# Patient Record
Sex: FEMALE | Race: BLACK OR AFRICAN AMERICAN | Employment: OTHER | URBAN - METROPOLITAN AREA
[De-identification: names, ages, dates, MRNs, and addresses within clinical notes are randomized per-mention and may not be internally consistent; named-entity substitution may affect disease eponyms.]

---

## 2017-01-23 ENCOUNTER — HOSPITAL ENCOUNTER (OUTPATIENT)
Dept: MAMMOGRAPHY | Age: 77
Discharge: HOME | End: 2017-01-23
Attending: INTERNAL MEDICINE
Payer: MEDICARE

## 2017-01-23 DIAGNOSIS — Z12.31 VISIT FOR SCREENING MAMMOGRAM: ICD-10-CM

## 2017-01-23 PROCEDURE — 77067 SCR MAMMO BI INCL CAD: CPT

## 2017-02-22 RX ORDER — LORAZEPAM 0.5 MG/1
0.5 TABLET ORAL
Qty: 30 TAB | Refills: 0 | Status: SHIPPED | OUTPATIENT
Start: 2017-02-22 | End: 2018-01-02 | Stop reason: SDUPTHER

## 2017-02-24 NOTE — TELEPHONE ENCOUNTER
The following prescription was called into pt's local pharmacy:    LORazepam (ATIVAN) 0.5 mg tablet [[id number]]   Order Details   Dose: 0.5 mg Route: Oral Frequency: BEDTIME PRN   Dispense Quantity:  30 Tab Refills:  0 Fills Remaining:  --           Sig: Take 1 Tab by mouth nightly as needed.  Max Daily Amount: 0.5 mg.          Written Date:  02/22/17 Expiration Date:  --     Start Date:  02/22/17 End Date:  --            Ordering Provider:  -- TERRY #:  -- NPI:  --    Authorizing Provider:  Prisca Daugherty MD TERRY #:  [id number] NPI:  [id number]       Original Order:  LORazepam (ATIVAN) 0.5 mg tablet [[id number]]      Pharmacy:  [facility] [address] [place] [address] TERRY #:  [id number]     Pharmacy Comments:  --          Quantity Remaining:  -- Quantity Filled:  --

## 2017-02-27 ENCOUNTER — TELEPHONE (OUTPATIENT)
Dept: INTERNAL MEDICINE CLINIC | Age: 77
End: 2017-02-27

## 2017-02-27 NOTE — TELEPHONE ENCOUNTER
Patient states she needs a call back to discuss her blood pressure reading this morning of 135/102. Please call.  Thank you

## 2017-02-27 NOTE — TELEPHONE ENCOUNTER
Called, spoke to pt. Two pt identifiers confirmed. Pt states having BP at 135/102. Pt states having some stress d/t loss of  last week. Pt states just taking BP and results were 131/81. Pt states taking amlodipine and metoprolol as prescribed. Pt advised to keep checking periodically and call if persistently elevated. Pt informed NOV 3/17/17. Pt verbalized understanding of information discussed w/ no further questions at this time.

## 2017-02-28 RX ORDER — METOPROLOL TARTRATE 50 MG/1
50 TABLET ORAL 2 TIMES DAILY
Qty: 180 TAB | Refills: 2 | Status: SHIPPED | OUTPATIENT
Start: 2017-02-28 | End: 2017-05-23 | Stop reason: SDUPTHER

## 2017-03-13 ENCOUNTER — LAB ONLY (OUTPATIENT)
Dept: INTERNAL MEDICINE CLINIC | Age: 77
End: 2017-03-13

## 2017-03-13 ENCOUNTER — HOSPITAL ENCOUNTER (OUTPATIENT)
Dept: LAB | Age: 77
Discharge: HOME | End: 2017-03-13
Payer: MEDICARE

## 2017-03-13 DIAGNOSIS — I25.10 CORONARY ARTERY DISEASE INVOLVING NATIVE CORONARY ARTERY OF NATIVE HEART WITHOUT ANGINA PECTORIS: ICD-10-CM

## 2017-03-13 DIAGNOSIS — E11.21 TYPE II DIABETES MELLITUS WITH NEPHROPATHY (HCC): ICD-10-CM

## 2017-03-13 DIAGNOSIS — E03.1 CONGENITAL HYPOTHYROIDISM WITHOUT GOITER: ICD-10-CM

## 2017-03-13 DIAGNOSIS — M81.0 OSTEOPOROSIS: ICD-10-CM

## 2017-03-13 DIAGNOSIS — E78.2 MIXED HYPERLIPIDEMIA: ICD-10-CM

## 2017-03-13 DIAGNOSIS — I50.32 DIASTOLIC CHF, CHRONIC (HCC): ICD-10-CM

## 2017-03-13 PROCEDURE — 83036 HEMOGLOBIN GLYCOSYLATED A1C: CPT

## 2017-03-13 PROCEDURE — 80053 COMPREHEN METABOLIC PANEL: CPT

## 2017-03-13 PROCEDURE — 36415 COLL VENOUS BLD VENIPUNCTURE: CPT

## 2017-03-13 PROCEDURE — 84443 ASSAY THYROID STIM HORMONE: CPT

## 2017-03-14 LAB
ALBUMIN SERPL-MCNC: 4.2 G/DL (ref 3.5–4.8)
ALBUMIN/GLOB SERPL: 1.4 {RATIO} (ref 1.2–2.2)
ALP SERPL-CCNC: 82 IU/L (ref 39–117)
ALT SERPL-CCNC: 15 IU/L (ref 0–32)
AST SERPL-CCNC: 19 IU/L (ref 0–40)
BILIRUB SERPL-MCNC: 0.5 MG/DL (ref 0–1.2)
BUN SERPL-MCNC: 16 MG/DL (ref 8–27)
BUN/CREAT SERPL: 22 (ref 11–26)
CALCIUM SERPL-MCNC: 9.5 MG/DL (ref 8.7–10.3)
CHLORIDE SERPL-SCNC: 103 MMOL/L (ref 96–106)
CO2 SERPL-SCNC: 21 MMOL/L (ref 18–29)
CREAT SERPL-MCNC: 0.74 MG/DL (ref 0.57–1)
EST. AVERAGE GLUCOSE BLD GHB EST-MCNC: 163 MG/DL
GLOBULIN SER CALC-MCNC: 3.1 G/DL (ref 1.5–4.5)
GLUCOSE SERPL-MCNC: 129 MG/DL (ref 65–99)
HBA1C MFR BLD: 7.3 % (ref 4.8–5.6)
POTASSIUM SERPL-SCNC: 4.4 MMOL/L (ref 3.5–5.2)
PROT SERPL-MCNC: 7.3 G/DL (ref 6–8.5)
SODIUM SERPL-SCNC: 145 MMOL/L (ref 134–144)
TSH SERPL DL<=0.005 MIU/L-ACNC: 3.77 UIU/ML (ref 0.45–4.5)

## 2017-03-17 ENCOUNTER — OFFICE VISIT (OUTPATIENT)
Dept: INTERNAL MEDICINE CLINIC | Age: 77
End: 2017-03-17

## 2017-03-17 VITALS
DIASTOLIC BLOOD PRESSURE: 85 MMHG | SYSTOLIC BLOOD PRESSURE: 139 MMHG | WEIGHT: 167.4 LBS | OXYGEN SATURATION: 99 % | HEART RATE: 82 BPM | BODY MASS INDEX: 31.6 KG/M2 | HEIGHT: 61 IN | TEMPERATURE: 97.5 F

## 2017-03-17 DIAGNOSIS — F51.01 PRIMARY INSOMNIA: ICD-10-CM

## 2017-03-17 DIAGNOSIS — M81.0 OSTEOPOROSIS: ICD-10-CM

## 2017-03-17 DIAGNOSIS — I10 ESSENTIAL HYPERTENSION, BENIGN: ICD-10-CM

## 2017-03-17 DIAGNOSIS — D50.8 OTHER IRON DEFICIENCY ANEMIA: ICD-10-CM

## 2017-03-17 DIAGNOSIS — F32.9 REACTIVE DEPRESSION: ICD-10-CM

## 2017-03-17 DIAGNOSIS — I25.10 CORONARY ARTERY DISEASE INVOLVING NATIVE CORONARY ARTERY OF NATIVE HEART WITHOUT ANGINA PECTORIS: ICD-10-CM

## 2017-03-17 DIAGNOSIS — E78.2 MIXED HYPERLIPIDEMIA: ICD-10-CM

## 2017-03-17 DIAGNOSIS — E03.1 CONGENITAL HYPOTHYROIDISM WITHOUT GOITER: ICD-10-CM

## 2017-03-17 DIAGNOSIS — E11.21 TYPE II DIABETES MELLITUS WITH NEPHROPATHY (HCC): Primary | ICD-10-CM

## 2017-03-17 DIAGNOSIS — I50.32 DIASTOLIC CHF, CHRONIC (HCC): ICD-10-CM

## 2017-03-17 DIAGNOSIS — I73.9 PVD (PERIPHERAL VASCULAR DISEASE) (HCC): ICD-10-CM

## 2017-03-17 NOTE — PROGRESS NOTES
HISTORY OF PRESENT ILLNESS  Contreras Rey is a 69 y.o. female. HPI   Last here 12/14/16.  Pt is here to f/u on chronic conditions    BP today is 143/72, will repeat this  She does note of increased stress with her 's death last month  BP at home running around 129/92  Continues metoprolol 50mg BID and norvasc 2.5mg daily  Recall had angioedema with lisinopril and benicar-HCT in the past  Pt called in 2/17 and stated her BP had been high a few times from her 's passing    Pt brought in her BS log for the last three months- reviewed with her   BS at home running around 77, 80s, 90s, 105, 107, 122, lowest 77 in February  In January- BS running 90s-118, in December low of 68 but not lows since this  Evening BS checked infrequently runs 120-150s, a few in the 200 range   Last visit, decreased her lantus from 22 to 20 U qhs and humalog from 8 U to 4 U with dinner  Continues victoza 1.8mg daily and metformin 1000mg BID  She also takes ASA 81mg daily  Recall issues with hypoglycemia in the past      Reviewed last labs 3/17  a1c stable overall and at goal for her- 7.5 or better      Continues on prilosec daily for reflux, works well, no breakthrough    Wt is stable since last visit  Discussed diet and weight loss     Pt follows annually with Dr. Gerhard Cranker ([address] [place] [address]) for her PAD  Reviewed reports from 3/01/17: normal toe pressures, normal blood flow in extremities at rest   She saw him 2/17 and all was stable, will f/u next year       Continues on synthroid 88 mcg daily      Reviewed mammogram 1/23/17: negative     Continues on lipitor 80mg-max dose daily for cholesterol   Per pt, Mele Bonilla wanted her to add zetia to her cholesterol medications  She will address further with him at her f/u with this physician in April       Pt follows with Dr. Mele Bonilla (cardio), last saw in 8/16  She follows q6 months, has appt pending 4/03/17        Continues lexapro 5mg daily for depression and anxiety  Pt states she does not remember to take this on daily basis- addressed with her   Pt has been feeling more sad and down recently  Her  passed away last month in 2/17   She does have some family members in the area  She states he passed away on her birthday and this was upsetting for her   Pt thinks she has been coping well overall   Pt does have things to do and keeps herself busy   She is working on finalizing issues from her 's death   She does have ativan at home to use prn for sleep and has been using this every other day with improvement to her sleeping       ACP: SDM is her daughter, Diandra Huynh  Pt brought in this paperwork today-       PREVENTIVE:    Colonoscopy: 8/02/13, Dr. Bushra Buitrago, repeat 5 years  Pap: 12/14, declines further   Mammogram: 1/23/17 negative  Dexa: 12/07/15, stopped fosamax, improved   Tdap: 1/04/2016  Pneumovax: 10/10/2010  Lhygnke77: never had?   Zostavax: 10/10/2016  Flu shot: 10/10/2016  Foot exam: 8/29/16 normal  Microalbumin: 12/15, 3/17 ordered   A1c: 8/11 6.2, 11/11 6.6, 1/24/12 6.3 , 5/12 7.2, 9/12 6.5, 12/12 6.9, 3/13 7.1, 6/13 7.0, 9/13 7.2, 2/14 7.7, 5/14 7.4, 7/14 7.8, 10/14 8.0 , 12/14 8.1, 4/15 7.7, 7/15 8.3, 9/15 7.3, 12/15 7.2 , 3/16 9.1, 8/16 7.6, 12/16 7.2, 3/17 7.3  Eye exam: Dr. Balderas Level, 8/16  Lipids: 8/16             Patient Active Problem List    Diagnosis Date Noted    Encounter for long-term (current) use of insulin ([facility] 04/04/2016    Type II diabetes mellitus with nephropathy ([facility] 01/04/2016    Lower GI bleeding 10/12/2015    Diverticulosis of intestine with bleeding 10/11/2015    Anemia 78/82/7807    Diastolic CHF, chronic ([address] 10/11/2015    GIB (gastrointestinal bleeding) 10/08/2015    ACP (advance care planning) 09/08/2015    Osteoporosis 09/10/2013    Bunion 10/15/2012    Essential hypertension, benign 05/17/2012    Mixed hyperlipidemia 05/17/2012    Postsurgical percutaneous transluminal coronary angioplasty status 05/17/2012    Old myocardial infarction 05/17/2012    Postsurgical aortocoronary bypass status 05/17/2012    S/P CABG x 3 11/11/2011    ASHD (arteriosclerotic heart disease) 11/10/2011    Heart failure ([facility] 10/18/2011    Hyperlipidemia 10/18/2011    Influenza 01/19/2011    CAD (coronary artery disease) 11/18/2009    Hypothyroidism 11/18/2009    PVD (peripheral vascular disease) ([facility] 11/18/2009     Current Outpatient Prescriptions   Medication Sig Dispense Refill    metoprolol tartrate (LOPRESSOR) 50 mg tablet Take 1 Tab by mouth two (2) times a day. 180 Tab 2    LORazepam (ATIVAN) 0.5 mg tablet Take 1 Tab by mouth nightly as needed. Max Daily Amount: 0.5 mg. 30 Tab 0    triamcinolone acetonide (KENALOG) 0.1 % topical cream Apply  to affected area two (2) times a day. use thin layer, Monday -Friday only 45 g 0    omeprazole (PRILOSEC) 40 mg capsule TAKE 1 CAPSULE BY MOUTH DAILY 90 Cap 1    metFORMIN (GLUCOPHAGE) 1,000 mg tablet TAKE 1 TABLET BY MOUTH TWICE DAILY 180 Tab 1    levothyroxine (SYNTHROID) 88 mcg tablet TAKE 1 TABLET BY MOUTH EVERY DAY BEFORE BREAKFAST 90 Tab 1    amLODIPine (NORVASC) 2.5 mg tablet TAKE 1 TABLET BY MOUTH DAILY 90 Tab 1    escitalopram oxalate (LEXAPRO) 5 mg tablet TAKE 1 TABLET BY MOUTH DAILY 90 Tab 2    INSULIN LISPRO (HUMALOG SC) 8 Units by SubCUTAneous route daily.  insulin glargine (LANTUS SOLOSTAR) 100 unit/mL (3 mL) pen 22 Units by SubCUTAneous route nightly.  glucose blood VI test strips (PRODIGY NO CODING) strip Prodigy Auto code test strips. Use to check blood sugar four times daily. Dx: E11.21 400 Strip 3    Insulin Needles, Disposable, (BD INSULIN PEN NEEDLE UF SHORT) 31 gauge x 5/16\" ndle Use as directed daily. Dx: E11.21 100 Pen Needle 11    Liraglutide (VICTOZA 3-GEE) 0.6 mg/0.1 mL (18 mg/3 mL) sub-q pen 0.3 mL by SubCUTAneous route daily.  Daily 9 Syringe 3    EPIPEN 2-GEE 0.3 mg/0.3 mL (1:1,000) injection   0    atorvastatin (LIPITOR) 80 mg tablet TAKE 1 TABLET NIGHTLY 90 Tab 3    aspirin 81 mg chewable tablet Take 1 Tab by mouth daily. 90 Tab 3    glucose blood VI test strips (ASCENSIA AUTODISC VI, ONE TOUCH ULTRA TEST VI) strip Qid 100 Strip 11    ferrous sulfate 325 mg (65 mg iron) tablet Take 1 Tab by mouth two (2) times daily (with meals). 60 Tab 1    polyethylene glycol (MIRALAX) 17 gram packet Take 1 Packet by mouth daily. 30 Each 1    cyanocobalamin (VITAMIN B-12) 1,000 mcg tablet Take 1,000 mcg by mouth daily. Past Surgical History:   Procedure Laterality Date    CARDIAC SURG PROCEDURE UNLIST      cabg x 3    HX CORONARY ARTERY BYPASS GRAFT  11/11/11    3 vessel    HX GYN      hysterectomy    HX UROLOGICAL      kidney stone    VASCULAR SURGERY PROCEDURE UNLIST      Stents put in right leg      Lab Results  Component Value Date/Time   WBC 7.2 03/31/2016 09:28 AM   Hemoglobin (POC) 12.9 03/02/2011 12:21 PM   HGB 13.7 03/31/2016 09:28 AM   HCT 41.7 03/31/2016 09:28 AM   PLATELET 490 37/10/9946 09:28 AM   MCV 85 03/31/2016 09:28 AM       Lab Results  Component Value Date/Time   Cholesterol, total 182 08/25/2016 09:47 AM   HDL Cholesterol 59 08/25/2016 09:47 AM   LDL, calculated 102 08/25/2016 09:47 AM   Triglyceride 104 08/25/2016 09:47 AM   CHOL/HDL Ratio 3.2 09/14/2010 08:42 AM       Lab Results  Component Value Date/Time   GFR est AA 91 03/13/2017 09:39 AM   GFR est non-AA 79 03/13/2017 09:39 AM   Creatinine 0.74 03/13/2017 09:39 AM   BUN 16 03/13/2017 09:39 AM   Sodium 145 03/13/2017 09:39 AM   Potassium 4.4 03/13/2017 09:39 AM   Chloride 103 03/13/2017 09:39 AM   CO2 21 03/13/2017 09:39 AM         Review of Systems   Respiratory: Negative for shortness of breath. Cardiovascular: Negative for chest pain. Physical Exam   Constitutional: She is oriented to person, place, and time. She appears well-developed and well-nourished. No distress. HENT:   Head: Normocephalic and atraumatic.    Mouth/Throat: Oropharynx is clear and moist. No oropharyngeal exudate. Eyes: Conjunctivae and EOM are normal. Right eye exhibits no discharge. Left eye exhibits no discharge. Neck: Normal range of motion. Neck supple. Cardiovascular: Normal rate, regular rhythm, normal heart sounds and intact distal pulses. Exam reveals no gallop and no friction rub. No murmur heard. Pulmonary/Chest: Effort normal and breath sounds normal. No respiratory distress. She has no wheezes. She has no rales. She exhibits no tenderness. Musculoskeletal: Normal range of motion. She exhibits no edema, tenderness or deformity. Lymphadenopathy:     She has no cervical adenopathy. Neurological: She is alert and oriented to person, place, and time. Coordination normal.   Skin: Skin is warm and dry. No rash noted. She is not diaphoretic. No erythema. No pallor. Psychiatric: She has a normal mood and affect. Her behavior is normal.       ASSESSMENT and PLAN    ICD-10-CM ICD-9-CM    1. Type II diabetes mellitus with nephropathy (HCC)    a1c controlled for her age on lantus 20 U qhs and humalog 4 U with dinner, victoza 1.8mg daily and metformin BID, goal a1c 7.5 or better, no additional changes today E11.21 250.40 AMB POC URINE, MICROALBUMIN, SEMIQUANT (3 RESULTS)     583.81 LIPID PANEL      METABOLIC PANEL, COMPREHENSIVE      HEMOGLOBIN A1C WITH EAG      TSH 3RD GENERATION   2. Osteoporosis    Due for repeat DEXA in December, currently on vit D, previously on fosamax M81.0 733.00 LIPID PANEL      METABOLIC PANEL, COMPREHENSIVE      HEMOGLOBIN A1C WITH EAG      TSH 3RD GENERATION   3. Diastolic CHF, chronic ([facility]    Follows with cardiology, Dr. Halley Yap, has appt pending to see him coming up in April. Stable, no signs or sx of active CAD I50.32 428.32 LIPID PANEL     760.3 METABOLIC PANEL, COMPREHENSIVE      HEMOGLOBIN A1C WITH EAG      TSH 3RD GENERATION   4.  Coronary artery disease involving native coronary artery of native heart without angina pectoris    Stable, sees Haim     I25.10 414.01 LIPID PANEL      METABOLIC PANEL, COMPREHENSIVE      HEMOGLOBIN A1C WITH EAG      TSH 3RD GENERATION   5. Congenital hypothyroidism without goiter    On synthroid 88mcg daily, controlled  E03.1 243 LIPID PANEL      METABOLIC PANEL, COMPREHENSIVE      HEMOGLOBIN A1C WITH EAG      TSH 3RD GENERATION   6. Mixed hyperlipidemia    On max dose lipitor, LDL close to goal. Dr. Mauricio Simon may be adding zetia to cholesterol medication if he prefers LDL less than 70, I addressed this with her. E78.2 272.2 LIPID PANEL      METABOLIC PANEL, COMPREHENSIVE      HEMOGLOBIN A1C WITH EAG      TSH 3RD GENERATION   7. Essential hypertension, benign    Initial BP mildly elevated, repeat BP improved. Continue metoprolol BID and norvasc 2.5mg daily I10 401.1 LIPID PANEL      METABOLIC PANEL, COMPREHENSIVE      HEMOGLOBIN A1C WITH EAG      TSH 3RD GENERATION   8. Other iron deficiency anemia    Mild, monitor CBC D50.8  LIPID PANEL      METABOLIC PANEL, COMPREHENSIVE      HEMOGLOBIN A1C WITH EAG      TSH 3RD GENERATION   9. PVD (peripheral vascular disease) ([facility])    Sees Dr Dk Calabrese annually, UTD I73.9 443.9 LIPID PANEL      METABOLIC PANEL, COMPREHENSIVE      HEMOGLOBIN A1C WITH EAG      TSH 3RD GENERATION   10. Reactive depression    Currently only taking her lexapro sporadically, discussed taking this every day to improve her sx F32.9 300.4 LIPID PANEL      METABOLIC PANEL, COMPREHENSIVE      HEMOGLOBIN A1C WITH EAG      TSH 3RD GENERATION   11. Primary insomnia    Has ativan which she takes qhs and this helps. F51.01 307.42 LIPID PANEL      METABOLIC PANEL, COMPREHENSIVE      HEMOGLOBIN A1C WITH EAG      TSH 3RD GENERATION            Written by Sloan De La Paz, as dictated by Tom Browne MD.    Current diagnosis and concerns discussed with pt at length.  Understands risks and benefits or current treatment plan and medications and accepts the treatment and medication with any possible risks.   Pt asks appropriate questions which were answered.   Pt instructed to call with any concerns or problems.

## 2017-03-17 NOTE — MR AVS SNAPSHOT
Visit Information Date & Time Provider Department Dept. Phone Encounter #  
 3/17/2017 11:15 AM Brianne West, [address] [place] [address],[address] [phone] [id number] Follow-up Instructions Return in about 3 months (around 6/17/2017). Your Appointments 4/3/2017 10:45 AM  
6 MONTH with Valdemar Lopez MD  
[facility] [address] [facility]) Appt Note: .; 2-28-17 pt called to cancell appt will cb ltr to resched-lj 2-28-17; 6 mo resched 3-8-17  
 [id number] [facility]  
[phone] [id number] [facility] Upcoming Health Maintenance Date Due Pneumococcal 65+ Low/Medium Risk (2 of 2 - PPSV23) 10/10/2015 EYE EXAM RETINAL OR DILATED Q1 8/5/2016 MICROALBUMIN Q1 12/30/2016 GLAUCOMA SCREENING Q2Y 8/5/2017 LIPID PANEL Q1 8/25/2017 FOOT EXAM Q1 8/29/2017 HEMOGLOBIN A1C Q6M 9/13/2017 MEDICARE YEARLY EXAM 12/15/2017 COLONOSCOPY 8/2/2018 DTaP/Tdap/Td series (2 - Td) 1/4/2026 Allergies as of 3/17/2017  Review Complete On: 3/17/2017 By: Mariana Krishnamurthy Severity Noted Reaction Type Reactions Ace Inhibitors  11/18/2009    Swelling Arb-angiotensin Receptor Antagonist  02/01/2016    Swelling Lisinopril  11/10/2011    Swelling Plavix [Clopidogrel]  11/18/2009    Other (comments) Excessive bleeding Potassium  11/25/2011   Intolerance Nausea and Vomiting Potassium containing oral products are not well tolerated by patient Current Immunizations  Reviewed on 10/14/2015 Name Date Influenza Vaccine 10/10/2016, 10/7/2014 Influenza Vaccine (Quad) PF 9/8/2015 Influenza Vaccine Split 9/6/2012, 10/18/2011 Pneumococcal Vaccine (Unspecified Type) 10/10/2010 Tdap 1/4/2016 Zoster Vaccine, Live 10/10/2016 Not reviewed this visit You Were Diagnosed With   
  
 Codes Comments Type II diabetes mellitus with nephropathy ([facility]    -  Primary ICD-10-CM: E11.21 
ICD-9-CM: 250.40, 583.81 Osteoporosis     ICD-10-CM: M81.0 ICD-9-CM: 733.00 Diastolic CHF, chronic (HCC)     ICD-10-CM: I50.32 
ICD-9-CM: 428.32, 428.0 Coronary artery disease involving native coronary artery of native heart without angina pectoris     ICD-10-CM: I25.10 ICD-9-CM: 414.01 Congenital hypothyroidism without goiter     ICD-10-CM: E03.1 ICD-9-CM: 657 Mixed hyperlipidemia     ICD-10-CM: E78.2 ICD-9-CM: 272.2 Essential hypertension, benign     ICD-10-CM: I10 
ICD-9-CM: 401.1 Other iron deficiency anemia     ICD-10-CM: D50.8   
 PVD (peripheral vascular disease) ([facility]     ICD-10-CM: I73.9 ICD-9-CM: 443.9 Reactive depression     ICD-10-CM: F32.9 ICD-9-CM: 300.4 Primary insomnia     ICD-10-CM: F51.01 
ICD-9-CM: 307.42 Vitals BP Pulse Temp Height(growth percentile) Weight(growth percentile) SpO2  
 143/72 (BP 1 Location: Right arm, BP Patient Position: Sitting) 82 97.5 °F (36.4 °C) (Oral) 5' 1\" (1.549 m) 167 lb 6.4 oz (75.9 kg) 99% BMI OB Status Smoking Status 31.63 kg/m2 Hysterectomy Former Smoker BMI and BSA Data Body Mass Index Body Surface Area  
 31.63 kg/m 2 1.81 m 2 Preferred Pharmacy Pharmacy Name Phone [address] [facility], [facility] Marylin Gorman [phone] Your Updated Medication List  
  
   
This list is accurate as of: 3/17/17 11:40 AM.  Always use your most recent med list. amLODIPine 2.5 mg tablet Commonly known as:  Marissa Pace TAKE 1 TABLET BY MOUTH DAILY  
  
 aspirin 81 mg chewable tablet Take 1 Tab by mouth daily. atorvastatin 80 mg tablet Commonly known as:  LIPITOR  
TAKE 1 TABLET NIGHTLY  
  
 EPIPEN 2-GEE 0.3 mg/0.3 mL injection Generic drug:  EPINEPHrine  
  
 escitalopram oxalate 5 mg tablet Commonly known as:  Pete Cox TAKE 1 TABLET BY MOUTH DAILY ferrous sulfate 325 mg (65 mg iron) tablet Take 1 Tab by mouth two (2) times daily (with meals). * glucose blood VI test strips strip Commonly known as:  ASCENSIA AUTODISC VI, ONE TOUCH ULTRA TEST VI Qid * glucose blood VI test strips strip Commonly known as:  PRODIGY NO CODING Prodigy Auto code test strips. Use to check blood sugar four times daily. Dx: E11.21  
  
 HUMALOG SC  
8 Units by SubCUTAneous route daily. insulin glargine 100 unit/mL (3 mL) pen Commonly known as:  LANTUS SOLOSTAR  
22 Units by SubCUTAneous route nightly. Insulin Needles (Disposable) 31 gauge x 5/16\" Ndle Commonly known as:  BD INSULIN PEN NEEDLE UF SHORT Use as directed daily. Dx: E11.21  
  
 levothyroxine 88 mcg tablet Commonly known as:  SYNTHROID  
TAKE 1 TABLET BY MOUTH EVERY DAY BEFORE BREAKFAST Liraglutide 0.6 mg/0.1 mL (18 mg/3 mL) sub-q pen Commonly known as:  VICTOZA 3-GEE  
0.3 mL by SubCUTAneous route daily. Daily LORazepam 0.5 mg tablet Commonly known as:  ATIVAN Take 1 Tab by mouth nightly as needed. Max Daily Amount: 0.5 mg.  
  
 metFORMIN 1,000 mg tablet Commonly known as:  GLUCOPHAGE  
TAKE 1 TABLET BY MOUTH TWICE DAILY  
  
 metoprolol tartrate 50 mg tablet Commonly known as:  LOPRESSOR Take 1 Tab by mouth two (2) times a day. omeprazole 40 mg capsule Commonly known as:  PRILOSEC  
TAKE 1 CAPSULE BY MOUTH DAILY polyethylene glycol 17 gram packet Commonly known as:  Waymond Gee Take 1 Packet by mouth daily. triamcinolone acetonide 0.1 % topical cream  
Commonly known as:  KENALOG Apply  to affected area two (2) times a day. use thin layer, Monday -Friday only VITAMIN B-12 1,000 mcg tablet Generic drug:  cyanocobalamin Take 1,000 mcg by mouth daily. * Notice: This list has 2 medication(s) that are the same as other medications prescribed for you.  Read the directions carefully, and ask your doctor or other care provider to review them with you. We Performed the Following AMB POC URINE, MICROALBUMIN, SEMIQUANT (3 RESULTS) [54350 CPT(R)] Follow-up Instructions Return in about 3 months (around 6/17/2017). To-Do List   
 03/23/2017 Lab:  HEMOGLOBIN A1C WITH EAG   
  
 03/23/2017 Lab:  LIPID PANEL   
  
 03/23/2017 Lab:  METABOLIC PANEL, COMPREHENSIVE   
  
 03/23/2017 Lab:  TSH 3RD GENERATION Introducing [facility]! Jennifer Dela Cruz introduces [employer] patient portal. Now you can access parts of your medical record, email your doctor's office, and request medication refills online. 1. In your internet browser, go to [email or web address] 2. Click on the First Time User? Click Here link in the Sign In box. You will see the New Member Sign Up page. 3. Enter your [employer] Access Code exactly as it appears below. You will not need to use this code after youve completed the sign-up process. If you do not sign up before the expiration date, you must request a new code. · [employer] Access Code: [email or web address] Expires: 6/15/2017 11:40 AM 
 
4. Enter the last four digits of your Social Security Number (xxxx) and Date of Birth (mm/dd/yyyy) as indicated and click Submit. You will be taken to the next sign-up page. 5. Create a [employer]il ID. This will be your [employer] login ID and cannot be changed, so think of one that is secure and easy to remember. 6. Create a [employer] password. You can change your password at any time. 7. Enter your Password Reset Question and Answer. This can be used at a later time if you forget your password. 8. Enter your e-mail address. You will receive e-mail notification when new information is available in [address] 9. Click Sign Up. You can now view and download portions of your medical record. 10. Click the Download Summary menu link to download a portable copy of your medical information. If you have questions, please visit the Frequently Asked Questions section of the [employer] website. Remember, [employer] is NOT to be used for urgent needs. For medical emergencies, dial 911. Now available from your iPhone and Android! Please provide this summary of care documentation to your next provider. Your primary care clinician is listed as Joaquin Irby. If you have any questions after today's visit, please call [phone].

## 2017-03-17 NOTE — ACP (ADVANCE CARE PLANNING)
Patient's signed ACP document received. Copy made in order to scan in to record and original returned to patient.

## 2017-03-27 RX ORDER — PEN NEEDLE, DIABETIC 31 GX5/16"
NEEDLE, DISPOSABLE MISCELLANEOUS
Qty: 180 PEN NEEDLE | Refills: 12 | Status: SHIPPED | OUTPATIENT
Start: 2017-03-27 | End: 2017-07-10 | Stop reason: SDUPTHER

## 2017-04-12 RX ORDER — ATORVASTATIN CALCIUM 80 MG/1
TABLET, FILM COATED ORAL
Qty: 90 TAB | Refills: 3 | Status: SHIPPED | OUTPATIENT
Start: 2017-04-12 | End: 2018-04-25 | Stop reason: SDUPTHER

## 2017-04-28 ENCOUNTER — OFFICE VISIT (OUTPATIENT)
Dept: CARDIOLOGY CLINIC | Age: 77
End: 2017-04-28

## 2017-04-28 VITALS
RESPIRATION RATE: 16 BRPM | HEART RATE: 84 BPM | SYSTOLIC BLOOD PRESSURE: 126 MMHG | OXYGEN SATURATION: 97 % | WEIGHT: 165.8 LBS | HEIGHT: 61 IN | DIASTOLIC BLOOD PRESSURE: 82 MMHG | BODY MASS INDEX: 31.3 KG/M2

## 2017-04-28 DIAGNOSIS — I50.32 DIASTOLIC CHF, CHRONIC (HCC): ICD-10-CM

## 2017-04-28 DIAGNOSIS — I10 ESSENTIAL HYPERTENSION, BENIGN: ICD-10-CM

## 2017-04-28 DIAGNOSIS — E78.5 HYPERLIPIDEMIA, UNSPECIFIED HYPERLIPIDEMIA TYPE: ICD-10-CM

## 2017-04-28 DIAGNOSIS — I25.10 CORONARY ARTERY DISEASE INVOLVING NATIVE CORONARY ARTERY OF NATIVE HEART WITHOUT ANGINA PECTORIS: Primary | ICD-10-CM

## 2017-04-28 DIAGNOSIS — E03.9 HYPOTHYROIDISM, UNSPECIFIED TYPE: ICD-10-CM

## 2017-04-28 DIAGNOSIS — I25.2 OLD MYOCARDIAL INFARCTION: ICD-10-CM

## 2017-04-28 DIAGNOSIS — Z95.1 S/P CABG X 3: ICD-10-CM

## 2017-04-28 DIAGNOSIS — I73.9 PVD (PERIPHERAL VASCULAR DISEASE) (HCC): ICD-10-CM

## 2017-04-28 DIAGNOSIS — E11.21 TYPE II DIABETES MELLITUS WITH NEPHROPATHY (HCC): ICD-10-CM

## 2017-04-28 RX ORDER — EZETIMIBE 10 MG/1
10 TABLET ORAL DAILY
Qty: 90 TAB | Refills: 3 | Status: SHIPPED | OUTPATIENT
Start: 2017-04-28 | End: 2017-05-10 | Stop reason: ALTCHOICE

## 2017-04-28 NOTE — PROGRESS NOTES
Subjective/HPI:     Maryjo Lau is a 69 y.o. female is here for f/u appt. The patient denies chest pain/worsening shortness of breath, orthopnea, PND, LE edema, palpitations, syncope, presyncope or fatigue.          PCP Provider  Reilly Ramírez MD  Past Medical History:   Diagnosis Date    CAD (coronary artery disease)     cabg     Diabetes ([facility]     Diverticulosis     Endocrine disease     thyroid    GERD (gastroesophageal reflux disease)     Heart disease     Heart failure ([facility] 10/18/2011    Hypertension     Other ill-defined conditions     kidney stones    Postsurgical percutaneous transluminal coronary angioplasty status 5/17/2012    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    [facility]    Sleeping difficulty     Teeth decayed     Thyroid disease     Weakness       Past Surgical History:   Procedure Laterality Date    CARDIAC SURG PROCEDURE UNLIST      cabg x 3    HX CORONARY ARTERY BYPASS GRAFT  11/11/11    3 vessel    HX GYN      hysterectomy    HX UROLOGICAL      kidney stone    VASCULAR SURGERY PROCEDURE UNLIST      Stents put in right leg     Allergies   Allergen Reactions    Ace Inhibitors Swelling    Arb-Angiotensin Receptor Antagonist Swelling    Lisinopril Swelling    Plavix [Clopidogrel] Other (comments)     Excessive bleeding    Potassium Nausea and Vomiting     Potassium containing oral products are not well tolerated by patient      Family History   Problem Relation Age of Onset    Diabetes Mother     Heart Disease Mother     Diabetes Brother     Heart Disease Brother     Mental Retardation Brother     Hypertension Brother     Other Father     Cancer Sister     Diabetes Brother     Heart Disease Brother     Diabetes Brother     Breast Cancer Daughter       Current Outpatient Prescriptions   Medication Sig    atorvastatin (LIPITOR) 80 mg tablet TAKE 1 TABLET NIGHTLY    BD INSULIN PEN NEEDLE UF SHORT 31 gauge x 5/16\" ndle USE AS DIRECTED DAILY    metoprolol tartrate (LOPRESSOR) 50 mg tablet Take 1 Tab by mouth two (2) times a day.  LORazepam (ATIVAN) 0.5 mg tablet Take 1 Tab by mouth nightly as needed. Max Daily Amount: 0.5 mg.    triamcinolone acetonide (KENALOG) 0.1 % topical cream Apply  to affected area two (2) times a day. use thin layer, Monday -Friday only    omeprazole (PRILOSEC) 40 mg capsule TAKE 1 CAPSULE BY MOUTH DAILY    metFORMIN (GLUCOPHAGE) 1,000 mg tablet TAKE 1 TABLET BY MOUTH TWICE DAILY    levothyroxine (SYNTHROID) 88 mcg tablet TAKE 1 TABLET BY MOUTH EVERY DAY BEFORE BREAKFAST    amLODIPine (NORVASC) 2.5 mg tablet TAKE 1 TABLET BY MOUTH DAILY    escitalopram oxalate (LEXAPRO) 5 mg tablet TAKE 1 TABLET BY MOUTH DAILY    INSULIN LISPRO (HUMALOG SC) 8 Units by SubCUTAneous route daily.  insulin glargine (LANTUS SOLOSTAR) 100 unit/mL (3 mL) pen 22 Units by SubCUTAneous route nightly.  glucose blood VI test strips (PRODIGY NO CODING) strip Prodigy Auto code test strips. Use to check blood sugar four times daily. Dx: E11.21    Liraglutide (VICTOZA 3-GEE) 0.6 mg/0.1 mL (18 mg/3 mL) sub-q pen 0.3 mL by SubCUTAneous route daily. Daily    aspirin 81 mg chewable tablet Take 1 Tab by mouth daily.  glucose blood VI test strips (ASCENSIA AUTODISC VI, ONE TOUCH ULTRA TEST VI) strip Qid    cyanocobalamin (VITAMIN B-12) 1,000 mcg tablet Take 1,000 mcg by mouth daily.  EPIPEN 2-GEE 0.3 mg/0.3 mL (1:1,000) injection     ferrous sulfate 325 mg (65 mg iron) tablet Take 1 Tab by mouth two (2) times daily (with meals).  polyethylene glycol (MIRALAX) 17 gram packet Take 1 Packet by mouth daily. No current facility-administered medications for this visit.        Vitals:    04/28/17 1031 04/28/17 1040   BP: 128/80 126/82   Pulse: 84    Resp: 16    SpO2: 97%    Weight: 165 lb 12.8 oz (75.2 kg)    Height: 5' 1\" (1.549 m)      Social History     Social History    Marital status:      Spouse name: N/A    Number of children: N/A    Years of education: N/A     Occupational History    Not on file. Social History Main Topics    Smoking status: Former Smoker     Packs/day: 0.50     Years: 40.00     Quit date: 9/20/2010    Smokeless tobacco: Never Used    Alcohol use No    Drug use: No    Sexual activity: No     Other Topics Concern    Not on file     Social History Narrative       I have reviewed the nurses notes, vitals, problem list, allergy list, medical history, family, social history and medications. Review of Symptoms:    General: Pt denies excessive weight gain or loss. Pt is able to conduct ADL's  HEENT: Denies blurred vision, headaches, epistaxis and difficulty swallowing. Respiratory: Denies worsening shortness of breath, STOUT, wheezing or stridor. Cardiovascular: Denies precordial pain, palpitations, edema or PND  Gastrointestinal: Denies poor appetite, indigestion, abdominal pain or blood in stool  Urinary: Denies dysuria, pyuria  Musculoskeletal: Denies pain or swelling from muscles or joints  Neurologic: Denies tremor, paresthesias, or sensory motor disturbance  Skin: Denies rash, itching or texture change. Psych: Denies depression        Physical Exam:      General: Well developed, in no acute distress, cooperative and alert  HEENT: No carotid bruits, no JVD, trach is midline. Neck Supple, PEERL, EOM intact. Heart:  Normal S1/S2 negative S3 or S4. Regular, no murmur, gallop or rub.   Respiratory: Clear bilaterally x 4, no wheezing or rales  Abdomen:   Soft, non-tender, no masses, bowel sounds are active.   Extremities:  No edema, normal cap refill, no cyanosis, atraumatic. Neuro: A&Ox3, speech clear, gait stable. Skin: Skin color is normal. No rashes or lesions. Non diaphoretic  Vascular: 2+ pulses symmetric in all extremities    Cardiographics    ECG: Sinus  Rhythm  -First degree A-V block HR 84  Low voltage in precordial leads.    Left atrial enlargement    Results for orders placed or performed during the hospital encounter of 10/11/12   EKG, 12 LEAD, INITIAL   Result Value Ref Range    Ventricular Rate 53 BPM    Atrial Rate 53 BPM    P-R Interval 212 ms    QRS Duration 88 ms    Q-T Interval 406 ms    QTC Calculation (Bezet) 380 ms    Calculated P Axis 59 degrees    Calculated R Axis 43 degrees    Calculated T Axis 25 degrees    Diagnosis       Sinus bradycardia with 1st degree AV block  Septal infarct (cited on or before 13-JUL-2009)  When compared with ECG of 10-NOV-2011 12:13,  Questionable change in initial forces of Anteroseptal leads  QT has shortened  Confirmed by MD Fausto, Sejal Motley ([id number]) on 10/11/2012 5:25:43 PM         Cardiology Labs:  Lab Results   Component Value Date/Time    Cholesterol, total 182 08/25/2016 09:47 AM    HDL Cholesterol 59 08/25/2016 09:47 AM    LDL, calculated 102 08/25/2016 09:47 AM    Triglyceride 104 08/25/2016 09:47 AM    CHOL/HDL Ratio 3.2 09/14/2010 08:42 AM       Lab Results   Component Value Date/Time    Sodium 145 03/13/2017 09:39 AM    Potassium 4.4 03/13/2017 09:39 AM    Chloride 103 03/13/2017 09:39 AM    CO2 21 03/13/2017 09:39 AM    Anion gap 7 10/16/2015 04:34 AM    Glucose 129 03/13/2017 09:39 AM    BUN 16 03/13/2017 09:39 AM    Creatinine 0.74 03/13/2017 09:39 AM    BUN/Creatinine ratio 22 03/13/2017 09:39 AM    GFR est AA 91 03/13/2017 09:39 AM    GFR est non-AA 79 03/13/2017 09:39 AM    Calcium 9.5 03/13/2017 09:39 AM    AST (SGOT) 19 03/13/2017 09:39 AM    Alk. phosphatase 82 03/13/2017 09:39 AM    Protein, total 7.3 03/13/2017 09:39 AM    Albumin 4.2 03/13/2017 09:39 AM    Globulin 3.4 10/16/2015 04:34 AM    A-G Ratio 1.4 03/13/2017 09:39 AM    ALT (SGPT) 15 03/13/2017 09:39 AM           Assessment:     Assessment:     Doug Reyes was seen today for cholesterol problem, hypertension and shortness of breath.     Diagnoses and all orders for this visit:    Coronary artery disease involving native coronary artery of native heart without angina pectoris    Hyperlipidemia, unspecified hyperlipidemia type  -     AMB POC EKG ROUTINE W/ 12 LEADS, INTER & REP    Diastolic CHF, chronic (HCC)    Essential hypertension, benign    Old myocardial infarction    S/P CABG x 3    PVD (peripheral vascular disease) ([facility])    Hypothyroidism, unspecified type    Type II diabetes mellitus with nephropathy ([facility] [address]        ICD-10-CM ICD-9-CM    1. Coronary artery disease involving native coronary artery of native heart without angina pectoris I25.10 414.01    2. Hyperlipidemia, unspecified hyperlipidemia type E78.5 272.4 AMB POC EKG ROUTINE W/ 12 LEADS, INTER & REP   3. Diastolic CHF, chronic (HCC) I50.32 428.32      428.0    4. Essential hypertension, benign I10 401.1    5. Old myocardial infarction I25.2 412    6. S/P CABG x 3 Z95.1 V45.81    7. PVD (peripheral vascular disease) ([facility]) I73.9 443.9    8. Hypothyroidism, unspecified type E03.9 244.9    9. Type II diabetes mellitus with nephropathy ([facility]) E11.21 250.40      583.81      Orders Placed This Encounter    AMB POC EKG ROUTINE W/ 12 LEADS, INTER & REP     Order Specific Question:   Reason for Exam:     Answer:   Routine        Plan:     Patient presents today for f/u appt, denies new/worsening cardiac complaints. She remains in SR without significant changes. BP is normotensive. Noted LDL not at goal in August. I will add Zetia 10mg daily, PCP can repeat labs in 3 months. f/u in 6 months. Terence Cuba NP      [facility]    4/28/2017         Agree with note as outlined by  NP. I confirm findings in history and physical exam. No additional findings noted. Agree with plan as outlined above.      Hansa Gonzalez MD

## 2017-04-28 NOTE — MR AVS SNAPSHOT
Visit Information Date & Time Provider Department Dept. Phone Encounter #  
 4/28/2017 10:30 AM Slava Wise MD [facility] [phone] [id number] Your Appointments 6/26/2017 11:30 AM  
ROUTINE CARE with Surjit Jose MD  
[facility]) Appt Note: 3 month follow up  
 [facility] [address] [id number]  
[address] [facility] [address] [place] [address] Upcoming Health Maintenance Date Due Pneumococcal 65+ Low/Medium Risk (2 of 2 - PPSV23) 10/10/2015 EYE EXAM RETINAL OR DILATED Q1 8/5/2016 MICROALBUMIN Q1 12/30/2016 GLAUCOMA SCREENING Q2Y 8/5/2017 LIPID PANEL Q1 8/25/2017 FOOT EXAM Q1 8/29/2017 HEMOGLOBIN A1C Q6M 9/13/2017 MEDICARE YEARLY EXAM 12/15/2017 COLONOSCOPY 8/2/2018 DTaP/Tdap/Td series (2 - Td) 1/4/2026 Allergies as of 4/28/2017  Review Complete On: 4/28/2017 By: Jackie Larson NP Severity Noted Reaction Type Reactions Ace Inhibitors  11/18/2009    Swelling Arb-angiotensin Receptor Antagonist  02/01/2016    Swelling Lisinopril  11/10/2011    Swelling Plavix [Clopidogrel]  11/18/2009    Other (comments) Excessive bleeding Potassium  11/25/2011   Intolerance Nausea and Vomiting Potassium containing oral products are not well tolerated by patient Current Immunizations  Reviewed on 10/14/2015 Name Date Influenza Vaccine 10/10/2016, 10/7/2014 Influenza Vaccine (Quad) PF 9/8/2015 Influenza Vaccine Split 9/6/2012, 10/18/2011 Pneumococcal Vaccine (Unspecified Type) 10/10/2010 Tdap 1/4/2016 Zoster Vaccine, Live 10/10/2016 Not reviewed this visit You Were Diagnosed With   
  
 Codes Comments Coronary artery disease involving native coronary artery of native heart without angina pectoris    -  Primary ICD-10-CM: I25.10 ICD-9-CM: 414.01   
 Hyperlipidemia, unspecified hyperlipidemia type     ICD-10-CM: E78.5 ICD-9-CM: 272.4 Diastolic CHF, chronic (HCC)     ICD-10-CM: I50.32 
ICD-9-CM: 428.32, 428.0 Essential hypertension, benign     ICD-10-CM: I10 
ICD-9-CM: 401.1 Old myocardial infarction     ICD-10-CM: I25.2 ICD-9-CM: 143 S/P CABG x 3     ICD-10-CM: Z95.1 ICD-9-CM: V45.81 PVD (peripheral vascular disease) ([facility]     ICD-10-CM: I73.9 ICD-9-CM: 443.9 Hypothyroidism, unspecified type     ICD-10-CM: E03.9 ICD-9-CM: 511. 9 Type II diabetes mellitus with nephropathy (HCC)     ICD-10-CM: E11.21 
ICD-9-CM: 250.40, 583.81 Vitals BP Pulse Resp Height(growth percentile) Weight(growth percentile) SpO2  
 126/82 (BP 1 Location: Right arm, BP Patient Position: Sitting) 84 16 5' 1\" (1.549 m) 165 lb 12.8 oz (75.2 kg) 97% BMI OB Status Smoking Status 31.33 kg/m2 Hysterectomy Former Smoker Vitals History BMI and BSA Data Body Mass Index Body Surface Area  
 31.33 kg/m 2 1.8 m 2 Preferred Pharmacy Pharmacy Name Phone [address], [facility] [phone] Your Updated Medication List  
  
   
This list is accurate as of: 4/28/17 11:10 AM.  Always use your most recent med list. amLODIPine 2.5 mg tablet Commonly known as:  Juanjo Marifer TAKE 1 TABLET BY MOUTH DAILY  
  
 aspirin 81 mg chewable tablet Take 1 Tab by mouth daily. atorvastatin 80 mg tablet Commonly known as:  LIPITOR  
TAKE 1 TABLET NIGHTLY  
  
 BD INSULIN PEN NEEDLE UF SHORT 31 gauge x 5/16\" Ndle Generic drug:  Insulin Needles (Disposable) USE AS DIRECTED DAILY  
  
 EPIPEN 2-GEE 0.3 mg/0.3 mL injection Generic drug:  EPINEPHrine  
  
 escitalopram oxalate 5 mg tablet Commonly known as:  [facility] TAKE 1 TABLET BY MOUTH DAILY ferrous sulfate 325 mg (65 mg iron) tablet Take 1 Tab by mouth two (2) times daily (with meals). * glucose blood VI test strips strip Commonly known as:  ASCENSIA AUTODISC VI, ONE TOUCH ULTRA TEST VI Qid * glucose blood VI test strips strip Commonly known as:  PRODIGY NO CODING Prodigy Auto code test strips. Use to check blood sugar four times daily. Dx: E11.21  
  
 HUMALOG SC  
8 Units by SubCUTAneous route daily. insulin glargine 100 unit/mL (3 mL) pen Commonly known as:  LANTUS SOLOSTAR  
22 Units by SubCUTAneous route nightly. levothyroxine 88 mcg tablet Commonly known as:  SYNTHROID  
TAKE 1 TABLET BY MOUTH EVERY DAY BEFORE BREAKFAST Liraglutide 0.6 mg/0.1 mL (18 mg/3 mL) sub-q pen Commonly known as:  VICTOZA 3-GEE  
0.3 mL by SubCUTAneous route daily. Daily LORazepam 0.5 mg tablet Commonly known as:  ATIVAN Take 1 Tab by mouth nightly as needed. Max Daily Amount: 0.5 mg.  
  
 metFORMIN 1,000 mg tablet Commonly known as:  GLUCOPHAGE  
TAKE 1 TABLET BY MOUTH TWICE DAILY  
  
 metoprolol tartrate 50 mg tablet Commonly known as:  LOPRESSOR Take 1 Tab by mouth two (2) times a day. omeprazole 40 mg capsule Commonly known as:  PRILOSEC  
TAKE 1 CAPSULE BY MOUTH DAILY polyethylene glycol 17 gram packet Commonly known as:  Minus Cedric Take 1 Packet by mouth daily. triamcinolone acetonide 0.1 % topical cream  
Commonly known as:  KENALOG Apply  to affected area two (2) times a day. use thin layer, Monday -Friday only VITAMIN B-12 1,000 mcg tablet Generic drug:  cyanocobalamin Take 1,000 mcg by mouth daily. * Notice: This list has 2 medication(s) that are the same as other medications prescribed for you. Read the directions carefully, and ask your doctor or other care provider to review them with you. We Performed the Following AMB POC EKG ROUTINE W/ 12 LEADS, INTER & REP [34101 CPT(R)] Introducing [facility]!    
 [facility]unb introduces [employer] patient portal. Now you can access parts of your medical record, email your doctor's office, and request medication refills online. 1. In your internet browser, go to [email or web address] 2. Click on the First Time User? Click Here link in the Sign In box. You will see the New Member Sign Up page. 3. Enter your [employer] Access Code exactly as it appears below. You will not need to use this code after youve completed the sign-up process. If you do not sign up before the expiration date, you must request a new code. · [employer] Access Code: [email or web address] Expires: 6/15/2017 11:40 AM 
 
4. Enter the last four digits of your Social Security Number (xxxx) and Date of Birth (mm/dd/yyyy) as indicated and click Submit. You will be taken to the next sign-up page. 5. Create a [employer] ID. This will be your [employer] login ID and cannot be changed, so think of one that is secure and easy to remember. 6. Create a [employer] password. You can change your password at any time. 7. Enter your Password Reset Question and Answer. This can be used at a later time if you forget your password. 8. Enter your e-mail address. You will receive e-mail notification when new information is available in [address] 9. Click Sign Up. You can now view and download portions of your medical record. 10. Click the Download Summary menu link to download a portable copy of your medical information. If you have questions, please visit the Frequently Asked Questions section of the [employer] website. Remember, [employer] is NOT to be used for urgent needs. For medical emergencies, dial 911. Now available from your iPhone and Android! Please provide this summary of care documentation to your next provider. Your primary care clinician is listed as Fifi Silver. If you have any questions after today's visit, please call [phone].

## 2017-05-02 ENCOUNTER — TELEPHONE (OUTPATIENT)
Dept: CARDIOLOGY CLINIC | Age: 77
End: 2017-05-02

## 2017-05-10 ENCOUNTER — TELEPHONE (OUTPATIENT)
Dept: CARDIOLOGY CLINIC | Age: 77
End: 2017-05-10

## 2017-05-10 RX ORDER — COLESEVELAM 180 1/1
1875 TABLET ORAL 2 TIMES DAILY WITH MEALS
COMMUNITY
End: 2019-01-09 | Stop reason: SDUPTHER

## 2017-05-10 NOTE — TELEPHONE ENCOUNTER
Pt received 30 day supply of Zetia, cost was $90, cannot afford this. Only sent 30 tabs as letter stated it is not on her formulary. Spoke with [employer] [facility] [facility] and will not be able to get any type of assistance while in the hole, does not know how much pt would need to spend to get out of hole. Is there another med pt can take? Please advise.

## 2017-05-10 NOTE — TELEPHONE ENCOUNTER
She has been on Welchol in the past. It is not the same as Zetia, works in a different way, but if she can afford that we can change it to 625mg 3 tabs bid

## 2017-05-10 NOTE — TELEPHONE ENCOUNTER
Verified patient with two identifiers. Spoke with pt, advising her to start taking Welchol 625 mg 3 tabs bid. Pt stated she has 3 bottles of Welchol at home she has never used. Will start taking that, will call when she is low so med can be reordered.

## 2017-05-10 NOTE — TELEPHONE ENCOUNTER
Patient is calling in regards to ezetimibe (ZETIA) 10 mg tablet   She states this med is not covered by her insurance and she will no be able to afford it.       Thanks

## 2017-05-24 RX ORDER — METOPROLOL TARTRATE 50 MG/1
50 TABLET ORAL 2 TIMES DAILY
Qty: 180 TAB | Refills: 2 | Status: SHIPPED | OUTPATIENT
Start: 2017-05-24 | End: 2017-11-28 | Stop reason: SDUPTHER

## 2017-05-24 RX ORDER — LEVOTHYROXINE SODIUM 88 UG/1
TABLET ORAL
Qty: 90 TAB | Refills: 1 | Status: SHIPPED | OUTPATIENT
Start: 2017-05-24 | End: 2017-06-26 | Stop reason: DRUGHIGH

## 2017-05-26 RX ORDER — ESCITALOPRAM OXALATE 5 MG/1
5 TABLET ORAL DAILY
Qty: 90 TAB | Refills: 2 | Status: SHIPPED | OUTPATIENT
Start: 2017-05-26 | End: 2017-06-26 | Stop reason: DRUGHIGH

## 2017-05-26 NOTE — TELEPHONE ENCOUNTER
Requested Prescriptions     Pending Prescriptions Disp Refills    escitalopram oxalate (LEXAPRO) 5 mg tablet 90 Tab 2     Sig: Take 1 Tab by mouth daily.      Last OV-3.17.17  Next OV-6.26.17

## 2017-05-27 RX ORDER — PEN NEEDLE, DIABETIC 30 GX3/16"
NEEDLE, DISPOSABLE MISCELLANEOUS
Qty: 1 PACKAGE | Refills: 11 | Status: SHIPPED | OUTPATIENT
Start: 2017-05-27 | End: 2017-09-26

## 2017-06-23 LAB
ALBUMIN SERPL-MCNC: 4.2 G/DL (ref 3.5–4.8)
ALBUMIN/GLOB SERPL: 1.4 {RATIO} (ref 1.2–2.2)
ALP SERPL-CCNC: 73 IU/L (ref 39–117)
ALT SERPL-CCNC: 14 IU/L (ref 0–32)
AST SERPL-CCNC: 16 IU/L (ref 0–40)
BILIRUB SERPL-MCNC: 0.7 MG/DL (ref 0–1.2)
BUN SERPL-MCNC: 14 MG/DL (ref 8–27)
BUN/CREAT SERPL: 14 (ref 12–28)
CALCIUM SERPL-MCNC: 9.5 MG/DL (ref 8.7–10.3)
CHLORIDE SERPL-SCNC: 107 MMOL/L (ref 96–106)
CHOLEST SERPL-MCNC: 134 MG/DL (ref 100–199)
CO2 SERPL-SCNC: 24 MMOL/L (ref 18–29)
CREAT SERPL-MCNC: 0.98 MG/DL (ref 0.57–1)
EST. AVERAGE GLUCOSE BLD GHB EST-MCNC: 163 MG/DL
GLOBULIN SER CALC-MCNC: 2.9 G/DL (ref 1.5–4.5)
GLUCOSE SERPL-MCNC: 82 MG/DL (ref 65–99)
HBA1C MFR BLD: 7.3 % (ref 4.8–5.6)
HDLC SERPL-MCNC: 60 MG/DL
LDLC SERPL CALC-MCNC: 53 MG/DL (ref 0–99)
POTASSIUM SERPL-SCNC: 3.9 MMOL/L (ref 3.5–5.2)
PROT SERPL-MCNC: 7.1 G/DL (ref 6–8.5)
SODIUM SERPL-SCNC: 147 MMOL/L (ref 134–144)
TRIGL SERPL-MCNC: 103 MG/DL (ref 0–149)
TSH SERPL DL<=0.005 MIU/L-ACNC: 4.85 UIU/ML (ref 0.45–4.5)
VLDLC SERPL CALC-MCNC: 21 MG/DL (ref 5–40)

## 2017-06-26 ENCOUNTER — OFFICE VISIT (OUTPATIENT)
Dept: INTERNAL MEDICINE CLINIC | Age: 77
End: 2017-06-26

## 2017-06-26 VITALS
OXYGEN SATURATION: 95 % | TEMPERATURE: 97.4 F | HEART RATE: 73 BPM | SYSTOLIC BLOOD PRESSURE: 113 MMHG | WEIGHT: 166 LBS | DIASTOLIC BLOOD PRESSURE: 72 MMHG | RESPIRATION RATE: 16 BRPM | HEIGHT: 61 IN | BODY MASS INDEX: 31.34 KG/M2

## 2017-06-26 DIAGNOSIS — E78.2 MIXED HYPERLIPIDEMIA: ICD-10-CM

## 2017-06-26 DIAGNOSIS — F32.9 REACTIVE DEPRESSION: ICD-10-CM

## 2017-06-26 DIAGNOSIS — E03.9 HYPOTHYROIDISM, UNSPECIFIED TYPE: ICD-10-CM

## 2017-06-26 DIAGNOSIS — E11.21 TYPE II DIABETES MELLITUS WITH NEPHROPATHY (HCC): Primary | ICD-10-CM

## 2017-06-26 DIAGNOSIS — I10 ESSENTIAL HYPERTENSION, BENIGN: ICD-10-CM

## 2017-06-26 DIAGNOSIS — M81.0 AGE-RELATED OSTEOPOROSIS WITHOUT CURRENT PATHOLOGICAL FRACTURE: ICD-10-CM

## 2017-06-26 DIAGNOSIS — I25.10 CORONARY ARTERY DISEASE INVOLVING NATIVE CORONARY ARTERY OF NATIVE HEART WITHOUT ANGINA PECTORIS: ICD-10-CM

## 2017-06-26 RX ORDER — ESCITALOPRAM OXALATE 10 MG/1
10 TABLET ORAL DAILY
Qty: 90 TAB | Refills: 1 | Status: SHIPPED | OUTPATIENT
Start: 2017-06-26 | End: 2018-01-02 | Stop reason: SDUPTHER

## 2017-06-26 RX ORDER — LEVOTHYROXINE SODIUM 100 UG/1
100 TABLET ORAL
Qty: 90 TAB | Refills: 1 | Status: SHIPPED | OUTPATIENT
Start: 2017-06-26 | End: 2017-11-28 | Stop reason: SDUPTHER

## 2017-06-26 RX ORDER — AMLODIPINE BESYLATE 2.5 MG/1
TABLET ORAL
Qty: 90 TAB | Refills: 1 | Status: SHIPPED | OUTPATIENT
Start: 2017-06-26 | End: 2017-11-28 | Stop reason: SDUPTHER

## 2017-06-26 NOTE — PROGRESS NOTES
HISTORY OF PRESENT ILLNESS  Oswaldo Chirinos is a 69 y.o. female. HPI   Last here 3/17/17. Pt is here to f/u on chronic conditions    BP today is   BP per cardio was 128/80   She has not been monitoring her BP at home  Continues metoprolol 50mg BID and norvasc 2.5mg daily  Recall had angioedema with lisinopril and benicar-HCT in the past  Pt called in 2/17 and stated her BP had been high a few times from her 's passing     BS at home running around 80s, 90s, 103, 114, 107, 118 in the AM, lowest 73 in the mornings  BS before dinner- 120s, 130s, 140s, occasional 160s, lower around 119  Lowest reading of 69 in the beginning of May after skipping a meal   Otherwise not having low readings  Continues lantus 20 U qhs and humalog 4 U with dinner, victoza 1.8mg daily, metformin 1000mg BID  She also takes ASA 81mg daily  Recall issues with hypoglycemia in the past, a1c under 7.5 at goal for her       Reviewed last labs 6/17  a1c 7.3-stable, thyroid suppressed    Continues on prilosec daily for reflux, works well, no breakthrough     Wt is stable since last visit  Discussed diet and weight loss     Reviewed last notes from Dr. Ramakrishna Akhtar (cardio) 4/28/17  Patient presents today for f/u appt, denies new/worsening cardiac complaints. She remains in SR without significant changes. BP is normotensive. Noted LDL not at goal in August. I will add Zetia 10mg daily, PCP can repeat labs in 3 months. f/u in 6 months.     Continues on synthroid 88 mcg daily  She is taking this separately from all medications  Discussed increasing this to 100mcg daily      Pt follows annually with Dr. Ruben Clark (vas) for her PAD  She sees him in February      Continues on lipitor 80mg-max dose daily for cholesterol   Dr. Ramakrishna Akhtar had added zetia to her regimen in April but this was too expensive  She is no longer taking this but instead back on welchol per cardio  Per instructions in [address] [facility], she should be taking two tablets BID     Pt is not currently taking iron daily      Continues lexapro 5mg daily for depression and anxiety  She is now compliant  She has been taking this every day since last visit   She has also been taking ativan prn, about twice weekly  This helps her sleep at night when especially anxious  Pt has had decreased motivation recently  Pt has not been wanting to get out and do things  Her sister, , and close friend passed away close together  She has been struggling with this   She has not been enjoying things as much  Discussed increasing dose of lexapro to 10mg daily-she is amenable        ACP: on file, SDM is her daughter, Edison Price:    Colonoscopy: 8/02/13, Dr. Marely Mahtew, repeat 5 years  Pap: 12/14, declines further   Mammogram: 1/23/17 negative  Dexa: 12/07/15, stopped fosamax, improved   Tdap: 1/04/2016  Pneumovax: 10/10/2010  [id number]: never had?   Zostavax: 10/10/2016  Flu shot: 10/10/2016  Foot exam: 6/26/17 normal  Microalbumin: 6/17 ordered   A1c: 8/11 6.2, 11/11 6.6, 1/24/12 6.3 , 5/12 7.2, 9/12 6.5, 12/12 6.9, 3/13 7.1, 6/13 7.0, 9/13 7.2, 2/14 7.7, 5/14 7.4, 7/14 7.8, 10/14 8.0 , 12/14 8.1, 4/15 7.7, 7/15 8.3, 9/15 7.3, 12/15 7.2 , 3/16 9.1, 8/16 7.6, 12/16 7.2, 3/17 7.3, 6/17 7.3  Eye exam: [facility]. Michael Loera at [facility], 4/17, will get notes  Lipids: 8/16                  Patient Active Problem List    Diagnosis Date Noted    Encounter for long-term (current) use of insulin ([facility] 04/04/2016    Type II diabetes mellitus with nephropathy ([facility] 01/04/2016    Lower GI bleeding 10/12/2015    Diverticulosis of intestine with bleeding 10/11/2015    Anemia 52/05/5124    Diastolic CHF, chronic ([facility] 10/11/2015    GIB (gastrointestinal bleeding) 10/08/2015    ACP (advance care planning) 09/08/2015    Osteoporosis 09/10/2013    Bunion 10/15/2012    Essential hypertension, benign 05/17/2012    Mixed hyperlipidemia 05/17/2012    Postsurgical percutaneous transluminal coronary angioplasty status 05/17/2012    Old myocardial infarction 05/17/2012    Postsurgical aortocoronary bypass status 05/17/2012    S/P CABG x 3 11/11/2011    ASHD (arteriosclerotic heart disease) 11/10/2011    Heart failure ([facility] 10/18/2011    Hyperlipidemia 10/18/2011    Influenza 01/19/2011    CAD (coronary artery disease) 11/18/2009    Hypothyroidism 11/18/2009    PVD (peripheral vascular disease) ([facility] 11/18/2009     Current Outpatient Prescriptions   Medication Sig Dispense Refill    Insulin Needles, Disposable, (PEN NEEDLE) 31 gauge x 5/16\" ndle Daily 1 Package 11    escitalopram oxalate (LEXAPRO) 5 mg tablet Take 1 Tab by mouth daily. 90 Tab 2    levothyroxine (SYNTHROID) 88 mcg tablet TAKE 1 TABLET BY MOUTH EVERY DAY BEFORE BREAKFAST 90 Tab 1    metoprolol tartrate (LOPRESSOR) 50 mg tablet Take 1 Tab by mouth two (2) times a day. 180 Tab 2    colesevelam (WELCHOL) 625 mg tablet Take 1,875 mg by mouth two (2) times daily (with meals).  atorvastatin (LIPITOR) 80 mg tablet TAKE 1 TABLET NIGHTLY 90 Tab 3    BD INSULIN PEN NEEDLE UF SHORT 31 gauge x 5/16\" ndle USE AS DIRECTED DAILY 180 Pen Needle 12    LORazepam (ATIVAN) 0.5 mg tablet Take 1 Tab by mouth nightly as needed. Max Daily Amount: 0.5 mg. 30 Tab 0    triamcinolone acetonide (KENALOG) 0.1 % topical cream Apply  to affected area two (2) times a day. use thin layer, Monday -Friday only 45 g 0    omeprazole (PRILOSEC) 40 mg capsule TAKE 1 CAPSULE BY MOUTH DAILY 90 Cap 1    metFORMIN (GLUCOPHAGE) 1,000 mg tablet TAKE 1 TABLET BY MOUTH TWICE DAILY 180 Tab 1    amLODIPine (NORVASC) 2.5 mg tablet TAKE 1 TABLET BY MOUTH DAILY 90 Tab 1    INSULIN LISPRO (HUMALOG SC) 8 Units by SubCUTAneous route daily.  insulin glargine (LANTUS SOLOSTAR) 100 unit/mL (3 mL) pen 22 Units by SubCUTAneous route nightly.  glucose blood VI test strips (PRODIGY NO CODING) strip Prodigy Auto code test strips. Use to check blood sugar four times daily. Dx: E11.21 400 Strip 3    Liraglutide (VICTOZA 3-GEE) 0.6 mg/0.1 mL (18 mg/3 mL) sub-q pen 0.3 mL by SubCUTAneous route daily. Daily 9 Syringe 3    EPIPEN 2-GEE 0.3 mg/0.3 mL (1:1,000) injection   0    aspirin 81 mg chewable tablet Take 1 Tab by mouth daily. 90 Tab 3    glucose blood VI test strips (ASCENSIA AUTODISC VI, ONE TOUCH ULTRA TEST VI) strip Qid 100 Strip 11    ferrous sulfate 325 mg (65 mg iron) tablet Take 1 Tab by mouth two (2) times daily (with meals). 60 Tab 1    polyethylene glycol (MIRALAX) 17 gram packet Take 1 Packet by mouth daily. 30 Each 1    cyanocobalamin (VITAMIN B-12) 1,000 mcg tablet Take 1,000 mcg by mouth daily. Past Surgical History:   Procedure Laterality Date    CARDIAC SURG PROCEDURE UNLIST      cabg x 3    HX CORONARY ARTERY BYPASS GRAFT  11/11/11    3 vessel    HX GYN      hysterectomy    HX UROLOGICAL      kidney stone    VASCULAR SURGERY PROCEDURE UNLIST      Stents put in right leg      Lab Results  Component Value Date/Time   WBC 7.2 03/31/2016 09:28 AM   HGB 13.7 03/31/2016 09:28 AM   Hemoglobin (POC) 12.9 03/02/2011 12:21 PM   HCT 41.7 03/31/2016 09:28 AM   PLATELET 137 76/47/5184 09:28 AM   MCV 85 03/31/2016 09:28 AM     Lab Results  Component Value Date/Time   Cholesterol, total 134 06/22/2017 09:41 AM   HDL Cholesterol 60 06/22/2017 09:41 AM   LDL, calculated 53 06/22/2017 09:41 AM   Triglyceride 103 06/22/2017 09:41 AM   CHOL/HDL Ratio 3.2 09/14/2010 08:42 AM     Lab Results  Component Value Date/Time   GFR est non-AA 56 06/22/2017 09:41 AM   GFR est AA 65 06/22/2017 09:41 AM   Creatinine 0.98 06/22/2017 09:41 AM   BUN 14 06/22/2017 09:41 AM   Sodium 147 06/22/2017 09:41 AM   Potassium 3.9 06/22/2017 09:41 AM   Chloride 107 06/22/2017 09:41 AM   CO2 24 06/22/2017 09:41 AM   Magnesium 1.7 10/16/2015 04:34 AM          Review of Systems   Respiratory: Negative for shortness of breath. Cardiovascular: Negative for chest pain.    Psychiatric/Behavioral: Positive for depression. Physical Exam   Constitutional: She is oriented to person, place, and time. She appears well-developed and well-nourished. No distress. HENT:   Head: Normocephalic and atraumatic. Mouth/Throat: Oropharynx is clear and moist. No oropharyngeal exudate. Eyes: Conjunctivae and EOM are normal. Right eye exhibits no discharge. Left eye exhibits no discharge. Neck: Normal range of motion. Neck supple. Cardiovascular: Normal rate, regular rhythm, normal heart sounds and intact distal pulses. Exam reveals no gallop and no friction rub. No murmur heard. Pulmonary/Chest: Effort normal and breath sounds normal. No respiratory distress. She has no wheezes. She has no rales. She exhibits no tenderness. Musculoskeletal: Normal range of motion. She exhibits no edema, tenderness or deformity. Lymphadenopathy:     She has no cervical adenopathy. Neurological: She is alert and oriented to person, place, and time. Coordination normal.   Monofilament nl BLE, good peripheral pulses, no ulcers     Skin: Skin is warm and dry. No rash noted. She is not diaphoretic. No erythema. No pallor. Psychiatric: She has a normal mood and affect. Her behavior is normal.       ASSESSMENT and PLAN    ICD-10-CM ICD-9-CM    1. Type II diabetes mellitus with nephropathy (HCC)    Adequately controlled for age on lantus 20 U, humalog 4 U with dinner, victoza 1.8mg daily and metformin 1000mg BID, continue. E11.21 250.40 HM DIABETES FOOT EXAM     221.21 METABOLIC PANEL, COMPREHENSIVE      HEMOGLOBIN A1C WITH EAG      LIPID PANEL      TSH 3RD GENERATION      MICROALBUMIN, UR, RAND W/ MICROALBUMIN/CREA RATIO   2.  Coronary artery disease involving native coronary artery of native heart without angina pectoris    UTD with Dr. Kya Smith, h/o CABG, stable and asymptomatic N32.03 430.55 METABOLIC PANEL, COMPREHENSIVE      HEMOGLOBIN A1C WITH EAG      LIPID PANEL      TSH 3RD GENERATION      MICROALBUMIN, UR, RAND W/ MICROALBUMIN/CREA RATIO   3. Hypothyroidism, unspecified type    Increase synthroid from 88mcg to 100mcg daily, repeat TFTs prior to follow up. E39.0 784.9 METABOLIC PANEL, COMPREHENSIVE      HEMOGLOBIN A1C WITH EAG      LIPID PANEL      TSH 3RD GENERATION      MICROALBUMIN, UR, RAND W/ MICROALBUMIN/CREA RATIO   4. Essential hypertension, benign    Well controlled on metoprolol 50mg BID and norvasc 2.5mg daily. W52 328.5 METABOLIC PANEL, COMPREHENSIVE      HEMOGLOBIN A1C WITH EAG      LIPID PANEL      TSH 3RD GENERATION      MICROALBUMIN, UR, RAND W/ MICROALBUMIN/CREA RATIO   5. Mixed hyperlipidemia    Now on welchol in addition to max dose lipitor, continue, repeat lipids at follow up. zetia was too expensive. S44.3 032.7 METABOLIC PANEL, COMPREHENSIVE      HEMOGLOBIN A1C WITH EAG      LIPID PANEL      TSH 3RD GENERATION      MICROALBUMIN, UR, RAND W/ MICROALBUMIN/CREA RATIO   6. Age-related osteoporosis without current pathological fracture    Previously on fosamax, currently just on vit D, due for DEXA test in December. U24.7 118.37 METABOLIC PANEL, COMPREHENSIVE      HEMOGLOBIN A1C WITH EAG      LIPID PANEL      TSH 3RD GENERATION      MICROALBUMIN, UR, RAND W/ MICROALBUMIN/CREA RATIO   7. Reactive depression    Worsening since death of  and sister, increase lexapro 10mg daily. M38.1 086.1 METABOLIC PANEL, COMPREHENSIVE      HEMOGLOBIN A1C WITH EAG      LIPID PANEL      TSH 3RD GENERATION      MICROALBUMIN, UR, RAND W/ MICROALBUMIN/CREA RATIO          Written by Dionisio Valero, as dictated by Tanner Orona MD.    Current diagnosis and concerns discussed with pt at length. Understands risks and benefits or current treatment plan and medications and accepts the treatment and medication with any possible risks.   Pt asks appropriate questions which were answered.   Pt instructed to call with any concerns or problems. This note will not be viewable in [address]

## 2017-06-26 NOTE — MR AVS SNAPSHOT
Visit Information Date & Time Provider Department Dept. Phone Encounter #  
 6/26/2017 11:30 AM Lisy Menjivar, [address] [place] [address],[address] [phone] [id number] Follow-up Instructions Return in about 3 months (around 9/26/2017). Your Appointments 11/21/2017 11:00 AM  
6 MONTH with MD T[facility]) Appt Note: 6 month per Dr. Jaime Reid, $30.00 cc, jr 4/28/17  
 [id number] [facility]  
[phone] [id number] [facility] Upcoming Health Maintenance Date Due Pneumococcal 65+ Low/Medium Risk (2 of 2 - PPSV23) 10/10/2015 EYE EXAM RETINAL OR DILATED Q1 8/5/2016 MICROALBUMIN Q1 12/30/2016 GLAUCOMA SCREENING Q2Y 8/5/2017 INFLUENZA AGE 9 TO ADULT 8/1/2017 FOOT EXAM Q1 8/29/2017 MEDICARE YEARLY EXAM 12/15/2017 HEMOGLOBIN A1C Q6M 12/22/2017 LIPID PANEL Q1 6/22/2018 COLONOSCOPY 8/2/2018 DTaP/Tdap/Td series (2 - Td) 1/4/2026 Allergies as of 6/26/2017  Review Complete On: 6/26/2017 By: Lisy Menjivar MD  
  
 Severity Noted Reaction Type Reactions Ace Inhibitors  11/18/2009    Swelling Arb-angiotensin Receptor Antagonist  02/01/2016    Swelling Lisinopril  11/10/2011    Swelling Plavix [Clopidogrel]  11/18/2009    Other (comments) Excessive bleeding Potassium  11/25/2011   Intolerance Nausea and Vomiting Potassium containing oral products are not well tolerated by patient Current Immunizations  Reviewed on 10/14/2015 Name Date Influenza Vaccine 10/10/2016, 10/7/2014 Influenza Vaccine (Quad) PF 9/8/2015 Influenza Vaccine Split 9/6/2012, 10/18/2011 Pneumococcal Vaccine (Unspecified Type) 10/10/2010 Tdap 1/4/2016 Zoster Vaccine, Live 10/10/2016 Not reviewed this visit You Were Diagnosed With   
  
 Codes Comments Type II diabetes mellitus with nephropathy ([facility]    -  Primary ICD-10-CM: E11.21 
ICD-9-CM: 250.40, 583.81 Coronary artery disease involving native coronary artery of native heart without angina pectoris     ICD-10-CM: I25.10 ICD-9-CM: 414.01 Hypothyroidism, unspecified type     ICD-10-CM: E03.9 ICD-9-CM: 244.9 Essential hypertension, benign     ICD-10-CM: I10 
ICD-9-CM: 401.1 Mixed hyperlipidemia     ICD-10-CM: E78.2 ICD-9-CM: 272.2 Age-related osteoporosis without current pathological fracture     ICD-10-CM: M81.0 ICD-9-CM: 733.01 Reactive depression     ICD-10-CM: F32.9 ICD-9-CM: 300.4 Vitals OB Status Smoking Status Hysterectomy Former Smoker Preferred Pharmacy Pharmacy Name Phone [address], [facility] [phone] Your Updated Medication List  
  
   
This list is accurate as of: 6/26/17 11:35 AM.  Always use your most recent med list. amLODIPine 2.5 mg tablet Commonly known as:  [place] TAKE 1 TABLET BY MOUTH DAILY  
  
 aspirin 81 mg chewable tablet Take 1 Tab by mouth daily. atorvastatin 80 mg tablet Commonly known as:  LIPITOR  
TAKE 1 TABLET NIGHTLY * BD INSULIN PEN NEEDLE UF SHORT 31 gauge x 5/16\" Ndle Generic drug:  Insulin Needles (Disposable) USE AS DIRECTED DAILY * Insulin Needles (Disposable) 31 gauge x 5/16\" Ndle Commonly known as:  PEN NEEDLE Daily EPIPEN 2-GEE 0.3 mg/0.3 mL injection Generic drug:  EPINEPHrine  
  
 escitalopram oxalate 10 mg tablet Commonly known as:  Maudie Phoenix Take 1 Tab by mouth daily. glucose blood VI test strips strip Commonly known as:  ASCENSIA AUTODISC VI, ONE TOUCH ULTRA TEST VI Qid HUMALOG SC  
8 Units by SubCUTAneous route daily. insulin glargine 100 unit/mL (3 mL) Inpn Commonly known as:  LANDANDRE NICHOLS  
22 Units by SubCUTAneous route nightly. levothyroxine 100 mcg tablet Commonly known as:  SYNTHROID Take 1 Tab by mouth Daily (before breakfast). Liraglutide 0.6 mg/0.1 mL (18 mg/3 mL) sub-q pen Commonly known as:  VICTOZA 3-GEE  
0.3 mL by SubCUTAneous route daily. Daily LORazepam 0.5 mg tablet Commonly known as:  ATIVAN Take 1 Tab by mouth nightly as needed. Max Daily Amount: 0.5 mg.  
  
 metFORMIN 1,000 mg tablet Commonly known as:  GLUCOPHAGE  
TAKE 1 TABLET BY MOUTH TWICE DAILY  
  
 metoprolol tartrate 50 mg tablet Commonly known as:  LOPRESSOR Take 1 Tab by mouth two (2) times a day. omeprazole 40 mg capsule Commonly known as:  PRILOSEC  
TAKE 1 CAPSULE BY MOUTH DAILY polyethylene glycol 17 gram packet Commonly known as:  [place] Rouge Take 1 Packet by mouth daily. triamcinolone acetonide 0.1 % topical cream  
Commonly known as:  KENALOG Apply  to affected area two (2) times a day. use thin layer, Monday -Friday only VITAMIN B-12 1,000 mcg tablet Generic drug:  cyanocobalamin Take 1,000 mcg by mouth daily. WELCHOL 625 mg tablet Generic drug:  colesevelam  
Take 1,875 mg by mouth two (2) times daily (with meals). * Notice: This list has 2 medication(s) that are the same as other medications prescribed for you. Read the directions carefully, and ask your doctor or other care provider to review them with you. Prescriptions Sent to Pharmacy Refills  
 levothyroxine (SYNTHROID) 100 mcg tablet 1 Sig: Take 1 Tab by mouth Daily (before breakfast). Class: Normal  
 Pharmacy: [address], [address] Ph #: [phone] Route: Oral  
 escitalopram oxalate (LEXAPRO) 10 mg tablet 1 Sig: Take 1 Tab by mouth daily. Class: Normal  
 Pharmacy: [address], [address] Ph #: [phone]  Route: Oral  
 amLODIPine (NORVASC) 2.5 mg tablet 1  
 Sig: TAKE 1 TABLET BY MOUTH DAILY Class: Normal  
 Pharmacy: [address], [address] [place] [address] Ph #: [phone] Liraglutide (VICTOZA 3-GEE) 0.6 mg/0.1 mL (18 mg/3 mL) sub-q pen 3 Si.3 mL by SubCUTAneous route daily. Daily Class: Normal  
 Pharmacy: [address], [address] [place] [address] Ph #: [phone] Route: SubCUTAneous We Performed the Following  DIABETES FOOT EXAM [7 Custom] Follow-up Instructions Return in about 3 months (around 2017). To-Do List   
 2017 Lab:  HEMOGLOBIN A1C WITH EAG   
  
 2017 Lab:  LIPID PANEL   
  
 2017 Lab:  METABOLIC PANEL, COMPREHENSIVE   
  
 2017 Lab:  MICROALBUMIN, UR, RAND W/ MICROALBUMIN/CREA RATIO   
  
 2017 Lab:  TSH 3RD GENERATION Patient Instructions Increase lexapro to 10mg Increase synthroid to 100mcg daily Introducing [facility]! [facility] [facility] introduces [facility] patient portal. Now you can access parts of your medical record, email your doctor's office, and request medication refills online. 1. In your internet browser, go to [email or web address] 2. Click on the First Time User? Click Here link in the Sign In box. You will see the New Member Sign Up page. 3. Enter your [facility] Access Code exactly as it appears below. You will not need to use this code after youve completed the sign-up process. If you do not sign up before the expiration date, you must request a new code. · [facility] Access Code: [email or web address] Expires: 2017 11:35 AM 
 
4. Enter the last four digits of your Social Security Number (xxxx) and Date of Birth (mm/dd/yyyy) as indicated and click Submit. You will be taken to the next sign-up page. 5. Create a [facility] ID.  This will be your [facility] login ID and cannot be changed, so think of one that is secure and easy to remember. 6. Create a [facility] password. You can change your password at any time. 7. Enter your Password Reset Question and Answer. This can be used at a later time if you forget your password. 8. Enter your e-mail address. You will receive e-mail notification when new information is available in [address] 9. Click Sign Up. You can now view and download portions of your medical record. 10. Click the Download Summary menu link to download a portable copy of your medical information. If you have questions, please visit the Frequently Asked Questions section of the [facility] website. Remember, [facility] is NOT to be used for urgent needs. For medical emergencies, dial 911. Now available from your iPhone and Android! Please provide this summary of care documentation to your next provider. Your primary care clinician is listed as Ever Spatz. If you have any questions after today's visit, please call [phone].

## 2017-06-29 NOTE — TELEPHONE ENCOUNTER
Pt states she is in the gap for her insurance so the [address] would cost $488. Insurance states you can prescribe Novalin (pre-filled pen)  and that will replace this. Pt will need to know how to use this. Pt states if you have any samples that would help her through this she would certainly appreciate it.

## 2017-06-29 NOTE — TELEPHONE ENCOUNTER
Please see message & advise on medications, thanks!      Requested Prescriptions     Pending Prescriptions Disp Refills    insulin aspart (NOVOLOG) 100 unit/mL inpn

## 2017-06-30 RX ORDER — INSULIN ASPART 100 [IU]/ML
INJECTION, SOLUTION INTRAVENOUS; SUBCUTANEOUS
OUTPATIENT
Start: 2017-06-30

## 2017-06-30 NOTE — TELEPHONE ENCOUNTER
victoza and novolog are not the same at all and not exchangeble    Lets see if we can provide her with samples or see if trulicity or bydureon would be more affordable

## 2017-06-30 NOTE — TELEPHONE ENCOUNTER
Call completed to patient, two identifiers verified. Patient advised per Dr. Jaguar Paiz that Victoza and Novolog are not the same at all and not exchangeable. Patient advised our office has available samples for pick-up. Patient verbalized an understanding. Samples were bagged and placed in the sample refrigerator.

## 2017-07-03 RX ORDER — INSULIN GLARGINE 100 [IU]/ML
20 INJECTION, SOLUTION SUBCUTANEOUS
Qty: 5 PEN | Refills: 1 | Status: SHIPPED | OUTPATIENT
Start: 2017-07-03 | End: 2017-07-12 | Stop reason: ALTCHOICE

## 2017-07-07 ENCOUNTER — TELEPHONE (OUTPATIENT)
Dept: INTERNAL MEDICINE CLINIC | Age: 77
End: 2017-07-07

## 2017-07-07 NOTE — TELEPHONE ENCOUNTER
Pt states the lantis insulin that you called in is still too expensive. Zackary Hebert suggested using Relion that would require vials & syringes   Please call this into Zackary Hebert and if you have any questions ask for Flora Morris at the pharmacy.

## 2017-07-10 DIAGNOSIS — E11.21 TYPE II DIABETES MELLITUS WITH NEPHROPATHY (HCC): Primary | ICD-10-CM

## 2017-07-10 RX ORDER — PEN NEEDLE, DIABETIC 30 GX3/16"
NEEDLE, DISPOSABLE MISCELLANEOUS
Qty: 180 PEN NEEDLE | Refills: 12 | Status: SHIPPED | OUTPATIENT
Start: 2017-07-10 | End: 2017-09-26

## 2017-07-10 NOTE — TELEPHONE ENCOUNTER
Patient is calling to get the status of her original request on Friday for medication change due to expense. Please call to discuss.  Thank you

## 2017-07-10 NOTE — TELEPHONE ENCOUNTER
MD Junior Verónica Lord LPN        Caller: Unspecified (3 days ago, 11:35 AM)                     the patient is on generic lantus (basaglar) -- we can change this to vials if needed

## 2017-07-10 NOTE — TELEPHONE ENCOUNTER
Called, spoke to pt. Two pt identifiers confirmed. Pt informed per Dr. Rosetta Almanzar would not prefer insulin Relion. Pt advised to check if insurance prefers Matty Childes, or Basaglar. Pt states she will be out after tonight. Pt instructed that sample in clinic ready for tomorrow. Pt states needing Bd ultra [facility]-p[facility] to PCP. Pt advised to call back with preference of insulin. Pt verbalized understanding of information discussed w/ no further questions at this time.

## 2017-07-11 DIAGNOSIS — E11.21 TYPE II DIABETES MELLITUS WITH NEPHROPATHY (HCC): Primary | ICD-10-CM

## 2017-07-11 RX ORDER — INSULIN GLARGINE 100 [IU]/ML
INJECTION, SOLUTION SUBCUTANEOUS
Qty: 1 PEN | Refills: 0 | Status: SHIPPED | COMMUNITY
Start: 2017-07-11 | End: 2017-07-12 | Stop reason: ALTCHOICE

## 2017-07-11 RX ORDER — INSULIN GLARGINE 100 [IU]/ML
INJECTION, SOLUTION SUBCUTANEOUS
Qty: 2 PEN | Refills: 0 | Status: SHIPPED | COMMUNITY
Start: 2017-07-11 | End: 2017-07-12 | Stop reason: ALTCHOICE

## 2017-07-12 ENCOUNTER — TELEPHONE (OUTPATIENT)
Dept: INTERNAL MEDICINE CLINIC | Age: 77
End: 2017-07-12

## 2017-07-12 NOTE — TELEPHONE ENCOUNTER
Called, spoke to pt. Two pt identifiers confirmed. Pt informed that novolin 70/30 sent to pharmacy. Pt advised to finish basaglar and humalog insulin then can start novolin. Pt informed once starting novolin, d/c lantus and humalog. Pt verbalized understanding of information discussed w/ no further questions at this time.

## 2017-07-24 DIAGNOSIS — E11.21 TYPE II DIABETES MELLITUS WITH NEPHROPATHY (HCC): Primary | ICD-10-CM

## 2017-07-24 NOTE — TELEPHONE ENCOUNTER
Pt also needs test strips called Prodigy (no Coding)  same amount as previous script, she just has to use this brand now due to insurance.       90 day mail order

## 2017-07-25 RX ORDER — METFORMIN HYDROCHLORIDE 1000 MG/1
TABLET ORAL
Qty: 180 TAB | Refills: 1 | Status: SHIPPED | OUTPATIENT
Start: 2017-07-25 | End: 2018-01-15 | Stop reason: SDUPTHER

## 2017-08-10 DIAGNOSIS — E78.2 MIXED HYPERLIPIDEMIA: ICD-10-CM

## 2017-08-10 RX ORDER — OMEPRAZOLE 40 MG/1
CAPSULE, DELAYED RELEASE ORAL
Qty: 90 CAP | Refills: 1 | Status: SHIPPED | OUTPATIENT
Start: 2017-08-10 | End: 2018-01-02 | Stop reason: SDUPTHER

## 2017-08-10 NOTE — TELEPHONE ENCOUNTER
Pt called concerning her Medications Rx Omeprazole, Humalog (only one pin left) Victoza (Half of pin left) Basaglar she needs this medication as well and she would like this sent to the [address].  Pt best contact number [phone]      Copied/pasted from [facility]

## 2017-08-11 ENCOUNTER — HOSPITAL ENCOUNTER (EMERGENCY)
Age: 77
Discharge: HOME | End: 2017-08-11
Attending: EMERGENCY MEDICINE
Payer: MEDICARE

## 2017-08-11 ENCOUNTER — APPOINTMENT (OUTPATIENT)
Dept: CT IMAGING | Age: 77
End: 2017-08-11
Attending: PHYSICIAN ASSISTANT
Payer: MEDICARE

## 2017-08-11 VITALS
BODY MASS INDEX: 30.43 KG/M2 | DIASTOLIC BLOOD PRESSURE: 95 MMHG | WEIGHT: 161.16 LBS | SYSTOLIC BLOOD PRESSURE: 178 MMHG | OXYGEN SATURATION: 97 % | HEART RATE: 81 BPM | TEMPERATURE: 97.8 F | HEIGHT: 61 IN | RESPIRATION RATE: 16 BRPM

## 2017-08-11 DIAGNOSIS — S09.90XA MINOR HEAD INJURY, INITIAL ENCOUNTER: Primary | ICD-10-CM

## 2017-08-11 PROCEDURE — 99282 EMERGENCY DEPT VISIT SF MDM: CPT

## 2017-08-11 PROCEDURE — 70450 CT HEAD/BRAIN W/O DYE: CPT

## 2017-08-11 RX ORDER — HYDROCODONE BITARTRATE AND ACETAMINOPHEN 5; 325 MG/1; MG/1
1 TABLET ORAL
Qty: 10 TAB | Refills: 0 | Status: SHIPPED | OUTPATIENT
Start: 2017-08-11 | End: 2017-09-26

## 2017-08-11 NOTE — DISCHARGE INSTRUCTIONS
Head Injury: Care Instructions  Your Care Instructions  Most injuries to the head are minor. Bumps, cuts, and scrapes on the head and face usually heal well and can be treated the same as injuries to other parts of the body. Although it's rare, once in a while a more serious problem shows up after you are home. So it's good to be on the lookout for symptoms for a day or two. Follow-up care is a key part of your treatment and safety. Be sure to make and go to all appointments, and call your doctor if you are having problems. It's also a good idea to know your test results and keep a list of the medicines you take. How can you care for yourself at home? · Follow your doctor's instructions. He or she will tell you if you need someone to watch you closely for the next 24 hours or longer. · Take it easy for the next few days or more if you are not feeling well. · Ask your doctor when it's okay for you to go back to activities like driving a car, riding a bike, or operating machinery. When should you call for help? Call 911 anytime you think you may need emergency care. For example, call if:  · You have a seizure. · You passed out (lost consciousness). · You are confused or can't stay awake. Call your doctor now or seek immediate medical care if:  · You have new or worse vomiting. · You feel less alert. · You have new weakness or numbness in any part of your body. Watch closely for changes in your health, and be sure to contact your doctor if:  · You do not get better as expected. · You have new symptoms, such as headaches, trouble concentrating, or changes in mood. Where can you learn more? Go to http://ginny-claudio.info/. Enter V684 in the search box to learn more about \"Head Injury: Care Instructions. \"  Current as of: October 14, 2016  Content Version: 11.3  © 1203-2012 [employer].  Care instructions adapted under license by [employer] (which disclaims liability or warranty for this information). If you have questions about a medical condition or this instruction, always ask your healthcare professional. [place] [address] any warranty or liability for your use of this information.

## 2017-08-11 NOTE — ED PROVIDER NOTES
HPI Comments:   Jorje Stewart is a 69 y.o. female with a hx of HTN, DM, CHF, CABG x3, and cardiac stents presenting to the ED C/O HA, hematoma to the left periorbital region, dizziness, and abrasion to the right knee s/p fall that occurred last night. Pt states she fell out of her bed last night but denies any LOC and is not on any blood thinners. She applied ice to the hematoma with some swelling improvement but it has not completely resolved. Patient denies nausea or any other symptoms or complaints. PCP: Charlotte Crockett MD    There are no other complaints, changes or physical findings at this time. Written by SVETLANA Connor, as dictated by Lyle Nascimento       The history is provided by the patient. No  was used.         Past Medical History:   Diagnosis Date    CAD (coronary artery disease)     cabg     Diabetes ([facility] [address]     Diverticulosis     Endocrine disease     thyroid    GERD (gastroesophageal reflux disease)     Heart disease     Heart failure ([facility] [address] 10/18/2011    Hypertension     Other ill-defined conditions     kidney stones    Postsurgical percutaneous transluminal coronary angioplasty status 5/17/2012    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    [facility]    Sleeping difficulty     Teeth decayed     Thyroid disease     Weakness        Past Surgical History:   Procedure Laterality Date    CARDIAC SURG PROCEDURE UNLIST      cabg x 3    HX CORONARY ARTERY BYPASS GRAFT  11/11/11    3 vessel    HX GYN      hysterectomy    HX UROLOGICAL      kidney stone    VASCULAR SURGERY PROCEDURE UNLIST      Stents put in right leg         Family History:   Problem Relation Age of Onset    Diabetes Mother     Heart Disease Mother     Diabetes Brother     Heart Disease Brother     Mental Retardation Brother     Hypertension Brother     Other Father     Cancer Sister     Diabetes Brother     Heart Disease Brother     Diabetes Brother     Breast Cancer Daughter Social History     Social History    Marital status:      Spouse name: N/A    Number of children: N/A    Years of education: N/A     Occupational History    Not on file. Social History Main Topics    Smoking status: Former Smoker     Packs/day: 0.50     Years: 40.00     Quit date: 9/20/2010    Smokeless tobacco: Never Used    Alcohol use No    Drug use: No    Sexual activity: No     Other Topics Concern    Not on file     Social History Narrative         ALLERGIES: Ace inhibitors; Arb-angiotensin receptor antagonist; Lisinopril; Plavix [clopidogrel]; and Potassium    Review of Systems   Constitutional: Negative for fatigue and fever. HENT: Negative for ear pain and sore throat. +Hematoma to left periorbital region   Eyes: Negative for pain, redness and visual disturbance. Respiratory: Negative for cough and shortness of breath. Cardiovascular: Negative for chest pain and palpitations. Gastrointestinal: Negative for abdominal pain, nausea and vomiting. Genitourinary: Negative for dysuria, frequency and urgency. Musculoskeletal: Negative for back pain, gait problem, neck pain and neck stiffness. Skin: Positive for wound (abrasion to right knee). Negative for rash. Neurological: Positive for dizziness and headaches. Negative for weakness, light-headedness and numbness. Vitals:    08/11/17 0800   BP: (!) 178/95   Pulse: 81   Resp: 16   Temp: 97.8 °F (36.6 °C)   SpO2: 97%   Weight: 73.1 kg (161 lb 2.5 oz)   Height: 5' 1\" (1.549 m)            Physical Exam   Constitutional: She is oriented to person, place, and time. She appears well-developed and well-nourished. No distress. HENT:   Head: Normocephalic. Right Ear: External ear normal.   Left Ear: External ear normal.   Nose: Nose normal.   Mouth/Throat: Oropharynx is clear and moist. No oropharyngeal exudate.    Large cephalohematoma on the left   Eyes: Conjunctivae and EOM are normal. Pupils are equal, round, and reactive to light. Right eye exhibits no discharge. Left eye exhibits no discharge. No scleral icterus. Neck: Normal range of motion. Neck supple. No JVD present. No tracheal deviation present. Cardiovascular: Normal rate, regular rhythm, normal heart sounds and intact distal pulses. Exam reveals no gallop and no friction rub. No murmur heard. Pulmonary/Chest: Effort normal and breath sounds normal. No respiratory distress. She has no wheezes. She has no rales. She exhibits no tenderness. Abdominal: Soft. Bowel sounds are normal. She exhibits no distension and no mass. There is no tenderness. There is no rebound and no guarding. Musculoskeletal: Normal range of motion. She exhibits no edema or tenderness. Lymphadenopathy:     She has no cervical adenopathy. Neurological: She is alert and oriented to person, place, and time. She has normal reflexes. No cranial nerve deficit. She exhibits normal muscle tone. Coordination normal.   Neurologically intact   Skin: Skin is warm and dry. She is not diaphoretic. Psychiatric: She has a normal mood and affect. Her behavior is normal. Judgment and thought content normal.   Nursing note and vitals reviewed. MDM  Number of Diagnoses or Management Options  Diagnosis management comments: DDx: intracerebral bleed, minor head trauma, abrasion       Amount and/or Complexity of Data Reviewed  Tests in the radiology section of CPT®: ordered and reviewed  Review and summarize past medical records: yes    Patient Progress  Patient progress: stable    Procedures    IMAGING RESULTS:  CT Results  (Last 48 hours)               08/11/17 0824  CT HEAD WO CONT Final result    Impression:  IMPRESSION:    1. There is no acute intracranial abnormality. 2. Left frontal extracalvarial soft tissue hematoma. Narrative:  EXAM:  CT HEAD WO CONT       INDICATION: Left frontal headache after injury getting out of bed today.        COMPARISON: CT head 9/13/2012       TECHNIQUE: Axial noncontrast head CT from foramen magnum to vertex. Coronal and   sagittal reformatted images were obtained. CT dose reduction was achieved   through use of a standardized protocol tailored for this examination and   automatic exposure control for dose modulation. FINDINGS:  There is diffuse age-related parenchymal volume loss. The ventricles   and sulci are age-appropriate without hydrocephalus. There is no mass effect or   midline shift. There is no intracranial hemorrhage or extra-axial fluid   collection. There is no significant white matter disease. The gray-white matter   differentiation is maintained. The basal cisterns are patent. The osseous structures are intact. There is a left frontal extracalvarial soft   tissue hematoma. The visualized paranasal sinuses and mastoid air cells are   clear. IMPRESSION:  1. Minor head injury, initial encounter    2. Cephalohematoma        PLAN:  1. Discharge Medication List as of 8/11/2017  8:48 AM      START taking these medications    Details   HYDROcodone-acetaminophen (NORCO) 5-325 mg per tablet Take 1 Tab by mouth every six (6) hours as needed for Pain. Max Daily Amount: 4 Tabs., Print, Disp-10 Tab, R-0         CONTINUE these medications which have NOT CHANGED    Details   ! ! Liraglutide (VICTOZA 3-GEE) 0.6 mg/0.1 mL (18 mg/3 mL) sub-q pen 0.3 mL by SubCUTAneous route daily. Daily, Normal, Disp-9 Syringe, R-3      omeprazole (PRILOSEC) 40 mg capsule TAKE 1 CAPSULE BY MOUTH DAILY, Normal, Disp-90 Cap, R-1      insulin NPH/insulin regular (NOVOLIN 70/30, HUMULIN 70/30) 100 unit/mL (70-30) injection 10 units sq bid, Normal, Disp-10 mL, R-1      lancets (FREESTYLE LANCETS) 28 gauge misc Check blood sugar 4 times daily. , Normal, Disp-200 Lancet, R-1      Blood-Glucose Meter (FREESTYLE LITE METER) monitoring kit Check blood sugar 4 times daily. , Normal, Disp-1 Kit, R-0      !! glucose blood VI test strips (FREESTYLE LITE STRIPS) strip Check blood sugar 4 times daily. , Normal, Disp-200 Strip, R-1      metFORMIN (GLUCOPHAGE) 1,000 mg tablet TAKE 1 TABLET BY MOUTH TWICE DAILY, Normal, Disp-180 Tab, R-1      !! glucose blood VI test strips (PRODIGY NO CODING) strip Check blood sugar 4 times daily. , Normal, Disp-200 Strip, R-3      !! Insulin Needles, Disposable, (BD INSULIN PEN NEEDLE UF SHORT) 31 gauge x 5/16\" ndle USE AS DIRECTED DAILY, Normal, Disp-180 Pen Needle, R-12      !! Liraglutide (VICTOZA) 0.6 mg/0.1 mL (18 mg/3 mL) sub-q pen Inject 1.8mg subq daily. , Sample, Disp-3 Each, R-0      levothyroxine (SYNTHROID) 100 mcg tablet Take 1 Tab by mouth Daily (before breakfast). , Normal, Disp-90 Tab, R-1      escitalopram oxalate (LEXAPRO) 10 mg tablet Take 1 Tab by mouth daily. , Normal, Disp-90 Tab, R-1      amLODIPine (NORVASC) 2.5 mg tablet TAKE 1 TABLET BY MOUTH DAILY, Normal, Disp-90 Tab, R-1      !! Insulin Needles, Disposable, (PEN NEEDLE) 31 gauge x 5/16\" ndle Daily, Normal, Disp-1 Package, R-11      metoprolol tartrate (LOPRESSOR) 50 mg tablet Take 1 Tab by mouth two (2) times a day., Normal, Disp-180 Tab, R-2      colesevelam (WELCHOL) 625 mg tablet Take 1,875 mg by mouth two (2) times daily (with meals). , Historical Med      atorvastatin (LIPITOR) 80 mg tablet TAKE 1 TABLET NIGHTLY, Normal, Disp-90 Tab, R-3      LORazepam (ATIVAN) 0.5 mg tablet Take 1 Tab by mouth nightly as needed. Max Daily Amount: 0.5 mg., Print, Disp-30 Tab, R-0      INSULIN LISPRO (HUMALOG SC) 8 Units by SubCUTAneous route daily. , Historical Med      EPIPEN 2-GEE 0.3 mg/0.3 mL (1:1,000) injection Historical Med, R-0, KARAN      aspirin 81 mg chewable tablet Take 1 Tab by mouth daily. , Normal, Disp-90 Tab, R-3      !! glucose blood VI test strips (ASCENSIA AUTODISC VI, ONE TOUCH ULTRA TEST VI) strip Qid, Normal, Disp-100 Strip, R-11      polyethylene glycol (MIRALAX) 17 gram packet Take 1 Packet by mouth daily. , Print, Disp-30 Each, R-1 cyanocobalamin (VITAMIN B-12) 1,000 mcg tablet Take 1,000 mcg by mouth daily. , Historical Med       !! - Potential duplicate medications found. Please discuss with provider. 2.   Follow-up Information     Follow up With Details Comments Contact Info    Lee Ann Melendez MD In 2 days As needed [address] [facility]  [phone]          Return to ED if worse     Discharge Note:  8:51 AM  The patient is ready for discharge. The patient's signs, symptoms, diagnosis, and discharge instruction have been discussed and the patient has conveyed their understanding. The patient is to follow up as recommended or return to the ER should their symptoms worsen. Plan has been discussed and the patient is in agreement. Written by Merrick Hester, ED Scribe, as dictated by Joselito Mckenna. Attestation: This note is prepared by Merrick Hester, acting as Scribe for Joselito Mckenna. KAHLIL Paz: The scribe's documentation has been prepared under my direction and personally reviewed by me in its entirety. I confirm that the note above accurately reflects all work, treatment, procedures, and medical decision making performed by me.

## 2017-08-11 NOTE — ED NOTES
Physician Assistant reviewed discharge instructions and prescriptions with pt. Pt verbalized need for follow up care. Pt denied any questions or concerns. No acute distress at this time.

## 2017-08-11 NOTE — ED NOTES
Wheelchair to private vehicle.  Reminded pt to use an ice bag pack today at least 3-4 times at 15 mins at a time today

## 2017-08-15 ENCOUNTER — TELEPHONE (OUTPATIENT)
Dept: INTERNAL MEDICINE CLINIC | Age: 77
End: 2017-08-15

## 2017-08-15 ENCOUNTER — OFFICE VISIT (OUTPATIENT)
Dept: INTERNAL MEDICINE CLINIC | Age: 77
End: 2017-08-15

## 2017-08-15 VITALS
OXYGEN SATURATION: 97 % | HEART RATE: 90 BPM | TEMPERATURE: 97.3 F | DIASTOLIC BLOOD PRESSURE: 80 MMHG | HEIGHT: 61 IN | SYSTOLIC BLOOD PRESSURE: 137 MMHG | BODY MASS INDEX: 30.4 KG/M2 | WEIGHT: 161 LBS | RESPIRATION RATE: 16 BRPM

## 2017-08-15 DIAGNOSIS — E11.21 TYPE II DIABETES MELLITUS WITH NEPHROPATHY (HCC): ICD-10-CM

## 2017-08-15 DIAGNOSIS — H11.32 CONJUNCTIVAL HEMORRHAGE OF LEFT EYE: Primary | ICD-10-CM

## 2017-08-15 DIAGNOSIS — T14.8XXA BRUISE: ICD-10-CM

## 2017-08-15 DIAGNOSIS — I10 ESSENTIAL HYPERTENSION, BENIGN: ICD-10-CM

## 2017-08-15 DIAGNOSIS — R26.89 POOR BALANCE: ICD-10-CM

## 2017-08-15 NOTE — PROGRESS NOTES
HISTORY OF PRESENT ILLNESS  Lynnette Velez is a 69 y.o. female. HPI   Last here 6/26/17. Pt is here for ED f/u     Pt was in ED 8/11/17 following a fall   Pt fell out of her bed while sleeping x Thursday, she woke up on the floor   She fell face down onto her hardwood floor and hit her head on the nightstand   She applied ice to her face at that time but was not able to fall back asleep  Her grandson was in the house at the time-lives with her, took her to ED in the morning   Reviewed notes per ED: fell out of bed, given hydrocodone for pain, neurologically intact  Reviewed CT head: L frontal soft tissue hematoma, no fracture or hemorrhage  She has significant bruising under her BL eyes today in clinic   She was given a prescription for pain but has not had any pain with this   The first several days her L eye was completely closed but this has been improving   Pt has mild blurry vision since her fall d/t the swelling  Denies any trouble eating with this   Denies lightheadedness or dizziness   Pt notes her balance has been a bit off since this fall however   Pt has never fallen out of her bed before-this was the first time  She does not have any rails to her bed, advised getting a guardrail to prevent recurrence  Advised moving her nightstand away from the side of her bed and putting pillows down next to her bed  Discussed completing PT for balance and strength- she is amenable, ordered      Labs for follow up ordered and in the computer     Provided samples of her victoza today  She is nearly out of her current supply  She is in the doughnut hole currently   Recall last visit, I had to stop lantus and humalog, started 70/30 mix- she has not started this yet. . She still has some humalog left over. She has basaglar at home. basaglar is not covered- will provide samples of lantus today. After completing basaglar and humalog will start 70/30 mix, ordered.  Discussed she can take this with the victoza but not the additional insulin. Provided instructions for her today. Advised closely monitoring her BS at home with the change in insulin regimen.      PREVENTIVE:    Colonoscopy: 8/02/13, Dr. Sydney Irby, repeat 5 years  Pap: 12/14, declines further   Mammogram: 1/23/17 negative  DEXA: 12/07/15, stopped fosamax, improved   Tdap: 1/04/2016  Pneumovax: 10/10/2010  [id number]: never had?   Zostavax: 10/10/2016  Flu shot: 10/10/2016  Foot exam: 6/26/17 normal  Microalbumin: 6/17 ordered- still due   A1c:9/13 7.2, 2/14 7.7, 5/14 7.4, 7/14 7.8, 10/14 8.0 , 12/14 8.1, 4/15 7.7, 7/15 8.3, 9/15 7.3, 12/15 7.2 , 3/16 9.1, 8/16 7.6, 12/16 7.2, 3/17 7.3, 6/17 7.3  Eye exam: Dr. Paddy Horne at [facility], 4/17, will get notes  Lipids: 6/17 LDL 53         Patient Active Problem List    Diagnosis Date Noted    Encounter for long-term (current) use of insulin ([address] 04/04/2016    Type II diabetes mellitus with nephropathy ([address] 01/04/2016    Lower GI bleeding 10/12/2015    Diverticulosis of intestine with bleeding 10/11/2015    Anemia 64/83/8264    Diastolic CHF, chronic ([address] 10/11/2015    GIB (gastrointestinal bleeding) 10/08/2015    ACP (advance care planning) 09/08/2015    Osteoporosis 09/10/2013    Bunion 10/15/2012    Essential hypertension, benign 05/17/2012    Mixed hyperlipidemia 05/17/2012    Postsurgical percutaneous transluminal coronary angioplasty status 05/17/2012    Old myocardial infarction 05/17/2012    Postsurgical aortocoronary bypass status 05/17/2012    S/P CABG x 3 11/11/2011    ASHD (arteriosclerotic heart disease) 11/10/2011    Heart failure ([address] 10/18/2011    Hyperlipidemia 10/18/2011    Influenza 01/19/2011    CAD (coronary artery disease) 11/18/2009    Hypothyroidism 11/18/2009    PVD (peripheral vascular disease) ([facility] 11/18/2009     Current Outpatient Prescriptions   Medication Sig Dispense Refill    Liraglutide (VICTOZA 3-GEE) 0.6 mg/0.1 mL (18 mg/3 mL) sub-q pen 0.3 mL by SubCUTAneous route daily. Daily 9 Syringe 3    omeprazole (PRILOSEC) 40 mg capsule TAKE 1 CAPSULE BY MOUTH DAILY 90 Cap 1    insulin NPH/insulin regular (NOVOLIN 70/30, HUMULIN 70/30) 100 unit/mL (70-30) injection 10 units sq bid 10 mL 1    lancets (FREESTYLE LANCETS) 28 gauge misc Check blood sugar 4 times daily. 200 Lancet 1    Blood-Glucose Meter (FREESTYLE LITE METER) monitoring kit Check blood sugar 4 times daily. 1 Kit 0    glucose blood VI test strips (FREESTYLE LITE STRIPS) strip Check blood sugar 4 times daily. 200 Strip 1    glucose blood VI test strips (PRODIGY NO CODING) strip Check blood sugar 4 times daily. 200 Strip 3    Insulin Needles, Disposable, (BD INSULIN PEN NEEDLE UF SHORT) 31 gauge x 5/16\" ndle USE AS DIRECTED DAILY 180 Pen Needle 12    Liraglutide (VICTOZA) 0.6 mg/0.1 mL (18 mg/3 mL) sub-q pen Inject 1.8mg subq daily. 3 Each 0    levothyroxine (SYNTHROID) 100 mcg tablet Take 1 Tab by mouth Daily (before breakfast). 90 Tab 1    escitalopram oxalate (LEXAPRO) 10 mg tablet Take 1 Tab by mouth daily. 90 Tab 1    amLODIPine (NORVASC) 2.5 mg tablet TAKE 1 TABLET BY MOUTH DAILY 90 Tab 1    Insulin Needles, Disposable, (PEN NEEDLE) 31 gauge x 5/16\" ndle Daily 1 Package 11    metoprolol tartrate (LOPRESSOR) 50 mg tablet Take 1 Tab by mouth two (2) times a day. 180 Tab 2    colesevelam (WELCHOL) 625 mg tablet Take 1,875 mg by mouth two (2) times daily (with meals).  atorvastatin (LIPITOR) 80 mg tablet TAKE 1 TABLET NIGHTLY 90 Tab 3    INSULIN LISPRO (HUMALOG SC) 4 Units by SubCUTAneous route daily.  aspirin 81 mg chewable tablet Take 1 Tab by mouth daily. 90 Tab 3    glucose blood VI test strips (ASCENSIA AUTODISC VI, ONE TOUCH ULTRA TEST VI) strip Qid 100 Strip 11    polyethylene glycol (MIRALAX) 17 gram packet Take 1 Packet by mouth daily. 30 Each 1    cyanocobalamin (VITAMIN B-12) 1,000 mcg tablet Take 1,000 mcg by mouth daily.       HYDROcodone-acetaminophen (NORCO) 5-325 mg per tablet Take 1 Tab by mouth every six (6) hours as needed for Pain. Max Daily Amount: 4 Tabs. 10 Tab 0    metFORMIN (GLUCOPHAGE) 1,000 mg tablet TAKE 1 TABLET BY MOUTH TWICE DAILY 180 Tab 1    LORazepam (ATIVAN) 0.5 mg tablet Take 1 Tab by mouth nightly as needed. Max Daily Amount: 0.5 mg. 30 Tab 0    EPIPEN 2-GEE 0.3 mg/0.3 mL (1:1,000) injection   0     Past Surgical History:   Procedure Laterality Date    CARDIAC SURG PROCEDURE UNLIST      cabg x 3    HX CORONARY ARTERY BYPASS GRAFT  11/11/11    3 vessel    HX GYN      hysterectomy    HX UROLOGICAL      kidney stone    VASCULAR SURGERY PROCEDURE UNLIST      Stents put in right leg      Lab Results  Component Value Date/Time   WBC 7.2 03/31/2016 09:28 AM   HGB 13.7 03/31/2016 09:28 AM   Hemoglobin (POC) 12.9 03/02/2011 12:21 PM   HCT 41.7 03/31/2016 09:28 AM   PLATELET 978 76/02/1578 09:28 AM   MCV 85 03/31/2016 09:28 AM     Lab Results  Component Value Date/Time   Cholesterol, total 134 06/22/2017 09:41 AM   HDL Cholesterol 60 06/22/2017 09:41 AM   LDL, calculated 53 06/22/2017 09:41 AM   Triglyceride 103 06/22/2017 09:41 AM   CHOL/HDL Ratio 3.2 09/14/2010 08:42 AM     Lab Results  Component Value Date/Time   GFR est non-AA 56 06/22/2017 09:41 AM   GFR est AA 65 06/22/2017 09:41 AM   Creatinine 0.98 06/22/2017 09:41 AM   BUN 14 06/22/2017 09:41 AM   Sodium 147 06/22/2017 09:41 AM   Potassium 3.9 06/22/2017 09:41 AM   Chloride 107 06/22/2017 09:41 AM   CO2 24 06/22/2017 09:41 AM   Magnesium 1.7 10/16/2015 04:34 AM          Review of Systems   Eyes: Positive for blurred vision. Respiratory: Negative for shortness of breath. Cardiovascular: Negative for chest pain. Musculoskeletal: Positive for falls. Physical Exam   Constitutional: She is oriented to person, place, and time. She appears well-developed and well-nourished. No distress. HENT:   Head: Normocephalic and atraumatic.    Eyes: EOM are normal. Pupils are equal, round, and reactive to light. Right eye exhibits no discharge. Left eye exhibits no discharge. No scleral icterus. Conjunctival hemorrhage L eye  Eye lid more swollen to R than L    Neck: Normal range of motion. Neck supple. Cardiovascular: Normal rate, regular rhythm, normal heart sounds and intact distal pulses. Exam reveals no gallop and no friction rub. No murmur heard. Pulmonary/Chest: Effort normal and breath sounds normal. No respiratory distress. She has no wheezes. She has no rales. She exhibits no tenderness. Musculoskeletal: Normal range of motion. She exhibits no edema, tenderness or deformity. Lymphadenopathy:     She has no cervical adenopathy. Neurological: She is alert and oriented to person, place, and time. Coordination normal.   Skin: Skin is warm and dry. No rash noted. She is not diaphoretic. No erythema. No pallor. Healing abrasion R knee with bruising around this  Mild bruising R neck  Periorbital bruising BL eyelids with eccymosis  2 inch hematoma L scalp  L periauricular bruising   Psychiatric: She has a normal mood and affect. Her behavior is normal.       ASSESSMENT and PLAN    ICD-10-CM ICD-9-CM    1. Conjunctival hemorrhage of left eye    Discussed benign etiology, will continue to resolve over time. Pt has normal ROM of her eye, vision intact. H11.32 372.72 REFERRAL TO PHYSICAL THERAPY      CBC W/O DIFF   2. Type II diabetes mellitus with nephropathy (HCC)    Pt is in [facility] and can no longer afford her victoza, lantus, or humalog, I have ordered novolog 70/30 mix for her to start at 10 U BID. She has not yet started this. Discussed when she starts to stop the lantus and the humalog, will have her monitor BS closely. She will continue victoza until she runs out and will provide her samples as much as possible. She has f/u with me in September and will adjust insulin further at that time. E11.21 250.40 REFERRAL TO PHYSICAL THERAPY     583.81 CBC W/O DIFF   3.  Bruise    Pt had head CT, no fractures found. Had mechanical fall from her bed which we discussed in great detail. Discussed precautions to take to prevent recurrent falls. Only anticoagulant was ASA. Discussed PT for her poor balance to reduce falls in the future. T14.8 924.9 REFERRAL TO PHYSICAL THERAPY      CBC W/O DIFF   4. Poor balance    Refer PT R26.89 781.99 REFERRAL TO PHYSICAL THERAPY      CBC W/O DIFF   5. Essential hypertension, benign    Well controlled, not orthostatic, continue current regimen    I10 401.1             Written by Sherren Cabal, as dictated by Elizabeth Amor MD.    Current diagnosis and concerns discussed with pt at length. Understands risks and benefits or current treatment plan and medications and accepts the treatment and medication with any possible risks.   Pt asks appropriate questions which were answered.   Pt instructed to call with any concerns or problems. This note will not be viewable in [address]

## 2017-08-15 NOTE — MR AVS SNAPSHOT
Visit Information Date & Time Provider Department Dept. Phone Encounter #  
 8/15/2017 12:00 PM Bruna Badillo, [address] [phone] [id number] Follow-up Instructions Return for as scheduled. Your Appointments 9/26/2017 11:30 AM  
ROUTINE CARE with Bruna Badillo MD  
[facility] [address] [facility]) Appt Note: 3 mon f/u  
 [facility], [place] [address] [facility]  
[phone]  
  
   
 [facility],  [facility]  [address] [facility]  
  
    
 11/21/2017 11:00 AM  
6 MONTH with [address], [address] [facility] [address]) Appt Note: 6 month per Dr. Gerardo Rm, $30.00 cc, jr 4/28/17  
 [address] [facility]  
[phone] [address] [facility] Upcoming Health Maintenance Date Due Pneumococcal 65+ Low/Medium Risk (2 of 2 - PPSV23) 10/10/2015 EYE EXAM RETINAL OR DILATED Q1 8/5/2016 MICROALBUMIN Q1 12/30/2016 INFLUENZA AGE 9 TO ADULT 8/1/2017 GLAUCOMA SCREENING Q2Y 8/5/2017 MEDICARE YEARLY EXAM 12/15/2017 HEMOGLOBIN A1C Q6M 12/22/2017 LIPID PANEL Q1 6/22/2018 FOOT EXAM Q1 6/26/2018 COLONOSCOPY 8/2/2018 DTaP/Tdap/Td series (2 - Td) 1/4/2026 Allergies as of 8/15/2017  Review Complete On: 8/15/2017 By: Bruna Badillo MD  
  
 Severity Noted Reaction Type Reactions Ace Inhibitors  11/18/2009    Swelling Arb-angiotensin Receptor Antagonist  02/01/2016    Swelling Lisinopril  11/10/2011    Swelling Plavix [Clopidogrel]  11/18/2009    Other (comments) Excessive bleeding Potassium  11/25/2011   Intolerance Nausea and Vomiting Potassium containing oral products are not well tolerated by patient Current Immunizations  Reviewed on 10/14/2015 Name Date Influenza Vaccine 10/10/2016, 10/7/2014 Influenza Vaccine (Quad) PF 9/8/2015 Influenza Vaccine Split 9/6/2012, 10/18/2011 Tdap 1/4/2016 ZZZ-RETIRED (DO NOT USE) Pneumococcal Vaccine (Unspecified Type) 10/10/2010 Zoster Vaccine, Live 10/10/2016 Not reviewed this visit You Were Diagnosed With   
  
 Codes Comments Conjunctival hemorrhage of left eye    -  Primary ICD-10-CM: H11.32 
ICD-9-CM: 372.72 Type II diabetes mellitus with nephropathy (HCC)     ICD-10-CM: E11.21 
ICD-9-CM: 250.40, 583.81 Bruise     ICD-10-CM: T14.8 ICD-9-CM: 924.9 Poor balance     ICD-10-CM: R26.89 
ICD-9-CM: 781.99 Essential hypertension, benign     ICD-10-CM: I10 
ICD-9-CM: 401.1 Vitals BP Pulse Temp Resp Height(growth percentile) Weight(growth percentile) 137/80 (BP 1 Location: Left arm, BP Patient Position: Sitting) 90 97.3 °F (36.3 °C) (Oral) 16 5' 1\" (1.549 m) 161 lb (73 kg) SpO2 BMI OB Status Smoking Status 97% 30.42 kg/m2 Hysterectomy Former Smoker Vitals History BMI and BSA Data Body Mass Index Body Surface Area  
 30.42 kg/m 2 1.77 m 2 Preferred Pharmacy Pharmacy Name Phone [facility] [address] [place] [address] [phone] Your Updated Medication List  
  
   
This list is accurate as of: 8/15/17 12:22 PM.  Always use your most recent med list. amLODIPine 2.5 mg tablet Commonly known as:  Aakash Shield TAKE 1 TABLET BY MOUTH DAILY  
  
 aspirin 81 mg chewable tablet Take 1 Tab by mouth daily. atorvastatin 80 mg tablet Commonly known as:  LIPITOR  
TAKE 1 TABLET NIGHTLY Blood-Glucose Meter monitoring kit Commonly known as:  FREESTYLE LITE METER Check blood sugar 4 times daily. EPIPEN 2-GEE 0.3 mg/0.3 mL injection Generic drug:  EPINEPHrine  
  
 escitalopram oxalate 10 mg tablet Commonly known as:  Philip Herring Take 1 Tab by mouth daily. * glucose blood VI test strips strip Commonly known as:  ASCENSIA AUTODISC VI, ONE TOUCH ULTRA TEST VI Qid * glucose blood VI test strips strip Commonly known as:  PRODIGY NO CODING Check blood sugar 4 times daily. * glucose blood VI test strips strip Commonly known as:  FREESTYLE LITE STRIPS Check blood sugar 4 times daily. HUMALOG SC  
4 Units by SubCUTAneous route daily. HYDROcodone-acetaminophen 5-325 mg per tablet Commonly known as:  Billye Raleigh Take 1 Tab by mouth every six (6) hours as needed for Pain. Max Daily Amount: 4 Tabs. * Insulin Needles (Disposable) 31 gauge x 5/16\" Ndle Commonly known as:  PEN NEEDLE Daily * Insulin Needles (Disposable) 31 gauge x 5/16\" Ndle Commonly known as:  BD INSULIN PEN NEEDLE UF SHORT  
USE AS DIRECTED DAILY  
  
 insulin NPH/insulin regular 100 unit/mL (70-30) injection Commonly known as:  NOVOLIN 70/30, HUMULIN 70/30  
10 units sq bid  
  
 lancets 28 gauge Misc Commonly known as:  FREESTYLE LANCETS Check blood sugar 4 times daily. levothyroxine 100 mcg tablet Commonly known as:  SYNTHROID Take 1 Tab by mouth Daily (before breakfast). * Liraglutide 0.6 mg/0.1 mL (18 mg/3 mL) sub-q pen Commonly known as:  Chip Dopp Inject 1.8mg subq daily. * Liraglutide 0.6 mg/0.1 mL (18 mg/3 mL) sub-q pen Commonly known as:  VICTOZA 3-GEE  
0.3 mL by SubCUTAneous route daily. Daily LORazepam 0.5 mg tablet Commonly known as:  ATIVAN Take 1 Tab by mouth nightly as needed. Max Daily Amount: 0.5 mg.  
  
 metFORMIN 1,000 mg tablet Commonly known as:  GLUCOPHAGE  
TAKE 1 TABLET BY MOUTH TWICE DAILY  
  
 metoprolol tartrate 50 mg tablet Commonly known as:  LOPRESSOR Take 1 Tab by mouth two (2) times a day. omeprazole 40 mg capsule Commonly known as:  PRILOSEC  
TAKE 1 CAPSULE BY MOUTH DAILY polyethylene glycol 17 gram packet Commonly known as:  Karli Hussar Take 1 Packet by mouth daily. VITAMIN B-12 1,000 mcg tablet Generic drug:  cyanocobalamin Take 1,000 mcg by mouth daily. WELCHOL 625 mg tablet Generic drug:  colesevelam  
Take 1,875 mg by mouth two (2) times daily (with meals). * Notice: This list has 7 medication(s) that are the same as other medications prescribed for you. Read the directions carefully, and ask your doctor or other care provider to review them with you. We Performed the Following REFERRAL TO PHYSICAL THERAPY [MOS21 Custom] Comments:  
 Please evaluate patient for balance Please schedule and authorize patient for services Follow-up Instructions Return for as scheduled. To-Do List   
 08/15/2017 Lab:  CBC W/O DIFF Referral Information Referral ID Referred By Referred To  
  
 [id number] BRENNA, Nhi Angel [facility] [address] [place], [address] [facility] Phone: [phone] Fax: [phone] Visits Status Start Date End Date 1 New Request 8/15/17 8/15/18 If your referral has a status of pending review or denied, additional information will be sent to support the outcome of this decision. Patient Instructions 70/30 insulin mix 10 units 2 times per day will replace lantus and humalog Introducing [facility]! [facility] introduces [employer] patient portal. Now you can access parts of your medical record, email your doctor's office, and request medication refills online. 1. In your internet browser, go to [email or web address] 2. Click on the First Time User? Click Here link in the Sign In box. You will see the New Member Sign Up page. 3. Enter your [employer]ajan Access Code exactly as it appears below. You will not need to use this code after youve completed the sign-up process. If you do not sign up before the expiration date, you must request a new code. · [employer] Access Code: [email or web address] Expires: 9/24/2017 11:35 AM 
 
4.  Enter the last four digits of your Social Security Number (xxxx) and Date of Birth (mm/dd/yyyy) as indicated and click Submit. You will be taken to the next sign-up page. 5. Create a [employer]res ID. This will be your [employer] login ID and cannot be changed, so think of one that is secure and easy to remember. 6. Create a [employer] password. You can change your password at any time. 7. Enter your Password Reset Question and Answer. This can be used at a later time if you forget your password. 8. Enter your e-mail address. You will receive e-mail notification when new information is available in [address] 9. Click Sign Up. You can now view and download portions of your medical record. 10. Click the Download Summary menu link to download a portable copy of your medical information. If you have questions, please visit the Frequently Asked Questions section of the [employer] website. Remember, [employer] is NOT to be used for urgent needs. For medical emergencies, dial 911. Now available from your iPhone and Android! Please provide this summary of care documentation to your next provider. Your primary care clinician is listed as Nasreen Barraza. If you have any questions after today's visit, please call [phone].

## 2017-08-15 NOTE — TELEPHONE ENCOUNTER
Patient states she needs a call back to be advised if there are any samples of [id number] [facility] that she can get from Dr. Trudi Morales. Patient states she also needs to discuss those medications if not available what else can be prescribed. Please call to advise. Patient does have appt that was scheduled while on phone.  Thank you

## 2017-08-15 NOTE — TELEPHONE ENCOUNTER
Called and spoke to pt. Two pt identifiers confirmed. Reported to pt that [id number] [facility] samples where available. Pt stated she would be by sometime this week to . No further questions at time of call.

## 2017-09-05 ENCOUNTER — HOSPITAL ENCOUNTER (OUTPATIENT)
Dept: PHYSICAL THERAPY | Age: 77
Discharge: HOME | End: 2017-09-05
Payer: MEDICARE

## 2017-09-05 PROCEDURE — 97110 THERAPEUTIC EXERCISES: CPT

## 2017-09-05 PROCEDURE — G8979 MOBILITY GOAL STATUS: HCPCS

## 2017-09-05 PROCEDURE — 97162 PT EVAL MOD COMPLEX 30 MIN: CPT

## 2017-09-05 PROCEDURE — G8978 MOBILITY CURRENT STATUS: HCPCS

## 2017-09-05 NOTE — PROGRESS NOTES
PT INITIAL EVALUATION NOTE - [facility] 2-15    Patient Name: Lyle Miranda  Date:2017  : 1940  [x]  Patient  Verified  Payor: [employer] / Plan: [address] [place] [address] HMO / Product Type: Managed Care Medicare /    In time: 9:40 AM  Out time: 10:45 AM  Total Treatment Time (min): 65 minutes  Total Timed Codes (min): 15 minutes  1:1 Treatment Time ( only): 65 minutes   Visit #: 1     Treatment Area: r26.89    SUBJECTIVE  Pain Level (0-10 scale): 0/10  Any medication changes, allergies to medications, adverse drug reactions, diagnosis change, or new procedure performed?: [] No    [x] Yes (see summary sheet for update)  Subjective: The Pt reports that she has noticed that she has been stumbling and losing her balance more frequently over the last month. She did fall out of her bed and hit her head on the nightstand and went to the ED- CT scan was negative. She states that the fall had nothing to do with her balance, but her balance has been progressively worsening. She denies any other major falls, but she has had increasingly number of trips and stumbles. She states that she has noticed a gradual progressing in her LE weakness, and reports occasional instability and buckling after standing/walking >30 minutes. She has noticed difficulty ambulating on uneven surfaces. She used to walk her daughter's dog, but does not feel comfortable walking the dog because of her balance. She wants to get back to \"not feeling like I'm going to fall\". She lives in a 1-story home with 3 stairs to enter (handrails on both sides) and denies any difficulty going up and down those stairs; but states she has difficulty climbing a flight of stairs (she takes 1 step at a time). She is independent with her ADLs, but reports a decrease in activity due to fear of falling. She states that at the grocery store she has to use a cart because she is afraid she will fall.  PMH: CHF, HTN, triple bypass surgery , PVD, and diabetes. OBJECTIVE/EXAMINATION  Posture: In standing, lean to right  Other Observations:  N/A  Gait and Functional Mobility:  Mild Trendelenburg gait bilaterally; decreased heel strike and foot clearance bilaterally, decreased knee flexion during swing bilaterally    Lumbar ROM:   Flexion:  Moderate   Extension: Moderate   Side Bending: Right: Mild   Left: Mild   Rotation: Right: Moderate    Left: Moderate    Balance Assessment: severe deficits in balance and neuromuscular control in single limb stance bilaterally (right- 1s; left- 3s)    Squat Assessment:   Double Leg NT   Single Leg NT    Neurological: Sensations: Intact to light touch sensation L1-S1 bilaterally    Flexibility: Moderate-severe restrictions in hamstrings, hip internal and external rotators, quadriceps, iliopsoas, and gastrocnemius/soleus complex bilaterally     Right Knee:  AROM WFL   Left  Knee:  AROM WFL      LOWER QUARTER   MUSCLE STRENGTH  KEY       R  L  0 - No Contraction  Hip flex  4-  4-  1 - Trace          er    4-  4-  2 - Poor          ir   4  4  3 - Fair           abd  3+  3+  4 - Good          ext  NT  NT  5 - Normal   Knee flex  4  4               Ext  4-  4-      Ankle DF  4  4                PF  4  4    Joint Mobility Assessment: Not assessed  Palpation: Not assessed    Special Tests:Varus: NT     SLR: Neg B    Valgus: NT     Slump: NT    Shivam's: NT    Madan: Positive B    Anterior Drawer: NT    Obers: NT    Posterior Drawer: NT    Clonus: Neg B    Lachman's: NT    TU.11 s    30s sit to stand: 8 (no hands)    MSTSIB: trial 1- 30s, trial 2- 30s, trial 5- 10s, trial 6- 3s       15 min Therapeutic Exercise:  [x] See flow sheet :   Rationale: increase ROM, increase strength, improve coordination, improve balance and increase proprioception to improve the patients ability to ambulate and perform ADLs with less difficulty or risk of falling          With   [x] TE   [] TA   [] neuro   [x] other: Patient Education: [x] Review HEP    [] Progressed/Changed HEP based on:   [] positioning   [] body mechanics   [] transfers   [] heat/ice application    [x] other: Pt educated on diagnosis and prognosis with therapy     Other Objective/Functional Measures: FOTO will be assessed at next visit    Pain Level (0-10 scale) post treatment: 0/10    ASSESSMENT/Changes in Function:     [x]  See Plan of Mamie Noyola, PT 9/5/2017  9:41 AM

## 2017-09-05 NOTE — PROGRESS NOTES
[address] [address] (MOB IV), [address] [place]  Phone: [phone] Fax: [phone]     Plan of Care/Statement of Necessity for Physical Therapy Services  2-15    Patient name: Toño Fu  : 1940  Provider#: [id number]  Referral source: Soniya Cerrato MD      Medical/Treatment Diagnosis: r26.89     Prior Hospitalization: see medical history     Comorbidities:  CHF, HTN, triple bypass surgery , PVD, and diabetes. Prior Level of Function: The patient completed 20 minutes of exercise seldom or never  Medications: Verified on Patient Summary List  Start of Care: 17      Onset Date: 2017   The Plan of Care and following information is based on the information from the initial evaluation. Assessment/ key information: The Pt is a pleasant and motivated 69year old female who presents to therapy with complaints of gradually worsening balance and lower extremity strength. The Pt currently displays decreased lower extremity strength and stability, decreased core strength and stability, decreased balance and neuromuscular control, moderate/severe restrictions in her hip musculature flexibility, gait impairmentsand decreased activity tolerance and endurance. The patient would benefit from skilled physical therapy to help improve the above listed impairments to allow the patient to safely return to their prior level of function with less overall pain or risk of further injury. The patient has a good prognosis with skilled physical therapy. Evaluation Complexity History HIGH Complexity :3+ comorbidities / personal factors will impact the outcome/ POC ; Examination HIGH Complexity : 4+ Standardized tests and measures addressing body structure, function, activity limitation and / or participation in recreation  ;Presentation MEDIUM Complexity : Evolving with changing characteristics  ; Clinical Decision Making MEDIUM Complexity : FOTO score of 26-74  Overall Complexity Rating: MEDIUM    Problem List: decrease ROM, decrease strength, impaired gait/ balance, decrease ADL/ functional abilitiies, decrease activity tolerance, decrease flexibility/ joint mobility and decrease transfer abilities   Treatment Plan may include any combination of the following: Therapeutic exercise, Therapeutic activities, Neuromuscular re-education, Physical agent/modality, Gait/balance training, Manual therapy, Patient education, Self Care training, Functional mobility training, Home safety training and Stair training  Patient / Family readiness to learn indicated by: asking questions and interest  Persons(s) to be included in education: patient (P)  Barriers to Learning/Limitations: None  Patient Goal (s): to get my steps and balance corrected so I won't fall  Patient Self Reported Health Status: fair  Rehabilitation Potential: good    Short Term Goals: To be accomplished in 6 treatments:  1. The Pt will be independent and compliant with her HEP. Long Term Goals: To be accomplished in 12 treatments:  1. The Pt will score the MCII on her FOTO survey demonstrating improved overall function. 2. The Pt will be able to stand/walk >/=45 minutes without feelings of weakness or instability to allow the Pt to be able to perform ADLs with less difficulty. 3. The Pt will be able to perform >/= 11 sit to stands in 30s demonstrating improved LE strength. 4. The Pt will score >/= 85/120 on her mSTSIB demonstrating improved balance and neuromuscular control. Frequency / Duration: Patient to be seen 1-2 times per week for 12 treatments. Patient/ Caregiver education and instruction: self care, activity modification and exercises    []  Plan of care has been reviewed with PTA    G-Codes (GP)  Mobility   Current  CL= 60-79%   Goal  CK= 40-59%  The severity rating is based on clinical judgment.     Certification Period: 9/5/17-12/5/17    Estrella Clarke, PT 9/5/2017 2:47 PM    ________________________________________________________________________    I certify that the above Therapy Services are being furnished while the patient is under my care. I agree with the treatment plan and certify that this therapy is necessary.     [de-identified] Signature:____________________  Date:____________Time: _________

## 2017-09-11 ENCOUNTER — HOSPITAL ENCOUNTER (OUTPATIENT)
Dept: PHYSICAL THERAPY | Age: 77
Discharge: HOME | End: 2017-09-11
Payer: MEDICARE

## 2017-09-11 PROCEDURE — 97530 THERAPEUTIC ACTIVITIES: CPT

## 2017-09-11 PROCEDURE — 97110 THERAPEUTIC EXERCISES: CPT

## 2017-09-11 NOTE — PROGRESS NOTES
PT DAILY TREATMENT NOTE - [facility] 2-15    Patient Name: Fabiola Sims  Date:2017  : 1940  [x]  Patient  Verified  Payor: Laura Denice / Plan: [address] [place] [address] HMO / Product Type: Managed Care Medicare /    In time:1230  Out time:130  Total Treatment Time (min): 60  Total Timed Codes (min): 45  1:1 Treatment Time ( [address] only): 45   Visit #: 2     Treatment Area: Other abnormalities of gait and mobility [R26.89]    SUBJECTIVE  Pain Level (0-10 scale): 0  Any medication changes, allergies to medications, adverse drug reactions, diagnosis change, or new procedure performed?: [x] No    [] Yes (see summary sheet for update)  Subjective functional status/changes:   [] No changes reported  States compliant with HEP. No change in overall s/s. OBJECTIVE    30 min Therapeutic Exercise:  [x] See flow sheet :   Rationale: increase strength, improve coordination, improve balance and increase proprioception to improve the patients ability to function at a higher level    15 min Therapeutic Activity:  [x]  See flow sheet :   Rationale: improve coordination, improve balance and increase proprioception  to improve the patients ability to function at a higher level           With   [] TE   [] TA   [] neuro   [] other: Patient Education: [x] Review HEP    [] Progressed/Changed HEP based on:   [] positioning   [] body mechanics   [] transfers   [] heat/ice application    [] other:      Other Objective/Functional Measures:      Pain Level (0-10 scale) post treatment: 0    ASSESSMENT/Changes in Function:   Needs frequent breaks, gets fatigued easily  Patient will continue to benefit from skilled PT services to address ROM deficits, analyze and address soft tissue restrictions, analyze and modify body mechanics/ergonomics and assess and modify postural abnormalities to attain remaining goals.      [x]  See Plan of Care  []  See progress note/recertification  []  See Discharge Summary         Progress towards goals / Updated goals:      PLAN  []  Upgrade activities as tolerated     [x]  Continue plan of care  []  Update interventions per flow sheet       []  Discharge due to:_  []  Other:_      Barbara Palomares, PT 9/11/2017  3:04 PM

## 2017-09-14 ENCOUNTER — HOSPITAL ENCOUNTER (OUTPATIENT)
Dept: PHYSICAL THERAPY | Age: 77
Discharge: HOME | End: 2017-09-14
Payer: MEDICARE

## 2017-09-14 PROCEDURE — 97110 THERAPEUTIC EXERCISES: CPT

## 2017-09-14 PROCEDURE — 97530 THERAPEUTIC ACTIVITIES: CPT

## 2017-09-14 NOTE — PROGRESS NOTES
PT DAILY TREATMENT NOTE - [facility] 2-15    Patient Name: Carolin New  Date:2017  : 1940  [x]  Patient  Verified  Payor: Jenny Bermudez / Plan: [address] [place], [place] [address] HMO / Product Type: Managed Care Medicare /    In time:1230  Out time:130  Total Treatment Time (min): 60  Total Timed Codes (min): 45  1:1 Treatment Time ( [address] only): 39   Visit #: 3     Treatment Area: r26.    SUBJECTIVE  Pain Level (0-10 scale): 0  Any medication changes, allergies to medications, adverse drug reactions, diagnosis change, or new procedure performed?: [x] No    [] Yes (see summary sheet for update)  Subjective functional status/changes:   [] No changes reported  Tolerated last session without pain or residual effects. No pain at onset today. OBJECTIVE    25 min Therapeutic Exercise:  [x] See flow sheet :   Rationale: increase strength, improve coordination, improve balance and increase proprioception to improve the patients ability to function at a higher level    25 min Therapeutic Activity:  [x]  See flow sheet :   Rationale: improve coordination, improve balance and increase proprioception  to improve the patients ability to function at a higher level           With   [] TE   [] TA   [] neuro   [] other: Patient Education: [x] Review HEP    [] Progressed/Changed HEP based on:   [] positioning   [] body mechanics   [] transfers   [] heat/ice application    [] other:      Other Objective/Functional Measures:      Pain Level (0-10 scale) post treatment: 0    ASSESSMENT/Changes in Function:   Needs frequent breaks, gets fatigued easily  Patient will continue to benefit from skilled PT services to address ROM deficits, analyze and address soft tissue restrictions, analyze and modify body mechanics/ergonomics and assess and modify postural abnormalities to attain remaining goals.      [x]  See Plan of Care  []  See progress note/recertification  []  See Discharge Summary         Progress towards goals / Updated goals:      PLAN  []  Upgrade activities as tolerated     [x]  Continue plan of care  []  Update interventions per flow sheet       []  Discharge due to:_  []  Other:_      Cathy Menjivar PT 9/14/2017  3:04 PM

## 2017-09-20 ENCOUNTER — HOSPITAL ENCOUNTER (OUTPATIENT)
Dept: PHYSICAL THERAPY | Age: 77
Discharge: HOME | End: 2017-09-20
Payer: MEDICARE

## 2017-09-20 PROCEDURE — 97530 THERAPEUTIC ACTIVITIES: CPT

## 2017-09-20 PROCEDURE — 97110 THERAPEUTIC EXERCISES: CPT

## 2017-09-20 NOTE — PROGRESS NOTES
PT DAILY TREATMENT NOTE - [facility] 2-15    Patient Name: Ino Ballard  Date:2017  : 1940  [x]  Patient  Verified  Payor: Carlo Ramires / Plan: [address] [place] [address] HMO / Product Type: Managed Care Medicare /    In time:10:30a  Out time:11:30a  Total Treatment Time (min): 60  Total Timed Codes (min): 60  1:1 Treatment Time ([address] only): 60   Visit #: 4     Treatment Area: r2689    SUBJECTIVE  Pain Level (0-10 scale): 0  Any medication changes, allergies to medications, adverse drug reactions, diagnosis change, or new procedure performed?: [x] No    [] Yes (see summary sheet for update)  Subjective functional status/changes:   [] No changes reported  Patient reports she is getting a little better, but still shuffles. OBJECTIVE    35 min Therapeutic Exercise:  [x] See flow sheet :   Rationale: increase strength, improve coordination, improve balance and increase proprioception to improve the patients ability to function at a higher level    25 min Therapeutic Activity:  [x]  See flow sheet :   Rationale: improve coordination, improve balance and increase proprioception  to improve the patients ability to function at a higher level           With   [] TE   [] TA   [] neuro   [] other: Patient Education: [x] Review HEP    [] Progressed/Changed HEP based on:   [] positioning   [] body mechanics   [] transfers   [] heat/ice application    [] other:      Other Objective/Functional Measures: Tandem stance: no UE, 1 LOB bilaterally. Pain Level (0-10 scale) post treatment: 0    ASSESSMENT/Changes in Function:   Needs frequent breaks, gets fatigued easily  Patient will continue to benefit from skilled PT services to address ROM deficits, analyze and address soft tissue restrictions, analyze and modify body mechanics/ergonomics and assess and modify postural abnormalities to attain remaining goals.      []  See Plan of Care  []  See progress note/recertification  []  See Discharge Summary Progress towards goals / Updated goals: Patient demonstrates improved tolerance for therapeutic exercises, however continues to require rest breaks due to increased fatigue. Short Term Goals: To be accomplished in 6 treatments:  1. The Pt will be independent and compliant with her HEP. Long Term Goals: To be accomplished in 12 treatments:  1. The Pt will score the MCII on her FOTO survey demonstrating improved overall function. 2. The Pt will be able to stand/walk >/=45 minutes without feelings of weakness or instability to allow the Pt to be able to perform ADLs with less difficulty. 3. The Pt will be able to perform >/= 11 sit to stands in 30s demonstrating improved LE strength.   4. The Pt will score >/= 85/120 on her mSTSIB demonstrating improved balance and neuromuscular control.     PLAN  []  Upgrade activities as tolerated     [x]  Continue plan of care  []  Update interventions per flow sheet       []  Discharge due to:_  []  Other:_      Chelsea Fried 9/20/2017  3:04 PM

## 2017-09-22 ENCOUNTER — LAB ONLY (OUTPATIENT)
Dept: INTERNAL MEDICINE CLINIC | Age: 77
End: 2017-09-22

## 2017-09-22 DIAGNOSIS — F32.9 REACTIVE DEPRESSION: ICD-10-CM

## 2017-09-22 DIAGNOSIS — E11.21 TYPE II DIABETES MELLITUS WITH NEPHROPATHY (HCC): ICD-10-CM

## 2017-09-22 DIAGNOSIS — H11.32 CONJUNCTIVAL HEMORRHAGE OF LEFT EYE: ICD-10-CM

## 2017-09-22 DIAGNOSIS — E03.9 HYPOTHYROIDISM, UNSPECIFIED TYPE: ICD-10-CM

## 2017-09-22 DIAGNOSIS — R26.89 POOR BALANCE: ICD-10-CM

## 2017-09-22 DIAGNOSIS — T14.8XXA BRUISE: ICD-10-CM

## 2017-09-22 DIAGNOSIS — I25.10 CORONARY ARTERY DISEASE INVOLVING NATIVE CORONARY ARTERY OF NATIVE HEART WITHOUT ANGINA PECTORIS: ICD-10-CM

## 2017-09-22 DIAGNOSIS — I10 ESSENTIAL HYPERTENSION, BENIGN: ICD-10-CM

## 2017-09-22 DIAGNOSIS — E78.2 MIXED HYPERLIPIDEMIA: ICD-10-CM

## 2017-09-22 DIAGNOSIS — M81.0 AGE-RELATED OSTEOPOROSIS WITHOUT CURRENT PATHOLOGICAL FRACTURE: ICD-10-CM

## 2017-09-23 LAB
ALBUMIN SERPL-MCNC: 3.9 G/DL (ref 3.5–4.8)
ALBUMIN/CREAT UR: 477.5 MG/G CREAT (ref 0–30)
ALBUMIN/GLOB SERPL: 1.3 {RATIO} (ref 1.2–2.2)
ALP SERPL-CCNC: 72 IU/L (ref 39–117)
ALT SERPL-CCNC: 10 IU/L (ref 0–32)
AST SERPL-CCNC: 14 IU/L (ref 0–40)
BILIRUB SERPL-MCNC: 0.6 MG/DL (ref 0–1.2)
BUN SERPL-MCNC: 16 MG/DL (ref 8–27)
BUN/CREAT SERPL: 17 (ref 12–28)
CALCIUM SERPL-MCNC: 9.2 MG/DL (ref 8.7–10.3)
CHLORIDE SERPL-SCNC: 104 MMOL/L (ref 96–106)
CHOLEST SERPL-MCNC: 157 MG/DL (ref 100–199)
CO2 SERPL-SCNC: 25 MMOL/L (ref 18–29)
CREAT SERPL-MCNC: 0.94 MG/DL (ref 0.57–1)
CREAT UR-MCNC: 137.3 MG/DL
ERYTHROCYTE [DISTWIDTH] IN BLOOD BY AUTOMATED COUNT: 17.3 % (ref 12.3–15.4)
EST. AVERAGE GLUCOSE BLD GHB EST-MCNC: 151 MG/DL
GLOBULIN SER CALC-MCNC: 2.9 G/DL (ref 1.5–4.5)
GLUCOSE SERPL-MCNC: 138 MG/DL (ref 65–99)
HBA1C MFR BLD: 6.9 % (ref 4.8–5.6)
HCT VFR BLD AUTO: 38.9 % (ref 34–46.6)
HDLC SERPL-MCNC: 56 MG/DL
HGB BLD-MCNC: 12.3 G/DL (ref 11.1–15.9)
LDLC SERPL CALC-MCNC: 78 MG/DL (ref 0–99)
MCH RBC QN AUTO: 26.7 PG (ref 26.6–33)
MCHC RBC AUTO-ENTMCNC: 31.6 G/DL (ref 31.5–35.7)
MCV RBC AUTO: 85 FL (ref 79–97)
MICROALBUMIN UR-MCNC: 655.6 UG/ML
PLATELET # BLD AUTO: 186 X10E3/UL (ref 150–379)
POTASSIUM SERPL-SCNC: 3.6 MMOL/L (ref 3.5–5.2)
PROT SERPL-MCNC: 6.8 G/DL (ref 6–8.5)
RBC # BLD AUTO: 4.6 X10E6/UL (ref 3.77–5.28)
SODIUM SERPL-SCNC: 142 MMOL/L (ref 134–144)
TRIGL SERPL-MCNC: 116 MG/DL (ref 0–149)
TSH SERPL DL<=0.005 MIU/L-ACNC: 1.22 UIU/ML (ref 0.45–4.5)
VLDLC SERPL CALC-MCNC: 23 MG/DL (ref 5–40)
WBC # BLD AUTO: 7.2 X10E3/UL (ref 3.4–10.8)

## 2017-09-26 ENCOUNTER — OFFICE VISIT (OUTPATIENT)
Dept: INTERNAL MEDICINE CLINIC | Age: 77
End: 2017-09-26

## 2017-09-26 VITALS
OXYGEN SATURATION: 96 % | HEIGHT: 61 IN | HEART RATE: 60 BPM | TEMPERATURE: 97.4 F | SYSTOLIC BLOOD PRESSURE: 132 MMHG | RESPIRATION RATE: 16 BRPM | DIASTOLIC BLOOD PRESSURE: 77 MMHG

## 2017-09-26 DIAGNOSIS — I10 ESSENTIAL HYPERTENSION, BENIGN: ICD-10-CM

## 2017-09-26 DIAGNOSIS — E11.21 TYPE II DIABETES MELLITUS WITH NEPHROPATHY (HCC): Primary | ICD-10-CM

## 2017-09-26 DIAGNOSIS — F32.9 REACTIVE DEPRESSION: ICD-10-CM

## 2017-09-26 DIAGNOSIS — R26.89 POOR BALANCE: ICD-10-CM

## 2017-09-26 DIAGNOSIS — R09.81 HEAD CONGESTION: ICD-10-CM

## 2017-09-26 DIAGNOSIS — M81.0 AGE-RELATED OSTEOPOROSIS WITHOUT CURRENT PATHOLOGICAL FRACTURE: ICD-10-CM

## 2017-09-26 DIAGNOSIS — Z23 ENCOUNTER FOR IMMUNIZATION: ICD-10-CM

## 2017-09-26 DIAGNOSIS — E78.2 MIXED HYPERLIPIDEMIA: ICD-10-CM

## 2017-09-26 DIAGNOSIS — E03.9 HYPOTHYROIDISM, UNSPECIFIED TYPE: ICD-10-CM

## 2017-09-26 DIAGNOSIS — I25.10 CORONARY ARTERY DISEASE INVOLVING NATIVE CORONARY ARTERY OF NATIVE HEART WITHOUT ANGINA PECTORIS: ICD-10-CM

## 2017-09-26 NOTE — PROGRESS NOTES
VORB per Dr. Christy Vargas, flu vaccine given after obtaining the consent form. 0.5ml injected in the R deltoid muscle intramuscularly. Prev 13 injected in the L deltoid muscle, 0.5ml. Administered by Nikia Reyes LPN. VIS given.

## 2017-09-26 NOTE — PROGRESS NOTES
HISTORY OF PRESENT ILLNESS  Fabiola Rob is a 69 y.o. female. HPI   Last here 8/15/17. Pt is here for routine care.     BP today is 132/77 today  She has not been monitoring her BP at home  Continues metoprolol 50mg BID and norvasc 2.5mg daily  Recall had angioedema with lisinopril and benicar-HCTZ in the past      BS at home running around 114-120, 109, 137, 131 in the AM fasting  BS at home running around 68, 90s, 145, 86 in the PM  Last visit, I started her on 10U 70/30 humulin mix daily - reports compliance - tolerating well  Lantus/humalog stopped d/t cost  Last visit, I provided pt with victoza samples as she was in the donut hole  She has since run out of victoza samples  Will stop victoza from now on  Continues on metformin 1000mg BID  She also takes ASA 81mg daily  Recall pt was on lantus 20 U qhs and humalog 4 U with dinner in the past  Recall issues with hypoglycemia in the past, a1c under 7.5 at goal for her     Last visit, pt c/o fall and ED visit  Pt denies any recent falls  Pt states that she still has a bump to her R forehead but bruising resolving, pain resolved  Had poor balance and I sent her to PT   Pt completes PT twice weekly      Reviewed last labs 9/17  Kidney nl, liver nl  Ordered labs to complete prior to next visit     Continues on prilosec daily for reflux, works well, no breakthrough  Story[employer] is stable since last visit  Discussed diet and weight loss      Pt follows with Dr. Ayleen Arciniega (cardio)   Last visit was 4/28/17  Next visit is scheduled for 11/21/17     Continues on synthroid 100 mcg daily  She takes this medication separately from all medications      Pt follows with Dr. Eric Kim (vasc) for her PAD annually in February   Will schedule an appt for 2/2018      Continues on lipitor 80mg (max dose) and welchol 625mg BID for cholesterol      Pt is not currently taking iron daily       Continues lexapro 5mg daily for depression and anxiety  Pt states that she feels tired on this med and a little sedated in the am  Advised her to take this med qhs  Pt no longer uses her ativan    Pt c/o minimally productive cough with mucus x last week - unchanged, no sore throat, no ear pain  Pt denies having a fever    Advised using zyrtec and flonase OTC      ACP: on file, SDM is her daughter, Faye Oliveros.     PREVENTIVE:    Colonoscopy: 8/02/13, Dr. Shawn Reeder, repeat 5 years  Pap: 12/14, declines further   Mammogram: 1/23/17 negative  DEXA: 12/07/15, stopped fosamax, improved   Tdap: 1/04/2016  Pneumovax: 10/10/2010  [id number]: 9/26/17  Zostavax: 10/10/2016  Flu shot: 9/26/17  Foot exam: 6/26/17 normal  Microalbumin: 9/17, some protein in urine   A1c:, 4/15 7.7, 7/15 8.3, 9/15 7.3, 12/15 7.2 , 3/16 9.1, 8/16 7.6, 12/16 7.2, 3/17 7.3, 6/17 7.3, 9/17 6.9  [facility] exam: [facility] Julia [facility], 9/17, floaters, will get notes for review  Lipids: 9/17 LDL 78    Patient Active Problem List    Diagnosis Date Noted    Encounter for long-term (current) use of insulin ([facility] 04/04/2016    Type II diabetes mellitus with nephropathy ([facility] 01/04/2016    Lower GI bleeding 10/12/2015    Diverticulosis of intestine with bleeding 10/11/2015    Anemia 75/47/3447    Diastolic CHF, chronic ([address] 10/11/2015    GIB (gastrointestinal bleeding) 10/08/2015    ACP (advance care planning) 09/08/2015    Osteoporosis 09/10/2013    Bunion 10/15/2012    Essential hypertension, benign 05/17/2012    Mixed hyperlipidemia 05/17/2012    Postsurgical percutaneous transluminal coronary angioplasty status 05/17/2012    Old myocardial infarction 05/17/2012    Postsurgical aortocoronary bypass status 05/17/2012    S/P CABG x 3 11/11/2011    ASHD (arteriosclerotic heart disease) 11/10/2011    Heart failure ([facility] 10/18/2011    Hyperlipidemia 10/18/2011    Influenza 01/19/2011    CAD (coronary artery disease) 11/18/2009    Hypothyroidism 11/18/2009    PVD (peripheral vascular disease) ([facility] 11/18/2009     Current Outpatient Prescriptions   Medication Sig Dispense Refill    HYDROcodone-acetaminophen (NORCO) 5-325 mg per tablet Take 1 Tab by mouth every six (6) hours as needed for Pain. Max Daily Amount: 4 Tabs. 10 Tab 0    Liraglutide (VICTOZA 3-GEE) 0.6 mg/0.1 mL (18 mg/3 mL) sub-q pen 0.3 mL by SubCUTAneous route daily. Daily 9 Syringe 3    omeprazole (PRILOSEC) 40 mg capsule TAKE 1 CAPSULE BY MOUTH DAILY 90 Cap 1    insulin NPH/insulin regular (NOVOLIN 70/30, HUMULIN 70/30) 100 unit/mL (70-30) injection 10 units sq bid 10 mL 1    lancets (FREESTYLE LANCETS) 28 gauge misc Check blood sugar 4 times daily. 200 Lancet 1    Blood-Glucose Meter (FREESTYLE LITE METER) monitoring kit Check blood sugar 4 times daily. 1 Kit 0    glucose blood VI test strips (FREESTYLE LITE STRIPS) strip Check blood sugar 4 times daily. 200 Strip 1    metFORMIN (GLUCOPHAGE) 1,000 mg tablet TAKE 1 TABLET BY MOUTH TWICE DAILY 180 Tab 1    glucose blood VI test strips (PRODIGY NO CODING) strip Check blood sugar 4 times daily. 200 Strip 3    Insulin Needles, Disposable, (BD INSULIN PEN NEEDLE UF SHORT) 31 gauge x 5/16\" ndle USE AS DIRECTED DAILY 180 Pen Needle 12    Liraglutide (VICTOZA) 0.6 mg/0.1 mL (18 mg/3 mL) sub-q pen Inject 1.8mg subq daily. 3 Each 0    levothyroxine (SYNTHROID) 100 mcg tablet Take 1 Tab by mouth Daily (before breakfast). 90 Tab 1    escitalopram oxalate (LEXAPRO) 10 mg tablet Take 1 Tab by mouth daily. 90 Tab 1    amLODIPine (NORVASC) 2.5 mg tablet TAKE 1 TABLET BY MOUTH DAILY 90 Tab 1    Insulin Needles, Disposable, (PEN NEEDLE) 31 gauge x 5/16\" ndle Daily 1 Package 11    metoprolol tartrate (LOPRESSOR) 50 mg tablet Take 1 Tab by mouth two (2) times a day. 180 Tab 2    colesevelam (WELCHOL) 625 mg tablet Take 1,875 mg by mouth two (2) times daily (with meals).  atorvastatin (LIPITOR) 80 mg tablet TAKE 1 TABLET NIGHTLY 90 Tab 3    LORazepam (ATIVAN) 0.5 mg tablet Take 1 Tab by mouth nightly as needed.  Max Daily Amount: 0.5 mg. 30 Tab 0    INSULIN LISPRO (HUMALOG SC) 4 Units by SubCUTAneous route daily.  EPIPEN 2-GEE 0.3 mg/0.3 mL (1:1,000) injection   0    aspirin 81 mg chewable tablet Take 1 Tab by mouth daily. 90 Tab 3    glucose blood VI test strips (ASCENSIA AUTODISC VI, ONE TOUCH ULTRA TEST VI) strip Qid 100 Strip 11    polyethylene glycol (MIRALAX) 17 gram packet Take 1 Packet by mouth daily. 30 Each 1    cyanocobalamin (VITAMIN B-12) 1,000 mcg tablet Take 1,000 mcg by mouth daily. Past Surgical History:   Procedure Laterality Date    CARDIAC SURG PROCEDURE UNLIST      cabg x 3    HX CORONARY ARTERY BYPASS GRAFT  11/11/11    3 vessel    HX GYN      hysterectomy    HX UROLOGICAL      kidney stone    VASCULAR SURGERY PROCEDURE UNLIST      Stents put in right leg      Lab Results  Component Value Date/Time   WBC 7.2 09/22/2017 09:20 AM   HGB 12.3 09/22/2017 09:20 AM   Hemoglobin (POC) 12.9 03/02/2011 12:21 PM   HCT 38.9 09/22/2017 09:20 AM   PLATELET 479 97/84/8953 09:20 AM   MCV 85 09/22/2017 09:20 AM     Lab Results  Component Value Date/Time   Cholesterol, total 157 09/22/2017 09:20 AM   HDL Cholesterol 56 09/22/2017 09:20 AM   LDL, calculated 78 09/22/2017 09:20 AM   Triglyceride 116 09/22/2017 09:20 AM   CHOL/HDL Ratio 3.2 09/14/2010 08:42 AM     Lab Results  Component Value Date/Time   GFR est non-AA 59 09/22/2017 09:20 AM   GFR est AA 68 09/22/2017 09:20 AM   Creatinine 0.94 09/22/2017 09:20 AM   BUN 16 09/22/2017 09:20 AM   Sodium 142 09/22/2017 09:20 AM   Potassium 3.6 09/22/2017 09:20 AM   Chloride 104 09/22/2017 09:20 AM   CO2 25 09/22/2017 09:20 AM   Magnesium 1.7 10/16/2015 04:34 AM          Review of Systems   Constitutional: Negative for fever. Respiratory: Positive for cough and sputum production. Negative for shortness of breath. Cardiovascular: Negative for chest pain. Musculoskeletal: Negative for falls.        Physical Exam   Constitutional: She is oriented to person, place, and time. She appears well-developed and well-nourished. No distress. HENT:   Head: Normocephalic and atraumatic. Right Ear: External ear normal.   Left Ear: External ear normal.   Mouth/Throat: Oropharynx is clear and moist. No oropharyngeal exudate. Eyes: Conjunctivae and EOM are normal. Right eye exhibits no discharge. Left eye exhibits no discharge. Neck: Normal range of motion. Neck supple. No thyromegaly present. Cardiovascular: Normal rate, regular rhythm, normal heart sounds and intact distal pulses. Exam reveals no gallop and no friction rub. No murmur heard. Pulmonary/Chest: Effort normal and breath sounds normal. No respiratory distress. She has no wheezes. She has no rales. She exhibits no tenderness. Musculoskeletal: Normal range of motion. She exhibits no edema, tenderness or deformity. Lymphadenopathy:     She has no cervical adenopathy. Neurological: She is alert and oriented to person, place, and time. She has normal reflexes. She exhibits normal muscle tone. Coordination normal.   Skin: Skin is warm and dry. No rash noted. She is not diaphoretic. No erythema. No pallor. Psychiatric: She has a normal mood and affect. Her behavior is normal.       ASSESSMENT and PLAN    ICD-10-CM ICD-9-CM    1. Type II diabetes mellitus with nephropathy (HCC)    Now on 70/30 humulin mix, tolerating well, home readings are good, no longer on victoza, continues metformin, continue 10U BID of insulin mix H50.90 314.50 METABOLIC PANEL, BASIC     583.81 HEMOGLOBIN A1C WITH EAG   2. Coronary artery disease involving native coronary artery of native heart without angina pectoris    Will be seeing Dr. Marta Barreto later this fall P32.91 639.24 METABOLIC PANEL, BASIC      HEMOGLOBIN A1C WITH EAG   3. Hypothyroidism, unspecified type    At goal on recent labs on synthroid 100mcg daily E64.0 863.4 METABOLIC PANEL, BASIC      HEMOGLOBIN A1C WITH EAG   4.  Essential hypertension, benign    BP adequately controlled on norvasc and metoprolol, continue F89 733.7 METABOLIC PANEL, BASIC      HEMOGLOBIN A1C WITH EAG   5. Mixed hyperlipidemia    On lipitor and welchol, at goal H43.0 184.6 METABOLIC PANEL, BASIC      HEMOGLOBIN A1C WITH EAG   6. Age-related osteoporosis without current pathological fracture    DEXA will be due in 12/17, will order next visit, previously on fosamax, continue vit D for tx C52.0 000.88 METABOLIC PANEL, BASIC      HEMOGLOBIN A1C WITH EAG   7. Reactive depression    Stable on lexapro but finds it sedating, will move med to PM N11.8 854.3 METABOLIC PANEL, BASIC      HEMOGLOBIN A1C WITH EAG   8. Poor balance    Improving with PT, no recurring falls since last visit   R26.89 781.99    9. Head congestion    Likely viral in etiology or from allergies, discussed flonase and zyrtec OTC R09.81 478.19         Scribed by [address], as dictated by Dr. Talisha Burt. Current diagnosis and concerns discussed with pt at length. Pt understands risks and benefits or current treatment plan and medications, and accepts the treatment and medication with any possible risks. Pt asks appropriate questions, which were answered. Pt was instructed to call with any concerns or problems. This note will not be viewable in [address]

## 2017-09-26 NOTE — MR AVS SNAPSHOT
Visit Information Date & Time Provider Department Dept. Phone Encounter #  
 9/26/2017 11:30 AM Keith Verma, [address] [phone] [id number] Follow-up Instructions Return in about 3 months (around 12/26/2017). Your Appointments 11/21/2017 11:00 AM  
6 MONTH with MD [facility]) Appt Note: 6 month per Dr. Fina Borges, $30.00 cc, jr 4/28/17  
 [address] [facility]  
[phone] [address] [facility] Upcoming Health Maintenance Date Due Pneumococcal 65+ Low/Medium Risk (2 of 2 - PPSV23) 10/10/2015 EYE EXAM RETINAL OR DILATED Q1 8/5/2016 INFLUENZA AGE 9 TO ADULT 8/1/2017 GLAUCOMA SCREENING Q2Y 8/5/2017 MEDICARE YEARLY EXAM 12/15/2017 HEMOGLOBIN A1C Q6M 3/22/2018 FOOT EXAM Q1 6/26/2018 COLONOSCOPY 8/2/2018 MICROALBUMIN Q1 9/22/2018 LIPID PANEL Q1 9/22/2018 DTaP/Tdap/Td series (2 - Td) 1/4/2026 Allergies as of 9/26/2017  Review Complete On: 9/26/2017 By: Keith Verma MD  
  
 Severity Noted Reaction Type Reactions Ace Inhibitors  11/18/2009    Swelling Arb-angiotensin Receptor Antagonist  02/01/2016    Swelling Lisinopril  11/10/2011    Swelling Plavix [Clopidogrel]  11/18/2009    Other (comments) Excessive bleeding Potassium  11/25/2011   Intolerance Nausea and Vomiting Potassium containing oral products are not well tolerated by patient Current Immunizations  Reviewed on 9/26/2017 Name Date Influenza Vaccine 10/10/2016, 10/7/2014 Influenza Vaccine (Quad) PF 9/8/2015 Influenza Vaccine Split 9/6/2012, 10/18/2011 Tdap 1/4/2016 ZZZ-RETIRED (DO NOT USE) Pneumococcal Vaccine (Unspecified Type) 10/10/2010 Zoster Vaccine, Live 10/10/2016 Reviewed by Keith Verma MD on 9/26/2017 at 11:39 AM  
You Were Diagnosed With   
  
 Codes Comments Type II diabetes mellitus with nephropathy ([facility]    -  Primary ICD-10-CM: E11.21 
ICD-9-CM: 250.40, 583.81 Coronary artery disease involving native coronary artery of native heart without angina pectoris     ICD-10-CM: I25.10 ICD-9-CM: 414.01 Hypothyroidism, unspecified type     ICD-10-CM: E03.9 ICD-9-CM: 244.9 Essential hypertension, benign     ICD-10-CM: I10 
ICD-9-CM: 401.1 Mixed hyperlipidemia     ICD-10-CM: E78.2 ICD-9-CM: 272.2 Age-related osteoporosis without current pathological fracture     ICD-10-CM: M81.0 ICD-9-CM: 733.01 Reactive depression     ICD-10-CM: F32.9 ICD-9-CM: 300.4 Poor balance     ICD-10-CM: R26.89 
ICD-9-CM: 781.99 Head congestion     ICD-10-CM: R09.81 ICD-9-CM: 478.19 Vitals BP Pulse Temp Resp Height(growth percentile) SpO2  
 132/77 (BP 1 Location: Left arm, BP Patient Position: Sitting) 60 97.4 °F (36.3 °C) (Oral) 16 5' 1\" (1.549 m) 96% OB Status Smoking Status Hysterectomy Former Smoker Preferred Pharmacy Pharmacy Name Phone [facility] [address] [place] [address] [phone] Your Updated Medication List  
  
   
This list is accurate as of: 9/26/17 11:49 AM.  Always use your most recent med list. amLODIPine 2.5 mg tablet Commonly known as:  Orval Little TAKE 1 TABLET BY MOUTH DAILY  
  
 aspirin 81 mg chewable tablet Take 1 Tab by mouth daily. atorvastatin 80 mg tablet Commonly known as:  LIPITOR  
TAKE 1 TABLET NIGHTLY  
  
 EPIPEN 2-GEE 0.3 mg/0.3 mL injection Generic drug:  EPINEPHrine  
  
 escitalopram oxalate 10 mg tablet Commonly known as:  Edgerton Roof Take 1 Tab by mouth daily. insulin NPH/insulin regular 100 unit/mL (70-30) injection Commonly known as:  NOVOLIN 70/30, HUMULIN 70/30  
10 units sq bid  
  
 lancets 28 gauge Misc Commonly known as:  FREESTYLE LANCETS Check blood sugar 4 times daily. levothyroxine 100 mcg tablet Commonly known as:  SYNTHROID Take 1 Tab by mouth Daily (before breakfast). LORazepam 0.5 mg tablet Commonly known as:  ATIVAN Take 1 Tab by mouth nightly as needed. Max Daily Amount: 0.5 mg.  
  
 metFORMIN 1,000 mg tablet Commonly known as:  GLUCOPHAGE  
TAKE 1 TABLET BY MOUTH TWICE DAILY  
  
 metoprolol tartrate 50 mg tablet Commonly known as:  LOPRESSOR Take 1 Tab by mouth two (2) times a day. omeprazole 40 mg capsule Commonly known as:  PRILOSEC  
TAKE 1 CAPSULE BY MOUTH DAILY polyethylene glycol 17 gram packet Commonly known as:  Kathy Lobstein Take 1 Packet by mouth daily. VITAMIN B-12 1,000 mcg tablet Generic drug:  cyanocobalamin Take 1,000 mcg by mouth daily. WELCHOL 625 mg tablet Generic drug:  colesevelam  
Take 1,875 mg by mouth two (2) times daily (with meals). Follow-up Instructions Return in about 3 months (around 12/26/2017). To-Do List   
 09/27/2017 Lab:  HEMOGLOBIN A1C WITH EAG   
  
 09/27/2017 Lab:  METABOLIC PANEL, BASIC   
  
 09/27/2017 2:00 PM  
  Appointment with Kendall Chapman PT at [facility] ([phone]) Please remember to arrive at the hospital at least 30 minutes prior to your scheduled appointment time. When you come in for your appointment, please be sure to bring the therapy prescription the doctor gave you, your insurance card, and a list of the medicines you are taking. Also, please remember to wear comfortable, loose- fitting clothes. We look forward to seeing you. 09/29/2017 1:30 PM  
  Appointment with Kendall Chapman PT at [facility] ([phone]) Please remember to arrive at the hospital at least 30 minutes prior to your scheduled appointment time. When you come in for your appointment, please be sure to bring the therapy prescription the doctor gave you, your insurance card, and a list of the medicines you are taking.   Also, please remember to wear comfortable, loose- fitting clothes. We look forward to seeing you. 10/02/2017 9:30 AM  
  Appointment with Stephen Osgood, PT at [facility] ([phone]) Please remember to arrive at the hospital at least 30 minutes prior to your scheduled appointment time. When you come in for your appointment, please be sure to bring the therapy prescription the doctor gave you, your insurance card, and a list of the medicines you are taking. Also, please remember to wear comfortable, loose- fitting clothes. We look forward to seeing you. 10/04/2017 10:00 AM  
  Appointment with Stephen Osgood, PT at [facility] ([phone]) Please remember to arrive at the hospital at least 30 minutes prior to your scheduled appointment time. When you come in for your appointment, please be sure to bring the therapy prescription the doctor gave you, your insurance card, and a list of the medicines you are taking. Also, please remember to wear comfortable, loose- fitting clothes. We look forward to seeing you. Patient Instructions   
escitalapram--move to before bed not the morning Introducing [facility] SERVICES! Jorge Whyte introduces [employer] patient portal. Now you can access parts of your medical record, email your doctor's office, and request medication refills online. 1. In your internet browser, go to [email or web address] 2. Click on the First Time User? Click Here link in the Sign In box. You will see the New Member Sign Up page. 3. Enter your [employer] Access Code exactly as it appears below. You will not need to use this code after youve completed the sign-up process. If you do not sign up before the expiration date, you must request a new code. · [employer] Access Code: [email or web address] Expires: 12/24/2017 10:20 AM 
 
4. Enter the last four digits of your Social Security Number (xxxx) and Date of Birth (mm/dd/yyyy) as indicated and click Submit.  You will be taken to the next sign-up page. 5. Create a [employer] ID. This will be your [employer] login ID and cannot be changed, so think of one that is secure and easy to remember. 6. Create a [employer] password. You can change your password at any time. 7. Enter your Password Reset Question and Answer. This can be used at a later time if you forget your password. 8. Enter your e-mail address. You will receive e-mail notification when new information is available in [address] 9. Click Sign Up. You can now view and download portions of your medical record. 10. Click the Download Summary menu link to download a portable copy of your medical information. If you have questions, please visit the Frequently Asked Questions section of the [employer] website. Remember, [employer] is NOT to be used for urgent needs. For medical emergencies, dial 911. Now available from your iPhone and Android! Please provide this summary of care documentation to your next provider. Your primary care clinician is listed as Tatiana Escalera. If you have any questions after today's visit, please call [phone].

## 2017-09-27 ENCOUNTER — HOSPITAL ENCOUNTER (OUTPATIENT)
Dept: PHYSICAL THERAPY | Age: 77
Discharge: HOME | End: 2017-09-27
Payer: MEDICARE

## 2017-09-27 PROCEDURE — 97112 NEUROMUSCULAR REEDUCATION: CPT

## 2017-09-27 PROCEDURE — 97110 THERAPEUTIC EXERCISES: CPT

## 2017-09-27 NOTE — PROGRESS NOTES
PT DAILY TREATMENT NOTE - [facility] 2-15    Patient Name: Chitra Bettencourt  Date:2017  : 1940  [x]  Patient  Verified  Payor: BLUE CROSS MEDICARE / Plan: [address] [place] [address] HMO / Product Type: Managed Care Medicare /    In time: 2:00 PM  Out time: 3:03 PM  Total Treatment Time (min): 63 minutes  Total Timed Codes (min): 63 minutes  1:1 Treatment Time ([address] only): 58 minutes   Visit #: 5     Treatment Area: r26.89    SUBJECTIVE  Pain Level (0-10 scale): 0/10  Any medication changes, allergies to medications, adverse drug reactions, diagnosis change, or new procedure performed?: [x] No    [] Yes (see summary sheet for update)  Subjective functional status/changes:   [] No changes reported  The Pt reports that she is feeling a little stronger and is able to walk a little better and not stumbling as frequently. She continues to remain diligent with her HEP. OBJECTIVE    30 min Therapeutic Exercise:  [x] See flow sheet :   Rationale: increase ROM, increase strength, improve coordination, improve balance and increase proprioception to improve the patients ability to ambulate and perform ADLs with less difficulty     33 min Neuromuscular Re-education:  [x]  See flow sheet :   Rationale: increase ROM, increase strength, improve coordination, improve balance and increase proprioception  to improve the patients ability to ambulate and perform ADLs with less risk of falling          With   [x] TE   [] TA   [] neuro   [] other: Patient Education: [x] Review HEP    [] Progressed/Changed HEP based on:   [] positioning   [] body mechanics   [] transfers   [] heat/ice application    [] other:      Other Objective/Functional Measures: None noted     Pain Level (0-10 scale) post treatment: 0/10    ASSESSMENT/Changes in Function:   The Pt tolerated her therapy program and progressed very well with her balance exercises.   She was able to perform more of her exercises without any external support, but still required CGA to prevent any falls. She has difficulty walking on uneven surfaces and has to walk very slowly to prevent any loss of balance. Patient will continue to benefit from skilled PT services to modify and progress therapeutic interventions, address functional mobility deficits, address ROM deficits, address strength deficits, analyze and address soft tissue restrictions, analyze and cue movement patterns, analyze and modify body mechanics/ergonomics, assess and modify postural abnormalities and instruct in home and community integration to attain remaining goals. []  See Plan of Care  []  See progress note/recertification  []  See Discharge Summary         Progress towards goals / Updated goals:  Short Term Goals: To be accomplished in 6 treatments:  1. The Pt will be independent and compliant with her HEP- progressing  Long Term Goals: To be accomplished in 12 treatments:  1. The Pt will score the MCII on her FOTO survey demonstrating improved overall function- progressing  2. The Pt will be able to stand/walk >/=45 minutes without feelings of weakness or instability to allow the Pt to be able to perform ADLs with less difficulty- progressing  3. The Pt will be able to perform >/= 11 sit to stands in 30s demonstrating improved LE strength- progressing  4.  The Pt will score >/= 85/120 on her mSTSIB demonstrating improved balance and neuromuscular control- progressing     PLAN  [x]  Upgrade activities as tolerated     [x]  Continue plan of care  [x]  Update interventions per flow sheet       []  Discharge due to:_  []  Other:_      Geetha Lobo PT 9/27/2017  3:35 PM

## 2017-09-29 ENCOUNTER — HOSPITAL ENCOUNTER (OUTPATIENT)
Dept: PHYSICAL THERAPY | Age: 77
Discharge: HOME | End: 2017-09-29
Payer: MEDICARE

## 2017-09-29 PROCEDURE — 97110 THERAPEUTIC EXERCISES: CPT

## 2017-09-29 PROCEDURE — 97112 NEUROMUSCULAR REEDUCATION: CPT

## 2017-09-29 NOTE — PROGRESS NOTES
PT DAILY TREATMENT NOTE - [facility] 2-15    Patient Name: Dejah Ruby  Date:2017  : 1940  [x]  Patient  Verified  Payor: BL[employer] MEDICARE / Plan: [address] [place] [address] HMO / Product Type: Managed Care Medicare /    In time: 1:33 PM  Out time: 2:32 PM  Total Treatment Time (min): 59 minutes  Total Timed Codes (min): 59 minutes  1:1 Treatment Time ( only): 54 minutes   Visit #: 6     Treatment Area: r26.89    SUBJECTIVE  Pain Level (0-10 scale): 0/10  Any medication changes, allergies to medications, adverse drug reactions, diagnosis change, or new procedure performed?: [x] No    [] Yes (see summary sheet for update)  Subjective functional status/changes:   [] No changes reported  The Pt reports that she was tired after the last session, but otherwise feels okay. She continues to report diligence with her HEP. OBJECTIVE    24 min Therapeutic Exercise:  [x] See flow sheet :   Rationale: increase ROM, increase strength, improve coordination, improve balance and increase proprioception to improve the patients ability to ambulate and perform ADLs with less risk of falling or difficulty    30 min Neuromuscular Re-education:  [x]  See flow sheet :   Rationale: increase ROM, increase strength, improve coordination, improve balance and increase proprioception  to improve the patients ability to ambulate and perform ADLs with less risk of falling or difficulty    With   [] TE   [] TA   [] neuro   [] other: Patient Education: [x] Review HEP    [] Progressed/Changed HEP based on:   [] positioning   [] body mechanics   [] transfers   [] heat/ice application    [] other:     Other Objective/Functional Measures: None noted     Pain Level (0-10 scale) post treatment: 0/10    ASSESSMENT/Changes in Function:   The Pt had improved balance and neuromuscular control today and required less external support or CGA when required to prevent any loss of balance.   She is progressing well towards her goals. Patient will continue to benefit from skilled PT services to modify and progress therapeutic interventions, address functional mobility deficits, address ROM deficits, address strength deficits, analyze and address soft tissue restrictions, analyze and cue movement patterns, analyze and modify body mechanics/ergonomics, assess and modify postural abnormalities and instruct in home and community integration to attain remaining goals. []  See Plan of Care  []  See progress note/recertification  []  See Discharge Summary         Progress towards goals / Updated goals:  Short Term Goals: To be accomplished in 6 treatments:  1. The Pt will be independent and compliant with her HEP- progressing  Long Term Goals: To be accomplished in 12 treatments:  1. The Pt will score the MCII on her FOTO survey demonstrating improved overall function- progressing  2. The Pt will be able to stand/walk >/=45 minutes without feelings of weakness or instability to allow the Pt to be able to perform ADLs with less difficulty- progressing  3. The Pt will be able to perform >/= 11 sit to stands in 30s demonstrating improved LE strength- progressing  4.  The Pt will score >/= 85/120 on her mSTSIB demonstrating improved balance and neuromuscular control- progressing     PLAN  [x]  Upgrade activities as tolerated     [x]  Continue plan of care  [x]  Update interventions per flow sheet       []  Discharge due to:_  []  Other:_      Macario Bloch, PT 9/29/2017  2:11 PM

## 2017-10-02 ENCOUNTER — HOSPITAL ENCOUNTER (OUTPATIENT)
Dept: PHYSICAL THERAPY | Age: 77
Discharge: HOME | End: 2017-10-02
Payer: MEDICARE

## 2017-10-02 PROCEDURE — 97110 THERAPEUTIC EXERCISES: CPT

## 2017-10-02 PROCEDURE — 97112 NEUROMUSCULAR REEDUCATION: CPT

## 2017-10-02 NOTE — PROGRESS NOTES
PT DAILY TREATMENT NOTE - [facility] 2-15    Patient Name: Maria Dolores Aldridge  Date:10/2/2017  : 1940  [x]  Patient  Verified  Payor: Soheila Sears / Plan: [facility] HMO / Product Type: Managed Care Medicare /    In time: 9:30 AM  Out time: 10:36 AM  Total Treatment Time (min): 66 minutes  Total Timed Codes (min): 66 minutes  1:1 Treatment Time ( only): 45 minutes   Visit #: 7     Treatment Area: r26.89    SUBJECTIVE  Pain Level (0-10 scale): 0/10  Any medication changes, allergies to medications, adverse drug reactions, diagnosis change, or new procedure performed?: [x] No    [] Yes (see summary sheet for update)  Subjective functional status/changes:   [] No changes reported  The Pt reports that her balance has improved since starting therapy. She feels like she has more control and is more stable while doing ADLs. OBJECTIVE    36 min Therapeutic Exercise:  [x] See flow sheet :   Rationale: increase ROM, increase strength, improve coordination, improve balance and increase proprioception to improve the patients ability to ambulate and perform ADLs with less difficulty    30 min Neuromuscular Re-education:  [x]  See flow sheet :   Rationale: increase ROM, increase strength, improve coordination, improve balance and increase proprioception  to improve the patients ability to ambulate and perform ADLs with less risk of falling          With   [x] TE   [] TA   [] neuro   [] other: Patient Education: [x] Review HEP    [] Progressed/Changed HEP based on:   [] positioning   [] body mechanics   [] transfers   [] heat/ice application    [] other:      Other Objective/Functional Measures: None noted     Pain Level (0-10 scale) post treatment: 0/10    ASSESSMENT/Changes in Function:   The Pt progressed well with her balance and neuromuscular control exercises requiring less need for an external support and only min assist with the red mat exercises.   She is progressing well towards her goals and anticipate discharge after her next PT session. Patient will continue to benefit from skilled PT services to modify and progress therapeutic interventions, address functional mobility deficits, address ROM deficits, address strength deficits, analyze and address soft tissue restrictions, analyze and cue movement patterns, analyze and modify body mechanics/ergonomics, assess and modify postural abnormalities and instruct in home and community integration to attain remaining goals. []  See Plan of Care  []  See progress note/recertification  []  See Discharge Summary         Progress towards goals / Updated goals:  Short Term Goals: To be accomplished in 6 treatments:  1. The Pt will be independent and compliant with her HEP- progressing  Long Term Goals: To be accomplished in 12 treatments:  1. The Pt will score the MCII on her FOTO survey demonstrating improved overall function- progressing  2. The Pt will be able to stand/walk >/=45 minutes without feelings of weakness or instability to allow the Pt to be able to perform ADLs with less difficulty- progressing  3. The Pt will be able to perform >/= 11 sit to stands in 30s demonstrating improved LE strength- progressing  4.  The Pt will score >/= 85/120 on her mSTSIB demonstrating improved balance and neuromuscular control- progressing     PLAN  [x]  Upgrade activities as tolerated     [x]  Continue plan of care  [x]  Update interventions per flow sheet       []  Discharge due to:_  []  Other:_      Shola Juarez, PT 10/2/2017  9:45 AM

## 2017-10-04 ENCOUNTER — HOSPITAL ENCOUNTER (OUTPATIENT)
Dept: PHYSICAL THERAPY | Age: 77
Discharge: HOME | End: 2017-10-04
Payer: MEDICARE

## 2017-10-04 PROCEDURE — G8979 MOBILITY GOAL STATUS: HCPCS

## 2017-10-04 PROCEDURE — G8980 MOBILITY D/C STATUS: HCPCS

## 2017-10-04 PROCEDURE — 97112 NEUROMUSCULAR REEDUCATION: CPT

## 2017-10-04 PROCEDURE — 97110 THERAPEUTIC EXERCISES: CPT

## 2017-10-04 NOTE — ANCILLARY DISCHARGE INSTRUCTIONS
[facility]  [facility] ([facility]), [address] [facility], [place], operated by [facility],  [address]  Phone: [phone] Fax: [phone]    Discharge Summary 2-15    Patient name: Jeny Lai  : 1940  Provider#: [id number]  Referral source: Julissa Viveros MD      Medical/Treatment Diagnosis: r26.89     Prior Hospitalization: see medical history     Comorbidities: See Plan of Care  Prior Level of Function: See Plan of Care  Medications: Verified on Patient Summary List    Start of Care: 17      Onset Date: 2017   Visits from Start of Care: 8     Missed Visits: 0  Reporting Period : 17 to 10/4/17    Assessment/Summary of care: The Pt has been seen for 8 outpatient physical therapy sessions with complains of gradually worsening balance and lower extremity strength. The Pt met all of her therapeutic goals and progressed very well with her therapy program that focused on improving her lower extremity strength and stability, improving her core strength and stability, improving her balance and neuromuscular control, stretching her hip musculature, correcting her gait impairments, and improving her activity tolerance and endurance. The Pt reports less stumbling and tripping in her home and is now able to walk/stand 60 minutes without feelings of instability in her lower extremities. She denies any functional limitations at the time and feels more stable with less risk of falling. At this time, it is believed that the Pt has reached maximum benefit from skilled PT and will continue to progress well independently with her HEP. The Pt has been educated how to safely progress her HEP and voiced understanding. The Pt is discharged from skilled PT and will contact her referring provider with any further questions or concerns.   Thank you for this referral.    Other Objective/Functional Measures: FOTO 60/100 (45/100 at initial evaluation)  30s sit to stand: 11 (no hands)  MSTSIB: trial 1- 30s, trial 2- 30s, trial 5- 30s, trial 6-10s                      Progress towards goals / Updated goals:  Short Term Goals: To be accomplished in 6 treatments:  1. The Pt will be independent and compliant with her HEP- met  Long Term Goals: To be accomplished in 12 treatments:  1. The Pt will score the MCII on her FOTO survey demonstrating improved overall function- met FOTO 60/100 at discharge  2. The Pt will be able to stand/walk >/=45 minutes without feelings of weakness or instability to allow the Pt to be able to perform ADLs with less difficulty- met  3. The Pt will be able to perform >/= 11 sit to stands in 30s demonstrating improved LE strength- met 11  4. The Pt will score >/= 85/120 on her mSTSIB demonstrating improved balance and neuromuscular control- met mSTSIB 100/120       G-Codes (GP)  Mobility    Goal  CJ= 20-39%  D/C  CK= 40-59%    The severity rating is based on clinical judgment and the FOTO Score score. RECOMMENDATIONS:  [x]Discontinue therapy: [x]Patient has reached or is progressing toward set goals     []Patient is non-compliant or has abdicated     []Due to lack of appreciable progress towards set goals     []Other  Alfonzo Patterson, PT 10/4/2017 11:19 AM        ______________________________________________________________________  NOTE TO PHYSICIAN:  Please complete the following and fax to: [facility]: Fax: [phone]. Retain this original for your records. If you are unable to process this request in 24 hours, please contact our office.      [de-identified] Signature:____________________  Date:____________Time:_________

## 2017-11-16 ENCOUNTER — TELEPHONE (OUTPATIENT)
Dept: INTERNAL MEDICINE CLINIC | Age: 77
End: 2017-11-16

## 2017-11-16 NOTE — TELEPHONE ENCOUNTER
-Patient glucose up to 140 . is very concerned and wants a  call .  Best contact  [phone] or leave message at home number [phone]

## 2017-11-16 NOTE — TELEPHONE ENCOUNTER
Called, spoke to pt. Two pt identifiers confirmed. Pt states that blood sugars have been in the 140's over the past 2wks. Pt still currently taking Novolin 70/30 10U BID and metformin 1000mg BID. Pt states that she has eaten some sweets. Pt advised to watch diet and sugar intake. Pt advised to closely watch sugars throughout the weekend and call Monday if sugars still elevated. Pt verbalized understanding of information discussed w/ no further questions at this time.

## 2017-11-21 ENCOUNTER — OFFICE VISIT (OUTPATIENT)
Dept: CARDIOLOGY CLINIC | Age: 77
End: 2017-11-21

## 2017-11-21 VITALS
WEIGHT: 165.8 LBS | BODY MASS INDEX: 31.3 KG/M2 | HEIGHT: 61 IN | DIASTOLIC BLOOD PRESSURE: 84 MMHG | HEART RATE: 76 BPM | RESPIRATION RATE: 20 BRPM | SYSTOLIC BLOOD PRESSURE: 130 MMHG | OXYGEN SATURATION: 96 %

## 2017-11-21 DIAGNOSIS — I50.32 DIASTOLIC CHF, CHRONIC (HCC): ICD-10-CM

## 2017-11-21 DIAGNOSIS — E11.21 TYPE II DIABETES MELLITUS WITH NEPHROPATHY (HCC): ICD-10-CM

## 2017-11-21 DIAGNOSIS — E78.2 MIXED HYPERLIPIDEMIA: ICD-10-CM

## 2017-11-21 DIAGNOSIS — Z95.1 POSTSURGICAL AORTOCORONARY BYPASS STATUS: ICD-10-CM

## 2017-11-21 DIAGNOSIS — I25.10 ASHD (ARTERIOSCLEROTIC HEART DISEASE): Primary | ICD-10-CM

## 2017-11-21 NOTE — MR AVS SNAPSHOT
Visit Information Date & Time Provider Department Dept. Phone Encounter #  
 11/21/2017 11:00 AM [address], [place] [facility] [phone] [id number] Your Appointments 1/2/2018 11:00 AM  
ROUTINE CARE with Teddy Stephenson, [address] [place] [address],[address] Floor [facility]) Appt Note: 3 mon f/u  
 [address] [address]  
[address] [place] [address] [address] [place] [address] Upcoming Health Maintenance Date Due  
 EYE EXAM RETINAL OR DILATED Q1 8/5/2016 GLAUCOMA SCREENING Q2Y 8/5/2017 MEDICARE YEARLY EXAM 12/15/2017 HEMOGLOBIN A1C Q6M 3/22/2018 FOOT EXAM Q1 6/26/2018 COLONOSCOPY 8/2/2018 MICROALBUMIN Q1 9/22/2018 LIPID PANEL Q1 9/22/2018 DTaP/Tdap/Td series (2 - Td) 1/4/2026 Allergies as of 11/21/2017  Review Complete On: 11/21/2017 By: Wally Lockhart LPN Severity Noted Reaction Type Reactions Ace Inhibitors  11/18/2009    Swelling Arb-angiotensin Receptor Antagonist  02/01/2016    Swelling Lisinopril  11/10/2011    Swelling Plavix [Clopidogrel]  11/18/2009    Other (comments) Excessive bleeding Potassium  11/25/2011   Intolerance Nausea and Vomiting Potassium containing oral products are not well tolerated by patient Current Immunizations  Reviewed on 9/26/2017 Name Date Influenza High Dose Vaccine PF 9/26/2017 Influenza Vaccine 10/10/2016, 10/7/2014 Influenza Vaccine (Quad) PF 9/8/2015 Influenza Vaccine Split 9/6/2012, 10/18/2011 Pneumococcal Conjugate (PCV-13) 9/26/2017 Tdap 1/4/2016 ZZZ-RETIRED (DO NOT USE) Pneumococcal Vaccine (Unspecified Type) 10/10/2010 Zoster Vaccine, Live 10/10/2016 Not reviewed this visit You Were Diagnosed With   
  
 Codes Comments ASHD (arteriosclerotic heart disease)    -  Primary ICD-10-CM: I25.10 ICD-9-CM: 414.00   
 Diastolic CHF, chronic (HCC)     ICD-10-CM: I50.32 
ICD-9-CM: 428.32, 428.0 Mixed hyperlipidemia     ICD-10-CM: E78.2 ICD-9-CM: 272.2 Postsurgical aortocoronary bypass status     ICD-10-CM: Z95.1 ICD-9-CM: V45.81 Type II diabetes mellitus with nephropathy (HCC)     ICD-10-CM: E11.21 
ICD-9-CM: 250.40, 583.81 Vitals BP Pulse Resp Height(growth percentile) Weight(growth percentile) SpO2  
 130/84 (BP 1 Location: Right arm, BP Patient Position: Sitting) 76 20 5' 1\" (1.549 m) 165 lb 12.8 oz (75.2 kg) 96% BMI OB Status Smoking Status 31.33 kg/m2 Hysterectomy Former Smoker Vitals History BMI and BSA Data Body Mass Index Body Surface Area  
 31.33 kg/m 2 1.8 m 2 Preferred Pharmacy Pharmacy Name Phone [facility] [address] [place] [address] [phone] Your Updated Medication List  
  
   
This list is accurate as of: 11/21/17 11:46 AM.  Always use your most recent med list. amLODIPine 2.5 mg tablet Commonly known as:  Jef Chafe TAKE 1 TABLET BY MOUTH DAILY  
  
 aspirin 81 mg chewable tablet Take 1 Tab by mouth daily. atorvastatin 80 mg tablet Commonly known as:  LIPITOR  
TAKE 1 TABLET NIGHTLY  
  
 EPIPEN 2-GEE 0.3 mg/0.3 mL injection Generic drug:  EPINEPHrine  
  
 escitalopram oxalate 10 mg tablet Commonly known as:  Celesta Murders Take 1 Tab by mouth daily. insulin NPH/insulin regular 100 unit/mL (70-30) injection Commonly known as:  NOVOLIN 70/30, HUMULIN 70/30  
10 units sq bid  
  
 lancets 28 gauge Misc Commonly known as:  FREESTYLE LANCETS Check blood sugar 4 times daily. levothyroxine 100 mcg tablet Commonly known as:  SYNTHROID Take 1 Tab by mouth Daily (before breakfast). LORazepam 0.5 mg tablet Commonly known as:  ATIVAN Take 1 Tab by mouth nightly as needed. Max Daily Amount: 0.5 mg.  
  
 metFORMIN 1,000 mg tablet Commonly known as:  GLUCOPHAGE  
 TAKE 1 TABLET BY MOUTH TWICE DAILY  
  
 metoprolol tartrate 50 mg tablet Commonly known as:  LOPRESSOR Take 1 Tab by mouth two (2) times a day. omeprazole 40 mg capsule Commonly known as:  PRILOSEC  
TAKE 1 CAPSULE BY MOUTH DAILY polyethylene glycol 17 gram packet Commonly known as:  Lukas Varinder Take 1 Packet by mouth daily. VITAMIN B-12 1,000 mcg tablet Generic drug:  cyanocobalamin Take 1,000 mcg by mouth daily. WELCHOL 625 mg tablet Generic drug:  colesevelam  
Take 1,875 mg by mouth two (2) times daily (with meals). We Performed the Following AMB POC EKG ROUTINE W/ 12 LEADS, INTER & REP [64655 CPT(R)] Introducing [facility]! Flory Charlton introduces [employer] patient portal. Now you can access parts of your medical record, email your doctor's office, and request medication refills online. 1. In your internet browser, go to [email or web address]. [employer] 2. Click on the First Time User? Click Here link in the Sign In box. You will see the New Member Sign Up page. 3. Enter your [employer] Access Code exactly as it appears below. You will not need to use this code after youve completed the sign-up process. If you do not sign up before the expiration date, you must request a new code. · [employer] Access Code: [email or web address] Expires: 12/24/2017  9:20 AM 
 
4. Enter the last four digits of your Social Security Number (xxxx) and Date of Birth (mm/dd/yyyy) as indicated and click Submit. You will be taken to the next sign-up page. 5. Create a [employer] ID. This will be your [employer] login ID and cannot be changed, so think of one that is secure and easy to remember. 6. Create a [employer] password. You can change your password at any time. 7. Enter your Password Reset Question and Answer. This can be used at a later time if you forget your password. 8. Enter your e-mail address.  You will receive e-mail notification when new information is available in [employer]. 9. Click Sign Up. You can now view and download portions of your medical record. 10. Click the Download Summary menu link to download a portable copy of your medical information. If you have questions, please visit the Frequently Asked Questions section of the [employer] website. Remember, [employer] is NOT to be used for urgent needs. For medical emergencies, dial 911. Now available from your iPhone and Android! Please provide this summary of care documentation to your next provider. Your primary care clinician is listed as Heriberto Smith. If you have any questions after today's visit, please call [phone].

## 2017-11-21 NOTE — PROGRESS NOTES
NAME:  Alexa Tyler   :   1940   MRN:   [id number]   PCP:  Rey Mane MD           Subjective: The patient is a 69y.o. year old female  who returns for a routine follow-up. Since the last visit, patient reports no change in exercise tolerance, chest pain, edema, medication intolerance, palpitations, shortness of breath, PND/orthopnea wheezing, sputum, syncope, dizziness or light headedness. Doing well. Past Medical History:   Diagnosis Date    CAD (coronary artery disease)     cabg     Diabetes ([facility]     Diverticulosis     Endocrine disease     thyroid    GERD (gastroesophageal reflux disease)     Heart disease     Heart failure ([facility] 10/18/2011    Hypertension     Other ill-defined conditions(799.89)     kidney stones    Postsurgical percutaneous transluminal coronary angioplasty status 2012    S/P CABG x 3 11    [facility]    Sleeping difficulty     Teeth decayed     Thyroid disease     Weakness         ICD-10-CM ICD-9-CM    1. ASHD (arteriosclerotic heart disease) I25.10 414.00    2. Diastolic CHF, chronic (HCC) I50.32 428.32 AMB POC EKG ROUTINE W/ 12 LEADS, INTER & REP     428.0    3. Mixed hyperlipidemia E78.2 272.2    4. Postsurgical aortocoronary bypass status Z95.1 V45.81    5.  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 250.40      583.81       Social History   Substance Use Topics    Smoking status: Former Smoker     Packs/day: 0.50     Years: 40.00     Quit date: 2010    Smokeless tobacco: Never Used    Alcohol use No      Family History   Problem Relation Age of Onset    Diabetes Mother     Heart Disease Mother     Diabetes Brother     Heart Disease Brother     Mental Retardation Brother     Hypertension Brother     Other Father     Cancer Sister     Diabetes Brother     Heart Disease Brother     Diabetes Brother     Breast Cancer Daughter         Review of Systems  General: Pt denies excessive weight gain or loss. Pt is able to conduct ADL's  HEENT: Denies blurred vision, headaches, epistaxis and difficulty swallowing. Respiratory: Denies shortness of breath, STOUT, wheezing or stridor. Cardiovascular: Denies precordial pain, palpitations, edema or PND  Gastrointestinal: Denies poor appetite, indigestion, abdominal pain or blood in stool  Musculoskeletal: Denies pain or swelling from muscles or joints  Neurologic: Denies tremor, paresthesias, or sensory motor disturbance  Skin: Denies rash, itching or texture change. Objective:       Vitals:    11/21/17 1111 11/21/17 1119   BP: 130/84 130/84   Pulse: 76    Resp: 20    SpO2: 96%    Weight: 165 lb 12.8 oz (75.2 kg)    Height: 5' 1\" (1.549 m)     Body mass index is 31.33 kg/(m^2). General PE  Mental Status - Alert. General Appearance - Not in acute distress. Chest and Lung Exam   Inspection: Accessory muscles - No use of accessory muscles in breathing. Auscultation:   Breath sounds: - Normal.    Cardiovascular   Inspection: Jugular vein - Bilateral - Inspection Normal.  Palpation/Percussion:   Apical Impulse: - Normal.  Auscultation: Rhythm - Regular. Heart Sounds - S1 WNL and S2 WNL. No S3 or S4. Murmurs & Other Heart Sounds: Auscultation of the heart reveals - No Murmurs. Peripheral Vascular   Upper Extremity: Inspection - Bilateral - No Cyanotic nailbeds or Digital clubbing. Lower Extremity:   Palpation: Edema - Bilateral - No edema. Data Review:     EKG -EKG: normal EKG, normal sinus rhythm, unchanged from previous tracings. Medications reviewed  Current Outpatient Prescriptions   Medication Sig    omeprazole (PRILOSEC) 40 mg capsule TAKE 1 CAPSULE BY MOUTH DAILY    insulin NPH/insulin regular (NOVOLIN 70/30, HUMULIN 70/30) 100 unit/mL (70-30) injection 10 units sq bid    lancets (FREESTYLE LANCETS) 28 gauge misc Check blood sugar 4 times daily.     metFORMIN (GLUCOPHAGE) 1,000 mg tablet TAKE 1 TABLET BY MOUTH TWICE DAILY  levothyroxine (SYNTHROID) 100 mcg tablet Take 1 Tab by mouth Daily (before breakfast).  escitalopram oxalate (LEXAPRO) 10 mg tablet Take 1 Tab by mouth daily.  amLODIPine (NORVASC) 2.5 mg tablet TAKE 1 TABLET BY MOUTH DAILY    metoprolol tartrate (LOPRESSOR) 50 mg tablet Take 1 Tab by mouth two (2) times a day.  colesevelam (WELCHOL) 625 mg tablet Take 1,875 mg by mouth two (2) times daily (with meals).  atorvastatin (LIPITOR) 80 mg tablet TAKE 1 TABLET NIGHTLY    LORazepam (ATIVAN) 0.5 mg tablet Take 1 Tab by mouth nightly as needed. Max Daily Amount: 0.5 mg.    EPIPEN 2-GEE 0.3 mg/0.3 mL (1:1,000) injection     aspirin 81 mg chewable tablet Take 1 Tab by mouth daily.  polyethylene glycol (MIRALAX) 17 gram packet Take 1 Packet by mouth daily.  cyanocobalamin (VITAMIN B-12) 1,000 mcg tablet Take 1,000 mcg by mouth daily. No current facility-administered medications for this visit. Assessment:       ICD-10-CM ICD-9-CM    1. ASHD (arteriosclerotic heart disease) I25.10 414.00    2. Diastolic CHF, chronic (HCC) I50.32 428.32 AMB POC EKG ROUTINE W/ 12 LEADS, INTER & REP     428.0    3. Mixed hyperlipidemia E78.2 272.2    4. Postsurgical aortocoronary bypass status Z95.1 V45.81    5. 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 250.40      583.81         Plan:     Patient presents doing well and stable from cardiac standpoint. lipida at goal. Continue current care and follow up in 6 months.     Raad Melara MD

## 2017-11-21 NOTE — PROGRESS NOTES
1. Have you been to the ER, urgent care clinic since your last visit? Hospital[facility] your [facility] visit? [facility]    2. Have you seen or consulted any other health care providers outside of the [address] [place] [address] since your last visit? Include any pap smears or colon screening. NO    6 MONTH FOLLOW UP, C/O SOB WHEN WALKING.

## 2017-11-28 RX ORDER — AMLODIPINE BESYLATE 2.5 MG/1
TABLET ORAL
Qty: 90 TAB | Refills: 1 | Status: SHIPPED | OUTPATIENT
Start: 2017-11-28 | End: 2018-05-10 | Stop reason: SDUPTHER

## 2017-11-28 RX ORDER — METOPROLOL TARTRATE 50 MG/1
50 TABLET ORAL 2 TIMES DAILY
Qty: 180 TAB | Refills: 2 | Status: SHIPPED | OUTPATIENT
Start: 2017-11-28 | End: 2018-08-08 | Stop reason: SDUPTHER

## 2017-11-28 RX ORDER — AMLODIPINE BESYLATE 2.5 MG/1
TABLET ORAL
Qty: 90 TAB | Refills: 1 | Status: SHIPPED | OUTPATIENT
Start: 2017-11-28 | End: 2018-04-13

## 2017-11-28 RX ORDER — LEVOTHYROXINE SODIUM 100 UG/1
100 TABLET ORAL
Qty: 90 TAB | Refills: 1 | Status: SHIPPED | OUTPATIENT
Start: 2017-11-28 | End: 2018-04-13

## 2017-11-28 RX ORDER — LEVOTHYROXINE SODIUM 100 UG/1
TABLET ORAL
Qty: 90 TAB | Refills: 1 | Status: SHIPPED | OUTPATIENT
Start: 2017-11-28 | End: 2018-05-10 | Stop reason: SDUPTHER

## 2017-12-20 ENCOUNTER — DOCUMENTATION ONLY (OUTPATIENT)
Dept: INTERNAL MEDICINE CLINIC | Age: 77
End: 2017-12-20

## 2017-12-20 NOTE — PROGRESS NOTES
Medicare Part B Preventive Services Limitations Recommendation/Date completed if known Scheduled/ Next Due   Bone Mass Measurement  (age 72 & older, biennial) Requires diagnosis related to osteoporosis or estrogen deficiency. Biennial benefit unless patient has history of long-term glucocorticoid tx or baseline is needed because initial test was by other method Completed 12/2015      Recommended every 2 years Due 12/2017   Cardiovascular Screening Blood Tests (every 5 years)  Total cholesterol, HDL, Triglycerides Order as a panel if possible Completed 9/2017      Recommended annually Due 9/2018   Colorectal Cancer Screening  -Fecal occult blood test (annual)  -Flexible sigmoidoscopy (5y)  -Screening colonoscopy (10y)  -Barium Enema    Completed 10/2016 with Dr. Chapito Ken  Recommended in 10 years Due 10/2026   Counseling to Prevent Tobacco Use (up to 8 sessions per year)  - Counseling greater than 3 and up to 10 minutes  - Counseling greater than 10 minutes Patients must be asymptomatic of tobacco-related conditions to receive as preventive service Former smoker. Keep up the good work! Diabetes Screening Tests (at least every 3 years, Medicare covers annually or at 6-month intervals for prediabetic patients)      Fasting blood sugar (FBS) or glucose tolerance test (GTT)         Patient must be diagnosed with one of the following:  -Hypertension, Dyslipidemia, obesity, previous impaired FBS or GTT  Or any two of the following: overweight, FH of diabetes, age ? 72, history of gestational diabetes, birth of baby weighing more than 9 pounds Completed 9/2017 with A1C 6.9        Recommended every 3-6 months for diabetics Due 12/2017-3/2018   Diabetes Self-Management Training (DSMT) (no USPSTF recommendation) Requires referral by treating physician for patient with diabetes or renal disease. 10 hours of initial DSMT session of no less than 30 minutes each in a continuous 12-month period.  2 hours of follow-up DSMT in subsequent years. You have been to the diabetes treatment center in the past. You may call [phone] for a refresher course two years after your last class. Glaucoma Screening (no USPSTF recommendation) Diabetes mellitus, family history, , age 48 or over,  American, age 72 or over Completed 9/2017 with Dr. Balderas Level       Recommended annually  Due 9/2018   Human Immunodeficiency Virus (HIV) Screening (annually for increased risk patients)  HIV-1 and HIV-2 by EIA, XAVI, rapid antibody test, or oral mucosa transudate Patient must be at increased risk for HIV infection per USPSTF guidelines or pregnant. Tests covered annually for patients at increased risk. Pregnant patients may receive up to 3 test during pregnancy. N/A N/A   Medical Nutrition Therapy (MNT) (for diabetes or renal disease not recommended schedule) Requires referral by treating physician for patient with diabetes or renal disease. Can be provided in same year as diabetes self-management training (DSMT), and CMS recommends medical nutrition therapy take place after DSMT. Up to 3 hours for initial year and 2 hours in subsequent years.  N/A N/A   Prostate Cancer Screening (annually up to age 76)  - Digital rectal exam (PRISCILLA)  - Prostate specific antigen (PSA) Annually (age 48 or over), PRISCILLA not paid separately when covered E/M service is provided on same date N/A N/A   Seasonal Influenza Vaccination (annually)    Completed 9/26/17    Recommended annually Due Fall 2018   Pneumococcal Vaccination (once after 72)    Pneumococcal 23 - 10/10/2010    Prevnar 13 - 9/26/17    Both recommended once over the age of 72 Completed       Completed    Shingles Vaccine            Completed 9/2016    Recommended once over the age of 72 Completed   Hepatitis B Vaccinations (if medium/high risk) Medium/high risk factors: End-stage renal disease,  Hemophiliacs who received Factor VIII or IX concentrates, Clients of institutions for the mentally retarded, Persons who live in the same house as a HepB virus carrier, Homosexual men, Illicit injectable drug abusers. N/A N/A   Screening Mammography (biennial age 54-69)? Annually (age 36 or over) Completed 1/23/17       Recommended annually Due 1/2018   Screening Pap Tests and Pelvic Examination (up to age 79 and after 79 if unknown history or abnormal study last 10 years) Every 25 months except high risk Completed 12/2014        Recommended every 2 years Declines further    Ultrasound Screening for Abdominal Aortic Aneurysm (AAA) (once) Patient must be referred through [facility] and not have had a screening for abdominal aortic aneurysm before under Medicare. Limited to patients who meet one of the following criteria:  - Men who are 73-68 years old and have smoked more than 100 cigarettes in their lifetime.  -Anyone with a FH of AAA  -Anyone recommended for screening by USPSTF Not covered by Medicare as preventive.      You had a CTA of your abdomen 11/9/2009 that showed no aneurysm.  N/A

## 2017-12-27 ENCOUNTER — APPOINTMENT (OUTPATIENT)
Dept: INTERNAL MEDICINE CLINIC | Age: 77
End: 2017-12-27

## 2017-12-27 DIAGNOSIS — I25.10 CORONARY ARTERY DISEASE INVOLVING NATIVE CORONARY ARTERY OF NATIVE HEART WITHOUT ANGINA PECTORIS: ICD-10-CM

## 2017-12-27 DIAGNOSIS — E11.21 TYPE II DIABETES MELLITUS WITH NEPHROPATHY (HCC): ICD-10-CM

## 2017-12-27 DIAGNOSIS — E03.9 HYPOTHYROIDISM, UNSPECIFIED TYPE: ICD-10-CM

## 2017-12-27 DIAGNOSIS — I10 ESSENTIAL HYPERTENSION, BENIGN: ICD-10-CM

## 2017-12-27 DIAGNOSIS — E78.2 MIXED HYPERLIPIDEMIA: ICD-10-CM

## 2017-12-27 DIAGNOSIS — M81.0 AGE-RELATED OSTEOPOROSIS WITHOUT CURRENT PATHOLOGICAL FRACTURE: ICD-10-CM

## 2017-12-27 DIAGNOSIS — F32.9 REACTIVE DEPRESSION: ICD-10-CM

## 2017-12-28 LAB
BUN SERPL-MCNC: 17 MG/DL (ref 8–27)
BUN/CREAT SERPL: 18 (ref 12–28)
CALCIUM SERPL-MCNC: 9.6 MG/DL (ref 8.7–10.3)
CHLORIDE SERPL-SCNC: 101 MMOL/L (ref 96–106)
CO2 SERPL-SCNC: 26 MMOL/L (ref 18–29)
CREAT SERPL-MCNC: 0.92 MG/DL (ref 0.57–1)
EST. AVERAGE GLUCOSE BLD GHB EST-MCNC: 197 MG/DL
GLUCOSE SERPL-MCNC: 151 MG/DL (ref 65–99)
HBA1C MFR BLD: 8.5 % (ref 4.8–5.6)
POTASSIUM SERPL-SCNC: 4.8 MMOL/L (ref 3.5–5.2)
SODIUM SERPL-SCNC: 143 MMOL/L (ref 134–144)

## 2018-01-02 ENCOUNTER — OFFICE VISIT (OUTPATIENT)
Dept: INTERNAL MEDICINE CLINIC | Age: 78
End: 2018-01-02

## 2018-01-02 VITALS
WEIGHT: 168 LBS | RESPIRATION RATE: 16 BRPM | HEART RATE: 71 BPM | HEIGHT: 61 IN | OXYGEN SATURATION: 97 % | SYSTOLIC BLOOD PRESSURE: 140 MMHG | TEMPERATURE: 97.4 F | DIASTOLIC BLOOD PRESSURE: 84 MMHG | BODY MASS INDEX: 31.72 KG/M2

## 2018-01-02 DIAGNOSIS — E78.5 HYPERLIPIDEMIA, UNSPECIFIED HYPERLIPIDEMIA TYPE: ICD-10-CM

## 2018-01-02 DIAGNOSIS — M89.9 BONE DISEASE: ICD-10-CM

## 2018-01-02 DIAGNOSIS — F41.9 ANXIETY: ICD-10-CM

## 2018-01-02 DIAGNOSIS — I73.9 PVD (PERIPHERAL VASCULAR DISEASE) (HCC): ICD-10-CM

## 2018-01-02 DIAGNOSIS — Z12.39 SCREENING BREAST EXAMINATION: ICD-10-CM

## 2018-01-02 DIAGNOSIS — F32.9 REACTIVE DEPRESSION: ICD-10-CM

## 2018-01-02 DIAGNOSIS — Z00.00 MEDICARE ANNUAL WELLNESS VISIT, SUBSEQUENT: ICD-10-CM

## 2018-01-02 DIAGNOSIS — K21.9 GASTROESOPHAGEAL REFLUX DISEASE WITHOUT ESOPHAGITIS: ICD-10-CM

## 2018-01-02 DIAGNOSIS — E11.21 TYPE II DIABETES MELLITUS WITH NEPHROPATHY (HCC): Primary | ICD-10-CM

## 2018-01-02 DIAGNOSIS — I10 ESSENTIAL HYPERTENSION, BENIGN: ICD-10-CM

## 2018-01-02 DIAGNOSIS — R21 RASH: ICD-10-CM

## 2018-01-02 DIAGNOSIS — I25.10 CORONARY ARTERY DISEASE INVOLVING NATIVE CORONARY ARTERY OF NATIVE HEART WITHOUT ANGINA PECTORIS: ICD-10-CM

## 2018-01-02 DIAGNOSIS — E03.9 HYPOTHYROIDISM, UNSPECIFIED TYPE: ICD-10-CM

## 2018-01-02 PROBLEM — F33.9 RECURRENT DEPRESSION (HCC): Status: ACTIVE | Noted: 2018-01-02

## 2018-01-02 RX ORDER — OMEPRAZOLE 40 MG/1
CAPSULE, DELAYED RELEASE ORAL
Qty: 90 CAP | Refills: 1 | Status: SHIPPED | OUTPATIENT
Start: 2018-01-02 | End: 2018-04-13 | Stop reason: DRUGHIGH

## 2018-01-02 RX ORDER — ESCITALOPRAM OXALATE 10 MG/1
10 TABLET ORAL DAILY
Qty: 90 TAB | Refills: 1 | Status: SHIPPED | OUTPATIENT
Start: 2018-01-02 | End: 2019-01-09

## 2018-01-02 RX ORDER — LANCETS 28 GAUGE
EACH MISCELLANEOUS
Qty: 200 LANCET | Refills: 1 | Status: SHIPPED | OUTPATIENT
Start: 2018-01-02 | End: 2018-04-13

## 2018-01-02 RX ORDER — LORAZEPAM 0.5 MG/1
0.5 TABLET ORAL
Qty: 30 TAB | Refills: 0 | Status: SHIPPED | OUTPATIENT
Start: 2018-01-02 | End: 2018-07-25 | Stop reason: SDUPTHER

## 2018-01-02 NOTE — PROGRESS NOTES
This is a Subsequent Medicare Annual Wellness Exam (AWV) (Performed 12 months after IPPE or effective date of Medicare Part B enrollment)    I have reviewed the patient's medical history in detail and updated the computerized patient record. History     Past Medical History:   Diagnosis Date    CAD (coronary artery disease)     cabg     Diabetes [facility] [address]     Diverticulosis     Endocrine disease     thyroid    GERD (gastroesophageal reflux disease)     Heart disease     Heart failure ([facility] [address] 10/18/2011    Hypertension     Other ill-defined conditions(799.89)     kidney stones    Postsurgical percutaneous transluminal coronary angioplasty status 5/17/2012    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    [facility]    Sleeping difficulty     Teeth decayed     Thyroid disease     Weakness       Past Surgical History:   Procedure Laterality Date    CARDIAC SURG PROCEDURE UNLIST      cabg x 3    HX CORONARY ARTERY BYPASS GRAFT  11/11/11    3 vessel    HX GYN      hysterectomy    HX UROLOGICAL      kidney stone    VASCULAR SURGERY PROCEDURE UNLIST      Stents put in right leg     Current Outpatient Prescriptions   Medication Sig Dispense Refill    amLODIPine (NORVASC) 2.5 mg tablet TAKE 1 TABLET BY MOUTH DAILY 90 Tab 1    levothyroxine (SYNTHROID) 100 mcg tablet Take 1 Tab by mouth Daily (before breakfast). 90 Tab 1    metoprolol tartrate (LOPRESSOR) 50 mg tablet Take 1 Tab by mouth two (2) times a day. 180 Tab 2    amLODIPine (NORVASC) 2.5 mg tablet TAKE 1 TABLET DAILY 90 Tab 1    levothyroxine (SYNTHROID) 100 mcg tablet TAKE 1 TABLET DAILY BEFORE BREAKFAST 90 Tab 1    omeprazole (PRILOSEC) 40 mg capsule TAKE 1 CAPSULE BY MOUTH DAILY 90 Cap 1    insulin NPH/insulin regular (NOVOLIN 70/30, HUMULIN 70/30) 100 unit/mL (70-30) injection 10 units sq bid 10 mL 1    lancets (FREESTYLE LANCETS) 28 gauge misc Check blood sugar 4 times daily.  200 Lancet 1    metFORMIN (GLUCOPHAGE) 1,000 mg tablet TAKE 1 TABLET BY MOUTH TWICE DAILY 180 Tab 1    colesevelam (WELCHOL) 625 mg tablet Take 1,875 mg by mouth two (2) times daily (with meals).  atorvastatin (LIPITOR) 80 mg tablet TAKE 1 TABLET NIGHTLY 90 Tab 3    LORazepam (ATIVAN) 0.5 mg tablet Take 1 Tab by mouth nightly as needed. Max Daily Amount: 0.5 mg. 30 Tab 0    EPIPEN 2-GEE 0.3 mg/0.3 mL (1:1,000) injection   0    aspirin 81 mg chewable tablet Take 1 Tab by mouth daily. 90 Tab 3    cyanocobalamin (VITAMIN B-12) 1,000 mcg tablet Take 1,000 mcg by mouth daily.  escitalopram oxalate (LEXAPRO) 10 mg tablet Take 1 Tab by mouth daily. 90 Tab 1    polyethylene glycol (MIRALAX) 17 gram packet Take 1 Packet by mouth daily.  30 Each 1     Allergies   Allergen Reactions    Ace Inhibitors Swelling    Arb-Angiotensin Receptor Antagonist Swelling    Lisinopril Swelling    Plavix [Clopidogrel] Other (comments)     Excessive bleeding    Potassium Nausea and Vomiting     Potassium containing oral products are not well tolerated by patient     Family History   Problem Relation Age of Onset    Diabetes Mother     Heart Disease Mother     Diabetes Brother     Heart Disease Brother     Mental Retardation Brother     Hypertension Brother     Other Father     Cancer Sister     Diabetes Brother     Heart Disease Brother     Diabetes Brother     Breast Cancer Daughter      Social History   Substance Use Topics    Smoking status: Former Smoker     Packs/day: 0.50     Years: 40.00     Quit date: 9/20/2010    Smokeless tobacco: Never Used    Alcohol use No     Patient Active Problem List   Diagnosis Code    CAD (coronary artery disease) I25.10    Hypothyroidism E03.9    PVD (peripheral vascular disease) ([facility] [address] I73.9    Influenza J11.1    [facility] I50.9    Hyperlipidemia E78.5    ASHD (arteriosclerotic heart disease) I25.10    S/P CABG x 3 Z95.1    Essential hypertension, benign I10    Mixed hyperlipidemia E78.2    Postsurgical percutaneous transluminal coronary angioplasty status Z98.61    Old myocardial infarction I25.2    Postsurgical aortocoronary bypass status Z95.1    Bunion M21.619    Osteoporosis M81.0    ACP (advance care planning) Z71.89    GIB (gastrointestinal bleeding) K92.2    Diverticulosis of intestine with bleeding K57.91    Anemia F53.1    Diastolic CHF, chronic (HCC) I50.32    Lower GI bleeding K92.2    Type II diabetes mellitus with nephropathy ([facility] [address] E11.21    Encounter for long-term (current) use of insulin (HCC) Z79.4    Type 2 diabetes mellitus with nephropathy (HCC) E11.21       Depression Risk Factor Screening:     PHQ over the last two weeks 1/2/2018   Little interest or pleasure in doing things Not at all   Feeling down, depressed or hopeless Several days   Total Score PHQ 2 1   restart lexapro  Alcohol Risk Factor Screening: You do not drink alcohol or very rarely. Functional Ability and Level of Safety:   Hearing Loss  Hearing is good. Activities of Daily Living  The home contains: handrails and grab bars  Patient does total self care    Fall Risk  Fall Risk Assessment, last 12 mths 1/2/2018   Able to walk? Yes   Fall in past 12 months? Yes   Fall with injury? Yes   Number of falls in past 12 months 1   Fall Risk Score 2     HAD A FALL OVER THE SUMMER, NO falls since then  compelted PT   Balance is better.        Abuse Screen  Patient is not abused  Grandson lives with her , get a long well   Cognitive Screening   Evaluation of Cognitive Function:  Has your family/caregiver stated any concerns about your memory: no  Normal    Patient Care Team   Patient Care Team:  Randi Reza MD as PCP - General (Internal Medicine)  Sarah Rodriguez O.D. (Optometry)  Ap Malik MD as Physician (Cardiology)  Kulwant Talbert MD (Gastroenterology)  Felicia Alcantara MD (Obstetrics & Gynecology)  Jocelyn Cazares MD (Vascular Surgery)  Marylen Neat, RN as Ambulatory Care Navigator  Adali Chirinos RN as Ambulatory Care Navigator   Updated     Assessment/Plan   Education and counseling provided:  Are appropriate based on today's review and evaluation  End-of-Life planning (with patient's consent)  Screening Mammography  Bone mass measurement (DEXA)  Screening for glaucoma  Diabetes screening test    Diagnoses and all orders for this visit:    1. Medicare annual wellness visit, subsequent      Health Maintenance Due   Topic Date Due    EYE EXAM RETINAL OR DILATED Q1  08/05/2016    GLAUCOMA SCREENING Q2Y  08/05/2017    MEDICARE YEARLY EXAM  12/15/2017         Discussed with patient about advance medical directive. ACP on file, SDMs are her grandson Anita Lewis, daughter Laisha Roman) and brother Ibrahima Campbell) .        Colonoscopy: Dr. Georgi Redmond, 10/13/15, unsure of repeat, she will call GI  Pap: 12/14, declines further   Mammogram: 1/23/17, negative, due 1/18, ordered  DEXA: 12/07/15, stopped fosamax, improved, due, ordered     Tdap: 1/04/2016  Pneumovax: 10/10/2010  [id number]: never had? Zostavax: 10/10/2016  shingrix--consider this now  Flu shot: 10/2017    A1c:12/17 8.5  [facility] exam: [facility]. Sagrario Beck[facility] at [facility], 4/17, will get notes  Lipids: 917 LDL 78    Medication reconciliation completed by MA and reviewed by me. Medical/surgical/social/family history reviewed and updated by me. Patient provided AVS and preventative screening table. Patient verbalized understanding of all information discussed.

## 2018-01-02 NOTE — ACP (ADVANCE CARE PLANNING)
ACP planning begun today, SDM is daughter trinidad moran, grandson also on list as his brother--reviewed

## 2018-01-02 NOTE — PROGRESS NOTES
HISTORY OF PRESENT ILLNESS  Dandre Moe is a 69 y.o. female. HPI   Last here 8/15/17. Pt is here for routine care. BP is 144/84, will repeat this today   BP at home running around 120/74  Pt intermittently monitors her BP at home  Continues on metoprolol 50mg BID and norvasc 2.5mg daily  Recall had angioedema with lisinopril and benicar-HCT in the past      BS at home running around 140s. , 148 in the AM fasting and 160, 180 before dinner  Continues on 70/20 insulin mix 10U BID and metformin 1000mg BID  Lov, I provided her with victoza samples  However, she stopped taking this medication after running out of samples, as it was too expensive  Will increase insulin from 10U BID to 13U BID  If fasting BS are >120 after 5 days, will increase insulin to 15U BID  If low BS occur, will decrease insulin and call the office  Pt wonders how she must inject her abd with insulin - addressed this today  She also takes ASA 81mg daily  Recall issues with hypoglycemia in the past, a1c under 7.5 at goal for her     Wt is up 7 lbs since last visit  Discussed diet and weight loss       Reviewed last labs 12/17    Reviewed COLO 10/13/15: preparation was fair, diverticulosis was severe, one polyp   Pt is unsure of the repeat  Pt will call the GI    Pt follows with Dr. Sara Rene (cardio)  Reviewed notes 11/21/17: Patient presents doing well and stable from cardiac standpoint. lipida at goal. Continue current care and follow up in 6 months.     Pt follows with [facility]. Justen Huertas ([facility]) for her PAD  Next visit scheduled for 2/18    Lov, pt had a fall  Pt denies any recent falls  Pt has completed PT with improvement to sx  Pt has safety equipment in the bathroom    Pt lives and gets along well with her grandson    Continues on prilosec 40mg daily for reflux - works well, no breakthrough     Since lov, I increased her synthroid 100mcg daily - reports compliance  She is taking this medication separately from all medications      Continues on lipitor 80mg max dose and welchol per cardio daily for cholesterol   Recall zetia is too expensive      Reviewed depression screen 01/02/18: +1  Since lov, I increased her lexapro from 5 to 10mg daily for depression and anxiety   Pt self d/c'd this medication, as it prevented her from sleeping x 1 month  Pt still has sad/down moments  Will restart lexapro 10mg qhs  She also has ativan to use prn (about twice weekly)  This medication helps her sleep when she is anxious    Pt would like a referral for a derm for her rash - ordered       ACP on file, SDMs are her grandson Haven Jack), daughter (Aldo Stahl) and brother Vanda Coleman) .      PREVENTIVE:    Colonoscopy: Dr. Libertad Reina, 10/13/15, unsure of repeat, she will call GI  Pap: 12/14, declines further   Mammogram: 1/23/17, negative, due 1/18, ordered  DEXA: 12/07/15, stopped fosamax, improved, due, ordered   Tdap: 1/04/2016  Pneumovax: 10/10/2010  [id number]: never had?   Zostavax: 10/10/2016  shingrix---consider  Flu shot: 9/18  Foot exam: 6/26/17, normal  Microalbumin: 9/17  A1c:  4/15 7.7, 7/15 8.3, 9/15 7.3, 12/15 7.2 , 3/16 9.1, 8/16 7.6, 12/16 7.2, 3/17 7.3, 6/17 7.3, 9/17 6.9, 12/17 8.5  Eye exam: [facility] Davy [facility] at [facility], 9/17, will get notes  Lipids: 917 LDL 78    Patient Active Problem List    Diagnosis Date Noted    Encounter for long-term (current) use of insulin ([address] 04/04/2016    Type II diabetes mellitus with nephropathy ([address] 01/04/2016    Lower GI bleeding 10/12/2015    Diverticulosis of intestine with bleeding 10/11/2015    Anemia 79/91/2133    Diastolic CHF, chronic ([address] 10/11/2015    GIB (gastrointestinal bleeding) 10/08/2015    ACP (advance care planning) 09/08/2015    Osteoporosis 09/10/2013    Bunion 10/15/2012    Essential hypertension, benign 05/17/2012    Mixed hyperlipidemia 05/17/2012    Postsurgical percutaneous transluminal coronary angioplasty status 05/17/2012    Old myocardial infarction 05/17/2012  Postsurgical aortocoronary bypass status 05/17/2012    S/P CABG x 3 11/11/2011    ASHD (arteriosclerotic heart disease) 11/10/2011    Heart failure ([facility] 10/18/2011    Hyperlipidemia 10/18/2011    Influenza 01/19/2011    CAD (coronary artery disease) 11/18/2009    Hypothyroidism 11/18/2009    PVD (peripheral vascular disease) ([facility] 11/18/2009     Current Outpatient Prescriptions   Medication Sig Dispense Refill    amLODIPine (NORVASC) 2.5 mg tablet TAKE 1 TABLET BY MOUTH DAILY 90 Tab 1    levothyroxine (SYNTHROID) 100 mcg tablet Take 1 Tab by mouth Daily (before breakfast). 90 Tab 1    metoprolol tartrate (LOPRESSOR) 50 mg tablet Take 1 Tab by mouth two (2) times a day. 180 Tab 2    amLODIPine (NORVASC) 2.5 mg tablet TAKE 1 TABLET DAILY 90 Tab 1    levothyroxine (SYNTHROID) 100 mcg tablet TAKE 1 TABLET DAILY BEFORE BREAKFAST 90 Tab 1    omeprazole (PRILOSEC) 40 mg capsule TAKE 1 CAPSULE BY MOUTH DAILY 90 Cap 1    insulin NPH/insulin regular (NOVOLIN 70/30, HUMULIN 70/30) 100 unit/mL (70-30) injection 10 units sq bid 10 mL 1    lancets (FREESTYLE LANCETS) 28 gauge misc Check blood sugar 4 times daily. 200 Lancet 1    metFORMIN (GLUCOPHAGE) 1,000 mg tablet TAKE 1 TABLET BY MOUTH TWICE DAILY 180 Tab 1    colesevelam (WELCHOL) 625 mg tablet Take 1,875 mg by mouth two (2) times daily (with meals).  atorvastatin (LIPITOR) 80 mg tablet TAKE 1 TABLET NIGHTLY 90 Tab 3    LORazepam (ATIVAN) 0.5 mg tablet Take 1 Tab by mouth nightly as needed. Max Daily Amount: 0.5 mg. 30 Tab 0    EPIPEN 2-GEE 0.3 mg/0.3 mL (1:1,000) injection   0    aspirin 81 mg chewable tablet Take 1 Tab by mouth daily. 90 Tab 3    cyanocobalamin (VITAMIN B-12) 1,000 mcg tablet Take 1,000 mcg by mouth daily.  escitalopram oxalate (LEXAPRO) 10 mg tablet Take 1 Tab by mouth daily. 90 Tab 1    polyethylene glycol (MIRALAX) 17 gram packet Take 1 Packet by mouth daily.  30 Each 1     Past Surgical History:   Procedure Laterality Date    CARDIAC SURG PROCEDURE UNLIST      cabg x 3    HX CORONARY ARTERY BYPASS GRAFT  11/11/11    3 vessel    HX GYN      hysterectomy    HX UROLOGICAL      kidney stone    VASCULAR SURGERY PROCEDURE UNLIST      Stents put in right leg      Lab Results  Component Value Date/Time   WBC 7.2 09/22/2017 09:20 AM   HGB 12.3 09/22/2017 09:20 AM   Hemoglobin (POC) 12.9 03/02/2011 12:21 PM   HCT 38.9 09/22/2017 09:20 AM   PLATELET 850 31/36/6460 09:20 AM   MCV 85 09/22/2017 09:20 AM     Lab Results  Component Value Date/Time   Cholesterol, total 157 09/22/2017 09:20 AM   HDL Cholesterol 56 09/22/2017 09:20 AM   LDL, calculated 78 09/22/2017 09:20 AM   Triglyceride 116 09/22/2017 09:20 AM   CHOL/HDL Ratio 3.2 09/14/2010 08:42 AM     Lab Results  Component Value Date/Time   GFR est non-AA 61 12/27/2017 09:46 AM   GFR est AA 70 12/27/2017 09:46 AM   Creatinine 0.92 12/27/2017 09:46 AM   BUN 17 12/27/2017 09:46 AM   Sodium 143 12/27/2017 09:46 AM   Potassium 4.8 12/27/2017 09:46 AM   Chloride 101 12/27/2017 09:46 AM   CO2 26 12/27/2017 09:46 AM   Magnesium 1.7 10/16/2015 04:34 AM        Review of Systems   HENT: Negative for hearing loss. Respiratory: Negative for shortness of breath. Cardiovascular: Negative for chest pain. Musculoskeletal: Negative for falls. Skin: Positive for rash. Psychiatric/Behavioral: Positive for depression. Negative for memory loss. Physical Exam   Constitutional: She is oriented to person, place, and time. She appears well-developed and well-nourished. No distress. HENT:   Head: Normocephalic and atraumatic. Right Ear: External ear normal.   Left Ear: External ear normal.   Mouth/Throat: Oropharynx is clear and moist. No oropharyngeal exudate. Eyes: Conjunctivae and EOM are normal. Right eye exhibits no discharge. Left eye exhibits no discharge. Neck: Normal range of motion. Neck supple. Cardiovascular: Normal rate and regular rhythm.   Exam reveals no gallop and no friction rub. Murmur (1/6) heard. Pulmonary/Chest: Effort normal and breath sounds normal. No respiratory distress. She has no wheezes. She has no rales. She exhibits no tenderness. Musculoskeletal: Normal range of motion. She exhibits no edema, tenderness or deformity. Lymphadenopathy:     She has no cervical adenopathy. Neurological: She is alert and oriented to person, place, and time. Coordination normal.   Skin: Skin is warm and dry. No rash noted. She is not diaphoretic. No erythema. No pallor. Psychiatric: She has a normal mood and affect. Her behavior is normal.       ASSESSMENT and PLAN    ICD-10-CM ICD-9-CM    1. Type II diabetes mellitus with nephropathy (HCC)    Worsening control with change to 70/30 mix, home readings show poor control, she is taking 70/30 mix 10U BID, will increase this to 13U BID after 5 days, if fasting BS still above goal increase to 15U BID   E11.21 250.40 insulin NPH/insulin regular (NOVOLIN 70/30, HUMULIN 70/30) 100 unit/mL (70-30) injection     583.81 lancets (FREESTYLE LANCETS) 28 gauge misc      glucose blood VI test strips (FREESTYLE TEST) strip      METABOLIC PANEL, BASIC      HEMOGLOBIN A1C WITH EAG   2. Medicare annual wellness visit, subsequent I29.72 R47.6 METABOLIC PANEL, BASIC      HEMOGLOBIN A1C WITH EAG   3. Bone disease    DEXA ordered   M89.9 733.90 DEXA BONE DENSITY STUDY AXIAL      METABOLIC PANEL, BASIC      HEMOGLOBIN A1C WITH EAG   4. Anxiety    Restart lexapro which improved her sx of anxiety and depression quite well, she is feeling more sad/down, uses ativan in the PM for acute anxiety, will refill this today, no signs of abuse   F41.9 300.00 LORazepam (ATIVAN) 0.5 mg tablet      METABOLIC PANEL, BASIC      HEMOGLOBIN A1C WITH EAG   5.  Coronary artery disease involving native coronary artery of native heart without angina pectoris    UTD with Dr. Micah Jang, continues on a daily ASA   Z37.33 273.51 METABOLIC PANEL, BASIC HEMOGLOBIN A1C WITH EAG   6. Hypothyroidism, unspecified type    At goal on synthroid 100mcg daily    H79.0 234.8 METABOLIC PANEL, BASIC      HEMOGLOBIN A1C WITH EAG   7. PVD (peripheral vascular disease) (HCC)    Sees Dr. Eugene Lutz annually, will next see him in February   J74.7 175.0 METABOLIC PANEL, BASIC      HEMOGLOBIN A1C WITH EAG   8. Hyperlipidemia, unspecified hyperlipidemia type    Controled on lipitor and welchol   U89.8 929.1 METABOLIC PANEL, BASIC      HEMOGLOBIN A1C WITH EAG   9. Essential hypertension, benign    BP adeqautely controled for age on metoprolol BID and norvasc 2.5mg daily    U27 275.3 METABOLIC PANEL, BASIC      HEMOGLOBIN A1C WITH EAG   10. Screening breast examination    Screen   Z12.31 V76.10 FELICIANO MAMMO BI SCREENING INCL CAD      METABOLIC PANEL, BASIC      HEMOGLOBIN A1C WITH EAG   11. Reactive depression    See above   O02.1 967.6 METABOLIC PANEL, BASIC      HEMOGLOBIN A1C WITH EAG   12. Gastroesophageal reflux disease without esophagitis    Controled with prilosec, does not use this every day   I33.1 173.38 METABOLIC PANEL, BASIC      HEMOGLOBIN A1C WITH EAG   13. Rash    Would like a derm eval, ordered   R21 782.1 REFERRAL TO DERMATOLOGY      METABOLIC PANEL, BASIC      HEMOGLOBIN A1C WITH EAG        Depression screen reviewed and positive (1). Pt self d/c'd her lexapro. Restarting lexapro 10mg daily. Scribed by Eloise Rodriguez of [address] [place] [address] [address], as dictated by Dr. Martínez Ko. Current diagnosis and concerns discussed with pt at length. Pt understands risks and benefits or current treatment plan and medications, and accepts the treatment and medication with any possible risks. Pt asks appropriate questions, which were answered. Pt was instructed to call with any concerns or problems. This note will not be viewable in [address]

## 2018-01-02 NOTE — PATIENT INSTRUCTIONS
Medicare Part B Preventive Services Limitations Recommendation/Date completed if known Scheduled/ Next Due   Bone Mass Measurement  (age 72 & older, biennial) Requires diagnosis related to osteoporosis or estrogen deficiency. Biennial benefit unless patient has history of long-term glucocorticoid tx or baseline is needed because initial test was by other method Completed 12/2015      Recommended every 2 years Due 12/2017   Cardiovascular Screening Blood Tests (every 5 years)  Total cholesterol, HDL, Triglycerides Order as a panel if possible Completed 9/2017      Recommended annually Due 9/2018   Colorectal Cancer Screening  -Fecal occult blood test (annual)  -Flexible sigmoidoscopy (5y)  -Screening colonoscopy (10y)  -Barium Enema    Completed 10/2015 with Dr. Derek Krishna  Recommended in 5 years? Due discuss with dr Mignon Alvarez       Counseling to Prevent Tobacco Use (up to 8 sessions per year)  - Counseling greater than 3 and up to 10 minutes  - Counseling greater than 10 minutes Patients must be asymptomatic of tobacco-related conditions to receive as preventive service Former smoker. Keep up the good work! Diabetes Screening Tests (at least every 3 years, Medicare covers annually or at 6-month intervals for prediabetic patients)      Fasting blood sugar (FBS) or glucose tolerance test (GTT)         Patient must be diagnosed with one of the following:  -Hypertension, Dyslipidemia, obesity, previous impaired FBS or GTT  Or any two of the following: overweight, FH of diabetes, age ? 72, history of gestational diabetes, birth of baby weighing more than 9 pounds Completed 12/2017 with A1C 8.5      Recommended every 3-6 months for diabetics Due -3/2018   Diabetes Self-Management Training (DSMT) (no USPSTF recommendation) Requires referral by treating physician for patient with diabetes or renal disease. 10 hours of initial DSMT session of no less than 30 minutes each in a continuous 12-month period.  2 hours of follow-up DSMT in subsequent years. You have been to the diabetes treatment center in the past. You may call [phone] for a refresher course two years after your last class. Glaucoma Screening (no USPSTF recommendation) Diabetes mellitus, family history, , age 48 or over,  American, age 72 or over Completed 9/2017 with Dr. Catherine Lyn       Recommended annually  Due 9/2018   Human Immunodeficiency Virus (HIV) Screening (annually for increased risk patients)  HIV-1 and HIV-2 by EIA, XAVI, rapid antibody test, or oral mucosa transudate Patient must be at increased risk for HIV infection per USPSTF guidelines or pregnant. Tests covered annually for patients at increased risk. Pregnant patients may receive up to 3 test during pregnancy. N/A N/A   Medical Nutrition Therapy (MNT) (for diabetes or renal disease not recommended schedule) Requires referral by treating physician for patient with diabetes or renal disease. Can be provided in same year as diabetes self-management training (DSMT), and CMS recommends medical nutrition therapy take place after DSMT. Up to 3 hours for initial year and 2 hours in subsequent years.  N/A N/A   Prostate Cancer Screening (annually up to age 76)  - Digital rectal exam (PRISCILLA)  - Prostate specific antigen (PSA) Annually (age 48 or over), PRISCILLA not paid separately when covered E/M service is provided on same date N/A N/A   Seasonal Influenza Vaccination (annually)    Completed 9/26/17     Recommended annually Due Fall 2018   Pneumococcal Vaccination (once after 72)    Pneumococcal 23 - 10/10/2010     Prevnar 13 - 9/26/17     Both recommended once over the age of 72 Completed         Completed    Shingles Vaccine            Completed 9/2016     Recommended once over the age of 72 Completed    Consider shingrix vaccine   Hepatitis B Vaccinations (if medium/high risk) Medium/high risk factors: End-stage renal disease,  Hemophiliacs who received Factor VIII or IX concentrates, Clients of institutions for the mentally retarded, Persons who live in the same house as a HepB virus carrier, Homosexual men, Illicit injectable drug abusers. N/A N/A   Screening Mammography (biennial age 54-69)? Annually (age 36 or over) Completed 1/23/17       Recommended annually Due 1/2018   Screening Pap Tests and Pelvic Examination (up to age 79 and after 79 if unknown history or abnormal study last 10 years) Every 25 months except high risk Completed 12/2014        Recommended every 2 years Declines further    Ultrasound Screening for Abdominal Aortic Aneurysm (AAA) (once) Patient must be referred through [facility] and not have had a screening for abdominal aortic aneurysm before under Medicare. Limited to patients who meet one of the following criteria:  - Men who are 73-68 years old and have smoked more than 100 cigarettes in their lifetime.  -Anyone with a FH of AAA  -Anyone recommended for screening by USPSTF Not covered by Medicare as preventive.      You had a CTA of your abdomen 11/9/2009 that showed no aneurysm. N/A             Medicare Wellness Visit, Female    The best way to live healthy is to have a healthy lifestyle by eating a well-balanced diet, exercising regularly, limiting alcohol and stopping smoking. Regular physical exams and screening tests are another way to keep healthy. Preventive exams provided by your health care provider can find health problems before they become diseases or illnesses. Preventive services including immunizations, screening tests, monitoring and exams can help you take care of your own health. All people over age 72 should have a pneumovax  and and a prevnar shot to prevent pneumonia. These are once in a lifetime unless you and your provider decide differently. All people over 65 should have a yearly flu shot and a tetanus vaccine every 10 years.     A bone mass density to screen for osteoporosis or thinning of the bones should be done every 2 years after 72. Screening for diabetes mellitus with a blood sugar test should be done every year. Glaucoma is a disease of the eye due to increased ocular pressure that can lead to blindness and it should be done every year by an eye professional.    Cardiovascular screening tests that check for elevated lipids (fatty part of blood) which can lead to heart disease and strokes should be done every 5 years. Colorectal screening that evaluates for blood or polyps in your colon should be done yearly as a stool test or every five years as a flexible sigmoidoscope or every 10 years as a colonoscopy up to age 76. Breast cancer screening with a mammogram is recommended biennially  for women age 54-69. Screening for cervical cancer with a pap smear and pelvic exam is recommended for women after age 72 years every 2 years up to age 79 or when the provider and patient decide to stop. If there is a history of cervical abnormalities or other increased risk for cancer then the test is recommended yearly. Hepatitis C screening is also recommended for anyone born between [birth year] through [address]. A shingles vaccine is also recommended once in a lifetime after age 61. Your Medicare Wellness Exam is recommended annually.     Here is a list of your current Health Maintenance items with a due date:  Health Maintenance Due   Topic Date Due    Eye Exam  08/05/2016    Glaucoma Screening   08/05/2017    Annual Well Visit  12/15/2017         INCREASE INSULIN MIX FROM 10UNITS 2 TIMES PER DAY TO 13UNITS 2 TIMES PER DAY    IF FASTING SUGARS STILL ABOVE 120 AFTER 5 DAYS INCREASE AGAIN TO 15 UNITS 2 TIMES PER DAY    IF LOW SUGARS OCCUR DECREASE BACK DOWN AND CALL OFFICE

## 2018-01-02 NOTE — MR AVS SNAPSHOT
Visit Information Date & Time Provider Department Dept. Phone Encounter #  
 1/2/2018 11:00 AM Honorio Hill, [address] [id number] Follow-up Instructions Return in about 3 months (around 4/2/2018). Your Appointments 6/13/2018 10:45 AM  
6 MONTH with MD [facility]) Appt Note: Dr. Mc Cuevas [facility]  
[phone] [address] Upcoming Health Maintenance Date Due  
 EYE EXAM RETINAL OR DILATED Q1 8/5/2016 GLAUCOMA SCREENING Q2Y 8/5/2017 MEDICARE YEARLY EXAM 12/15/2017 FOOT EXAM Q1 6/26/2018 HEMOGLOBIN A1C Q6M 6/27/2018 COLONOSCOPY 8/2/2018 MICROALBUMIN Q1 9/22/2018 LIPID PANEL Q1 9/22/2018 DTaP/Tdap/Td series (2 - Td) 1/4/2026 Allergies as of 1/2/2018  Review Complete On: 1/2/2018 By: Adriel Aguilar LPN Severity Noted Reaction Type Reactions Ace Inhibitors  11/18/2009    Swelling Arb-angiotensin Receptor Antagonist  02/01/2016    Swelling Lisinopril  11/10/2011    Swelling Plavix [Clopidogrel]  11/18/2009    Other (comments) Excessive bleeding Potassium  11/25/2011   Intolerance Nausea and Vomiting Potassium containing oral products are not well tolerated by patient Current Immunizations  Reviewed on 9/26/2017 Name Date Influenza High Dose Vaccine PF 9/26/2017 Influenza Vaccine 10/10/2016, 10/7/2014 Influenza Vaccine (Quad) PF 9/8/2015 Influenza Vaccine Split 9/6/2012, 10/18/2011 Pneumococcal Conjugate (PCV-13) 9/26/2017 Tdap 1/4/2016 ZZZ-RETIRED (DO NOT USE) Pneumococcal Vaccine (Unspecified Type) 10/10/2010 Zoster Vaccine, Live 10/10/2016 Not reviewed this visit You Were Diagnosed With   
  
 Codes Comments  Type II diabetes mellitus with nephropathy ([facility]    -  Primary ICD-10-CM: E11.21 
 ICD-9-CM: 250.40, 583.81 Medicare annual wellness visit, subsequent     ICD-10-CM: Z00.00 ICD-9-CM: V70.0 Bone disease     ICD-10-CM: M89.9 ICD-9-CM: 733.90 Anxiety     ICD-10-CM: F41.9 ICD-9-CM: 300.00 Coronary artery disease involving native coronary artery of native heart without angina pectoris     ICD-10-CM: I25.10 ICD-9-CM: 414.01 Hypothyroidism, unspecified type     ICD-10-CM: E03.9 ICD-9-CM: 244.9 PVD (peripheral vascular disease) ([facility]     ICD-10-CM: I73.9 ICD-9-CM: 443.9 Hyperlipidemia, unspecified hyperlipidemia type     ICD-10-CM: E78.5 ICD-9-CM: 272.4 Essential hypertension, benign     ICD-10-CM: I10 
ICD-9-CM: 401.1 Screening breast examination     ICD-10-CM: Z12.31 
ICD-9-CM: V76.10 Reactive depression     ICD-10-CM: F32.9 ICD-9-CM: 300.4 Gastroesophageal reflux disease without esophagitis     ICD-10-CM: K21.9 ICD-9-CM: 530.81 Rash     ICD-10-CM: R21 
ICD-9-CM: 782.1 Vitals BP Pulse Temp Resp Height(growth percentile) Weight(growth percentile) 144/84 (BP 1 Location: Left arm, BP Patient Position: Sitting) 71 97.4 °F (36.3 °C) (Oral) 16 5' 1\" (1.549 m) 168 lb (76.2 kg) SpO2 BMI OB Status Smoking Status 97% 31.74 kg/m2 Hysterectomy Former Smoker Vitals History BMI and BSA Data Body Mass Index Body Surface Area 31.74 kg/m 2 1.81 m 2 Preferred Pharmacy Pharmacy Name Phone Jaron Son [address] [facility] [phone] Your Updated Medication List  
  
   
This list is accurate as of: 1/2/18 11:42 AM.  Always use your most recent med list.  
  
  
  
  
 * amLODIPine 2.5 mg tablet Commonly known as:  Billie Reganl TAKE 1 TABLET BY MOUTH DAILY  
  
 * amLODIPine 2.5 mg tablet Commonly known as:  Billie Gamal TAKE 1 TABLET DAILY  
  
 aspirin 81 mg chewable tablet Take 1 Tab by mouth daily. atorvastatin 80 mg tablet Commonly known as:  LIPITOR TAKE 1 TABLET NIGHTLY  
  
 EPIPEN 2-GEE 0.3 mg/0.3 mL injection Generic drug:  EPINEPHrine  
  
 escitalopram oxalate 10 mg tablet Commonly known as:  Cheryl Carie Take 1 Tab by mouth daily. glucose blood VI test strips strip Commonly known as:  FREESTYLE TEST  
bid  
  
 insulin NPH/insulin regular 100 unit/mL (70-30) injection Commonly known as:  NOVOLIN 70/30, HUMULIN 70/30  
13 units sq bid  
  
 lancets 28 gauge Misc Commonly known as:  FREESTYLE LANCETS Check blood sugar 4 times daily. * levothyroxine 100 mcg tablet Commonly known as:  SYNTHROID Take 1 Tab by mouth Daily (before breakfast). * levothyroxine 100 mcg tablet Commonly known as:  SYNTHROID  
TAKE 1 TABLET DAILY BEFORE BREAKFAST LORazepam 0.5 mg tablet Commonly known as:  ATIVAN Take 1 Tab by mouth nightly as needed. Max Daily Amount: 0.5 mg.  
  
 metFORMIN 1,000 mg tablet Commonly known as:  GLUCOPHAGE  
TAKE 1 TABLET BY MOUTH TWICE DAILY  
  
 metoprolol tartrate 50 mg tablet Commonly known as:  LOPRESSOR Take 1 Tab by mouth two (2) times a day. omeprazole 40 mg capsule Commonly known as:  PRILOSEC  
TAKE 1 CAPSULE BY MOUTH DAILY polyethylene glycol 17 gram packet Commonly known as:  Donna Arts Take 1 Packet by mouth daily. VITAMIN B-12 1,000 mcg tablet Generic drug:  cyanocobalamin Take 1,000 mcg by mouth daily. WELCHOL 625 mg tablet Generic drug:  colesevelam  
Take 1,875 mg by mouth two (2) times daily (with meals). * Notice: This list has 4 medication(s) that are the same as other medications prescribed for you. Read the directions carefully, and ask your doctor or other care provider to review them with you. Prescriptions Printed Refills LORazepam (ATIVAN) 0.5 mg tablet 0 Sig: Take 1 Tab by mouth nightly as needed. Max Daily Amount: 0.5 mg.  
 Class: Print Route: Oral  
  
Prescriptions Sent to Pharmacy Refills escitalopram oxalate (LEXAPRO) 10 mg tablet 1 Sig: Take 1 Tab by mouth daily. Class: Normal  
 Pharmacy: [facility] DR LEI MCCAIN [address] [place] [address] Ph #: [phone] Route: Oral  
 insulin NPH/insulin regular (NOVOLIN 70/30, HUMULIN 70/30) 100 unit/mL (70-30) injection 3 Si units sq bid Class: Normal  
 Pharmacy: [facility] DR LEI MCCAIN [facility] Ph #: [phone]  
 lancets (FREESTYLE LANCETS) 28 gauge misc 1 Sig: Check blood sugar 4 times daily. Class: Normal  
 Pharmacy: [facility] DR LEI SULLIVANCARLITOS [facility] Ph #: [phone]  
 glucose blood VI test strips (FREESTYLE TEST) strip 12 Sig: bid  
 Class: Normal  
 Pharmacy: [facility] DR LEI MCCAIN [facility] Ph #: [phone]  
 omeprazole (PRILOSEC) 40 mg capsule 1 Sig: TAKE 1 CAPSULE BY MOUTH DAILY Class: Normal  
 Pharmacy: [facility] DR LEI MCCAIN [address] [place] [address] Ph #: [phone] We Performed the Following REFERRAL TO DERMATOLOGY [REF19 Custom] Follow-up Instructions Return in about 3 months (around 2018). To-Do List   
 2018 Imaging:  DEXA BONE DENSITY STUDY AXIAL   
  
 2018 Imaging:  FELICIANO MAMMO BI SCREENING INCL CAD   
  
 2018 Lab:  HEMOGLOBIN A1C WITH EAG   
  
 2018 Lab:  METABOLIC PANEL, BASIC Referral Information Referral ID Referred By Referred To  
  
 [id number] Hendrum Round, [address] [place] [address] [address] [facility] [address] , [address] [facility] Phone: [phone] Fax: [phone] Visits Status Start Date End Date 1 New Request 18 If your referral has a status of pending review or denied, additional information will be sent to support the outcome of this decision. Patient Instructions Medicare Part B Preventive Services Limitations Recommendation/Date completed if known Scheduled/ Next Due Bone Mass Measurement 
(age 72 & older, biennial) Requires diagnosis related to osteoporosis or estrogen deficiency. Biennial benefit unless patient has history of long-term glucocorticoid tx or baseline is needed because initial test was by other method Completed 12/2015 
   
Recommended every 2 years Due 12/2017 Cardiovascular Screening Blood Tests (every 5 years) Total cholesterol, HDL, Triglycerides Order as a panel if possible Completed 9/2017 
   
Recommended annually Due 9/2018 Colorectal Cancer Screening 
-Fecal occult blood test (annual) -Flexible sigmoidoscopy (5y) 
-Screening colonoscopy (10y) -Barium Enema    Completed 10/2015 with Dr. Oralia Baez 
Recommended in 5 years? Due discuss with dr Hector Gamboa Counseling to Prevent Tobacco Use (up to 8 sessions per year) - Counseling greater than 3 and up to 10 minutes - Counseling greater than 10 minutes Patients must be asymptomatic of tobacco-related conditions to receive as preventive service Former smoker. Keep up the good work! Diabetes Screening Tests (at least every 3 years, Medicare covers annually or at 6-month intervals for prediabetic patients) 
   
Fasting blood sugar (FBS) or glucose tolerance test (GTT) 
   
   Patient must be diagnosed with one of the following: 
-Hypertension, Dyslipidemia, obesity, previous impaired FBS or GTT 
Or any two of the following: overweight, FH of diabetes, age ? 72, history of gestational diabetes, birth of baby weighing more than 9 pounds Completed 12/2017 with A1C 8.5 
   
Recommended every 3-6 months for diabetics Due -3/2018 Diabetes Self-Management Training (DSMT) (no USPSTF recommendation) Requires referral by treating physician for patient with diabetes or renal disease. 10 hours of initial DSMT session of no less than 30 minutes each in a continuous 12-month period.  2 hours of follow-up DSMT in subsequent years. [place] have been to the diabetes treatment center in the past. You may call [phone] for a refresher course two years after your last class. Glaucoma Screening (no USPSTF recommendation) Diabetes mellitus, family history, , age 48 or over,  American, age 72 or over Completed 9/2017 with Dr. Urban Price  
   
Recommended annually [facility] 9/2018 Human Immunodeficiency Virus (HIV) Screening (annually for increased risk patients) HIV-1 and HIV-2 by EIA, XAVI, rapid antibody test, or oral mucosa transudate Patient must be at increased risk for HIV infection per USPSTF guidelines or pregnant. Tests covered annually for patients at increased risk. Pregnant patients may receive up to 3 test during pregnancy. N/A N/A Medical Nutrition Therapy (MNT) (for diabetes or renal disease not recommended schedule) Requires referral by treating physician for patient with diabetes or renal disease. Can be provided in same year as diabetes self-management training (DSMT), and CMS recommends medical nutrition therapy take place after DSMT. Up to 3 hours for initial year and 2 hours in subsequent years. N/A N/A Prostate Cancer Screening (annually up to age 76) - Digital rectal exam (PRISCILLA) - Prostate specific antigen (PSA) Annually (age 48 or over), PRISCILLA not paid separately when covered E/M service is provided on same date N/A N/A Seasonal Influenza Vaccination (annually)    Completed 9/26/17 
  
Recommended annually Due Fall 2018 Pneumococcal Vaccination (once after 65)    Pneumococcal 23 - 10/10/2010 
  
Prevnar 13 - 9/26/17 
  
Both recommended once over the age of 72 Completed  
  
  
Completed Shingles Vaccine    
      Completed 9/2016 
  
Recommended once over the age of 72 Completed Consider shingrix vaccine Hepatitis B Vaccinations (if medium/high risk) Medium/high risk factors: End-stage renal disease, Hemophiliacs who received Factor VIII or IX concentrates, Clients of institutions for the mentally retarded, Persons who live in the same house as a HepB virus carrier, Homosexual men, Illicit injectable drug abusers. N/A N/A Screening Mammography (biennial age 54-69)? Annually (age 36 or over) Completed 1/23/17  
   
Recommended annually Due 1/2018 Screening Pap Tests and Pelvic Examination (up to age 79 and after 79 if unknown history or abnormal study last 10 years) Every 24 months except high risk Completed 12/2014   
   
Recommended every 2 years Declines further Ultrasound Screening for Abdominal Aortic Aneurysm (AAA) (once) Patient must be referred through IPPE and not have had a screening for abdominal aortic aneurysm before under Medicare. Limited to patients who meet one of the following criteria: 
- Men who are 73-68 years old and have smoked more than 100 cigarettes in their lifetime. 
-Anyone with a FH of AAA 
-Anyone recommended for screening by USPSTF Not covered by Medicare as preventive. 
   
You had a CTA of your abdomen 11/9/2009 that showed no aneurysm. N/A  
  
  
 
 
Medicare Wellness Visit, Female The best way to live healthy is to have a healthy lifestyle by eating a well-balanced diet, exercising regularly, limiting alcohol and stopping smoking. Regular physical exams and screening tests are another way to keep healthy. Preventive exams provided by your health care provider can find health problems before they become diseases or illnesses. Preventive services including immunizations, screening tests, monitoring and exams can help you take care of your own health. All people over age 72 should have a pneumovax  and and a prevnar shot to prevent pneumonia. These are once in a lifetime unless you and your provider decide differently. All people over 65 should have a yearly flu shot and a tetanus vaccine every 10 years. A bone mass density to screen for osteoporosis or thinning of the bones should be done every 2 years after 65. Screening for diabetes mellitus with a blood sugar test should be done every year. Glaucoma is a disease of the eye due to increased ocular pressure that can lead to blindness and it should be done every year by an eye professional. 
 
Cardiovascular screening tests that check for elevated lipids (fatty part of blood) which can lead to heart disease and strokes should be done every 5 years. Colorectal screening that evaluates for blood or polyps in your colon should be done yearly as a stool test or every five years as a flexible sigmoidoscope or every 10 years as a colonoscopy up to age 76. Breast cancer screening with a mammogram is recommended biennially  for women age 54-69. Screening for cervical cancer with a pap smear and pelvic exam is recommended for women after age 72 years every 2 years up to age 79 or when the provider and patient decide to stop. If there is a history of cervical abnormalities or other increased risk for cancer then the test is recommended yearly. Hepatitis C screening is also recommended for anyone born between 80 through [address]. A shingles vaccine is also recommended once in a lifetime after age 61. Your Medicare Wellness Exam is recommended annually. Here is a list of your current Health Maintenance items with a due date: 
Health Maintenance Due Topic Date Due [facility] Eye Exam  08/05/2016  Glaucoma Screening   08/05/2017 [facility] Annual Well Visit  12/15/2017 INCREASE INSULIN MIX FROM 10UNITS 2 TIMES PER DAY TO 13UNITS 2 TIMES PER DAY 
 
IF FASTING SUGARS STILL ABOVE 120 AFTER 5 DAYS INCREASE AGAIN TO 15 UNITS 2 TIMES PER DAY 
 
IF LOW SUGARS OCCUR DECREASE BACK DOWN AND CALL OFFICE Introducing hospitals & HEALTH SERVICES! [facility] introduces [employer] patient portal. Now you can access parts of your medical record, email your doctor's office, and request medication refills online. 1. In your internet browser, go to [email or web address] 2. Click on the First Time User? Click Here link in the Sign In box. You will see the New Member Sign Up page. 3. Enter your [employer] Access Code exactly as it appears below. You will not need to use this code after youve completed the sign-up process. If you do not sign up before the expiration date, you must request a new code. · [employer] Access Code: [email or web address] Expires: 4/2/2018 11:42 AM 
 
4. Enter the last four digits of your Social Security Number (xxxx) and Date of Birth (mm/dd/yyyy) as indicated and click Submit. You will be taken to the next sign-up page. 5. Create a [employer] ID. This will be your [employer] login ID and cannot be changed, so think of one that is secure and easy to remember. 6. Create a [employer] password. You can change your password at any time. 7. Enter your Password Reset Question and Answer. This can be used at a later time if you forget your password. 8. Enter your e-mail address. You will receive e-mail notification when new information is available in [address] 9. Click Sign Up. You can now view and download portions of your medical record. 10. Click the Download Summary menu link to download a portable copy of your medical information. If you have questions, please visit the Frequently Asked Questions section of the [employer] website. Remember, [employer] is NOT to be used for urgent needs. For medical emergencies, dial 911. Now available from your iPhone and Android! Please provide this summary of care documentation to your next provider. Your primary care clinician is listed as Jelani Torrez. If you have any questions after today's visit, please call [phone].

## 2018-01-08 RX ORDER — HUMAN INSULIN 100 [IU]/ML
INJECTION, SUSPENSION SUBCUTANEOUS
Qty: 10 ML | Refills: 1 | Status: SHIPPED | OUTPATIENT
Start: 2018-01-08 | End: 2018-02-17 | Stop reason: ALTCHOICE

## 2018-01-15 DIAGNOSIS — E11.21 TYPE II DIABETES MELLITUS WITH NEPHROPATHY (HCC): ICD-10-CM

## 2018-01-15 RX ORDER — METFORMIN HYDROCHLORIDE 1000 MG/1
TABLET ORAL
Qty: 180 TAB | Refills: 1 | Status: SHIPPED | COMMUNITY
Start: 2018-01-15 | End: 2019-01-16 | Stop reason: SDUPTHER

## 2018-01-24 ENCOUNTER — TELEPHONE (OUTPATIENT)
Dept: INTERNAL MEDICINE CLINIC | Age: 78
End: 2018-01-24

## 2018-01-24 NOTE — TELEPHONE ENCOUNTER
#[phone] Mj Burks states pt's insurance no longer covers the [place] log as of 1-1-18    They will not cover humulin and Humalog. She states a new script will need to to to pt's pharmacy that she uses? You may call her with any questions. Thanks.

## 2018-01-24 NOTE — TELEPHONE ENCOUNTER
Spoke with Candelario Weeks with [facility] [employer]. [phone]  Novolin, Novolog or Novolin products are no longer covered by patients insurance. Humulin and Humalog or covered and are the preferred formulary item. Please advise.

## 2018-01-25 NOTE — TELEPHONE ENCOUNTER
HIPAA verified by two patient identifiers. Called [phone], spoke with patient and informed her that Insulin will be changed to 70/25 Humalog and to use the same as Novolin BID 10 units. Patient stated she has a 30 day supply of 70/30 Novolin just picked up from pharmacy, advised patient to us Novolin before picking up Humalog. Patient expressed thanks.

## 2018-02-15 NOTE — TELEPHONE ENCOUNTER
Pt  [phone] says that she needs a RX for humalog 75/25 called into [facility] [phone].           Message received & copied from [facility]

## 2018-02-16 ENCOUNTER — TELEPHONE (OUTPATIENT)
Dept: INTERNAL MEDICINE CLINIC | Age: 78
End: 2018-02-16

## 2018-02-16 ENCOUNTER — DOCUMENTATION ONLY (OUTPATIENT)
Dept: INTERNAL MEDICINE CLINIC | Age: 78
End: 2018-02-16

## 2018-02-16 NOTE — TELEPHONE ENCOUNTER
Called, spoke to pt. Two pt identifiers confirmed. Pt states that she was on novolin but insurance switched to humalog 75/25. Pt asking which dose to start and needs [facility] to [facility] on file. Pt informed Dr. Atif Gonzalez will be notified. Pt verbalized understanding of information discussed w/ no further questions at this time.

## 2018-02-16 NOTE — TELEPHONE ENCOUNTER
Patient states she needs a call back in reference to the status of her refill request yesterday for her insulin. Please call to update.  Thank you

## 2018-02-16 NOTE — PROGRESS NOTES
Following rx was faxed to pharmacy with confirmation received:      insulin lispro protamine/insulin lispro (HUMALOG MIX 75/25) 100 unit/mL (75-25) injection [[id number]]   Order Details   Dose, Route, Frequency: As Directed    Dispense Quantity:  4 Vial Refills:  2 Fills Remaining:  --           Si units bid          Written Date:  02/15/18 Expiration Date:  --     Start Date:  02/15/18 End Date:  --            Ordering Provider:  -- TERRY #:  -- NPI:  --    Authorizing Provider:  Bart Robison MD

## 2018-02-17 RX ORDER — INSULIN LISPRO 100 [IU]/ML
INJECTION, SUSPENSION SUBCUTANEOUS
Qty: 4 ADJUSTABLE DOSE PRE-FILLED PEN SYRINGE | Refills: 3 | Status: SHIPPED | OUTPATIENT
Start: 2018-02-17 | End: 2018-04-13

## 2018-02-19 NOTE — TELEPHONE ENCOUNTER
Carisa Ayoub MD   You 13 minutes ago (3:44 PM)                 Start humulin mix at 10units bid, can increase back to 13units bid if no lows (Routing comment

## 2018-02-19 NOTE — TELEPHONE ENCOUNTER
Teddy, of [facility], called to advise that the patient's co- pay for Humalog would be $80 for a 46 day supply. The insurance requires $40 co-pay for a $30 day supply. He wants to know if the doctor would consider switching the patient to Humulin which is a $0 co-pay. Patient is unable to meet the $80 co-pay for Humalog and wants to know if the switch and can be done without requiring an appointment.  Best contact number for Jose Desai is [phone] ext [phone]. Best contact number for patient is [phone].        Message received & copied from [facility]

## 2018-02-19 NOTE — TELEPHONE ENCOUNTER
Spoke to Sundar at [facility]. Flor informed that pt can have insulin Humulin. Sundar asking if order can be sent to pt's Abbi Toney on file-pended.

## 2018-03-02 ENCOUNTER — HOSPITAL ENCOUNTER (OUTPATIENT)
Dept: BONE DENSITY | Age: 78
Discharge: HOME | End: 2018-03-02
Attending: INTERNAL MEDICINE
Payer: MEDICARE

## 2018-03-02 ENCOUNTER — HOSPITAL ENCOUNTER (OUTPATIENT)
Dept: MAMMOGRAPHY | Age: 78
Discharge: HOME | End: 2018-03-02
Attending: INTERNAL MEDICINE
Payer: MEDICARE

## 2018-03-02 DIAGNOSIS — Z12.39 SCREENING BREAST EXAMINATION: ICD-10-CM

## 2018-03-02 DIAGNOSIS — M89.9 BONE DISEASE: ICD-10-CM

## 2018-03-02 PROCEDURE — 77067 SCR MAMMO BI INCL CAD: CPT

## 2018-03-02 PROCEDURE — 77080 DXA BONE DENSITY AXIAL: CPT

## 2018-04-10 ENCOUNTER — APPOINTMENT (OUTPATIENT)
Dept: INTERNAL MEDICINE CLINIC | Age: 78
End: 2018-04-10

## 2018-04-10 DIAGNOSIS — I25.10 CORONARY ARTERY DISEASE INVOLVING NATIVE CORONARY ARTERY OF NATIVE HEART WITHOUT ANGINA PECTORIS: ICD-10-CM

## 2018-04-10 DIAGNOSIS — K21.9 GASTROESOPHAGEAL REFLUX DISEASE WITHOUT ESOPHAGITIS: ICD-10-CM

## 2018-04-10 DIAGNOSIS — Z12.39 SCREENING BREAST EXAMINATION: ICD-10-CM

## 2018-04-10 DIAGNOSIS — Z00.00 MEDICARE ANNUAL WELLNESS VISIT, SUBSEQUENT: ICD-10-CM

## 2018-04-10 DIAGNOSIS — E78.5 HYPERLIPIDEMIA, UNSPECIFIED HYPERLIPIDEMIA TYPE: ICD-10-CM

## 2018-04-10 DIAGNOSIS — E11.21 TYPE II DIABETES MELLITUS WITH NEPHROPATHY (HCC): ICD-10-CM

## 2018-04-10 DIAGNOSIS — F41.9 ANXIETY: ICD-10-CM

## 2018-04-10 DIAGNOSIS — I10 ESSENTIAL HYPERTENSION, BENIGN: ICD-10-CM

## 2018-04-10 DIAGNOSIS — R21 RASH: ICD-10-CM

## 2018-04-10 DIAGNOSIS — I73.9 PVD (PERIPHERAL VASCULAR DISEASE) (HCC): ICD-10-CM

## 2018-04-10 DIAGNOSIS — M89.9 BONE DISEASE: ICD-10-CM

## 2018-04-10 DIAGNOSIS — F32.9 REACTIVE DEPRESSION: ICD-10-CM

## 2018-04-10 DIAGNOSIS — E03.9 HYPOTHYROIDISM, UNSPECIFIED TYPE: ICD-10-CM

## 2018-04-11 LAB
BUN SERPL-MCNC: 19 MG/DL (ref 8–27)
BUN/CREAT SERPL: 21 (ref 12–28)
CALCIUM SERPL-MCNC: 9.9 MG/DL (ref 8.7–10.3)
CHLORIDE SERPL-SCNC: 102 MMOL/L (ref 96–106)
CO2 SERPL-SCNC: 25 MMOL/L (ref 18–29)
CREAT SERPL-MCNC: 0.89 MG/DL (ref 0.57–1)
EST. AVERAGE GLUCOSE BLD GHB EST-MCNC: 194 MG/DL
GFR SERPLBLD CREATININE-BSD FMLA CKD-EPI: 63 ML/MIN/1.73
GFR SERPLBLD CREATININE-BSD FMLA CKD-EPI: 72 ML/MIN/1.73
GLUCOSE SERPL-MCNC: 177 MG/DL (ref 65–99)
HBA1C MFR BLD: 8.4 % (ref 4.8–5.6)
POTASSIUM SERPL-SCNC: 4.4 MMOL/L (ref 3.5–5.2)
SODIUM SERPL-SCNC: 144 MMOL/L (ref 134–144)

## 2018-04-13 ENCOUNTER — OFFICE VISIT (OUTPATIENT)
Dept: INTERNAL MEDICINE CLINIC | Age: 78
End: 2018-04-13

## 2018-04-13 VITALS
HEART RATE: 75 BPM | RESPIRATION RATE: 16 BRPM | TEMPERATURE: 98.4 F | HEIGHT: 61 IN | DIASTOLIC BLOOD PRESSURE: 82 MMHG | SYSTOLIC BLOOD PRESSURE: 147 MMHG | OXYGEN SATURATION: 96 % | WEIGHT: 168 LBS | BODY MASS INDEX: 31.72 KG/M2

## 2018-04-13 DIAGNOSIS — E03.9 HYPOTHYROIDISM, UNSPECIFIED TYPE: ICD-10-CM

## 2018-04-13 DIAGNOSIS — L29.9 ITCH: ICD-10-CM

## 2018-04-13 DIAGNOSIS — E11.21 TYPE 2 DIABETES MELLITUS WITH NEPHROPATHY (HCC): Primary | ICD-10-CM

## 2018-04-13 DIAGNOSIS — I73.9 PVD (PERIPHERAL VASCULAR DISEASE) (HCC): ICD-10-CM

## 2018-04-13 DIAGNOSIS — I25.10 ASHD (ARTERIOSCLEROTIC HEART DISEASE): ICD-10-CM

## 2018-04-13 DIAGNOSIS — E78.2 MIXED HYPERLIPIDEMIA: ICD-10-CM

## 2018-04-13 DIAGNOSIS — I10 ESSENTIAL HYPERTENSION, BENIGN: ICD-10-CM

## 2018-04-13 DIAGNOSIS — M81.0 AGE-RELATED OSTEOPOROSIS WITHOUT CURRENT PATHOLOGICAL FRACTURE: ICD-10-CM

## 2018-04-13 DIAGNOSIS — F33.9 RECURRENT DEPRESSION (HCC): ICD-10-CM

## 2018-04-13 DIAGNOSIS — E66.9 OBESITY (BMI 30-39.9): ICD-10-CM

## 2018-04-13 DIAGNOSIS — K21.9 GASTROESOPHAGEAL REFLUX DISEASE WITHOUT ESOPHAGITIS: ICD-10-CM

## 2018-04-13 RX ORDER — PHENOL/SODIUM PHENOLATE
20 AEROSOL, SPRAY (ML) MUCOUS MEMBRANE DAILY
Qty: 90 TAB | Refills: 1 | Status: SHIPPED | OUTPATIENT
Start: 2018-04-13 | End: 2019-07-15 | Stop reason: SDUPTHER

## 2018-04-13 RX ORDER — TRIAMCINOLONE ACETONIDE 1 MG/G
CREAM TOPICAL 2 TIMES DAILY
Qty: 30 G | Refills: 0 | Status: SHIPPED | OUTPATIENT
Start: 2018-04-13 | End: 2018-10-19 | Stop reason: SDUPTHER

## 2018-04-13 NOTE — MR AVS SNAPSHOT
[address] 845 [facility] 
[phone] Patient: Dylan Garcia MRN: [id number] QNL:4/18/0667 Visit Information Date & Time Provider Department Dept. Phone Encounter #  
 4/13/2018 12:00 PM Bruna Badillo, [address] [place] [address],[address] [phone] [id number] Follow-up Instructions Return in about 3 months (around 7/13/2018). Your Appointments 6/13/2018 10:45 AM  
6 MONTH with Tamiko Stallworth MD  
[facility] [address] [place] [address]) Appt Note: Dr. Briana Chew 845 [facility]  
[phone] [address] [facility] 845 [facility] Upcoming Health Maintenance Date Due  
 EYE EXAM RETINAL OR DILATED Q1 8/5/2016 GLAUCOMA SCREENING Q2Y 8/5/2017 COLONOSCOPY 8/2/2018 FOOT EXAM Q1 6/26/2018 MICROALBUMIN Q1 9/22/2018 LIPID PANEL Q1 9/22/2018 HEMOGLOBIN A1C Q6M 10/10/2018 MEDICARE YEARLY EXAM 1/3/2019 DTaP/Tdap/Td series (2 - Td) 1/4/2026 Allergies as of 4/13/2018  Review Complete On: 1/2/2018 By: Bruna Badillo MD  
  
 Severity Noted Reaction Type Reactions Ace Inhibitors  11/18/2009    Swelling Arb-angiotensin Receptor Antagonist  02/01/2016    Swelling Lisinopril  11/10/2011    Swelling Plavix [Clopidogrel]  11/18/2009    Other (comments) Excessive bleeding Potassium  11/25/2011   Intolerance Nausea and Vomiting Potassium containing oral products are not well tolerated by patient Current Immunizations  Reviewed on 9/26/2017 Name Date Influenza High Dose Vaccine PF 9/26/2017 Influenza Vaccine 10/10/2016, 10/7/2014 Influenza Vaccine (Quad) PF 9/8/2015 Influenza Vaccine Split 9/6/2012, 10/18/2011 Pneumococcal Conjugate (PCV-13) 9/26/2017 Tdap 1/4/2016 ZZZ-RETIRED (DO NOT USE) Pneumococcal Vaccine (Unspecified Type) 10/10/2010 Zoster Vaccine, Live 10/10/2016 Not reviewed this visit You Were Diagnosed With   
  
 Codes Comments Type 2 diabetes mellitus with nephropathy ([facility]    -  Primary ICD-10-CM: E11.21 
ICD-9-CM: 250.40, 583.81 Recurrent depression ([facility]     ICD-10-CM: F33.9 ICD-9-CM: 296.30 PVD (peripheral vascular disease) ([facility]     ICD-10-CM: I73.9 ICD-9-CM: 443.9 Age-related osteoporosis without current pathological fracture     ICD-10-CM: M81.0 ICD-9-CM: 733.01 Mixed hyperlipidemia     ICD-10-CM: E78.2 ICD-9-CM: 272.2 Essential hypertension, benign     ICD-10-CM: I10 
ICD-9-CM: 401.1 ASHD (arteriosclerotic heart disease)     ICD-10-CM: I25.10 ICD-9-CM: 414.00 Hypothyroidism, unspecified type     ICD-10-CM: E03.9 ICD-9-CM: 244.9 Gastroesophageal reflux disease without esophagitis     ICD-10-CM: K21.9 ICD-9-CM: 530.81 Obesity (BMI 30-39. 9)     ICD-10-CM: E66.9 ICD-9-CM: 278.00 Itch     ICD-10-CM: L29.9 ICD-9-CM: 698.9 Vitals OB Status Smoking Status Hysterectomy Former Smoker Preferred Pharmacy Pharmacy Name Phone [address] [phone] Your Updated Medication List  
  
   
This list is accurate as of 4/13/18 12:13 PM.  Always use your most recent med list. amLODIPine 2.5 mg tablet Commonly known as:  Remonia Grates TAKE 1 TABLET DAILY  
  
 aspirin 81 mg chewable tablet Take 1 Tab by mouth daily. atorvastatin 80 mg tablet Commonly known as:  LIPITOR  
TAKE 1 TABLET NIGHTLY  
  
 EPIPEN 2-GEE 0.3 mg/0.3 mL injection Generic drug:  EPINEPHrine  
  
 escitalopram oxalate 10 mg tablet Commonly known as:  Mini Angst Take 1 Tab by mouth daily. insulin NPH/insulin regular 100 unit/mL (70-30) injection Commonly known as:  NOVOLIN 70/30, HUMULIN 70/30 Start 10 units sq bid, can increase up to 13 units bid for goal  
  
 levothyroxine 100 mcg tablet Commonly known as:  SYNTHROID  
TAKE 1 TABLET DAILY BEFORE BREAKFAST LORazepam 0.5 mg tablet Commonly known as:  ATIVAN Take 1 Tab by mouth nightly as needed. Max Daily Amount: 0.5 mg.  
  
 metFORMIN 1,000 mg tablet Commonly known as:  GLUCOPHAGE  
TAKE 1 TABLET TWICE A DAY  
  
 metoprolol tartrate 50 mg tablet Commonly known as:  LOPRESSOR Take 1 Tab by mouth two (2) times a day. Omeprazole delayed release 20 mg tablet Commonly known as:  PRILOSEC D/R Take 1 Tab by mouth daily. polyethylene glycol 17 gram packet Commonly known as:  Fazal Hobson Take 1 Packet by mouth daily. triamcinolone acetonide 0.1 % topical cream  
Commonly known as:  KENALOG Apply  to affected area two (2) times a day. use thin layer Monday to Friday only VITAMIN B-12 1,000 mcg tablet Generic drug:  cyanocobalamin Take 1,000 mcg by mouth daily. WELCHOL 625 mg tablet Generic drug:  colesevelam  
Take 1,875 mg by mouth two (2) times daily (with meals). Prescriptions Printed Refills Omeprazole delayed release (PRILOSEC D/R) 20 mg tablet 1 Sig: Take 1 Tab by mouth daily. Class: Print Route: Oral  
  
Prescriptions Sent to Pharmacy Refills  
 triamcinolone acetonide (KENALOG) 0.1 % topical cream 0 Sig: Apply  to affected area two (2) times a day. use thin layer Monday to Friday only Class: Normal  
 Pharmacy: [facility] DR LEI MCCAIN [address] [place] [address] Ph #: [phone] Route: Topical  
  
Follow-up Instructions Return in about 3 months (around 7/13/2018). To-Do List   
 04/17/2018 Lab:  HEMOGLOBIN A1C WITH EAG   
  
 04/17/2018 Lab:  METABOLIC PANEL, BASIC   
  
 04/17/2018 Lab:  MICROALBUMIN, UR, RAND W/ MICROALB/CREAT RATIO   
  
 04/17/2018 Lab:  TSH 3RD GENERATION Patient Instructions INCREASE INSULIN TO 15UNITS 2 TIMES PER DAY 
 
 IF FASTING SUGAR NOT AT GOAL AFTER 5 DAYS  INCREASE AGAIN TO 17 UNITS 
 
IF NOT SURE CALL CLINIC 
 
START VITAMIN d 1000UNITS OVER THE COUNTER DAILY Introducing [facility]! [employer] introduces [email or web address] patient portal. Now you can access parts of your medical record, email your doctor's office, and request medication refills online. 1. In your internet browser, go to [email or web address]. "[employer] 2. Click on the First Time User? Click Here link in the Sign In box. You will see the New Member Sign Up page. 3. Enter your [email or web address] Access Code exactly as it appears below. You will not need to use this code after youve completed the sign-up process. If you do not sign up before the expiration date, you must request a new code. · [email or web address] Access Code: [email or web address] Expires: 7/12/2018 12:13 PM 
 
4. Enter the last four digits of your Social Security Number (xxxx) and Date of Birth (mm/dd/yyyy) as indicated and click Submit. You will be taken to the next sign-up page. 5. Create a [email or web address] ID. This will be your [email or web address] login ID and cannot be changed, so think of one that is secure and easy to remember. 6. Create a [email or web address] password. You can change your password at any time. 7. Enter your Password Reset Question and Answer. This can be used at a later time if you forget your password. 8. Enter your e-mail address. You will receive e-mail notification when new information is available in [address] 9. Click Sign Up. You can now view and download portions of your medical record. 10. Click the Download Summary menu link to download a portable copy of your medical information. If you have questions, please visit the Frequently Asked Questions section of the [email or web address] website. Remember, [email or web address] is NOT to be used for urgent needs. For medical emergencies, dial 911. Now available from your iPhone and Android! Please provide this summary of care documentation to your next provider. Your primary care clinician is listed as Carmen Cisse. If you have any questions after today's visit, please call [phone].

## 2018-04-13 NOTE — PROGRESS NOTES
HISTORY OF PRESENT ILLNESS  Dylan Garcia is a 69 y.o. female. HPI  Last here 1/2/18.  Pt is here for routine care.     BP is controled    Pt intermittently monitors her BP at home  Continues on metoprolol 50mg BID and norvasc 2.5mg daily  Recall had angioedema with lisinopril and benicar-HCT in the past      BS at home running around 127s, 130s, 130, 117 (this AM) in the AM fasting   BS at home running around 166, 146, 128, 139, 122, 140 before dinner  Pt denies having BS <70  Lov, I provided her with victoza samples  However, she ran out of these samples 3 months ago  Continues on metformin 1000mg BID and NPH regular mix 13U BID  She also takes ASA 81mg daily  Will increase insulin to 15U BID  If her BS are not at goal, will increase insulin to 17U BID after 5 days  Recall issues with hypoglycemia in the past, a1c under 7.5 at goal for her      Wt is stable since last visit  Discussed diet and weight loss       Reviewed last labs 4/18    Advised her to restart her vit D    Reviewed mammo 3/18: negative    Reviewed DEXA 3/18: osteopenic     Pt c/o rash on back x 3 weeks - worsening   Pt has been itching this area with a back scratch   Discussed there being no rash, but dry itchy skin  Advised her to apply lotion to this area  Advised her to bathe with warm water  Ordered triamcinolone cream M-F    Pt follows with Dr. Cristhian Woodson (cardio)  Last visit was 11/21/17   Next visit scheduled for 6/18     Pt follows annually with Dr. Art Gregorio (vasc) for her PAD  Last visit was 2/18 - will get notes for review  Per pt, PAD in LLE worsened     Continues on prilosec 40mg daily for reflux, which works well, no breakthrough  Advised her to take this med every other day   Advised her to take 20mg daily once her current rx is finished       Continues synthroid 100mcg daily    She takes this med separately from all meds      Continues on lipitor 80mg max dose and welchol daily for cholesterol   Recall zetia is too expensive      Lov, I had her restart her lexapro 10mg qhs, which works well  She also has ativan to use prn (not daily) for anxiety and sleep  She has not used this med recently     Pt went to a derm who did not accept her insurance   Pt will try to go to another derm      ACP on file, SDMs are her grandson Amy Rodriguez), daughter (Tracee Rebolledo) and brother Ly Gomes) .      PREVENTIVE:    Colonoscopy: Dr. Nathanael Bernheim, 10/13/15, unsure of repeat, she will call GI  Pap: 12/14, declines further   Mammogram: 3/18, negative   DEXA: 3/18, osteopenic, stopped fosamax   Tdap: 1/04/2016  Pneumovax: 10/10/2010  Wzucjgy20: never had?   Zostavax: 10/10/2016  Shhingrix: consider  Flu shot: 9/17  Foot exam: 6/26/17, normal  Microalbumin: 9/17  A1c: 4/15 7.7, 7/15 8.3, 9/15 7.3, 12/15 7.2, 3/16 9.1, 8/16 7.6, 12/16 7.2, 3/17 7.3, 6/17 7.3, 9/17 6.9, 12/17 8.5, 4/18 8.4  [facility] exam: [facility] Janeth Hercules at [facility], 9/17, will get notes for review  Lipids: 9/17 LDL 78    Patient Active Problem List    Diagnosis Date Noted    Type 2 diabetes mellitus with nephropathy ([address] 01/02/2018    Recurrent depression ([address] 01/02/2018    Encounter for long-term (current) use of insulin ([address] 04/04/2016    Type II diabetes mellitus with nephropathy ([address] 01/04/2016    Lower GI bleeding 10/12/2015    Diverticulosis of intestine with bleeding 10/11/2015    Anemia 05/75/1163    Diastolic CHF, chronic ([address] 10/11/2015    GIB (gastrointestinal bleeding) 10/08/2015    ACP (advance care planning) 09/08/2015    Osteoporosis 09/10/2013    Bunion 10/15/2012    Essential hypertension, benign 05/17/2012    Mixed hyperlipidemia 05/17/2012    Postsurgical percutaneous transluminal coronary angioplasty status 05/17/2012    Old myocardial infarction 05/17/2012    Postsurgical aortocoronary bypass status 05/17/2012    S/P CABG x 3 11/11/2011    ASHD (arteriosclerotic heart disease) 11/10/2011    Heart failure ([facility] 10/18/2011    Hyperlipidemia 10/18/2011    Influenza 01/19/2011    CAD (coronary artery disease) 11/18/2009    Hypothyroidism 11/18/2009    PVD (peripheral vascular disease) ([facility] 11/18/2009     Current Outpatient Prescriptions   Medication Sig Dispense Refill    insulin NPH/insulin regular (NOVOLIN 70/30, HUMULIN 70/30) 100 unit/mL (70-30) injection Start 10 units sq bid, can increase up to 13 units bid for goal 10 mL 1    insulin lispro  & lisp protamine, human, (HUMALOG MIX 75-25 KWIKPEN) 100 unit/mL (75-25) inpn 13 units sq bide 4 Adjustable Dose Pre-filled Pen Syringe 3    insulin lispro protamine/insulin lispro (HUMALOG MIX 75/25) 100 unit/mL (75-25) injection 13 units bid 4 Vial 2    metFORMIN (GLUCOPHAGE) 1,000 mg tablet TAKE 1 TABLET TWICE A  Tab 1    LORazepam (ATIVAN) 0.5 mg tablet Take 1 Tab by mouth nightly as needed. Max Daily Amount: 0.5 mg. 30 Tab 0    escitalopram oxalate (LEXAPRO) 10 mg tablet Take 1 Tab by mouth daily. 90 Tab 1    lancets (FREESTYLE LANCETS) 28 gauge misc Check blood sugar 4 times daily. 200 Lancet 1    glucose blood VI test strips (FREESTYLE TEST) strip bid 200 Strip 12    omeprazole (PRILOSEC) 40 mg capsule TAKE 1 CAPSULE BY MOUTH DAILY 90 Cap 1    amLODIPine (NORVASC) 2.5 mg tablet TAKE 1 TABLET BY MOUTH DAILY 90 Tab 1    levothyroxine (SYNTHROID) 100 mcg tablet Take 1 Tab by mouth Daily (before breakfast). 90 Tab 1    metoprolol tartrate (LOPRESSOR) 50 mg tablet Take 1 Tab by mouth two (2) times a day. 180 Tab 2    amLODIPine (NORVASC) 2.5 mg tablet TAKE 1 TABLET DAILY 90 Tab 1    levothyroxine (SYNTHROID) 100 mcg tablet TAKE 1 TABLET DAILY BEFORE BREAKFAST 90 Tab 1    colesevelam (WELCHOL) 625 mg tablet Take 1,875 mg by mouth two (2) times daily (with meals).  atorvastatin (LIPITOR) 80 mg tablet TAKE 1 TABLET NIGHTLY 90 Tab 3    EPIPEN 2-GEE 0.3 mg/0.3 mL (1:1,000) injection   0    aspirin 81 mg chewable tablet Take 1 Tab by mouth daily.  90 Tab 3    polyethylene glycol (MIRALAX) 17 gram packet Take 1 Packet by mouth daily. 30 Each 1    cyanocobalamin (VITAMIN B-12) 1,000 mcg tablet Take 1,000 mcg by mouth daily. Past Surgical History:   Procedure Laterality Date    CARDIAC SURG PROCEDURE UNLIST      cabg x 3    HX CORONARY ARTERY BYPASS GRAFT  11/11/11    3 vessel    HX GYN      hysterectomy    HX UROLOGICAL      kidney stone    VASCULAR SURGERY PROCEDURE UNLIST      Stents put in right leg      Lab Results  Component Value Date/Time   WBC 7.2 09/22/2017 09:20 AM   HGB 12.3 09/22/2017 09:20 AM   Hemoglobin (POC) 12.9 03/02/2011 12:21 PM   HCT 38.9 09/22/2017 09:20 AM   PLATELET 543 01/48/6570 09:20 AM   MCV 85 09/22/2017 09:20 AM     Lab Results  Component Value Date/Time   Cholesterol, total 157 09/22/2017 09:20 AM   HDL Cholesterol 56 09/22/2017 09:20 AM   LDL, calculated 78 09/22/2017 09:20 AM   Triglyceride 116 09/22/2017 09:20 AM   CHOL/HDL Ratio 3.2 09/14/2010 08:42 AM     Lab Results  Component Value Date/Time   GFR est non-AA 63 04/10/2018 09:43 AM   GFR est AA 72 04/10/2018 09:43 AM   Creatinine 0.89 04/10/2018 09:43 AM   BUN 19 04/10/2018 09:43 AM   Sodium 144 04/10/2018 09:43 AM   Potassium 4.4 04/10/2018 09:43 AM   Chloride 102 04/10/2018 09:43 AM   CO2 25 04/10/2018 09:43 AM   Magnesium 1.7 10/16/2015 04:34 AM        Review of Systems   Respiratory: Negative for shortness of breath. Cardiovascular: Negative for chest pain. Skin: Positive for itching and rash. Physical Exam   Constitutional: She is oriented to person, place, and time. She appears well-developed and well-nourished. No distress. HENT:   Head: Normocephalic and atraumatic. Mouth/Throat: Oropharynx is clear and moist. No oropharyngeal exudate. Eyes: Conjunctivae and EOM are normal. Right eye exhibits no discharge. Left eye exhibits no discharge. Neck: Normal range of motion. Neck supple.    Cardiovascular: Normal rate, regular rhythm, normal heart sounds and intact distal pulses. Exam reveals no gallop and no friction rub. No murmur heard. Pulmonary/Chest: Effort normal and breath sounds normal. No respiratory distress. She has no wheezes. She has no rales. She exhibits no tenderness. Musculoskeletal: Normal range of motion. She exhibits no edema, tenderness or deformity. Lymphadenopathy:     She has no cervical adenopathy. Neurological: She is alert and oriented to person, place, and time. Coordination normal.   Skin: Skin is warm and dry. No rash noted. She is not diaphoretic. No erythema. No pallor. Psychiatric: She has a normal mood and affect. Her behavior is normal.       ASSESSMENT and PLAN    ICD-10-CM ICD-9-CM    1. Type 2 diabetes mellitus with nephropathy (HCC)    Worsening control, she stopped her victoza x lov, can't afford it, she is now on an NPH regular mix 70/30, 13U BID, will increase this to 15U BID, in 5 days increase again to 17U BID if fasting BS above goal of 120, continue metformin BID   E11.21 250.40 TSH 3RD GENERATION     894.68 METABOLIC PANEL, BASIC      HEMOGLOBIN A1C WITH EAG      MICROALBUMIN, UR, RAND W/ MICROALB/CREAT RATIO   2.  Recurrent depression (HCC)    Stable on lexapro, uses ativan prn for anxiety, infrequently    F33.9 296.30 TSH 3RD GENERATION      METABOLIC PANEL, BASIC      HEMOGLOBIN A1C WITH EAG      MICROALBUMIN, UR, RAND W/ MICROALB/CREAT RATIO   3. PVD (peripheral vascular disease) ([facility])    UTD with Dr. Domingo Schulz, will get his notes for review, reports mild progression of her PAD in her L leg   I73.9 443.9 TSH 3RD GENERATION      METABOLIC PANEL, BASIC      HEMOGLOBIN A1C WITH EAG      MICROALBUMIN, UR, RAND W/ MICROALB/CREAT RATIO   4. Age-related osteoporosis without current pathological fracture    Was osteopenia on last report, no longer on fosamax, advised her to resume vit D   M81.0 733.01 TSH 3RD GENERATION      METABOLIC PANEL, BASIC      HEMOGLOBIN A1C WITH EAG      MICROALBUMIN, UR, RAND W/ MICROALB/CREAT RATIO   5. Mixed hyperlipidemia    Controled on lipitor and welchol   E78.2 272.2 TSH 3RD GENERATION      METABOLIC PANEL, BASIC      HEMOGLOBIN A1C WITH EAG      MICROALBUMIN, UR, RAND W/ MICROALB/CREAT RATIO   6. Essential hypertension, benign    Controlled for age  No change to meds   I10 401.1 TSH 3RD GENERATION      METABOLIC PANEL, BASIC      HEMOGLOBIN A1C WITH EAG      MICROALBUMIN, UR, RAND W/ MICROALB/CREAT RATIO   7. ASHD (arteriosclerotic heart disease)    Sees Haim delvalle in 6/18, on ASA, no signs of active CAD   I25.10 414.00 TSH 3RD GENERATION      METABOLIC PANEL, BASIC      HEMOGLOBIN A1C WITH EAG      MICROALBUMIN, UR, RAND W/ MICROALB/CREAT RATIO   8. Hypothyroidism, unspecified type    On synthroid 100mcg, repeat TSH prior to f/u   E03.9 244.9 TSH 3RD GENERATION      METABOLIC PANEL, BASIC      HEMOGLOBIN A1C WITH EAG      MICROALBUMIN, UR, RAND W/ MICROALB/CREAT RATIO   9. Gastroesophageal reflux disease without esophagitis    Will decrease prilosec from 40 to 20mg and see if her sx are still controled    K21.9 530.81 TSH 3RD GENERATION      METABOLIC PANEL, BASIC      HEMOGLOBIN A1C WITH EAG      MICROALBUMIN, UR, RAND W/ MICROALB/CREAT RATIO   10. Obesity (BMI 30-39. 9)    Discussed diet, exercise and portion control    E66.9 278.00    11. Itch    Discussed warm not hot showers, moisturizer, avoid scratching, provided triamcinolone cream to use M-F only   L29.9 698.9         Scribed by Theresa Rod [facility], as dictated by Dr. Dalton Becerril. Current diagnosis and concerns discussed with pt at length. Pt understands risks and benefits or current treatment plan and medications, and accepts the treatment and medication with any possible risks. Pt asks appropriate questions, which were answered. Pt was instructed to call with any concerns or problems. This note will not be viewable in [address]

## 2018-04-13 NOTE — PATIENT INSTRUCTIONS
INCREASE INSULIN TO 15UNITS 2 TIMES PER DAY    IF FASTING SUGAR NOT AT GOAL AFTER 5 DAYS  INCREASE AGAIN TO 17 UNITS    IF NOT SURE CALL CLINIC    START VITAMIN d 1000UNITS OVER THE COUNTER DAILY

## 2018-04-25 RX ORDER — ATORVASTATIN CALCIUM 80 MG/1
TABLET, FILM COATED ORAL
Qty: 90 TAB | Refills: 3 | Status: SHIPPED | OUTPATIENT
Start: 2018-04-25 | End: 2019-04-24 | Stop reason: SDUPTHER

## 2018-04-25 NOTE — TELEPHONE ENCOUNTER
PCP: Tai Singer MD    Last appt: 4/13/2018  Future Appointments  Date Time Provider Gen Paige   6/13/2018 10:45 AM Te Mtz MD [address] [facility]   7/25/2018 1:00 PM Tai Singer MD [facility]       Requested Prescriptions     Pending Prescriptions Disp Refills    atorvastatin (LIPITOR) 80 mg tablet 90 Tab 3     Sig: TAKE 1 TABLET NIGHTLY

## 2018-04-26 ENCOUNTER — TELEPHONE (OUTPATIENT)
Dept: INTERNAL MEDICINE CLINIC | Age: 78
End: 2018-04-26

## 2018-04-26 NOTE — TELEPHONE ENCOUNTER
Called, spoke to pt. Two pt identifiers confirmed. Pt c/o urinary frequency and pressure in bladder x1 day. Pt denies any pain/burning. Pt states scant amounts of urine. Pt states that urine is clear and no unusual odor. Pt informed Dr. Viky Valle will be notified. Pt verbalized understanding of information discussed w/ no further questions at this time.

## 2018-04-26 NOTE — TELEPHONE ENCOUNTER
Called, spoke to pt. Two pt identifiers confirmed. Pt offered and accepted appt for 4/27/18 8030. Pt advised a urine sample will be taken. Pt verbalized understanding of information discussed w/ no further questions at this time.

## 2018-04-26 NOTE — TELEPHONE ENCOUNTER
MD Enrique Aguero LPN        Caller: Unspecified (Today, 10:26 AM)                     Have her come in at 9:30 tomorrow

## 2018-04-26 NOTE — TELEPHONE ENCOUNTER
----- Message from Urszula Rangel sent at 4/26/2018 10:20 AM EDT -----  Regarding: Dr. Tiffani Ely  Patient experiencing frequent urination every hour along with uterine pain. Would like a call from the nurse as soon as possible. Best contact number is [phone].        Message copied/pasted from [facility]

## 2018-04-27 ENCOUNTER — OFFICE VISIT (OUTPATIENT)
Dept: INTERNAL MEDICINE CLINIC | Age: 78
End: 2018-04-27

## 2018-04-27 VITALS
WEIGHT: 171 LBS | RESPIRATION RATE: 16 BRPM | HEIGHT: 61 IN | DIASTOLIC BLOOD PRESSURE: 83 MMHG | BODY MASS INDEX: 32.28 KG/M2 | HEART RATE: 74 BPM | SYSTOLIC BLOOD PRESSURE: 158 MMHG | OXYGEN SATURATION: 99 % | TEMPERATURE: 98.1 F

## 2018-04-27 DIAGNOSIS — N30.00 ACUTE CYSTITIS WITHOUT HEMATURIA: Primary | ICD-10-CM

## 2018-04-27 LAB
BILIRUB UR QL STRIP: NEGATIVE
GLUCOSE UR-MCNC: NEGATIVE MG/DL
KETONES P FAST UR STRIP-MCNC: NEGATIVE MG/DL
PH UR STRIP: 5 [PH] (ref 4.6–8)
PROT UR QL STRIP: NORMAL
SP GR UR STRIP: 1.03 (ref 1–1.03)
UA UROBILINOGEN AMB POC: NORMAL (ref 0.2–1)
URINALYSIS CLARITY POC: CLEAR
URINALYSIS COLOR POC: YELLOW
URINE BLOOD POC: NORMAL
URINE LEUKOCYTES POC: NEGATIVE
URINE NITRITES POC: NEGATIVE

## 2018-04-27 RX ORDER — GLUCOSAMINE SULFATE 1500 MG
1000 POWDER IN PACKET (EA) ORAL DAILY
COMMUNITY

## 2018-04-27 RX ORDER — NITROFURANTOIN 25; 75 MG/1; MG/1
100 CAPSULE ORAL 2 TIMES DAILY
Qty: 10 CAP | Refills: 0 | Status: SHIPPED | OUTPATIENT
Start: 2018-04-27 | End: 2018-05-02

## 2018-04-27 NOTE — PROGRESS NOTES
HISTORY OF PRESENT ILLNESS  Breana Johnson is a 69 y.o. female. HPI   Last here 4/13/18. Pt is here for acute care. Pt c/o frequent urination, abd pressure and dysuria x 2 days  Pt denies having F/C, N/V, back pain or hematuria   Pt did not take any meds for her sx  Ordered UA  If she cannot provide us with a urine sample in the clinic, will have her bring in a urine sample   Ordered macrobid        Patient Active Problem List    Diagnosis Date Noted    Type 2 diabetes mellitus with nephropathy ([address] 01/02/2018    Recurrent depression ([address] 01/02/2018    Encounter for long-term (current) use of insulin ([address] 04/04/2016    Type II diabetes mellitus with nephropathy ([address] 01/04/2016    Lower GI bleeding 10/12/2015    Diverticulosis of intestine with bleeding 10/11/2015    Anemia 17/48/8546    Diastolic CHF, chronic ([address] 10/11/2015    GIB (gastrointestinal bleeding) 10/08/2015    ACP (advance care planning) 09/08/2015    Osteoporosis 09/10/2013    Bunion 10/15/2012    Essential hypertension, benign 05/17/2012    Mixed hyperlipidemia 05/17/2012    Postsurgical percutaneous transluminal coronary angioplasty status 05/17/2012    Old myocardial infarction 05/17/2012    Postsurgical aortocoronary bypass status 05/17/2012    S/P CABG x 3 11/11/2011    ASHD (arteriosclerotic heart disease) 11/10/2011    Heart failure ([address] 10/18/2011    Hyperlipidemia 10/18/2011    Influenza 01/19/2011    CAD (coronary artery disease) 11/18/2009    Hypothyroidism 11/18/2009    PVD (peripheral vascular disease) ([address] 11/18/2009     Current Outpatient Prescriptions   Medication Sig Dispense Refill    atorvastatin (LIPITOR) 80 mg tablet TAKE 1 TABLET NIGHTLY 90 Tab 3    Omeprazole delayed release (PRILOSEC D/R) 20 mg tablet Take 1 Tab by mouth daily. 90 Tab 1    triamcinolone acetonide (KENALOG) 0.1 % topical cream Apply  to affected area two (2) times a day.  use thin layer Monday to Friday only 30 g 0    insulin NPH/insulin regular (NOVOLIN 70/30, HUMULIN 70/30) 100 unit/mL (70-30) injection Start 10 units sq bid, can increase up to 13 units bid for goal 10 mL 1    metFORMIN (GLUCOPHAGE) 1,000 mg tablet TAKE 1 TABLET TWICE A  Tab 1    LORazepam (ATIVAN) 0.5 mg tablet Take 1 Tab by mouth nightly as needed. Max Daily Amount: 0.5 mg. 30 Tab 0    escitalopram oxalate (LEXAPRO) 10 mg tablet Take 1 Tab by mouth daily. 90 Tab 1    metoprolol tartrate (LOPRESSOR) 50 mg tablet Take 1 Tab by mouth two (2) times a day. 180 Tab 2    amLODIPine (NORVASC) 2.5 mg tablet TAKE 1 TABLET DAILY 90 Tab 1    levothyroxine (SYNTHROID) 100 mcg tablet TAKE 1 TABLET DAILY BEFORE BREAKFAST 90 Tab 1    colesevelam (WELCHOL) 625 mg tablet Take 1,875 mg by mouth two (2) times daily (with meals).  EPIPEN 2-GEE 0.3 mg/0.3 mL (1:1,000) injection   0    aspirin 81 mg chewable tablet Take 1 Tab by mouth daily. 90 Tab 3    polyethylene glycol (MIRALAX) 17 gram packet Take 1 Packet by mouth daily. 30 Each 1    cyanocobalamin (VITAMIN B-12) 1,000 mcg tablet Take 1,000 mcg by mouth daily.        Past Surgical History:   Procedure Laterality Date    CARDIAC SURG PROCEDURE UNLIST      cabg x 3    HX CORONARY ARTERY BYPASS GRAFT  11/11/11    3 vessel    HX GYN      hysterectomy    HX UROLOGICAL      kidney stone    VASCULAR SURGERY PROCEDURE UNLIST      Stents put in right leg      Lab Results  Component Value Date/Time   WBC 7.2 09/22/2017 09:20 AM   HGB 12.3 09/22/2017 09:20 AM   Hemoglobin (POC) 12.9 03/02/2011 12:21 PM   HCT 38.9 09/22/2017 09:20 AM   PLATELET 885 46/71/9175 09:20 AM   MCV 85 09/22/2017 09:20 AM     Lab Results  Component Value Date/Time   Cholesterol, total 157 09/22/2017 09:20 AM   HDL Cholesterol 56 09/22/2017 09:20 AM   LDL, calculated 78 09/22/2017 09:20 AM   Triglyceride 116 09/22/2017 09:20 AM   CHOL/HDL Ratio 3.2 09/14/2010 08:42 AM     Lab Results  Component Value Date/Time   GFR est non-AA 63 04/10/2018 09:43 AM   GFR est AA 72 04/10/2018 09:43 AM   Creatinine 0.89 04/10/2018 09:43 AM   BUN 19 04/10/2018 09:43 AM   Sodium 144 04/10/2018 09:43 AM   Potassium 4.4 04/10/2018 09:43 AM   Chloride 102 04/10/2018 09:43 AM   CO2 25 04/10/2018 09:43 AM   Magnesium 1.7 10/16/2015 04:34 AM        Review of Systems   Constitutional: Negative for chills and fever. Respiratory: Negative for shortness of breath. Cardiovascular: Negative for chest pain. Gastrointestinal: Negative for nausea and vomiting. Genitourinary: Positive for dysuria and frequency. Negative for flank pain and hematuria. Physical Exam   Constitutional: She is oriented to person, place, and time. She appears well-developed and well-nourished. No distress. HENT:   Head: Normocephalic and atraumatic. Mouth/Throat: Oropharynx is clear and moist. No oropharyngeal exudate. Eyes: Conjunctivae and EOM are normal. Right eye exhibits no discharge. Left eye exhibits no discharge. Neck: Normal range of motion. Neck supple. Cardiovascular: Normal rate, regular rhythm and normal heart sounds. Exam reveals no gallop and no friction rub. No murmur heard. Pulmonary/Chest: Effort normal and breath sounds normal. No respiratory distress. She has no wheezes. She has no rales. She exhibits no tenderness. Abdominal: Soft. She exhibits no distension and no mass. There is tenderness (Suprapubic pressure ). There is no rebound and no guarding. Musculoskeletal: Normal range of motion. She exhibits no edema, tenderness (No CVA) or deformity. Lymphadenopathy:     She has no cervical adenopathy. Neurological: She is alert and oriented to person, place, and time. Coordination normal.   Skin: Skin is warm and dry. No rash noted. She is not diaphoretic. No erythema. No pallor. Psychiatric: She has a normal mood and affect. Her behavior is normal.       ASSESSMENT and PLAN    ICD-10-CM ICD-9-CM    1.  Acute cystitis without hematuria    Will treat with macrobid, will send urine for culture, kidneys checked and OK   N30.00 595.0       2. HTN - borderline, repeat was not improved, ok for age, no change to meds    Scribed by Barry Wright of [address] [place] [address] [address], as dictated by Dr. Rudolph Roach. Current diagnosis and concerns discussed with pt at length. Pt understands risks and benefits or current treatment plan and medications, and accepts the treatment and medication with any possible risks. Pt asks appropriate questions, which were answered. Pt was instructed to call with any concerns or problems. This note will not be viewable in [address]

## 2018-04-27 NOTE — MR AVS SNAPSHOT
[address] [facility] 
[phone] Patient: Mignon Cardenas MRN: [id number] MARTHA:[id number] Visit Information Date & Time Provider Department Dept. Phone Encounter #  
 4/27/2018  9:30 AM Susanne Bragg, [address] [phone] [id number] Follow-up Instructions Return if symptoms worsen or fail to improve. Follow-up and Disposition History Your Appointments 4/30/2018  2:00 PM  
HOSPITAL FOLLOW-UP with Susanne Bragg, [address]) Appt Note: RABIA f/u  
 [address] [facility]  
[phone]  
  
   
 [address] [place]  [address] [address]  
  
    
 6/13/2018 10:45 AM  
6 MONTH with Negrito Krishna MD  
[facility] [address] [facility]) Appt Note: Dr. Regina King [facility]  
[phone] [address] [facility]  
  
    
 7/25/2018  1:00 PM  
ROUTINE CARE with Susanne Bragg, [address] [facility] [address]) Appt Note: 3 month f/u  
 [address] [address]  
[address] [facility]  [address] [facility] Upcoming Health Maintenance Date Due COLONOSCOPY 8/2/2018 FOOT EXAM Q1 6/26/2018 Influenza Age 5 to Adult 8/1/2018 EYE EXAM RETINAL OR DILATED Q1 9/15/2018 MICROALBUMIN Q1 9/22/2018 LIPID PANEL Q1 9/22/2018 HEMOGLOBIN A1C Q6M 10/10/2018 MEDICARE YEARLY EXAM 1/3/2019 GLAUCOMA SCREENING Q2Y 9/15/2019 DTaP/Tdap/Td series (2 - Td) 1/4/2026 Allergies as of 4/27/2018  Review Complete On: 4/27/2018 By: Susanne Bragg MD  
  
 Severity Noted Reaction Type Reactions Ace Inhibitors  11/18/2009    Swelling Arb-angiotensin Receptor Antagonist  02/01/2016    Swelling Lisinopril  11/10/2011    Swelling Plavix [Clopidogrel]  11/18/2009    Other (comments) Excessive bleeding Potassium  11/25/2011   Intolerance Nausea and Vomiting Potassium containing oral products are not well tolerated by patient Current Immunizations  Reviewed on 9/26/2017 Name Date Influenza High Dose Vaccine PF 9/26/2017 Influenza Vaccine 10/10/2016, 10/7/2014 Influenza Vaccine (Quad) PF 9/8/2015 Influenza Vaccine Split 9/6/2012, 10/18/2011 Pneumococcal Conjugate (PCV-13) 9/26/2017 Tdap 1/4/2016 ZZZ-RETIRED (DO NOT USE) Pneumococcal Vaccine (Unspecified Type) 10/10/2010 Zoster Vaccine, Live 10/10/2016 Not reviewed this visit You Were Diagnosed With   
  
 Codes Comments Acute cystitis without hematuria    -  Primary ICD-10-CM: N30.00 ICD-9-CM: 595.0 Vitals BP Pulse Temp Resp Height(growth percentile) Weight(growth percentile) 158/83 (BP 1 Location: Left arm, BP Patient Position: Sitting) 74 98.1 °F (36.7 °C) (Oral) 16 5' 1\" (1.549 m) 171 lb (77.6 kg) SpO2 BMI OB Status Smoking Status 99% 32.31 kg/m2 Hysterectomy Former Smoker Vitals History BMI and BSA Data Body Mass Index Body Surface Area  
 32.31 kg/m 2 1.83 m 2 Preferred Pharmacy Pharmacy Name Phone [address] [phone] Your Updated Medication List  
  
   
This list is accurate as of 4/27/18 10:12 AM.  Always use your most recent med list. amLODIPine 2.5 mg tablet Commonly known as:  Caraleperfecto Dupes TAKE 1 TABLET DAILY  
  
 aspirin 81 mg chewable tablet Take 1 Tab by mouth daily. atorvastatin 80 mg tablet Commonly known as:  LIPITOR  
TAKE 1 TABLET NIGHTLY  
  
 EPIPEN 2-GEE 0.3 mg/0.3 mL injection Generic drug:  EPINEPHrine  
  
 escitalopram oxalate 10 mg tablet Commonly known as:  Kristian Magaña Take 1 Tab by mouth daily. * HumuLIN 70/30 U-100 Insulin 100 unit/mL (70-30) injection Generic drug:  insulin NPH/insulin regular 15 Units by SubCUTAneous route two (2) times a day. * insulin NPH/insulin regular 100 unit/mL (70-30) injection Commonly known as:  NOVOLIN 70/30, HUMULIN 70/30 Start 10 units sq bid, can increase up to 13 units bid for goal  
  
 levothyroxine 100 mcg tablet Commonly known as:  SYNTHROID  
TAKE 1 TABLET DAILY BEFORE BREAKFAST LORazepam 0.5 mg tablet Commonly known as:  ATIVAN Take 1 Tab by mouth nightly as needed. Max Daily Amount: 0.5 mg.  
  
 metFORMIN 1,000 mg tablet Commonly known as:  GLUCOPHAGE  
TAKE 1 TABLET TWICE A DAY  
  
 metoprolol tartrate 50 mg tablet Commonly known as:  LOPRESSOR Take 1 Tab by mouth two (2) times a day. nitrofurantoin (macrocrystal-monohydrate) 100 mg capsule Commonly known as:  MACROBID Take 1 Cap by mouth two (2) times a day for 5 days. Omeprazole delayed release 20 mg tablet Commonly known as:  PRILOSEC D/R Take 1 Tab by mouth daily. polyethylene glycol 17 gram packet Commonly known as:  Marlane Blinks Take 1 Packet by mouth daily. triamcinolone acetonide 0.1 % topical cream  
Commonly known as:  KENALOG Apply  to affected area two (2) times a day. use thin layer Monday to Friday only VITAMIN B-12 1,000 mcg tablet Generic drug:  cyanocobalamin Take 1,000 mcg by mouth daily. VITAMIN D3 1,000 unit Cap Generic drug:  cholecalciferol Take  by mouth daily. WELCHOL 625 mg tablet Generic drug:  colesevelam  
Take 1,875 mg by mouth two (2) times daily (with meals). * Notice: This list has 2 medication(s) that are the same as other medications prescribed for you. Read the directions carefully, and ask your doctor or other care provider to review them with you. Prescriptions Sent to Pharmacy  Refills  
 nitrofurantoin, macrocrystal-monohydrate, (MACROBID) 100 mg capsule 0  
 Sig: Take 1 Cap by mouth two (2) times a day for 5 days. Class: Normal  
 Pharmacy: [facility] DR LEI MCCAIN [address] [place] [address] #: [phone] Route: Oral  
  
Follow-up Instructions Return if symptoms worsen or fail to improve. Introducing [facility] & HEALTH SERVICES! [employer] introduces [employer] patient portal. Now you can access parts of your medical record, email your doctor's office, and request medication refills online. 1. In your internet browser, go to [email or web address] 2. Click on the First Time User? Click Here link in the Sign In box. You will see the New Member Sign Up page. 3. Enter your TripFab Access Code exactly as it appears below. You will not need to use this code after youve completed the sign-up process. If you do not sign up before the expiration date, you must request a new code. · TripFab Access Code: [email or web address] Expires: 7/12/2018 12:13 PM 
 
4. Enter the last four digits of your Social Security Number (xxxx) and Date of Birth (mm/dd/yyyy) as indicated and click Submit. You will be taken to the next sign-up page. 5. Create a TripFab ID. This will be your TripFab login ID and cannot be changed, so think of one that is secure and easy to remember. 6. Create a TripFab password. You can change your password at any time. 7. Enter your Password Reset Question and Answer. This can be used at a later time if you forget your password. 8. Enter your e-mail address. You will receive e-mail notification when new information is available in [address] 9. Click Sign Up. You can now view and download portions of your medical record. 10. Click the Download Summary menu link to download a portable copy of your medical information. If you have questions, please visit the Frequently Asked Questions section of the TripFab website. Remember, TripFab is NOT to be used for urgent needs. For medical emergencies, dial 911. Now available from your iPhone and Android! Please provide this summary of care documentation to your next provider. Your primary care clinician is listed as Mylene Minor. If you have any questions after today's visit, please call [phone].

## 2018-04-28 LAB — BACTERIA UR CULT: NORMAL

## 2018-05-10 DIAGNOSIS — R31.9 HEMATURIA, UNSPECIFIED TYPE: Primary | ICD-10-CM

## 2018-05-10 LAB
BILIRUB UR QL STRIP: NEGATIVE
GLUCOSE UR-MCNC: NEGATIVE MG/DL
KETONES P FAST UR STRIP-MCNC: NEGATIVE MG/DL
PH UR STRIP: 7.5 [PH] (ref 4.6–8)
PROT UR QL STRIP: NORMAL
SP GR UR STRIP: 1.02 (ref 1–1.03)
UA UROBILINOGEN AMB POC: NORMAL (ref 0.2–1)
URINALYSIS CLARITY POC: CLEAR
URINALYSIS COLOR POC: YELLOW
URINE BLOOD POC: NEGATIVE
URINE LEUKOCYTES POC: NEGATIVE
URINE NITRITES POC: NEGATIVE

## 2018-05-10 RX ORDER — LEVOTHYROXINE SODIUM 100 UG/1
100 TABLET ORAL
Qty: 90 TAB | Refills: 1 | Status: SHIPPED | OUTPATIENT
Start: 2018-05-10 | End: 2018-10-24 | Stop reason: SDUPTHER

## 2018-05-10 RX ORDER — AMLODIPINE BESYLATE 2.5 MG/1
2.5 TABLET ORAL DAILY
Qty: 90 TAB | Refills: 1 | Status: SHIPPED | OUTPATIENT
Start: 2018-05-10 | End: 2018-10-24

## 2018-05-10 NOTE — TELEPHONE ENCOUNTER
PCP: Kendall Bueno MD    Last appt: 4/27/2018  Future Appointments  Date Time Provider Gen Paige   6/13/2018 10:45 AM Te Han MD [address] [facility]   7/25/2018 1:00 PM Kendall Bueno MD [address]       Requested Prescriptions     Pending Prescriptions Disp Refills    amLODIPine (NORVASC) 2.5 mg tablet 90 Tab 1     Sig: Take 1 Tab by mouth daily.  levothyroxine (SYNTHROID) 100 mcg tablet 90 Tab 1     Sig: Take 1 Tab by mouth Daily (before breakfast).

## 2018-05-11 ENCOUNTER — TELEPHONE (OUTPATIENT)
Dept: INTERNAL MEDICINE CLINIC | Age: 78
End: 2018-05-11

## 2018-05-24 NOTE — TELEPHONE ENCOUNTER
PCP: Vignesh Villegas MD    Last appt: 2018  Future Appointments  Date Time Provider Gen Paige   2018 10:45 AM Te Macias MD [address] [facility]   2018 1:00 PM [facility]       Requested Prescriptions     Pending Prescriptions Disp Refills    insulin NPH/insulin regular (HUMULIN 70/30 U-100 INSULIN) 100 unit/mL (70-30) injection 10 mL 1     Si Units by SubCUTAneous route two (2) times a day.

## 2018-05-24 NOTE — TELEPHONE ENCOUNTER
Patient is requesting refill Rx\" Humulin 70/30\"  sent to [facility] [phone].  Stated \" Blood sugar high (140) increased Humulin  to 17 units Best contact [phone]       Message received & copied from [facility]

## 2018-06-13 ENCOUNTER — OFFICE VISIT (OUTPATIENT)
Dept: CARDIOLOGY CLINIC | Age: 78
End: 2018-06-13

## 2018-06-13 VITALS
OXYGEN SATURATION: 93 % | HEIGHT: 61 IN | DIASTOLIC BLOOD PRESSURE: 70 MMHG | WEIGHT: 170.6 LBS | SYSTOLIC BLOOD PRESSURE: 126 MMHG | HEART RATE: 68 BPM | RESPIRATION RATE: 20 BRPM | BODY MASS INDEX: 32.21 KG/M2

## 2018-06-13 DIAGNOSIS — E78.2 MIXED HYPERLIPIDEMIA: ICD-10-CM

## 2018-06-13 DIAGNOSIS — I50.32 DIASTOLIC CHF, CHRONIC (HCC): ICD-10-CM

## 2018-06-13 DIAGNOSIS — I10 ESSENTIAL HYPERTENSION, BENIGN: ICD-10-CM

## 2018-06-13 DIAGNOSIS — Z95.1 S/P CABG X 3: ICD-10-CM

## 2018-06-13 DIAGNOSIS — I73.9 PVD (PERIPHERAL VASCULAR DISEASE) (HCC): ICD-10-CM

## 2018-06-13 DIAGNOSIS — I25.10 CORONARY ARTERY DISEASE INVOLVING NATIVE CORONARY ARTERY OF NATIVE HEART WITHOUT ANGINA PECTORIS: Primary | ICD-10-CM

## 2018-06-13 NOTE — MR AVS SNAPSHOT
[address] 
[phone] Patient: Jorge Darnell MRN: [id number] EUP:0/74/2950 Visit Information Date & Time Provider Department Dept. Phone Encounter #  
 6/13/2018 10:45 AM George Marcano MD [facility] [phone] [id number] Your Appointments 7/25/2018  1:00 PM  
ROUTINE CARE with Sravan Granados MD  
[address]) Appt Note: 3 month f/u  
 [place] [address] [address]  
[address] [facility] [address] [address]  
  
    
 12/21/2018 11:00 AM  
ESTABLISHED PATIENT with George Marcano MD  
[facility] [address] [place] [address]) [place] [address]  
[phone] [place] [address] Upcoming Health Maintenance Date Due  
 FOOT EXAM Q1 6/26/2018 COLONOSCOPY 8/2/2018 Influenza Age 5 to Adult 8/1/2018 EYE EXAM RETINAL OR DILATED Q1 9/15/2018 MICROALBUMIN Q1 9/22/2018 LIPID PANEL Q1 9/22/2018 HEMOGLOBIN A1C Q6M 10/10/2018 MEDICARE YEARLY EXAM 1/3/2019 GLAUCOMA SCREENING Q2Y 9/15/2019 DTaP/Tdap/Td series (2 - Td) 1/4/2026 Allergies as of 6/13/2018  Review Complete On: 6/13/2018 By: Sidney Taylor LPN Severity Noted Reaction Type Reactions Ace Inhibitors  11/18/2009    Swelling Arb-angiotensin Receptor Antagonist  02/01/2016    Swelling Lisinopril  11/10/2011    Swelling Plavix [Clopidogrel]  11/18/2009    Other (comments) Excessive bleeding Potassium  11/25/2011   Intolerance Nausea and Vomiting Potassium containing oral products are not well tolerated by patient Current Immunizations  Reviewed on 9/26/2017 Name Date Influenza High Dose Vaccine PF 9/26/2017 Influenza Vaccine 10/10/2016, 10/7/2014 Influenza Vaccine (Quad) PF 9/8/2015 Influenza Vaccine Split 9/6/2012, 10/18/2011 Pneumococcal Conjugate (PCV-13) 9/26/2017 Tdap 1/4/2016 ZZZ-RETIRED (DO NOT USE) Pneumococcal Vaccine (Unspecified Type) 10/10/2010 Zoster Vaccine, Live 10/10/2016 Not reviewed this visit You Were Diagnosed With   
  
 Codes Comments Coronary artery disease involving native coronary artery of native heart without angina pectoris    -  Primary ICD-10-CM: I25.10 ICD-9-CM: 414.01 Mixed hyperlipidemia     ICD-10-CM: E78.2 ICD-9-CM: 272.2 Essential hypertension, benign     ICD-10-CM: I10 
ICD-9-CM: 401.1 Diastolic CHF, chronic (HCC)     ICD-10-CM: I50.32 
ICD-9-CM: 428.32, 428.0   
 PVD (peripheral vascular disease) ([facility] [address] [address]     ICD-10-CM: I73.9 ICD-9-CM: 443.9 S/P CABG x 3     ICD-10-CM: Z95.1 ICD-9-CM: V45.81 Vitals BP Pulse Resp Height(growth percentile) Weight(growth percentile) SpO2  
 126/70 (BP 1 Location: Right arm, BP Patient Position: Sitting) 68 20 5' 1\" (1.549 m) 170 lb 9.6 oz (77.4 kg) 93% BMI OB Status Smoking Status 32.23 kg/m2 Hysterectomy Former Smoker Vitals History BMI and BSA Data Body Mass Index Body Surface Area  
 32.23 kg/m 2 1.83 m 2 Preferred Pharmacy Pharmacy Name Phone [address] [phone] Your Updated Medication List  
  
   
This list is accurate as of 6/13/18 11:19 AM.  Always use your most recent med list. amLODIPine 2.5 mg tablet Commonly known as:  [place] Shield Take 1 Tab by mouth daily. aspirin 81 mg chewable tablet Take 1 Tab by mouth daily. atorvastatin 80 mg tablet Commonly known as:  LIPITOR  
TAKE 1 TABLET NIGHTLY  
  
 EPIPEN 2-GEE 0.3 mg/0.3 mL injection Generic drug:  EPINEPHrine  
  
 escitalopram oxalate 10 mg tablet Commonly known as:  Frekelli Herring Take 1 Tab by mouth daily. * insulin NPH/insulin regular 100 unit/mL (70-30) injection Commonly known as:  NOVOLIN 70/30, HUMULIN 70/30 Start 10 units sq bid, can increase up to 13 units bid for goal  
  
 * insulin NPH/insulin regular 100 unit/mL (70-30) injection Commonly known as:  HumuLIN 70/30 U-100 Insulin 17 Units by SubCUTAneous route two (2) times a day. levothyroxine 100 mcg tablet Commonly known as:  SYNTHROID Take 1 Tab by mouth Daily (before breakfast). LORazepam 0.5 mg tablet Commonly known as:  ATIVAN Take 1 Tab by mouth nightly as needed. Max Daily Amount: 0.5 mg.  
  
 metFORMIN 1,000 mg tablet Commonly known as:  GLUCOPHAGE  
TAKE 1 TABLET TWICE A DAY  
  
 metoprolol tartrate 50 mg tablet Commonly known as:  LOPRESSOR Take 1 Tab by mouth two (2) times a day. Omeprazole delayed release 20 mg tablet Commonly known as:  PRILOSEC D/R Take 1 Tab by mouth daily. polyethylene glycol 17 gram packet Commonly known as:  Spearfish Iman Take 1 Packet by mouth daily. triamcinolone acetonide 0.1 % topical cream  
Commonly known as:  KENALOG Apply  to affected area two (2) times a day. use thin layer Monday to Friday only VITAMIN B-12 1,000 mcg tablet Generic drug:  cyanocobalamin Take 1,000 mcg by mouth daily. VITAMIN D3 1,000 unit Cap Generic drug:  cholecalciferol Take  by mouth daily. WELCHOL 625 mg tablet Generic drug:  colesevelam  
Take 1,875 mg by mouth two (2) times daily (with meals). * Notice: This list has 2 medication(s) that are the same as other medications prescribed for you. Read the directions carefully, and ask your doctor or other care provider to review them with you. We Performed the Following AMB POC EKG ROUTINE W/ 12 LEADS, INTER & REP [24642 CPT(R)] Introducing [facility] & HEALTH SERVICES!    
 [facility] [employer] introduces [employer] patient portal. Now you can access parts of your medical record, email your doctor's office, and request medication refills online. 1. In your internet browser, go to [email or web address] 2. Click on the First Time User? Click Here link in the Sign In box. You will see the New Member Sign Up page. 3. Enter your Eneedo Access Code exactly as it appears below. You will not need to use this code after youve completed the sign-up process. If you do not sign up before the expiration date, you must request a new code. · E[employer]o Access Code: [email or web address] Expires: 7/12/2018 12:13 PM 
 
4. Enter the last four digits of your Social Security Number (xxxx) and Date of Birth (mm/dd/yyyy) as indicated and click Submit. You will be taken to the next sign-up page. 5. Create a Eneedo ID. This will be your Eneedo login ID and cannot be changed, so think of one that is secure and easy to remember. 6. Create a Eneedo password. You can change your password at any time. 7. Enter your Password Reset Question and Answer. This can be used at a later time if you forget your password. 8. Enter your e-mail address. You will receive e-mail notification when new information is available in [address] 9. Click Sign Up. You can now view and download portions of your medical record. 10. Click the Download Summary menu link to download a portable copy of your medical information. If you have questions, please visit the Frequently Asked Questions section of the Eneedo website. Remember, E[employer]o is NOT to be used for urgent needs. For medical emergencies, dial 911. Now available from your iPhone and Android! Please provide this summary of care documentation to your next provider. Your primary care clinician is listed as Mylene Minor. If you have any questions after today's visit, please call [phone].

## 2018-06-13 NOTE — PROGRESS NOTES
1. Have you been to the ER, urgent care clinic since your last visit? Hospitalized since your last visit? NO    2. Have you seen or consulted any other health care providers outside of the [facility] since your last visit? Include any pap smears or colon screening. NO    C/O SOB OCCASIONALLY.

## 2018-06-19 NOTE — PROGRESS NOTES
NAME:  Lynnette Velez   :   1940   MRN:   [id number]   PCP:  Jelena Perla MD           Subjective: The patient is a 69y.o. year old female  who returns for a routine follow-up. Since the last visit, patient reports no change in exercise tolerance, chest pain, edema, medication intolerance, palpitations, shortness of breath, PND/orthopnea wheezing, sputum, syncope, dizziness or light headedness. Doing well. Past Medical History:   Diagnosis Date    CAD (coronary artery disease)     cabg     Diabetes ([facility]     Diverticulosis     Endocrine disease     thyroid    GERD (gastroesophageal reflux disease)     Heart disease     Heart failure ([facility] 10/18/2011    Hypertension     Other ill-defined conditions(799.89)     kidney stones    Postsurgical percutaneous transluminal coronary angioplasty status 2012    S/P CABG x 3 11    [facility]    Sleeping difficulty     Teeth decayed     Thyroid disease     Weakness         ICD-10-CM ICD-9-CM    1. Coronary artery disease involving native coronary artery of native heart without angina pectoris I25.10 414.01 AMB POC EKG ROUTINE W/ 12 LEADS, INTER & REP   2. Mixed hyperlipidemia E78.2 272.2    3. Essential hypertension, benign I10 401.1    4. Diastolic CHF, chronic (HCC) I50.32 428.32      428.0    5. PVD (peripheral vascular disease) ([facility]) I73.9 443.9    6.  S/P CABG x 3 Z95.1 V45.81       Social History   Substance Use Topics    Smoking status: Former Smoker     Packs/day: 0.50     Years: 40.00     Quit date: 2010    Smokeless tobacco: Never Used    Alcohol use No      Family History   Problem Relation Age of Onset    Diabetes Mother     Heart Disease Mother     Diabetes Brother     Heart Disease Brother     Mental Retardation Brother     Hypertension Brother     Other Father     Cancer Sister     Diabetes Brother     Heart Disease Brother     Diabetes Brother     Breast Cancer Daughter 50's        Review of Systems  General: Pt denies excessive weight gain or loss. Pt is able to conduct ADL's  HEENT: Denies blurred vision, headaches, epistaxis and difficulty swallowing. Respiratory: Denies shortness of breath, STOUT, wheezing or stridor. Cardiovascular: Denies precordial pain, palpitations, edema or PND  Gastrointestinal: Denies poor appetite, indigestion, abdominal pain or blood in stool  Musculoskeletal: Denies pain or swelling from muscles or joints  Neurologic: Denies tremor, paresthesias, or sensory motor disturbance  Skin: Denies rash, itching or texture change. Objective:       Vitals:    06/13/18 1048 06/13/18 1056   BP: 128/72 126/70   Pulse: 68    Resp: 20    SpO2: 93%    Weight: 170 lb 9.6 oz (77.4 kg)    Height: 5' 1\" (1.549 m)     Body mass index is 32.23 kg/(m^2). General PE  Mental Status - Alert. General Appearance - Not in acute distress. Chest and Lung Exam   Inspection: Accessory muscles - No use of accessory muscles in breathing. Auscultation:   Breath sounds: - Normal.    Cardiovascular   Inspection: Jugular vein - Bilateral - Inspection Normal.  Palpation/Percussion:   Apical Impulse: - Normal.  Auscultation: Rhythm - Regular. Heart Sounds - S1 WNL and S2 WNL. No S3 or S4. Murmurs & Other Heart Sounds: Auscultation of the heart reveals - No Murmurs. Peripheral Vascular   Upper Extremity: Inspection - Bilateral - No Cyanotic nailbeds or Digital clubbing. Lower Extremity:   Palpation: Edema - Bilateral - No edema. Data Review:     [facility]    Medications reviewed  Current Outpatient Prescriptions   Medication Sig    insulin NPH/insulin regular (HUMULIN 70/30 U-100 INSULIN) 100 unit/mL (70-30) injection 17 Units by SubCUTAneous route two (2) times a day.  amLODIPine (NORVASC) 2.5 mg tablet Take 1 Tab by mouth daily.  levothyroxine (SYNTHROID) 100 mcg tablet Take 1 Tab by mouth Daily (before breakfast).     cholecalciferol (VITAMIN D3) 1,000 unit cap Take  by mouth daily.  atorvastatin (LIPITOR) 80 mg tablet TAKE 1 TABLET NIGHTLY    Omeprazole delayed release (PRILOSEC D/R) 20 mg tablet Take 1 Tab by mouth daily.  insulin NPH/insulin regular (NOVOLIN 70/30, HUMULIN 70/30) 100 unit/mL (70-30) injection Start 10 units sq bid, can increase up to 13 units bid for goal    metFORMIN (GLUCOPHAGE) 1,000 mg tablet TAKE 1 TABLET TWICE A DAY    LORazepam (ATIVAN) 0.5 mg tablet Take 1 Tab by mouth nightly as needed. Max Daily Amount: 0.5 mg.    escitalopram oxalate (LEXAPRO) 10 mg tablet Take 1 Tab by mouth daily.  metoprolol tartrate (LOPRESSOR) 50 mg tablet Take 1 Tab by mouth two (2) times a day.  colesevelam (WELCHOL) 625 mg tablet Take 1,875 mg by mouth two (2) times daily (with meals).  EPIPEN 2-GEE 0.3 mg/0.3 mL (1:1,000) injection     aspirin 81 mg chewable tablet Take 1 Tab by mouth daily.  cyanocobalamin (VITAMIN B-12) 1,000 mcg tablet Take 1,000 mcg by mouth daily.  triamcinolone acetonide (KENALOG) 0.1 % topical cream Apply  to affected area two (2) times a day. use thin layer Monday to Friday only    polyethylene glycol (MIRALAX) 17 gram packet Take 1 Packet by mouth daily. No current facility-administered medications for this visit. Assessment:       ICD-10-CM ICD-9-CM    1. Coronary artery disease involving native coronary artery of native heart without angina pectoris I25.10 414.01 AMB POC EKG ROUTINE W/ 12 LEADS, INTER & REP   2. Mixed hyperlipidemia E78.2 272.2    3. Essential hypertension, benign I10 401.1    4. Diastolic CHF, chronic (HCC) I50.32 428.32      428.0    5. PVD (peripheral vascular disease) (HCC) I73.9 443.9    6. S/P CABG x 3 Z95.1 V45.81         Plan:     Patient presents doing well and stable from cardiac standpoint. Continue current care and follow up in 6 months.     1700 Bhavesh Krishna MD

## 2018-07-20 ENCOUNTER — APPOINTMENT (OUTPATIENT)
Dept: INTERNAL MEDICINE CLINIC | Age: 78
End: 2018-07-20

## 2018-07-20 DIAGNOSIS — F33.9 RECURRENT DEPRESSION (HCC): ICD-10-CM

## 2018-07-20 DIAGNOSIS — E03.9 HYPOTHYROIDISM, UNSPECIFIED TYPE: ICD-10-CM

## 2018-07-20 DIAGNOSIS — I73.9 PVD (PERIPHERAL VASCULAR DISEASE) (HCC): ICD-10-CM

## 2018-07-20 DIAGNOSIS — I25.10 ASHD (ARTERIOSCLEROTIC HEART DISEASE): ICD-10-CM

## 2018-07-20 DIAGNOSIS — M81.0 AGE-RELATED OSTEOPOROSIS WITHOUT CURRENT PATHOLOGICAL FRACTURE: ICD-10-CM

## 2018-07-20 DIAGNOSIS — E11.21 TYPE 2 DIABETES MELLITUS WITH NEPHROPATHY (HCC): ICD-10-CM

## 2018-07-20 DIAGNOSIS — E78.2 MIXED HYPERLIPIDEMIA: ICD-10-CM

## 2018-07-20 DIAGNOSIS — I10 ESSENTIAL HYPERTENSION, BENIGN: ICD-10-CM

## 2018-07-20 DIAGNOSIS — K21.9 GASTROESOPHAGEAL REFLUX DISEASE WITHOUT ESOPHAGITIS: ICD-10-CM

## 2018-07-21 LAB
BUN SERPL-MCNC: 13 MG/DL (ref 8–27)
BUN/CREAT SERPL: 13 (ref 12–28)
CALCIUM SERPL-MCNC: 9.6 MG/DL (ref 8.7–10.3)
CHLORIDE SERPL-SCNC: 106 MMOL/L (ref 96–106)
CO2 SERPL-SCNC: 22 MMOL/L (ref 20–29)
CREAT SERPL-MCNC: 1.03 MG/DL (ref 0.57–1)
EST. AVERAGE GLUCOSE BLD GHB EST-MCNC: 183 MG/DL
GLUCOSE SERPL-MCNC: 147 MG/DL (ref 65–99)
HBA1C MFR BLD: 8 % (ref 4.8–5.6)
POTASSIUM SERPL-SCNC: 3.2 MMOL/L (ref 3.5–5.2)
SODIUM SERPL-SCNC: 143 MMOL/L (ref 134–144)
TSH SERPL DL<=0.005 MIU/L-ACNC: 2.3 UIU/ML (ref 0.45–4.5)

## 2018-07-25 ENCOUNTER — OFFICE VISIT (OUTPATIENT)
Dept: INTERNAL MEDICINE CLINIC | Age: 78
End: 2018-07-25

## 2018-07-25 VITALS
TEMPERATURE: 97.5 F | WEIGHT: 171 LBS | OXYGEN SATURATION: 97 % | BODY MASS INDEX: 32.28 KG/M2 | SYSTOLIC BLOOD PRESSURE: 134 MMHG | RESPIRATION RATE: 16 BRPM | HEIGHT: 61 IN | DIASTOLIC BLOOD PRESSURE: 80 MMHG | HEART RATE: 64 BPM

## 2018-07-25 DIAGNOSIS — E78.2 MIXED HYPERLIPIDEMIA: ICD-10-CM

## 2018-07-25 DIAGNOSIS — M81.0 AGE-RELATED OSTEOPOROSIS WITHOUT CURRENT PATHOLOGICAL FRACTURE: ICD-10-CM

## 2018-07-25 DIAGNOSIS — M25.511 RIGHT SHOULDER PAIN, UNSPECIFIED CHRONICITY: Primary | ICD-10-CM

## 2018-07-25 DIAGNOSIS — F33.9 RECURRENT DEPRESSION (HCC): ICD-10-CM

## 2018-07-25 DIAGNOSIS — I25.10 ASHD (ARTERIOSCLEROTIC HEART DISEASE): ICD-10-CM

## 2018-07-25 DIAGNOSIS — D50.8 OTHER IRON DEFICIENCY ANEMIA: ICD-10-CM

## 2018-07-25 DIAGNOSIS — E03.9 HYPOTHYROIDISM, UNSPECIFIED TYPE: ICD-10-CM

## 2018-07-25 DIAGNOSIS — F51.01 PRIMARY INSOMNIA: ICD-10-CM

## 2018-07-25 DIAGNOSIS — K21.9 GASTROESOPHAGEAL REFLUX DISEASE WITHOUT ESOPHAGITIS: ICD-10-CM

## 2018-07-25 DIAGNOSIS — M25.511 CHRONIC RIGHT SHOULDER PAIN: ICD-10-CM

## 2018-07-25 DIAGNOSIS — I50.32 DIASTOLIC CHF, CHRONIC (HCC): ICD-10-CM

## 2018-07-25 DIAGNOSIS — E11.21 TYPE 2 DIABETES MELLITUS WITH NEPHROPATHY (HCC): Primary | ICD-10-CM

## 2018-07-25 DIAGNOSIS — I10 ESSENTIAL HYPERTENSION, BENIGN: ICD-10-CM

## 2018-07-25 DIAGNOSIS — F41.9 ANXIETY: ICD-10-CM

## 2018-07-25 DIAGNOSIS — G89.29 CHRONIC RIGHT SHOULDER PAIN: ICD-10-CM

## 2018-07-25 DIAGNOSIS — I25.10 CORONARY ARTERY DISEASE INVOLVING NATIVE CORONARY ARTERY OF NATIVE HEART WITHOUT ANGINA PECTORIS: ICD-10-CM

## 2018-07-25 DIAGNOSIS — I73.9 PVD (PERIPHERAL VASCULAR DISEASE) (HCC): ICD-10-CM

## 2018-07-25 RX ORDER — LORAZEPAM 0.5 MG/1
0.5 TABLET ORAL
Qty: 30 TAB | Refills: 0 | Status: SHIPPED | OUTPATIENT
Start: 2018-07-25 | End: 2019-01-22 | Stop reason: SDUPTHER

## 2018-07-25 NOTE — MR AVS SNAPSHOT
[address] [facility] 
[phone] Patient: Dejah Ruby MRN: [id number] OOK:[birth year]75[birth year] Visit Information Date & Time Provider Department Dept. Phone Encounter #  
 7/25/2018  1:00 PM Minoo Lin, [address] [facility] [phone] [id number] Follow-up Instructions Return in about 3 months (around 10/25/2018). Your Appointments 12/21/2018 11:00 AM  
ESTABLISHED PATIENT with Negrito Krishna MD  
[facility] [address]) [address] [facility]  
[phone] [address] [facility] Upcoming Health Maintenance Date Due  
 FOOT EXAM Q1 6/26/2018 COLONOSCOPY 8/2/2018 Influenza Age 5 to Adult 8/1/2018 EYE EXAM RETINAL OR DILATED Q1 9/15/2018 MICROALBUMIN Q1 9/22/2018 LIPID PANEL Q1 9/22/2018 MEDICARE YEARLY EXAM 1/3/2019 HEMOGLOBIN A1C Q6M 1/20/2019 GLAUCOMA SCREENING Q2Y 9/15/2019 DTaP/Tdap/Td series (2 - Td) 1/4/2026 Allergies as of 7/25/2018  Review Complete On: 7/25/2018 By: Sheridan Perkins LPN Severity Noted Reaction Type Reactions Ace Inhibitors  11/18/2009    Swelling Arb-angiotensin Receptor Antagonist  02/01/2016    Swelling Lisinopril  11/10/2011    Swelling Plavix [Clopidogrel]  11/18/2009    Other (comments) Excessive bleeding Potassium  11/25/2011   Intolerance Nausea and Vomiting Potassium containing oral products are not well tolerated by patient Current Immunizations  Reviewed on 9/26/2017 Name Date Influenza High Dose Vaccine PF 9/26/2017 Influenza Vaccine 10/10/2016, 10/7/2014 Influenza Vaccine (Quad) PF 9/8/2015 Influenza Vaccine Split 9/6/2012, 10/18/2011 Pneumococcal Conjugate (PCV-13) 9/26/2017 Tdap 1/4/2016  ZZZ-RETIRED (DO NOT USE) Pneumococcal Vaccine (Unspecified Type) 10/10/2010 Zoster Vaccine, Live 10/10/2016 Not reviewed this visit You Were Diagnosed With   
  
 Codes Comments Type 2 diabetes mellitus with nephropathy ([facility]    -  Primary ICD-10-CM: E11.21 
ICD-9-CM: 250.40, 583.81 Recurrent depression ([facility]     ICD-10-CM: F33.9 ICD-9-CM: 296.30 Diastolic CHF, chronic (HCC)     ICD-10-CM: I50.32 
ICD-9-CM: 428.32, 428.0   
 PVD (peripheral vascular disease) ([facility]     ICD-10-CM: I73.9 ICD-9-CM: 443.9 Other iron deficiency anemia     ICD-10-CM: D50.8 ICD-9-CM: 280.8 Essential hypertension, benign     ICD-10-CM: I10 
ICD-9-CM: 401.1 Mixed hyperlipidemia     ICD-10-CM: E78.2 ICD-9-CM: 272.2 Coronary artery disease involving native coronary artery of native heart without angina pectoris     ICD-10-CM: I25.10 ICD-9-CM: 414.01 Hypothyroidism, unspecified type     ICD-10-CM: E03.9 ICD-9-CM: 244.9 Primary insomnia     ICD-10-CM: F51.01 
ICD-9-CM: 307.42 Anxiety     ICD-10-CM: F41.9 ICD-9-CM: 300.00 Chronic right shoulder pain     ICD-10-CM: M25.511, G89.29 ICD-9-CM: 719.41, 338.29 Vitals BP Pulse Temp Resp Height(growth percentile) Weight(growth percentile) 157/73 (BP 1 Location: Left arm, BP Patient Position: Sitting) 64 97.5 °F (36.4 °C) (Oral) 16 5' 1\" (1.549 m) 171 lb (77.6 kg) SpO2 BMI OB Status Smoking Status 97% 32.31 kg/m2 Hysterectomy Former Smoker Vitals History BMI and BSA Data Body Mass Index Body Surface Area  
 32.31 kg/m 2 1.83 m 2 Preferred Pharmacy Pharmacy Name Phone Damián Reddy, [facility] [phone] Your Updated Medication List  
  
   
This list is accurate as of 7/25/18  1:20 PM.  Always use your most recent med list. amLODIPine 2.5 mg tablet Commonly known as:  [place] Mend Take 1 Tab by mouth daily. aspirin 81 mg chewable tablet Take 1 Tab by mouth daily. atorvastatin 80 mg tablet Commonly known as:  LIPITOR  
TAKE 1 TABLET NIGHTLY  
  
 EPIPEN 2-GEE 0.3 mg/0.3 mL injection Generic drug:  EPINEPHrine  
  
 escitalopram oxalate 10 mg tablet Commonly known as:  Kwabena Dub Take 1 Tab by mouth daily. * insulin NPH/insulin regular 100 unit/mL (70-30) injection Commonly known as:  NOVOLIN 70/30, HUMULIN 70/30 Start 10 units sq bid, can increase up to 13 units bid for goal  
  
 * insulin NPH/insulin regular 100 unit/mL (70-30) injection Commonly known as:  HumuLIN 70/30 U-100 Insulin 17 Units by SubCUTAneous route two (2) times a day. levothyroxine 100 mcg tablet Commonly known as:  SYNTHROID Take 1 Tab by mouth Daily (before breakfast). LORazepam 0.5 mg tablet Commonly known as:  ATIVAN Take 1 Tab by mouth nightly as needed. Max Daily Amount: 0.5 mg.  
  
 metFORMIN 1,000 mg tablet Commonly known as:  GLUCOPHAGE  
TAKE 1 TABLET TWICE A DAY  
  
 metoprolol tartrate 50 mg tablet Commonly known as:  LOPRESSOR Take 1 Tab by mouth two (2) times a day. Omeprazole delayed release 20 mg tablet Commonly known as:  PRILOSEC D/R Take 1 Tab by mouth daily. polyethylene glycol 17 gram packet Commonly known as:  Braulio Deck Take 1 Packet by mouth daily. triamcinolone acetonide 0.1 % topical cream  
Commonly known as:  KENALOG Apply  to affected area two (2) times a day. use thin layer Monday to Friday only  
  
 varicella-zoster recombinant (PF) 50 mcg/0.5 mL Susr injection Commonly known as:  SHINGRIX (PF)  
0.5 mL by IntraMUSCular route once for 1 dose. VITAMIN B-12 1,000 mcg tablet Generic drug:  cyanocobalamin Take 1,000 mcg by mouth daily. VITAMIN D3 1,000 unit Cap Generic drug:  cholecalciferol Take  by mouth daily. WELCHOL 625 mg tablet Generic drug:  colesevelam  
Take 1,875 mg by mouth two (2) times daily (with meals). * Notice: This list has 2 medication(s) that are the same as other medications prescribed for you. Read the directions carefully, and ask your doctor or other care provider to review them with you. Prescriptions Printed Refills LORazepam (ATIVAN) 0.5 mg tablet 0 Sig: Take 1 Tab by mouth nightly as needed. Max Daily Amount: 0.5 mg.  
 Class: Print Route: Oral  
  
Prescriptions Sent to Pharmacy Refills  
 varicella-zoster recombinant, PF, (SHINGRIX, PF,) 50 mcg/0.5 mL susr injection 1 Si.5 mL by IntraMUSCular route once for 1 dose. Class: Normal  
 Pharmacy: [address] [facility], [address] [facility] #: [phone] Route: IntraMUSCular We Performed the Following  DIABETES FOOT EXAM [HM7 Custom] REFERRAL TO PHYSICAL THERAPY [ZEO59 Custom] Comments:  
 Rotator cuff --r shoulder Follow-up Instructions Return in about 3 months (around 10/25/2018). To-Do List   
 2018 Imaging:  XR SHOULDER RT AP/LAT MIN 2 V   
  
 2018 Lab:  HEMOGLOBIN A1C WITH EAG   
  
 2018 Lab:  LIPID PANEL   
  
 2018 Lab:  METABOLIC PANEL, COMPREHENSIVE   
  
 2018 Lab:  MICROALBUMIN, UR, RAND W/ MICROALB/CREAT RATIO   
  
 2018 Lab:  TSH 3RD GENERATION Referral Information Referral ID Referred By Referred To  
  
 [id number] Yolis Ambrose Not Available Visits Status Start Date End Date 1 New Request 18 If your referral has a status of pending review or denied, additional information will be sent to support the outcome of this decision. Referral ID Referred By Referred To  
 [id number] BRENNA, [address] [facility] [address] [place] [address] Phone: [phone] Fax: [phone] Visits Status Start Date End Date 1 New Request 18 If your referral has a status of pending review or denied, additional information will be sent to support the outcome of this decision. Patient Instructions IF FASTING SUGARS STAY OVER 130 INCREASE INSULIN TO 19UNITS 2 TIMES PER DAY Introducing [facility] SERVICES! [employer] introduces [employer] patient portal. Now you can access parts of your medical record, email your doctor's office, and request medication refills online. 1. In your internet browser, go to [email or web address] 2. Click on the First Time User? Click Here link in the Sign In box. You will see the New Member Sign Up page. 3. Enter your [employer] Access Code exactly as it appears below. You will not need to use this code after youve completed the sign-up process. If you do not sign up before the expiration date, you must request a new code. · [employer] Access Code: [email or web address] Expires: 10/23/2018  1:20 PM 
 
4. Enter the last four digits of your Social Security Number (xxxx) and Date of Birth (mm/dd/yyyy) as indicated and click Submit. You will be taken to the next sign-up page. 5. Create a [employer] ID. This will be your [employer] login ID and cannot be changed, so think of one that is secure and easy to remember. 6. Create a [employer] password. You can change your password at any time. 7. Enter your Password Reset Question and Answer. This can be used at a later time if you forget your password. 8. Enter your e-mail address. You will receive e-mail notification when new information is available in [address] 9. Click Sign Up. You can now view and download portions of your medical record. 10. Click the Download Summary menu link to download a portable copy of your medical information. If you have questions, please visit the Frequently Asked Questions section of the [employer] website. Remember, [employer] is NOT to be used for urgent needs. For medical emergencies, dial 911. Now available from your iPhone and Android! Please provide this summary of care documentation to your next provider. Your primary care clinician is listed as Alex Alicea. If you have any questions after today's visit, please call [phone].

## 2018-07-25 NOTE — LETTER
7/25/2018 2:46 PM 
 
[address] [facility] [address] [facility] [id number] Dear Jeny Lai: 
 
Please find your most recent results below. Resulted Orders XR SHOULDER RT AP/LAT MIN 2 V Narrative  
 history: Right shoulder pain, no reported trauma COMPARISON: None FINDINGS: 
 
2 views of the right shoulder submitted for review. No evidence for acute fracture or dislocation. Mild to moderate osteoarthritis 
in the right AC and glenohumeral joints Impression IMPRESSION: 
 
Mild to moderate osteoarthritis RECOMMENDATIONS: 
Your imaging shows changes consistent with arthritis. Continue current medications. Will have you see orthopedics if you are not improving. Please call me if you have any questions: [phone] Sincerely, 
 
 
Earl Viramontes MD

## 2018-07-26 LAB
ALBUMIN/CREAT UR: 1402.9 MG/G CREAT (ref 0–30)
CREAT UR-MCNC: 41.8 MG/DL
MICROALBUMIN UR-MCNC: 586.4 UG/ML

## 2018-07-31 ENCOUNTER — TELEPHONE (OUTPATIENT)
Dept: INTERNAL MEDICINE CLINIC | Age: 78
End: 2018-07-31

## 2018-08-01 ENCOUNTER — APPOINTMENT (OUTPATIENT)
Dept: PHYSICAL THERAPY | Age: 78
End: 2018-08-01

## 2018-08-01 NOTE — TELEPHONE ENCOUNTER
Called, spoke to pt. Two pt identifiers confirmed. Pt inquiring on how pt should do PT for her shoulder. Pt also asking if she could do either Tylenol or aleve (at what dose) to help with her shoulder discomfort. Pt informed Dr. Main Miller will be notified. Pt verbalized understanding of information discussed w/ no further questions at this time.

## 2018-08-02 NOTE — TELEPHONE ENCOUNTER
MD Ayesha Li, JORDYN        Caller: Unspecified (2 days ago,  9:51 AM)                     Tylenol for pain     Can send to PT for her shoulder

## 2018-08-02 NOTE — TELEPHONE ENCOUNTER
Made phone call to patient, verified patient with two identifiers, name and date of birth. Informed patient she can take Tylenol for pain and to start physical therapy for shoulder per MD.  Patient verbalized understanding and had no further questions.

## 2018-08-03 ENCOUNTER — TELEPHONE (OUTPATIENT)
Dept: INTERNAL MEDICINE CLINIC | Age: 78
End: 2018-08-03

## 2018-08-03 NOTE — TELEPHONE ENCOUNTER
Called [facility] and spoke to Zuly Palmer. Zuly Palmer states a referral needs to be sent in.   Zuly Palmer gave no option to fax.

## 2018-08-03 NOTE — TELEPHONE ENCOUNTER
Patient states she needs a referral/order for [facility] for her Physical Therapy to be done thru \"[facility] on [address]. Their PH# is [phone]. Please call patient if any further questions.  Thank you

## 2018-08-03 NOTE — TELEPHONE ENCOUNTER
Called, spoke to pt. Two pt identifiers confirmed. Pt informed that referral req needs to be taken in to [facility]. Printed and mailed to pt. Pt verbalized understanding of information discussed w/ no further questions at this time.

## 2018-08-08 RX ORDER — METOPROLOL TARTRATE 50 MG/1
50 TABLET ORAL 2 TIMES DAILY
Qty: 180 TAB | Refills: 2 | Status: SHIPPED | OUTPATIENT
Start: 2018-08-08 | End: 2019-03-06 | Stop reason: SDUPTHER

## 2018-08-08 NOTE — TELEPHONE ENCOUNTER
PCP: Ines Pack MD    Last appt: 7/25/2018  Future Appointments  Date Time Provider Gen Paige   10/31/2018 11:15 AM Ines Pack MD [address]   12/21/2018 11:00 AM Te Akbar MD [address] [facility]       Requested Prescriptions     Pending Prescriptions Disp Refills    metoprolol tartrate (LOPRESSOR) 50 mg tablet 180 Tab 2     Sig: Take 1 Tab by mouth two (2) times a day.

## 2018-09-06 NOTE — TELEPHONE ENCOUNTER
Pt requesting a refill on insulin and [address]. [address] number to pharmacy is ([phone]).  Best contact [phone]       Message received & copied from [facility]

## 2018-09-10 NOTE — TELEPHONE ENCOUNTER
----- Message from Shorty Parra sent at 9/10/2018  9:15 AM EDT -----  Regarding: Dr. Aurora Powell  Patient is requesting a refill of Humilin 70-30 to be sent to the [facility] DR LEI MCCAIN at [phone]. Her number is [phone]. Please call the patient when the prescription has been sent.         Message copied/pasted from [facility]

## 2018-10-09 ENCOUNTER — TELEPHONE (OUTPATIENT)
Dept: INTERNAL MEDICINE CLINIC | Age: 78
End: 2018-10-09

## 2018-10-09 NOTE — TELEPHONE ENCOUNTER
Pt is requesting a call back in regards to colonoscopy on 10/31/18 @ 10:30am. Wants to know if a referral is needed.  Best contact [phone]       Message received & copied from [facility]

## 2018-10-18 ENCOUNTER — TELEPHONE (OUTPATIENT)
Dept: INTERNAL MEDICINE CLINIC | Age: 78
End: 2018-10-18

## 2018-10-18 NOTE — TELEPHONE ENCOUNTER
Called, spoke to pt. Two pt identifiers confirmed. Pt c/o L arm pain at the top of the arm. Pt denies chest pain/SOB/jaw pain. Pt offered and accepted appt for 10/19/18 4345. Pt verbalized understanding of information discussed w/ no further questions at this time.

## 2018-10-18 NOTE — TELEPHONE ENCOUNTER
Patient states she needs a call back in reference to getting an appt to be seen for persistent Left Arm pain since Sat., 10/13/18 that is keeping her from sleeping at night. Please call to advise.  Thank you

## 2018-10-19 ENCOUNTER — OFFICE VISIT (OUTPATIENT)
Dept: INTERNAL MEDICINE CLINIC | Age: 78
End: 2018-10-19

## 2018-10-19 VITALS
HEART RATE: 81 BPM | OXYGEN SATURATION: 96 % | DIASTOLIC BLOOD PRESSURE: 91 MMHG | SYSTOLIC BLOOD PRESSURE: 155 MMHG | BODY MASS INDEX: 32.66 KG/M2 | WEIGHT: 173 LBS | TEMPERATURE: 97.4 F | HEIGHT: 61 IN | RESPIRATION RATE: 16 BRPM

## 2018-10-19 DIAGNOSIS — I50.32 DIASTOLIC CHF, CHRONIC (HCC): ICD-10-CM

## 2018-10-19 DIAGNOSIS — D50.8 OTHER IRON DEFICIENCY ANEMIA: ICD-10-CM

## 2018-10-19 DIAGNOSIS — I25.10 CORONARY ARTERY DISEASE INVOLVING NATIVE CORONARY ARTERY OF NATIVE HEART WITHOUT ANGINA PECTORIS: ICD-10-CM

## 2018-10-19 DIAGNOSIS — E11.21 TYPE 2 DIABETES MELLITUS WITH NEPHROPATHY (HCC): ICD-10-CM

## 2018-10-19 DIAGNOSIS — I10 ESSENTIAL HYPERTENSION, BENIGN: ICD-10-CM

## 2018-10-19 DIAGNOSIS — F33.9 RECURRENT DEPRESSION (HCC): ICD-10-CM

## 2018-10-19 DIAGNOSIS — M81.0 AGE-RELATED OSTEOPOROSIS WITHOUT CURRENT PATHOLOGICAL FRACTURE: ICD-10-CM

## 2018-10-19 DIAGNOSIS — I25.10 ASHD (ARTERIOSCLEROTIC HEART DISEASE): ICD-10-CM

## 2018-10-19 DIAGNOSIS — E03.9 HYPOTHYROIDISM, UNSPECIFIED TYPE: ICD-10-CM

## 2018-10-19 DIAGNOSIS — I73.9 PVD (PERIPHERAL VASCULAR DISEASE) (HCC): ICD-10-CM

## 2018-10-19 DIAGNOSIS — E78.2 MIXED HYPERLIPIDEMIA: ICD-10-CM

## 2018-10-19 DIAGNOSIS — E66.9 OBESITY (BMI 30.0-34.9): ICD-10-CM

## 2018-10-19 DIAGNOSIS — R21 RASH: ICD-10-CM

## 2018-10-19 DIAGNOSIS — E11.21 TYPE 2 DIABETES MELLITUS WITH NEPHROPATHY (HCC): Primary | ICD-10-CM

## 2018-10-19 DIAGNOSIS — M25.512 ACUTE PAIN OF LEFT SHOULDER: ICD-10-CM

## 2018-10-19 RX ORDER — HYDROCODONE BITARTRATE AND ACETAMINOPHEN 5; 325 MG/1; MG/1
1 TABLET ORAL
Qty: 15 TAB | Refills: 0 | Status: SHIPPED | OUTPATIENT
Start: 2018-10-19 | End: 2018-10-30

## 2018-10-19 RX ORDER — TRIAMCINOLONE ACETONIDE 1 MG/G
CREAM TOPICAL 2 TIMES DAILY
Qty: 30 G | Refills: 0 | Status: SHIPPED | OUTPATIENT
Start: 2018-10-19 | End: 2018-10-30

## 2018-10-19 NOTE — PROGRESS NOTES
HISTORY OF PRESENT ILLNESS Drew Hernandez is a 69 y.o. female. HPI Last here 7/2/518. Pt is here for routine care. Pt c/o pain in her upper L arm x last week Denies numbness/tingling This pain has been keeping her awake at night Denies any injury that she is aware of 
Pt had been completing PT, but on her R arm Pt has taken some OTC pain medication, she helps briefly but not for too long Pt has tried applying both heat and ice Ordered PT Will provide limited supply hydrocodone to use prn while her arm heals 
   
BP is 155/91 /86 yesterday at home Continues on metoprolol 50mg BID and norvasc 2.5mg daily Recall had angioedema with lisinopril and benicar-HCT in the past 
   
BS at home running around 95, 118, 125, 126, 111, 134, 62, 69  in October Lower readings are from before dinner on days she skipped lunch 
, 190, 210 in the PM 
Continues on metformin 1000mg BID Pt is currently taking 19U BID of NPH regular mix She also takes ASA 81mg daily  
Recall issues with hypoglycemia in the past, a1c under 7.5 at goal for her  
[facility] is up 2 lbs x lov Discussed diet and weight loss Pt has recently joined a program to help with this 
   
Reviewed last labs 7/18 
  
Pt follows with Dr. Radha Gracia (cardio) Last visit was 6/13/18 Next visit scheduled for 12/18 
  
Pt follows annually with Dr. Kamini Rivera (vasc) for her PAD Reviewed ABIs 3/14/18:showed mild decrease in pressure Reviewed note 3/18: /72, no additional intervention needed, f/u 1 year Per pt, PAD in LLE worsened  
  
Pt is now taking prilosec 20mg daily for reflux, down from 40mg This is working well Controls burning Takes OTC vit D daily 
   
Continues synthroid 100mcg daily   She takes this med separately from all meds 
   
Continues on lipitor 80mg max dose and welchol daily for cholesterol Recall zetia is too expensive 
   
Continues on lexapro 10mg qhs, which works well not sad, has recent stressors but overall happy with dose She also has ativan to use prn (not daily) for anxiety and sleep--she has not used recently --lost it, would like more to help sleep with recent stressors. She has not used this med recently  
  
Pt went to a derm who did not accept her insurance Pt will try to go to another derm b/c rash resolved. Pt c/o a rash on her L forearm that appear last night Pt also c/o red spot on her L palm, as well as a spot on her L thumb Pt says this is not from gripping something too hard but may have been from after using workout machines She has not applied any steroid cream to this Discussed both sx are likely from eczema Will provide triamcinolone cream 
Advised using gloves when working out Discussed deltasone for elbow crease Lov, pt c/o R shoulder pain and decreased ROM Pt completed PT for this Pt states this was not a rotator cuff injury, just tightness ACP on file, SDMs are her grandson Rach Luo), daughter (Nabor Peacock) and brother Luz Mcfadden) . 
   
PREVENTIVE:   
Colonoscopy: Dr. Drew Hester, 10/13/15, repeat scheduled 10/31/18 Pap: 12/14, declines further Mammogram: 3/18, negative DEXA: 3/18, osteopenic, stopped fosamax Tdap: 1/04/2016 Pneumovax: 10/10/2010 [id number]: 9/17 Zostavax: 10/10/2016 Shingrix: consider Flu shot: 9/17 Foot exam: 7/18 Microalbumin: 7/18 A1c: , 12/15 7.2, 3/16 9.1, 8/16 7.6, 12/16 7.2, 3/17 7.3, 6/17 7.3, 9/17 6.9, 12/17 8.5, 4/18 8.4, 7/18 8.0 Eye exam: Dr. Nilesh Gunderson at [facility], 9/18, will get notes for review Lipids: 9/17 LDL 78 Patient Active Problem List  
 Diagnosis Date Noted  Type 2 diabetes mellitus with nephropathy ([facility] 01/02/2018  Recurrent depression ([facility] 01/02/2018  Encounter for long-term (current[facility] use [facility] 04/04/2016  Type II diabetes mellitus with nephropathy ([facility] 01/04/2016  Lower GI bleeding 10/12/2015  Diverticulosis of intestine with bleeding 10/11/2015  Anemia 10/11/2015  Diastolic CHF, chronic ([facility] 10/11/2015  GIB (gastrointestinal bleeding) 10/08/2015  ACP (advance care planning) 09/08/2015  Osteoporosis 09/10/2013  Bunion 10/15/2012  Essential hypertension, benign 05/17/2012  Mixed hyperlipidemia 05/17/2012  Postsurgical percutaneous transluminal coronary angioplasty status 05/17/2012  Old myocardial infarction 05/17/2012  Postsurgical aortocoronary bypass status 05/17/2012  S/P CABG x 3 11/11/2011  ASHD (arteriosclerotic heart disease) 11/10/2011  
 Heart failure ([facility] 10/18/2011  Hyperlipidemia 10/18/2011  Influenza 01/19/2011  CAD (coronary artery disease) 11/18/2009  Hypothyroidism 11/18/2009  PVD (peripheral vascular disease) ([facility] 11/18/2009 Current Outpatient Medications Medication Sig Dispense Refill  insulin NPH/insulin regular (HUMULIN 70/30 U-100 INSULIN) 100 unit/mL (70-30) injection 17 Units by SubCUTAneous route two (2) times a day. 10 mL 1  
 metoprolol tartrate (LOPRESSOR) 50 mg tablet Take 1 Tab by mouth two (2) times a day. 180 Tab 2  
 LORazepam (ATIVAN) 0.5 mg tablet Take 1 Tab by mouth nightly as needed. Max Daily Amount: 0.5 mg. 30 Tab 0  
 amLODIPine (NORVASC) 2.5 mg tablet Take 1 Tab by mouth daily. 90 Tab 1  
 levothyroxine (SYNTHROID) 100 mcg tablet Take 1 Tab by mouth Daily (before breakfast). 90 Tab 1  cholecalciferol (VITAMIN D3) 1,000 unit cap Take  by mouth daily.  atorvastatin (LIPITOR) 80 mg tablet TAKE 1 TABLET NIGHTLY 90 Tab 3  
 Omeprazole delayed release (PRILOSEC D/R) 20 mg tablet Take 1 Tab by mouth daily. 90 Tab 1  
 triamcinolone acetonide (KENALOG) 0.1 % topical cream Apply  to affected area two (2) times a day.  use thin layer Monday to Friday only 30 g 0  
 insulin NPH/insulin regular (NOVOLIN 70/30, HUMULIN 70/30) 100 unit/mL (70-30) injection Start 10 units sq bid, can increase up to 13 units bid for goal 10 mL 1  
 metFORMIN (GLUCOPHAGE) 1,000 mg tablet TAKE 1 TABLET TWICE A  Tab 1  
 escitalopram oxalate (LEXAPRO) 10 mg tablet Take 1 Tab by mouth daily. 90 Tab 1  
 colesevelam (WELCHOL) 625 mg tablet Take 1,875 mg by mouth two (2) times daily (with meals).  EPIPEN 2-GEE 0.3 mg/0.3 mL (1:1,000) injection   0  
 aspirin 81 mg chewable tablet Take 1 Tab by mouth daily. 90 Tab 3  polyethylene glycol (MIRALAX) 17 gram packet Take 1 Packet by mouth daily. 30 Each 1  
 cyanocobalamin (VITAMIN B-12) 1,000 mcg tablet Take 1,000 mcg by mouth daily. Past Surgical History:  
Procedure Laterality Date  CARDIAC SURG PROCEDURE UNLIST    
 cabg x 3  
 HX CORONARY ARTERY BYPASS GRAFT  11/11/11  
 3 vessel  HX GYN    
 hysterectomy  HX UROLOGICAL    
 kidney stone  VASCULAR SURGERY PROCEDURE UNLIST Stents put in right leg Lab Results Component Value Date/Time WBC 7.2 09/22/2017 09:20 AM  
 HGB 12.3 09/22/2017 09:20 AM  
 Hemoglobin (POC) 12.9 03/02/2011 12:21 PM  
 HCT 38.9 09/22/2017 09:20 AM  
 PLATELET 251 51/97/6716 09:20 AM  
 MCV 85 09/22/2017 09:20 AM  
 
Lab Results Component Value Date/Time Cholesterol, total 157 09/22/2017 09:20 AM  
 HDL Cholesterol 56 09/22/2017 09:20 AM  
 LDL, calculated 78 09/22/2017 09:20 AM  
 Triglyceride 116 09/22/2017 09:20 AM  
 CHOL/HDL Ratio 3.2 09/14/2010 08:42 AM  
 
Lab Results Component Value Date/Time GFR est non-AA 53 (L) 07/20/2018 09:25 AM  
 GFR est AA 61 07/20/2018 09:25 AM  
 Creatinine 1.03 (H) 07/20/2018 09:25 AM  
 BUN 13 07/20/2018 09:25 AM  
 Sodium 143 07/20/2018 09:25 AM  
 Potassium 3.2 (L) 07/20/2018 09:25 AM  
 Chloride 106 07/20/2018 09:25 AM  
 CO2 22 07/20/2018 09:25 AM  
 Magnesium 1.7 10/16/2015 04:34 AM  
  
Review of Systems Respiratory: Negative for shortness of breath. Cardiovascular: Negative for chest pain. Musculoskeletal: Positive for myalgias. Skin: Positive for rash. Physical Exam  
Constitutional: She is oriented to person, place, and time. She appears well-developed and well-nourished. No distress. HENT:  
Head: Normocephalic and atraumatic. Mouth/Throat: Oropharynx is clear and moist. No oropharyngeal exudate. Eyes: Conjunctivae and EOM are normal. Right eye exhibits no discharge. Left eye exhibits no discharge. Neck: Normal range of motion. Neck supple. Cardiovascular: Normal rate, regular rhythm, normal heart sounds and intact distal pulses. Exam reveals no gallop and no friction rub. No murmur heard. Pulmonary/Chest: Effort normal and breath sounds normal. No respiratory distress. She has no wheezes. She has no rales. She exhibits no tenderness. Musculoskeletal: Normal range of motion. She exhibits no edema, tenderness or deformity. Full ROM but more difficult with L arm Mild pain with extension of the L arm 
5/5 strength BLUE Lymphadenopathy:  
  She has no cervical adenopathy. Neurological: She is alert and oriented to person, place, and time. Coordination normal.  
Skin: Skin is warm and dry. No rash noted. She is not diaphoretic. No erythema. No pallor. Nickel sized patch on L femur eminence Erythematous plaque on crease of L elbow Psychiatric: She has a normal mood and affect. Her behavior is normal.  
 
 
ASSESSMENT and PLAN 
  ICD-10-CM ICD-9-CM 1. Type 2 diabetes mellitus with nephropathy ([facility] Home readings are good, had a couple of lows, these were all related to skipping lunch and not eating, not having any AM hypoglycemia, thus will continue NPH regular 70/30 mix 19U BID 
 E11.21 250.40   
  583.81 2. Recurrent depression ([facility] Controlled on lexapro 10mg qhs, continue F33.9 296.30   
3. Mixed hyperlipidemia At goal on lipitor 80mg and welchol daily E78.2 272.2 4. Age-related osteoporosis without current pathological fracture Improved this past March, now just osteopenia, continues on vit D, no longer on fosamax M81.0 733.01   
5. Hypothyroidism, unspecified type Controlled on synthroid 100mcgs daily, repeat labs are being drawn today E03.9 244.9 6. Rash Appears to be contact dermatitis vs form of eczema, will give triamcinolone cream to use BID M-F only R21 782.1 7. Acute pain of left shoulder Will do PT 
 M25.512 719.41 REFERRAL TO PHYSICAL THERAPY HYDROcodone-acetaminophen (NORCO) 5-325 mg per tablet 8. Essential hypertension, benign Controlled on metoprolol and norvasc I10 401.1 9. Obesity (BMI 30.0-34. 9) Discussed diet and w/l, portion control, and WW 
 E66.9 278.00   
10. ASHD (arteriosclerotic heart disease) Follows with Dr Carlos A Mancia, has f/u pending for 12/18, clinically stable I25.10 414.00   
11. PVD [facility]peripheral vascular disease) ([facility] Follows annually with Dr Kika Adames in 3/18 I73.9 443.9 Scribed by Mini Swan of [address] [place] [address] [address], as dictated by Dr. Ximena Devlin. Current diagnosis and concerns discussed with pt at length. Pt understands risks and benefits or current treatment plan and medications, and accepts the treatment and medication with any possible risks. Pt asks appropriate questions, which were answered. Pt was instructed to call with any concerns or problems. I have reviewed the note documented by the scribe. The services provided are my own. The documentation is accurate This note will not be viewable in [employer].

## 2018-10-20 LAB
ALBUMIN SERPL-MCNC: 4.1 G/DL (ref 3.5–4.8)
ALBUMIN/CREAT UR: 1810.3 MG/G CREAT (ref 0–30)
ALBUMIN/GLOB SERPL: 1.3 {RATIO} (ref 1.2–2.2)
ALP SERPL-CCNC: 89 IU/L (ref 39–117)
ALT SERPL-CCNC: 18 IU/L (ref 0–32)
AST SERPL-CCNC: 21 IU/L (ref 0–40)
BILIRUB SERPL-MCNC: 0.5 MG/DL (ref 0–1.2)
BUN SERPL-MCNC: 21 MG/DL (ref 8–27)
BUN/CREAT SERPL: 21 (ref 12–28)
CALCIUM SERPL-MCNC: 10.1 MG/DL (ref 8.7–10.3)
CHLORIDE SERPL-SCNC: 103 MMOL/L (ref 96–106)
CHOLEST SERPL-MCNC: 180 MG/DL (ref 100–199)
CO2 SERPL-SCNC: 25 MMOL/L (ref 20–29)
CREAT SERPL-MCNC: 1 MG/DL (ref 0.57–1)
CREAT UR-MCNC: 56.3 MG/DL
EST. AVERAGE GLUCOSE BLD GHB EST-MCNC: 183 MG/DL
GLOBULIN SER CALC-MCNC: 3.1 G/DL (ref 1.5–4.5)
GLUCOSE SERPL-MCNC: 142 MG/DL (ref 65–99)
HBA1C MFR BLD: 8 % (ref 4.8–5.6)
HDLC SERPL-MCNC: 64 MG/DL
LDLC SERPL CALC-MCNC: 90 MG/DL (ref 0–99)
MICROALBUMIN UR-MCNC: 1019.2 UG/ML
POTASSIUM SERPL-SCNC: 3.9 MMOL/L (ref 3.5–5.2)
PROT SERPL-MCNC: 7.2 G/DL (ref 6–8.5)
SODIUM SERPL-SCNC: 143 MMOL/L (ref 134–144)
TRIGL SERPL-MCNC: 129 MG/DL (ref 0–149)
TSH SERPL DL<=0.005 MIU/L-ACNC: 5.49 UIU/ML (ref 0.45–4.5)
VLDLC SERPL CALC-MCNC: 26 MG/DL (ref 5–40)

## 2018-10-20 NOTE — PROGRESS NOTES
tsh mildly up, ok last check    Will not change dose synthroid yet    Repeat tsh, a1c, bmp 1 week prior to f/u    F/u should be rescheduled--cancel 11/5, reschedule for 3 months

## 2018-10-22 ENCOUNTER — TELEPHONE (OUTPATIENT)
Dept: INTERNAL MEDICINE CLINIC | Age: 78
End: 2018-10-22

## 2018-10-22 NOTE — TELEPHONE ENCOUNTER
Clifford Mann with [facility] is inquiring about the authorization for physical therapy on left arm.  Best contact number is [phone]       Message received & copied from Parisa Harris

## 2018-10-22 NOTE — TELEPHONE ENCOUNTER
Called and spoke with Lula Viramontes from [employer] Lula Viramontes stated they need an authorization in order for pt to see PT. Lula Viramontes directed me to online authorization form that can be filled out and faxed.   Form filled out and sent to [employer] @ [phone]

## 2018-10-24 ENCOUNTER — TELEPHONE (OUTPATIENT)
Dept: INTERNAL MEDICINE CLINIC | Age: 78
End: 2018-10-24

## 2018-10-24 DIAGNOSIS — E11.21 TYPE II DIABETES MELLITUS WITH NEPHROPATHY (HCC): Primary | ICD-10-CM

## 2018-10-24 DIAGNOSIS — E03.9 HYPOTHYROIDISM, UNSPECIFIED TYPE: ICD-10-CM

## 2018-10-24 RX ORDER — AMLODIPINE BESYLATE 5 MG/1
5 TABLET ORAL DAILY
Qty: 90 TAB | Refills: 0 | Status: SHIPPED | OUTPATIENT
Start: 2018-10-24 | End: 2019-02-04 | Stop reason: SDUPTHER

## 2018-10-24 RX ORDER — LEVOTHYROXINE SODIUM 100 UG/1
100 TABLET ORAL
Qty: 90 TAB | Refills: 1 | Status: SHIPPED | OUTPATIENT
Start: 2018-10-24 | End: 2019-04-24 | Stop reason: SDUPTHER

## 2018-10-24 NOTE — TELEPHONE ENCOUNTER
MD Keren Jesus LPN   Caller: Unspecified (Today,  7:44 AM)             Lets increase norvasc to 5mg daily for bp     Continue to monitor

## 2018-10-24 NOTE — TELEPHONE ENCOUNTER
Spoke to Peter at [place]. Peter states that office notes along with the referral req would be needed to be faxed to [phone]. Peter states the [place] is pending until that info is received. Auth #[id number]. Recent o/n's and referral faxed to [facility] w/ confirmation received.

## 2018-10-24 NOTE — PROGRESS NOTES
Called, spoke to pt. Two pt identifiers confirmed. Pt informed per Dr. Savannah Powell tsh mildly up, ok last check-Will not change dose synthroid yet. Pt informed per Dr. Savannah Powell repeat tsh, a1c, bmp 1 week prior to f/u. Labs ordered and mailed to pt. Pt verbalized understanding of information discussed w/ no further questions at this time.

## 2018-10-24 NOTE — TELEPHONE ENCOUNTER
Called, spoke to pt. Two pt identifiers confirmed. Pt states elevated BP. Pt states BP this morning was 171/106 in R arm and L 131/101. Pt cannot state how old her BP cuff is but is \"pretty old\". Pt states that at [facility]; her BP readings were 174/102, 180/104, and 165/9. Pt states feeling \"a little lightheadedness\". Pt denies any other sx. Pt states that \"every now and then I'll drink a cup of coffee\". Pt has not allowed herself to relax prior to taking BP-advice given. Pt also reports that pharmacy only gave pt 7 [place] (15 tabs prescribed). Pt states that she was told that it is from the new law passed and that if more are needed, that Dr. Janusz Mosley office will need to call pharmacy. Pt states that she was planning on taking aleve after running out PRN. Pt informed Dr. Donna Hartmann will be notified. Pt verbalized understanding of information discussed w/ no further questions at this time.

## 2018-10-24 NOTE — TELEPHONE ENCOUNTER
----- Message from [address] sent at 10/24/2018  2:27 PM EDT -----  Regarding: Dr. Shanelle Truong with [facility] advised that the progress report and treatment plan needs to be updated for continuous of treatment.   Best contact number is [phone]    Copy/paste Cinthya Bishop

## 2018-10-24 NOTE — TELEPHONE ENCOUNTER
----- Message from [facility] sent at 10/23/2018  6:13 PM EDT -----  Regarding: Dr. Nicole Teresa  ##urgent##   Pt called to let Dr. Erick Herndon know that her BP readings have been high. Reports 174/102 and 180/104 on 10/17/18 and today (10/23/18) BP was 168/91 at best. Pt is unable to start exercises at gym until BP is under control, so she would like to discuss changing BP medication. PT also advised that the pain medication given to her for her L shoulder is not helping much. Best contact number for pt is [phone].        Message copied/pasted from [facility]

## 2018-10-24 NOTE — TELEPHONE ENCOUNTER
Called, spoke to pt. Two pt identifiers confirmed. Pt informed per Dr. Mark Strong to increase amlodipine to 5mg and monitor BP. Pended. Pt advised to call back for high readings. Pt verbalized understanding of information discussed w/ no further questions at this time.

## 2018-10-24 NOTE — TELEPHONE ENCOUNTER
Pt returning missed call. Best contact [phone].        Message received & copied from [facility]

## 2018-10-29 ENCOUNTER — TELEPHONE (OUTPATIENT)
Dept: INTERNAL MEDICINE CLINIC | Age: 78
End: 2018-10-29

## 2018-10-29 NOTE — TELEPHONE ENCOUNTER
Left vm for pt that pt needs to return call prior to colo. Needs to tae half insulin dose mix night before and skip morning dose.

## 2018-10-29 NOTE — TELEPHONE ENCOUNTER
MD Chad Mathur, JORDYN   Caller: Unspecified (Today,  1:33 PM)             Take half dose of her insulin mix the night before colonoscopy     Skip morning dose of the insulin mix

## 2018-10-29 NOTE — TELEPHONE ENCOUNTER
#[phone] pt needs a call tomorrow morning, 10-30-18 as she is having a colonoscopy on 10-30-18 and needs to know about medications. Pt states her pressure has gone down and she'll talk to you about that tomorrow morning.

## 2018-10-30 NOTE — TELEPHONE ENCOUNTER
Pt calling requesting a call back today before 3 pm , in regards to some info about what meds to take and not take prior mto her colonoscopy appt schedued for tomorrow.  Pt's contact [phone]     Copy/paste [place]

## 2018-10-30 NOTE — TELEPHONE ENCOUNTER
Called, spoke to pt. Two pt identifiers confirmed. Pt states colo is actually 10/31/18. Pt informed per Dr. Aman Mathis to take half the dose of insulin mix the night before and skip the morning mix of insulin. Pt verbalized understanding of information discussed w/ no further questions at this time.

## 2018-10-31 ENCOUNTER — HOSPITAL ENCOUNTER (OUTPATIENT)
Age: 78
Setting detail: OUTPATIENT SURGERY
Discharge: HOME | End: 2018-10-31
Attending: SPECIALIST | Admitting: SPECIALIST
Payer: MEDICARE

## 2018-10-31 ENCOUNTER — ANESTHESIA (OUTPATIENT)
Dept: ENDOSCOPY | Age: 78
End: 2018-10-31
Payer: MEDICARE

## 2018-10-31 ENCOUNTER — ANESTHESIA EVENT (OUTPATIENT)
Dept: ENDOSCOPY | Age: 78
End: 2018-10-31
Payer: MEDICARE

## 2018-10-31 VITALS
SYSTOLIC BLOOD PRESSURE: 144 MMHG | HEIGHT: 61 IN | OXYGEN SATURATION: 98 % | DIASTOLIC BLOOD PRESSURE: 64 MMHG | BODY MASS INDEX: 32.5 KG/M2 | TEMPERATURE: 97.5 F | HEART RATE: 68 BPM | RESPIRATION RATE: 39 BRPM | WEIGHT: 172.13 LBS

## 2018-10-31 PROCEDURE — 76060000031 HC ANESTHESIA FIRST 0.5 HR: Performed by: SPECIALIST

## 2018-10-31 PROCEDURE — 74011250636 HC RX REV CODE- 250/636: Performed by: SPECIALIST

## 2018-10-31 PROCEDURE — 77030039825 HC MSK NSL PAP SUPERNO2VA VYRM -B: Performed by: SPECIALIST

## 2018-10-31 PROCEDURE — 76040000019: Performed by: SPECIALIST

## 2018-10-31 PROCEDURE — 74011250636 HC RX REV CODE- 250/636

## 2018-10-31 RX ORDER — SODIUM CHLORIDE 0.9 % (FLUSH) 0.9 %
5-10 SYRINGE (ML) INJECTION AS NEEDED
Status: DISCONTINUED | OUTPATIENT
Start: 2018-10-31 | End: 2018-10-31 | Stop reason: HOSPADM

## 2018-10-31 RX ORDER — LIDOCAINE HYDROCHLORIDE 20 MG/ML
INJECTION, SOLUTION EPIDURAL; INFILTRATION; INTRACAUDAL; PERINEURAL AS NEEDED
Status: DISCONTINUED | OUTPATIENT
Start: 2018-10-31 | End: 2018-10-31 | Stop reason: HOSPADM

## 2018-10-31 RX ORDER — NALOXONE HYDROCHLORIDE 0.4 MG/ML
0.4 INJECTION, SOLUTION INTRAMUSCULAR; INTRAVENOUS; SUBCUTANEOUS
Status: DISCONTINUED | OUTPATIENT
Start: 2018-10-31 | End: 2018-10-31 | Stop reason: HOSPADM

## 2018-10-31 RX ORDER — FENTANYL CITRATE 50 UG/ML
50 INJECTION, SOLUTION INTRAMUSCULAR; INTRAVENOUS
Status: DISCONTINUED | OUTPATIENT
Start: 2018-10-31 | End: 2018-10-31 | Stop reason: HOSPADM

## 2018-10-31 RX ORDER — FLUMAZENIL 0.1 MG/ML
0.2 INJECTION INTRAVENOUS
Status: DISCONTINUED | OUTPATIENT
Start: 2018-10-31 | End: 2018-10-31 | Stop reason: HOSPADM

## 2018-10-31 RX ORDER — MIDAZOLAM HYDROCHLORIDE 1 MG/ML
.25-5 INJECTION, SOLUTION INTRAMUSCULAR; INTRAVENOUS
Status: DISCONTINUED | OUTPATIENT
Start: 2018-10-31 | End: 2018-10-31 | Stop reason: HOSPADM

## 2018-10-31 RX ORDER — SODIUM CHLORIDE 0.9 % (FLUSH) 0.9 %
5-10 SYRINGE (ML) INJECTION EVERY 8 HOURS
Status: DISCONTINUED | OUTPATIENT
Start: 2018-10-31 | End: 2018-10-31 | Stop reason: HOSPADM

## 2018-10-31 RX ORDER — PROPOFOL 10 MG/ML
INJECTION, EMULSION INTRAVENOUS AS NEEDED
Status: DISCONTINUED | OUTPATIENT
Start: 2018-10-31 | End: 2018-10-31 | Stop reason: HOSPADM

## 2018-10-31 RX ORDER — DEXTROMETHORPHAN/PSEUDOEPHED 2.5-7.5/.8
1.2 DROPS ORAL
Status: DISCONTINUED | OUTPATIENT
Start: 2018-10-31 | End: 2018-10-31 | Stop reason: HOSPADM

## 2018-10-31 RX ORDER — SODIUM CHLORIDE 9 MG/ML
50 INJECTION, SOLUTION INTRAVENOUS CONTINUOUS
Status: DISCONTINUED | OUTPATIENT
Start: 2018-10-31 | End: 2018-10-31 | Stop reason: HOSPADM

## 2018-10-31 RX ADMIN — PROPOFOL 50 MG: 10 INJECTION, EMULSION INTRAVENOUS at 11:53

## 2018-10-31 RX ADMIN — PROPOFOL 50 MG: 10 INJECTION, EMULSION INTRAVENOUS at 11:54

## 2018-10-31 RX ADMIN — PROPOFOL 60 MG: 10 INJECTION, EMULSION INTRAVENOUS at 12:03

## 2018-10-31 RX ADMIN — SODIUM CHLORIDE 50 ML/HR: 900 INJECTION, SOLUTION INTRAVENOUS at 11:33

## 2018-10-31 RX ADMIN — LIDOCAINE HYDROCHLORIDE 60 MG: 20 INJECTION, SOLUTION EPIDURAL; INFILTRATION; INTRACAUDAL; PERINEURAL at 11:53

## 2018-10-31 RX ADMIN — PROPOFOL 20 MG: 10 INJECTION, EMULSION INTRAVENOUS at 12:11

## 2018-10-31 NOTE — ANESTHESIA POSTPROCEDURE EVALUATION
Procedure(s): 
COLONOSCOPY. Anesthesia Post Evaluation Patient location during evaluation: PACU Note status: Adequate. Level of consciousness: responsive to verbal stimuli and sleepy but conscious Pain management: satisfactory to patient Airway patency: patent Anesthetic complications: no 
Cardiovascular status: acceptable Respiratory status: acceptable Hydration status: acceptable Comments: +Post-Anesthesia Evaluation and Assessment Patient: Ellie Lee MRN: [id number]  SSN: [id number] YOB: 1940  Age: 69 y.o. Sex: female Cardiovascular Function/Vital Signs /64   Pulse 68   Temp 36.4 °C (97.5 °F)   Resp (!) 39   Ht 5' 1\" (1.549 m)   Wt 78.1 kg (172 lb 2 oz)   SpO2 98%   Breastfeeding? No   BMI 32.52 kg/m² Patient is status post Procedure(s): 
COLONOSCOPY. Nausea/Vomiting: Controlled. Postoperative hydration reviewed and adequate. Pain: 
Pain Scale 1: Numeric (0 - 10) (10/31/18 1317) Pain Intensity 1: 0 (10/31/18 1317) Managed. Neurological Status: At baseline. Mental Status and Level of Consciousness: Arousable. Pulmonary Status:  
O2 Device: Room air (10/31/18 1317) Adequate oxygenation and airway patent. Complications related to anesthesia: None Post-anesthesia assessment completed. No concerns. Signed By: Aleena Raymond MD  
 10/31/2018 Visit Vitals /64 Pulse 68 Temp 36.4 °C (97.5 °F) Resp (!) 39 Ht 5' 1\" (1.549 m) Wt 78.1 kg (172 lb 2 oz) SpO2 98% Breastfeeding? No  
BMI 32.52 kg/m²

## 2018-10-31 NOTE — DISCHARGE INSTRUCTIONS
Ministerio Rincon  [id number]  1940    COLON DISCHARGE INSTRUCTIONS  Discomfort:  Redness at IV site- apply warm compress to area; if redness or soreness persist- contact your physician  There may be a slight amount of blood passed from the rectum  Gaseous discomfort- walking, belching will help relieve any discomfort  You may not operate a vehicle for 12 hours  You may not engage in an occupation involving machinery or appliances for rest of today  You may not drink alcoholic beverages for at least 12 hours  Avoid making any critical decisions for at least 24 hour  DIET:   Regular diet. - however -  remember your colon is empty and a heavy meal will produce gas. Avoid these foods:  vegetables, fried / greasy foods, carbonated drinks for today. MEDICATIONS:        Regarding Aspirin or Nonsteroidal medications, please see below. ACTIVITY:  You may resume your normal daily activities it is recommended that you spend the remainder of the day resting -  avoid any strenuous activity. CALL M.D. ANY SIGN OF:  Increasing pain, nausea, vomiting  Abdominal distension (swelling)  New increased bleeding (oral or rectal)  Fever (chills)  ONLY  Tylenol as needed for pain.       Follow-up Instructions:   Call Dr. Layo Herron for questions about procedure at telephone #  [phone]

## 2018-10-31 NOTE — ROUTINE PROCESS
[facility] 1940 
[id number] Situation: 
Verbal report received from: Jailyn Larkin RN Procedure: Procedure(s): 
COLONOSCOPY Background: 
 
Preoperative diagnosis: SCREENING Postoperative diagnosis: diverticulosis :  Dr. Sharmila Rowland Assistant(s): Endoscopy Technician-1: Antonio Costa Endoscopy RN-1: Rosa Alford RN Float Staff: Emelina Calvert RN Specimens: * No specimens in log * H. Pylori  no Assessment: 
Intra-procedure medications Anesthesia gave intra-procedure sedation and medications, see anesthesia flow sheet yes Intravenous fluids: NS@ Leanora Peyer Vital signs stable Abdominal assessment: round and soft Recommendation: 
Discharge patient per MD order. Family or Friend Permission to share finding with family or friend yes

## 2018-10-31 NOTE — H&P
Gastroenterology Outpatient History and Physical 
 
Patient: Keshia Mountain Physician: Kamilah Mckeon MD 
 
Vital Signs: Blood pressure 107/68, pulse 80, temperature 97.4 °F (36.3 °C), resp. rate 14, height 5' 1\" (1.549 m), weight 78.1 kg (172 lb 2 oz), SpO2 95 %, not currently breastfeeding. Allergies: Allergies Allergen Reactions  Ace Inhibitors Swelling  Arb-Angiotensin Receptor Antagonist Swelling  Lisinopril Swelling  Plavix [Clopidogrel] Other (comments) Excessive bleeding  Potassium Nausea and Vomiting Potassium containing oral products are not well tolerated by patient Chief Complaint: H/O Polyps History of Present Illness: 70 yo BF with personal h/o colon polyps. Justification for Procedure: above History: 
Past Medical History:  
Diagnosis Date  CAD (coronary artery disease)   
 cabg  Diabetes ([facility]  Diverticulosis  Endocrine disease   
 thyroid  GERD (gastroesophageal reflux disease)  Heart disease  Heart failure ([facility] [address] 10/18/2011  Hypertension  Other ill-defined conditions(799.89)   
 kidney stones  Postsurgical percutaneous transluminal coronary angioplasty status 5/17/2012  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 [address]  Sleeping difficulty  Teeth decayed  Thyroid disease  Weakness Past Surgical History:  
Procedure Laterality Date  CARDIAC SURG PROCEDURE UNLIST    
 cabg x 3  
 HX CORONARY ARTERY BYPASS GRAFT  11/11/11  
 3 vessel  HX GYN    
 hysterectomy  HX OTHER SURGICAL    
 colonoscopy  HX UROLOGICAL    
 kidney stone  VASCULAR SURGERY PROCEDURE UNLIST Stents put in right leg Social History Socioeconomic History  Marital status:  Spouse name: Not on file  Number of children: Not on file  Years of education: Not on file  Highest education level: Not on file Social Needs  Financial resource strain: Not on file  Food insecurity - worry: Not on file  Food insecurity - inability: Not on file  Transportation needs - medical: Not on file  Transportation needs - non-medical: Not on file Occupational History  Not on file Tobacco Use  Smoking status: Former Smoker Packs/day: 0.50 Years: 40.00 Pack years: 20.00 Last attempt to quit: 2010 Years since quittin.1  Smokeless tobacco: Never Used Substance and Sexual Activity  Alcohol use: No  
 Drug use: No  
 Sexual activity: No  
Other Topics Concern  Not on file Social History Narrative  Not on file Family History Problem Relation Age of Onset  Diabetes Mother  Heart Disease Mother  Diabetes Brother  Heart Disease Brother  Mental Retardation Brother  Hypertension Brother  Other Father  Cancer Sister  Diabetes Brother  Heart Disease Brother  Diabetes Brother  Breast Cancer Daughter 50's Medications:  
Prior to Admission medications Medication Sig Start Date End Date Taking? Authorizing Provider  
amLODIPine (NORVASC) 5 mg tablet Take 1 Tab by mouth daily. 10/24/18  Yes Julianna Aguilera MD  
levothyroxine (SYNTHROID) 100 mcg tablet Take 1 Tab by mouth Daily (before breakfast). 10/24/18  Yes Julianna Aguilera MD  
insulin NPH/insulin regular (HUMULIN 70/30 U-100 INSULIN) 100 unit/mL (70-30) injection 17 Units by SubCUTAneous route two (2) times a day. 9/10/18  Yes Julianna Aguilera MD  
metoprolol tartrate (LOPRESSOR) 50 mg tablet Take 1 Tab by mouth two (2) times a day. 18  Yes Julianna Aguilera MD  
LORazepam (ATIVAN) 0.5 mg tablet Take 1 Tab by mouth nightly as needed. Max Daily Amount: 0.5 mg. 18  Yes Julianna Aguilera MD  
cholecalciferol (VITAMIN D3) 1,000 unit cap Take  by mouth daily.    Yes Provider, Historical  
atorvastatin (LIPITOR) 80 mg tablet TAKE 1 TABLET NIGHTLY 18  Yes Julianna Aguilera MD  
 Omeprazole delayed release (PRILOSEC D/R) 20 mg tablet Take 1 Tab by mouth daily. 4/13/18  Yes Tamy Busby MD  
metFORMIN (GLUCOPHAGE) 1,000 mg tablet TAKE 1 TABLET TWICE A DAY 1/15/18  Yes Tamy Busby MD  
escitalopram oxalate (LEXAPRO) 10 mg tablet Take 1 Tab by mouth daily. 1/2/18  Yes Tamy Busby MD  
[facility]) 625 mg tablet Take 1,875 mg by mouth two (2) times daily (with meals). Yes Provider, Historical  
aspirin 81 mg chewable tablet Take 1 Tab by mouth daily. 1/4/16  Yes Tamy Busby MD  
cyanocobalamin (VITAMIN B-12) 1,000 mcg tablet Take 1,000 mcg by mouth daily. Yes Provider, Historical  
 
 
Physical Exam:  
General: alert, no distress HEENT: Head: Normocephalic, no lesions, without obvious abnormality. Heart: regular rate and rhythm, S1, S2 normal, no murmur, click, rub or gallop Lungs: chest clear, no wheezing, rales, normal symmetric air entry Abdominal: soft, nontender, nondistended, + BS Neurological: Grossly normal  
Extremities: extremities normal, atraumatic, no cyanosis or edema Findings/Diagnosis: H/O Polyps Plan of Care/Planned Procedure: Colonoscopy Signed By: Aiden Gonzalez MD   
 October 31, 2018

## 2018-10-31 NOTE — PROCEDURES
Colonoscopy Procedure Note    Indications:   Personal history of colon polyps (screening only)    Referring Physician: Ruy Calvert MD  Anesthesia/Sedation: MAC anesthesia Propofol  Endoscopist:  Dr. Nena Bettencourt    Procedure in Detail:  Informed consent was obtained for the procedure, including sedation. Risks of perforation, hemorrhage, adverse drug reaction, and aspiration were discussed. The patient was placed in the left lateral decubitus position. Based on the pre-procedure assessment, including review of the patient's medical history, medications, allergies, and review of systems, she had been deemed to be an appropriate candidate for moderate sedation; she was therefore sedated with the medications listed above. The patient was monitored continuously with ECG tracing, pulse oximetry, blood pressure monitoring, and direct observations. A rectal examination was performed. The [id number] was inserted into the rectum and advanced under direct vision to the cecum, which was identified by the ileocecal valve and appendiceal orifice. The quality of the colonic preparation was adequate. A careful inspection was made as the colonoscope was withdrawn, including a retroflexed view of the rectum; findings and interventions are described below. Appropriate photodocumentation was obtained. Findings:     1. Scope advanced to the cecum. 2.  Moderately severe diverticulosis of the sigmoid and descending colon. 3.  No polyps seen. 4.  Small internal hemorrhoids. Therapies:  none    Specimen:  none     Complications: None were encountered during the procedure. EBL: < 10 ml.     Recommendations:   -Repeat colonoscopy in 5 years   Signed By: Nicole Kaplan MD                        October 31, 2018

## 2018-10-31 NOTE — ANESTHESIA PREPROCEDURE EVALUATION
Anesthetic History No history of anesthetic complications Review of Systems / Medical History Patient summary reviewed, nursing notes reviewed and pertinent labs reviewed Pulmonary Smoker (EX, 20 pk yr, quit 2010) Neuro/Psych Within defined limits Cardiovascular Hypertension CAD, PAD (stents rt leg) and CABG (3 vessel in 2011) Exercise tolerance: <4 METS 
  
GI/Hepatic/Renal 
  
GERD Renal disease: stones Comments: GI bleed Hx of diverticulosis with bleeding Endo/Other Diabetes: type 2, using insulin Hypothyroidism: well controlled Obesity and anemia Other Findings Comments: Osteoporosis Physical Exam 
 
Airway Mallampati: II 
TM Distance: 4 - 6 cm Neck ROM: normal range of motion Mouth opening: Normal 
 
 Cardiovascular Rhythm: regular Rate: normal 
 
 
 
 Dental 
 
Dentition: Lower partial plate Comments: Missing some teeth, none loose Pulmonary Breath sounds clear to auscultation Comments: Shallow inspirations Abdominal 
GI exam deferred Other Findings Anesthetic Plan ASA: 3 Anesthesia type: general and total IV anesthesia Induction: Intravenous Anesthetic plan and risks discussed with: Patient Propofol MAC

## 2018-11-12 ENCOUNTER — TELEPHONE (OUTPATIENT)
Dept: INTERNAL MEDICINE CLINIC | Age: 78
End: 2018-11-12

## 2018-11-12 ENCOUNTER — OFFICE VISIT (OUTPATIENT)
Dept: INTERNAL MEDICINE CLINIC | Age: 78
End: 2018-11-12

## 2018-11-12 VITALS
BODY MASS INDEX: 32.85 KG/M2 | DIASTOLIC BLOOD PRESSURE: 85 MMHG | SYSTOLIC BLOOD PRESSURE: 141 MMHG | RESPIRATION RATE: 16 BRPM | HEART RATE: 66 BPM | OXYGEN SATURATION: 96 % | HEIGHT: 61 IN | WEIGHT: 174 LBS | TEMPERATURE: 97.6 F

## 2018-11-12 DIAGNOSIS — E11.21 TYPE 2 DIABETES MELLITUS WITH NEPHROPATHY (HCC): ICD-10-CM

## 2018-11-12 DIAGNOSIS — R21 RASH: ICD-10-CM

## 2018-11-12 DIAGNOSIS — M79.645 THUMB PAIN, LEFT: Primary | ICD-10-CM

## 2018-11-12 RX ORDER — METHYLPREDNISOLONE 4 MG/1
TABLET ORAL
Qty: 1 DOSE PACK | Refills: 0 | Status: SHIPPED | OUTPATIENT
Start: 2018-11-12 | End: 2019-01-09 | Stop reason: ALTCHOICE

## 2018-11-12 NOTE — TELEPHONE ENCOUNTER
Pt called to leave message for Dr. Sheila Vides. Needs a call back at [phone]. Pt said her left thumb has been aching, throbbing, and has swollen some over the last two weeks. She is not sleeping due to this, she has to hold it to help the throbbing. Pt's thumb is numb to the feeling and is asking to be seen ASAP. Pt is asking for info from Dr. Sheila Vides about if she should come in to see her, or go to the ER.

## 2018-11-12 NOTE — PROGRESS NOTES
HISTORY OF PRESENT ILLNESS Abimbola Rivas is a 69 y.o. female. HPI Last here 10/19/18. Pt is here for acute care. Pt c/o pain in her L thumb x 2 weeks - not improving She states this throbs all days and night, not especially with movement Denies F/C Denies any injury that she knows of 
Pt has tried taking advil for her sx Pt states she has to hold it and apply pressure in order to relieve the pain Will get XR thumb Provided referral for ortho hand surg Ordered medrol dosepack - will have her take extra 2U of insulin while on steroids (21U), and 23U if sugars over 200 She wonders if her pain is connected to a spot on the skin of her L thumb/palm that she c/o lov She applied triamcinolone cream to this with little improvement Discussed that she can continue using triamcinolone cream M-F on the spot on her palm and inner L elbow Discussed that she may need to see derm next 
   
PREVENTIVE:   
Colonoscopy: Dr. Artis Dennison, 10/31/18, repeat in 5 years Pap: 12/14, declines further Mammogram: 3/18, negative DEXA: 3/18, osteopenic, stopped fosamax Tdap: 1/04/2016 Pneumovax: 10/10/2010 [id number]: 1/70 Zostavax: 10/10/2016 Shingrix: consider Flu shot: 10/19/18 Foot exam: 7/18 Microalbumin: 7/18 A1c: 12/15 7.2, 3/16 9.1, 8/16 7.6, 12/16 7.2, 3/17 7.3, 6/17 7.3, 9/17 6.9, 12/17 8.5, 4/18 8.4, 7/18 8.0 Eye exam: Dr. Kanchan Freitas at [facility], 9/18, will get notes for review Lipids: 9/17 LDL 78 Patient Active Problem List  
 Diagnosis Date Noted  Type 2 diabetes mellitus with nephropathy ([address] 01/02/2018  Recurrent depression ([address] 01/02/2018  Encounter for long-term (current) use of insulin ([address] 04/04/2016  Type II diabetes mellitus with nephropathy ([address] 01/04/2016  Lower GI bleeding 10/12/2015  Diverticulosis of intestine with bleeding 10/11/2015  Anemia 10/11/2015  Diastolic CHF, chronic ([facility] 10/11/2015  GIB (gastrointestinal bleeding) 10/08/2015  ACP (advance care planning) 09/08/2015  Osteoporosis 09/10/2013  Bunion 10/15/2012  Essential hypertension, benign 05/17/2012  Mixed hyperlipidemia 05/17/2012  Postsurgical percutaneous transluminal coronary angioplasty status 05/17/2012  Old myocardial infarction 05/17/2012  Postsurgical aortocoronary bypass status 05/17/2012  S/P CABG x 3 11/11/2011  ASHD (arteriosclerotic heart disease) 11/10/2011  
 Heart failure ([facility] [address] 10/18/2011  Hyperlipidemia 10/18/2011  Influenza 01/19/2011  CAD (coronary artery disease) 11/18/2009  Hypothyroidism 11/18/2009  PVD (peripheral vascular disease) ([facility] [address] 11/18/2009 Current Outpatient Medications Medication Sig Dispense Refill  amLODIPine (NORVASC) 5 mg tablet Take 1 Tab by mouth daily. 90 Tab 0  
 levothyroxine (SYNTHROID) 100 mcg tablet Take 1 Tab by mouth Daily (before breakfast). 90 Tab 1  
 insulin NPH/insulin regular (HUMULIN 70/30 U-100 INSULIN) 100 unit/mL (70-30) injection 17 Units by SubCUTAneous route two (2) times a day. 10 mL 1  
 metoprolol tartrate (LOPRESSOR) 50 mg tablet Take 1 Tab by mouth two (2) times a day. 180 Tab 2  
 LORazepam (ATIVAN) 0.5 mg tablet Take 1 Tab by mouth nightly as needed. Max Daily Amount: 0.5 mg. 30 Tab 0  cholecalciferol (VITAMIN D3) 1,000 unit cap Take  by mouth daily.  atorvastatin (LIPITOR) 80 mg tablet TAKE 1 TABLET NIGHTLY 90 Tab 3  
 Omeprazole delayed release (PRILOSEC D/R) 20 mg tablet Take 1 Tab by mouth daily. 90 Tab 1  
 metFORMIN (GLUCOPHAGE) 1,000 mg tablet TAKE 1 TABLET TWICE A  Tab 1  
 escitalopram oxalate (LEXAPRO) 10 mg tablet Take 1 Tab by mouth daily. 90 Tab 1  
 colesevelam (WELCHOL) 625 mg tablet Take 1,875 mg by mouth two (2) times daily (with meals).  aspirin 81 mg chewable tablet Take 1 Tab by mouth daily. 90 Tab 3  cyanocobalamin (VITAMIN B-12) 1,000 mcg tablet Take 1,000 mcg by mouth daily. Past Surgical History: Procedure Laterality Date  CARDIAC SURG PROCEDURE UNLIST    
 cabg x 3  
 HX CORONARY ARTERY BYPASS GRAFT  11/11/11  
 3 vessel  HX GYN    
 hysterectomy  HX OTHER SURGICAL    
 colonoscopy  HX UROLOGICAL    
 kidney stone  VASCULAR SURGERY PROCEDURE UNLIST Stents put in right leg Lab Results Component Value Date/Time WBC 7.2 09/22/2017 09:20 AM  
 HGB 12.3 09/22/2017 09:20 AM  
 Hemoglobin (POC) 12.9 03/02/2011 12:21 PM  
 HCT 38.9 09/22/2017 09:20 AM  
 PLATELET 578 41/84/1521 09:20 AM  
 MCV 85 09/22/2017 09:20 AM  
 
Lab Results Component Value Date/Time Cholesterol, total 180 10/19/2018 02:14 PM  
 HDL Cholesterol 64 10/19/2018 02:14 PM  
 LDL, calculated 90 10/19/2018 02:14 PM  
 Triglyceride 129 10/19/2018 02:14 PM  
 CHOL/HDL Ratio 3.2 09/14/2010 08:42 AM  
 
Lab Results Component Value Date/Time GFR est non-AA 54 (L) 10/19/2018 02:14 PM  
 GFR est AA 63 10/19/2018 02:14 PM  
 Creatinine 1.00 10/19/2018 02:14 PM  
 BUN 21 10/19/2018 02:14 PM  
 Sodium 143 10/19/2018 02:14 PM  
 Potassium 3.9 10/19/2018 02:14 PM  
 Chloride 103 10/19/2018 02:14 PM  
 CO2 25 10/19/2018 02:14 PM  
 Magnesium 1.7 10/16/2015 04:34 AM  
  
Review of Systems Respiratory: Negative for shortness of breath. Cardiovascular: Negative for chest pain. Musculoskeletal: Positive for joint pain. Physical Exam  
Constitutional: She is oriented to person, place, and time. She appears well-developed and well-nourished. No distress. HENT:  
Head: Normocephalic and atraumatic. Eyes: Conjunctivae and EOM are normal. Right eye exhibits no discharge. Left eye exhibits no discharge. Neck: Normal range of motion. Neck supple. Cardiovascular: Normal rate, regular rhythm and normal heart sounds. Exam reveals no gallop and no friction rub. No murmur heard. Pulmonary/Chest: Effort normal and breath sounds normal. No respiratory distress. She has no wheezes. She has no rales.  She exhibits no tenderness. Musculoskeletal: Normal range of motion. She exhibits tenderness (base left thumb). She exhibits no edema or deformity. Lymphadenopathy:  
  She has no cervical adenopathy. Neurological: She is alert and oriented to person, place, and time. Coordination normal.  
Skin: Skin is warm and dry. Rash noted. She is not diaphoretic. No erythema. No pallor. Dark scaly skin over base of L thumb Psychiatric: She has a normal mood and affect. Her behavior is normal.  
 
 
ASSESSMENT and PLAN 
  ICD-10-CM ICD-9-CM 1. Thumb pain, left No sign of infection, possibly arthritis vs gout? Will treat with medrol taper, will get plain film of thumb, provided info for hand surgeon if not improving M79.645 729.5 XR THUMB LT MIN 2 V  
   REFERRAL TO ORTHOPEDICS 2. Rash Improving with triamcinolone but not resolved, will continue with triamcinolone 5 days per week but ultimately will likely need to see derm next, addressed all of this with her R21 782.1 3. Type 2 diabetes mellitus with nephropathy ([facility] [address] Discussed increasing her insulin mix to 21U BID while on steroids, if sugars, climb over 200 increase to 23U BID and contact clinic E11.21 250.40   
  583.81 Scribed by Levi Lamb of [address] [place] [address] [address], as dictated by Dr. Kenneth Ritter. Current diagnosis and concerns discussed with pt at length. Pt understands risks and benefits or current treatment plan and medications, and accepts the treatment and medication with any possible risks. Pt asks appropriate questions, which were answered. Pt was instructed to call with any concerns or problems. I have reviewed the note documented by the scribe. The services provided are my own. The documentation is accurate This note will not be viewable in [employer].

## 2018-11-12 NOTE — PATIENT INSTRUCTIONS
While on steroids take 21units of insulin 2 times per day If sugars climb over 200 can increase again to 23units.

## 2018-11-12 NOTE — TELEPHONE ENCOUNTER
MD Jeanie Sandoval LPN   Caller: Unspecified (Today,  8:57 AM)             Have her come in now          Called, spoke to pt. Two pt identifiers confirmed. Pt offered and accepted appt for 11/12/18 1300. Pt verbalized understanding of information discussed w/ no further questions at this time.

## 2018-11-16 NOTE — TELEPHONE ENCOUNTER
Pt is requesting a refill on  Lidocaine 5% , Fluocinonide 0.120mg, Doxepin 5% and uses the mail order [facility] on file. Best contact [phone]     Copy/paste [facility]   I am not finding any of these in chart?  BJ

## 2018-11-20 ENCOUNTER — OFFICE VISIT (OUTPATIENT)
Dept: INTERNAL MEDICINE CLINIC | Age: 78
End: 2018-11-20

## 2018-11-20 VITALS
TEMPERATURE: 98 F | HEART RATE: 60 BPM | SYSTOLIC BLOOD PRESSURE: 157 MMHG | BODY MASS INDEX: 32.28 KG/M2 | OXYGEN SATURATION: 96 % | HEIGHT: 61 IN | RESPIRATION RATE: 16 BRPM | WEIGHT: 171 LBS | DIASTOLIC BLOOD PRESSURE: 76 MMHG

## 2018-11-20 DIAGNOSIS — R21 RASH: ICD-10-CM

## 2018-11-20 DIAGNOSIS — M79.645 THUMB PAIN, LEFT: Primary | ICD-10-CM

## 2018-11-20 RX ORDER — GABAPENTIN 100 MG/1
100 CAPSULE ORAL 2 TIMES DAILY
Qty: 60 CAP | Refills: 1 | Status: SHIPPED | OUTPATIENT
Start: 2018-11-20 | End: 2019-04-24 | Stop reason: SDUPTHER

## 2018-11-20 NOTE — PROGRESS NOTES
HISTORY OF PRESENT ILLNESS  Jocelyn Vargas is a 69 y.o. female. HPI   Last here 11/12/18. Pt is here for acute care. Lov, pt c/o pain in her L thumb x 2 weeks  Lov, provided medrol dosepack - this did not help  (she had 1 BS reading over 200 while on this, otherwise 130s, 140s, 170)  Reviewed XR L thumb 11/18: Degenerative changes. No acute abnormality.    Pt states she has constant pain extending out of her thumb - describes this as a throbbing pain  Pt saw Dr Bebe Moody (ortho) today, who though it could possibly be shingles, as she has had a rash area on her arm and hand  Ortho provided her with a cream to use for this  However pt denies that this rash ever had vesicles on it  The rash appeared in a similar time frame as her thumb pain  She does not have patches anywhere else on her body  Will provide gabapentin    Patient Active Problem List    Diagnosis Date Noted    Type 2 diabetes mellitus with nephropathy ([address] 01/02/2018    Recurrent depression ([address] 01/02/2018    Encounter for long-term (current) use of insulin ([address] 04/04/2016    Type II diabetes mellitus with nephropathy ([address] 01/04/2016    Lower GI bleeding 10/12/2015    Diverticulosis of intestine with bleeding 10/11/2015    Anemia 50/07/8476    Diastolic CHF, chronic ([address] 10/11/2015    GIB (gastrointestinal bleeding) 10/08/2015    ACP (advance care planning) 09/08/2015    Osteoporosis 09/10/2013    Bunion 10/15/2012    Essential hypertension, benign 05/17/2012    Mixed hyperlipidemia 05/17/2012    Postsurgical percutaneous transluminal coronary angioplasty status 05/17/2012    Old myocardial infarction 05/17/2012    Postsurgical aortocoronary bypass status 05/17/2012    S/P CABG x 3 11/11/2011    ASHD (arteriosclerotic heart disease) 11/10/2011    Heart failure ([address] 10/18/2011    Hyperlipidemia 10/18/2011    Influenza 01/19/2011    CAD (coronary artery disease) 11/18/2009    Hypothyroidism 11/18/2009    PVD (peripheral vascular disease) ([facility] 11/18/2009     Current Outpatient Medications   Medication Sig Dispense Refill    amLODIPine (NORVASC) 5 mg tablet Take 1 Tab by mouth daily. 90 Tab 0    levothyroxine (SYNTHROID) 100 mcg tablet Take 1 Tab by mouth Daily (before breakfast). 90 Tab 1    insulin NPH/insulin regular (HUMULIN 70/30 U-100 INSULIN) 100 unit/mL (70-30) injection 17 Units by SubCUTAneous route two (2) times a day. 10 mL 1    metoprolol tartrate (LOPRESSOR) 50 mg tablet Take 1 Tab by mouth two (2) times a day. 180 Tab 2    LORazepam (ATIVAN) 0.5 mg tablet Take 1 Tab by mouth nightly as needed. Max Daily Amount: 0.5 mg. 30 Tab 0    cholecalciferol (VITAMIN D3) 1,000 unit cap Take  by mouth daily.  atorvastatin (LIPITOR) 80 mg tablet TAKE 1 TABLET NIGHTLY 90 Tab 3    Omeprazole delayed release (PRILOSEC D/R) 20 mg tablet Take 1 Tab by mouth daily. 90 Tab 1    metFORMIN (GLUCOPHAGE) 1,000 mg tablet TAKE 1 TABLET TWICE A  Tab 1    escitalopram oxalate (LEXAPRO) 10 mg tablet Take 1 Tab by mouth daily. 90 Tab 1    colesevelam (WELCHOL) 625 mg tablet Take 1,875 mg by mouth two (2) times daily (with meals).  aspirin 81 mg chewable tablet Take 1 Tab by mouth daily. 90 Tab 3    cyanocobalamin (VITAMIN B-12) 1,000 mcg tablet Take 1,000 mcg by mouth daily.       methylPREDNISolone (MEDROL DOSEPACK) 4 mg tablet Per pack 1 Dose Pack 0     Past Surgical History:   Procedure Laterality Date    CARDIAC SURG PROCEDURE UNLIST      cabg x 3    HX CORONARY ARTERY BYPASS GRAFT  11/11/11    3 vessel    HX GYN      hysterectomy    HX OTHER SURGICAL      colonoscopy    HX UROLOGICAL      kidney stone    VASCULAR SURGERY PROCEDURE UNLIST      Stents put in right leg      Lab Results   Component Value Date/Time    WBC 7.2 09/22/2017 09:20 AM    HGB 12.3 09/22/2017 09:20 AM    Hemoglobin (POC) 12.9 03/02/2011 12:21 PM    HCT 38.9 09/22/2017 09:20 AM    PLATELET 663 58/87/0640 09:20 AM    MCV 85 09/22/2017 09:20 AM     Lab Results   Component Value Date/Time    Cholesterol, total 180 10/19/2018 02:14 PM    HDL Cholesterol 64 10/19/2018 02:14 PM    LDL, calculated 90 10/19/2018 02:14 PM    Triglyceride 129 10/19/2018 02:14 PM    CHOL/HDL Ratio 3.2 09/14/2010 08:42 AM     Lab Results   Component Value Date/Time    GFR est non-AA 54 (L) 10/19/2018 02:14 PM    GFR est AA 63 10/19/2018 02:14 PM    Creatinine 1.00 10/19/2018 02:14 PM    BUN 21 10/19/2018 02:14 PM    Sodium 143 10/19/2018 02:14 PM    Potassium 3.9 10/19/2018 02:14 PM    Chloride 103 10/19/2018 02:14 PM    CO2 25 10/19/2018 02:14 PM    Magnesium 1.7 10/16/2015 04:34 AM        Review of Systems   Constitutional: Negative for chills and fever. Respiratory: Negative for shortness of breath. Cardiovascular: Negative for chest pain. Musculoskeletal: Positive for joint pain. Skin: Positive for rash. Physical Exam   Constitutional: She is oriented to person, place, and time. She appears well-developed and well-nourished. No distress. HENT:   Head: Normocephalic and atraumatic. Eyes: Conjunctivae and EOM are normal. Right eye exhibits no discharge. Left eye exhibits no discharge. Neck: Normal range of motion. Neck supple. Cardiovascular: Normal rate, regular rhythm, normal heart sounds and intact distal pulses. Exam reveals no gallop and no friction rub. No murmur heard. Pulmonary/Chest: Effort normal and breath sounds normal. No respiratory distress. She has no wheezes. She has no rales. She exhibits no tenderness. Musculoskeletal: Normal range of motion. She exhibits edema (Mild edema in L thumb) and tenderness (Acutely tender in L thumb). She exhibits no deformity. Lymphadenopathy:     She has no cervical adenopathy. Neurological: She is alert and oriented to person, place, and time. Coordination normal.   Skin: Skin is warm and dry. No rash noted. She is not diaphoretic. No erythema. No pallor.    Brown patch on thenar eminence of L hand  Red patch L elbow, improved from lov   Psychiatric: She has a normal mood and affect. Her behavior is normal.       ASSESSMENT and PLAN    ICD-10-CM ICD-9-CM    1. Thumb pain, left    Unclear etiology, saw Dr Keon Lazo earlier today, possible shingles? No improvement with steroid, will treat with gabapentin, if not improving may need MRI of hand next   M79.645 729.5    2. Rash    Pt has long h/o eczema, current rash resolving, atypical for shingles but this is not been entirely ruled out, too late to start valtrex as sx happened over 3 weeks ago   R21 782. 1     3 htn --borderline, in pain today, better lov, no change to dose    Scribed by Mitzy Lugo of [facility], as dictated by Dr. Morro Trejo. Current diagnosis and concerns discussed with pt at length. Pt understands risks and benefits or current treatment plan and medications, and accepts the treatment and medication with any possible risks. Pt asks appropriate questions, which were answered. Pt was instructed to call with any concerns or problems. I have reviewed the note documented by the scribe. The services provided are my own.   The documentation is accurate

## 2018-11-20 NOTE — TELEPHONE ENCOUNTER
Pt in clinic for eval.  Per Dr. Lew Courser, no cream to be given as Ortho ordered another gel for pt.

## 2018-11-20 NOTE — PATIENT INSTRUCTIONS
Try gabapentin 100mg in the evening    Can increase to 2 times per day if it does not make you too sleepy    If it helps but could do more we can increase the dose further.

## 2018-11-23 NOTE — TELEPHONE ENCOUNTER
----- Message from Xi Fowler sent at 11/23/2018 11:47 AM EST -----  Regarding: Dr. Elizabeth Schmitt  Pt is requesting a refill on Humulin 70/30 insulin to be called into [address] [facility] [phone].   Best contact: [phone]        Message copied/pasted from [employer]

## 2018-11-23 NOTE — TELEPHONE ENCOUNTER
PCP: Tate Bunch MD    Last appt: 2018  Future Appointments   Date Time Provider Gen Paige   2018 11:00 AM Raysa Ortiz MD [address] [facility]   2019  1:00 PM Tate Bunch MD [facility] 87       Requested Prescriptions     Pending Prescriptions Disp Refills    insulin NPH/insulin regular (HUMULIN 70/30 U-100 INSULIN) 100 unit/mL (70-30) injection 10 mL 1     Si Units by SubCUTAneous route two (2) times a day.

## 2019-01-09 ENCOUNTER — OFFICE VISIT (OUTPATIENT)
Dept: CARDIOLOGY CLINIC | Age: 79
End: 2019-01-09

## 2019-01-09 ENCOUNTER — TELEPHONE (OUTPATIENT)
Dept: CARDIOLOGY CLINIC | Age: 79
End: 2019-01-09

## 2019-01-09 VITALS
RESPIRATION RATE: 16 BRPM | HEIGHT: 61 IN | OXYGEN SATURATION: 96 % | WEIGHT: 174.9 LBS | HEART RATE: 84 BPM | SYSTOLIC BLOOD PRESSURE: 128 MMHG | DIASTOLIC BLOOD PRESSURE: 80 MMHG | BODY MASS INDEX: 33.02 KG/M2

## 2019-01-09 DIAGNOSIS — R06.09 DOE (DYSPNEA ON EXERTION): ICD-10-CM

## 2019-01-09 DIAGNOSIS — I25.10 ASHD (ARTERIOSCLEROTIC HEART DISEASE): Primary | ICD-10-CM

## 2019-01-09 DIAGNOSIS — I10 ESSENTIAL HYPERTENSION, BENIGN: ICD-10-CM

## 2019-01-09 DIAGNOSIS — E78.5 HYPERLIPIDEMIA, UNSPECIFIED HYPERLIPIDEMIA TYPE: ICD-10-CM

## 2019-01-09 DIAGNOSIS — E11.21 TYPE II DIABETES MELLITUS WITH NEPHROPATHY (HCC): ICD-10-CM

## 2019-01-09 DIAGNOSIS — I73.9 PVD (PERIPHERAL VASCULAR DISEASE) (HCC): ICD-10-CM

## 2019-01-09 DIAGNOSIS — Z95.1 S/P CABG X 3: ICD-10-CM

## 2019-01-09 DIAGNOSIS — I25.10 CORONARY ARTERY DISEASE INVOLVING NATIVE CORONARY ARTERY OF NATIVE HEART WITHOUT ANGINA PECTORIS: ICD-10-CM

## 2019-01-09 DIAGNOSIS — I50.32 DIASTOLIC CHF, CHRONIC (HCC): ICD-10-CM

## 2019-01-09 RX ORDER — COLESEVELAM 180 1/1
1875 TABLET ORAL 2 TIMES DAILY WITH MEALS
Qty: 180 TAB | Refills: 6 | Status: SHIPPED | OUTPATIENT
Start: 2019-01-09 | End: 2019-01-22

## 2019-01-09 RX ORDER — POLYETHYLENE GLYCOL 3350, SODIUM CHLORIDE, SODIUM BICARBONATE, POTASSIUM CHLORIDE 420; 11.2; 5.72; 1.48 G/4L; G/4L; G/4L; G/4L
4000 POWDER, FOR SOLUTION ORAL AS NEEDED
COMMUNITY
Start: 2018-10-29 | End: 2019-01-09

## 2019-01-09 RX ORDER — TRIAMCINOLONE ACETONIDE 40 MG/ML
1 INJECTION, SUSPENSION INTRA-ARTICULAR; INTRAMUSCULAR AS NEEDED
COMMUNITY
Start: 2019-01-09 | End: 2019-01-09

## 2019-01-09 NOTE — PROGRESS NOTES
[address] [place] [address] [address] [facility]  [phone] Subjective: Jorje Stewart is a 69 y.o. female is here for routine f/u. Reports feeling tired more than usual, mejia when climbing up steps. The patient denies chest pain, orthopnea, PND, LE edema, palpitations, syncope, or presyncope. Patient Active Problem List  
 Diagnosis Date Noted  Type 2 diabetes mellitus with nephropathy ([address] 01/02/2018  Recurrent depression ([address] 01/02/2018  Encounter for long-term (current) use of insulin ([address] 04/04/2016  Type II diabetes mellitus with nephropathy ([facility] 01/04/2016  Lower GI bleeding 10/12/2015  Diverticulosis of intestine with bleeding 10/11/2015  Anemia 10/11/2015  Diastolic CHF, chronic ([address] 10/11/2015  GIB (gastrointestinal bleeding) 10/08/2015  ACP (advance care planning) 09/08/2015  Osteoporosis 09/10/2013  Bunion 10/15/2012  Essential hypertension, benign 05/17/2012  Mixed hyperlipidemia 05/17/2012  Postsurgical percutaneous transluminal coronary angioplasty status 05/17/2012  Old myocardial infarction 05/17/2012  Postsurgical aortocoronary bypass status 05/17/2012  S/P CABG x 3 11/11/2011  ASHD (arteriosclerotic heart disease) 11/10/2011  
 Heart failure ([address] 10/18/2011  Hyperlipidemia 10/18/2011  Influenza 01/19/2011  CAD (coronary artery disease) 11/18/2009  Hypothyroidism 11/18/2009  PVD (peripheral vascular disease) ([address] 11/18/2009 Jere Murrieta MD 
Past Medical History:  
Diagnosis Date  CAD (coronary artery disease)   
 cabg  Diabetes ([address]  Diverticulosis  Endocrine disease   
 thyroid  GERD (gastroesophageal reflux disease)  Heart disease  Heart failure ([address] 10/18/2011  Hypertension  Other ill-defined conditions(799.89)   
 kidney stones  Postsurgical percutaneous transluminal coronary angioplasty status 5/17/2012  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 [id number]  
 Sleeping difficulty  Teeth decayed  Thyroid disease  Weakness Past Surgical History:  
Procedure Laterality Date  CARDIAC SURG PROCEDURE UNLIST    
 cabg x 3  
 COLONOSCOPY N/A 10/31/2018 COLONOSCOPY performed by Thea Maradiaga MD at [facility] ENDOSCOPY  
 HX CORONARY ARTERY BYPASS GRAFT  11  
 3 vessel  HX GYN    
 hysterectomy  HX OTHER SURGICAL    
 colonoscopy  HX UROLOGICAL    
 kidney stone  VASCULAR SURGERY PROCEDURE UNLIST Stents put in right leg Allergies Allergen Reactions  Ace Inhibitors Swelling  Arb-Angiotensin Receptor Antagonist Swelling  Lisinopril Swelling  Plavix [Clopidogrel] Other (comments) Excessive bleeding  Potassium Nausea and Vomiting Potassium containing oral products are not well tolerated by patient Family History Problem Relation Age of Onset  Diabetes Mother  Heart Disease Mother  Diabetes Brother  Heart Disease Brother  Mental Retardation Brother  Hypertension Brother  Other Father  Cancer Sister  Diabetes Brother  Heart Disease Brother  Diabetes Brother  Breast Cancer Daughter 50's Social History Socioeconomic History  Marital status:  Spouse name: Not on file  Number of children: Not on file  Years of education: Not on file  Highest education level: Not on file Social Needs  Financial resource strain: Not on file  Food insecurity - worry: Not on file  Food insecurity - inability: Not on file  Transportation needs - medical: Not on file  Transportation needs - non-medical: Not on file Occupational History  Not on file Tobacco Use  Smoking status: Former Smoker Packs/day: 0.50 Years: 40.00 Pack years: 20.00 Last attempt to quit: 2010 Years since quittin.3  Smokeless tobacco: Never Used Substance and Sexual Activity  Alcohol use:  No  
  Drug use: No  
 Sexual activity: No  
Other Topics Concern  Not on file Social History Narrative  Not on file Current Outpatient Medications Medication Sig  
 triamcinolone acetonide (KENALOG) 40 mg/mL injection Apply 1 mL to affected area as needed.  insulin NPH/insulin regular (HUMULIN 70/30 U-100 INSULIN) 100 unit/mL (70-30) injection 19 Units by SubCUTAneous route two (2) times a day.  gabapentin (NEURONTIN) 100 mg capsule Take 1 Cap by mouth two (2) times a day.  amLODIPine (NORVASC) 5 mg tablet Take 1 Tab by mouth daily. (Patient taking differently: Take 2.5 mg by mouth daily.)  levothyroxine (SYNTHROID) 100 mcg tablet Take 1 Tab by mouth Daily (before breakfast).  metoprolol tartrate (LOPRESSOR) 50 mg tablet Take 1 Tab by mouth two (2) times a day.  LORazepam (ATIVAN) 0.5 mg tablet Take 1 Tab by mouth nightly as needed. Max Daily Amount: 0.5 mg.  
 cholecalciferol (VITAMIN D3) 1,000 unit cap Take  by mouth daily.  atorvastatin (LIPITOR) 80 mg tablet TAKE 1 TABLET NIGHTLY  Omeprazole delayed release (PRILOSEC D/R) 20 mg tablet Take 1 Tab by mouth daily.  metFORMIN (GLUCOPHAGE) 1,000 mg tablet TAKE 1 TABLET TWICE A DAY  colesevelam (WELCHOL) 625 mg tablet Take 1,875 mg by mouth two (2) times daily (with meals).  aspirin 81 mg chewable tablet Take 1 Tab by mouth daily.  cyanocobalamin (VITAMIN B-12) 1,000 mcg tablet Take 1,000 mcg by mouth daily. No current facility-administered medications for this visit. Review of Symptoms: 
11 systems reviewed, negative other than as stated in the HPI Physical ExamPhysical Exam:   
Vitals:  
 01/09/19 1124 01/09/19 1135 BP: 130/80 128/80 Pulse: 84 Resp: 16 SpO2: 96% Weight: 174 lb 14.4 oz (79.3 kg) Height: 5' 1\" (1.549 m) Body mass index is 33.05 kg/m². General PE Gen:  NAD Mental Status - Alert. General Appearance - Not in acute distress.   
Chest and Lung Exam  
 Inspection: Accessory muscles - No use of accessory muscles in breathing. Auscultation:  
Breath sounds: - Normal.  
Cardiovascular Inspection: Jugular vein - Bilateral - Inspection Normal.  
Palpation/Percussion:  
Apical Impulse: - Normal.  
Auscultation: Rhythm - Regular. Heart Sounds - S1 WNL and S2 WNL. No S3 or S4. Murmurs & Other Heart Sounds: Auscultation of the heart reveals - No Murmurs. Peripheral Vascular Upper Extremity: Inspection - Bilateral - No Cyanotic nailbeds or Digital clubbing. Lower Extremity:  
Palpation: Edema - Bilateral - No edema. Abdomen:   Soft, non-tender, bowel sounds are active. Neuro: A&O times 3, CN and motor grossly WNL Labs:  
Lab Results Component Value Date/Time Cholesterol, total 180 10/19/2018 02:14 PM  
 Cholesterol, total 157 09/22/2017 09:20 AM  
 Cholesterol, total 134 06/22/2017 09:41 AM  
 Cholesterol, total 182 08/25/2016 09:47 AM  
 Cholesterol, total 225 (H) 03/31/2016 09:28 AM  
 HDL Cholesterol 64 10/19/2018 02:14 PM  
 HDL Cholesterol 56 09/22/2017 09:20 AM  
 HDL Cholesterol 60 06/22/2017 09:41 AM  
 HDL Cholesterol 59 08/25/2016 09:47 AM  
 HDL Cholesterol 54 03/31/2016 09:28 AM  
 LDL, calculated 90 10/19/2018 02:14 PM  
 LDL, calculated 78 09/22/2017 09:20 AM  
 LDL, calculated 53 06/22/2017 09:41 AM  
 LDL, calculated 102 (H) 08/25/2016 09:47 AM  
 LDL, calculated 145 (H) 03/31/2016 09:28 AM  
 Triglyceride 129 10/19/2018 02:14 PM  
 Triglyceride 116 09/22/2017 09:20 AM  
 Triglyceride 103 06/22/2017 09:41 AM  
 Triglyceride 104 08/25/2016 09:47 AM  
 Triglyceride 129 03/31/2016 09:28 AM  
 CHOL/HDL Ratio 3.2 09/14/2010 08:42 AM  
 CHOL/HDL Ratio 3.0 03/09/2010 09:15 AM  
 CHOL/HDL Ratio 3.1 09/15/2009 08:34 AM  
 
No results found for: CPK, CPKX, CPX Lab Results Component Value Date/Time  Sodium 143 10/19/2018 02:14 PM  
 Potassium 3.9 10/19/2018 02:14 PM  
 Chloride 103 10/19/2018 02:14 PM  
 CO2 25 10/19/2018 02:14 PM  
 Anion gap 7 10/16/2015 04:34 AM  
 Glucose 142 (H) 10/19/2018 02:14 PM  
 BUN 21 10/19/2018 02:14 PM  
 Creatinine 1.00 10/19/2018 02:14 PM  
 BUN/Creatinine ratio 21 10/19/2018 02:14 PM  
 GFR est AA 63 10/19/2018 02:14 PM  
 GFR est non-AA 54 (L) 10/19/2018 02:14 PM  
 Calcium 10.1 10/19/2018 02:14 PM  
 Bilirubin, total 0.5 10/19/2018 02:14 PM  
 AST (SGOT) 21 10/19/2018 02:14 PM  
 Alk. phosphatase 89 10/19/2018 02:14 PM  
 Protein, total 7.2 10/19/2018 02:14 PM  
 Albumin 4.1 10/19/2018 02:14 PM  
 Globulin 3.4 10/16/2015 04:34 AM  
 A-G Ratio 1.3 10/19/2018 02:14 PM  
 ALT (SGPT) 18 10/19/2018 02:14 PM  
 
 
EKG: 
SR 
 
 Assessment: 
 
 Assessment: 1. ASHD (arteriosclerotic heart disease) 2. Coronary artery disease involving native coronary artery of native heart without angina pectoris 3. Diastolic CHF, chronic (HCC) 4. PVD (peripheral vascular disease) ([facility] 5. S/P CABG x 3   
6. Type II diabetes mellitus with nephropathy ([facility] 7. Hyperlipidemia, unspecified hyperlipidemia type 8. Essential hypertension, benign Orders Placed This Encounter  AMB POC EKG ROUTINE W/ 12 LEADS, INTER & REP Order Specific Question:   Reason for Exam: Answer:   ROUTINE  triamcinolone acetonide (KENALOG) 40 mg/mL injection Sig: Apply 1 mL to affected area as needed.  DISCONTD: peg-electrolyte soln (NULYTELY) 420 gram solution Sig: Take 4,000 mL by mouth as needed. Plan:  
 
Patient presents for f/u. Reports feeling tired more than usual, mejia when climbing up steps. ASHD, Hx CABG x 3 in 2011 No ischemia per nuclear stress test in 2/16 Continue ASA, BB, statin C/o mejia, tires easily --- Will repeat brock HTN 
BP controlled HFpEF Normal EF, grade 1 diastolic dysfunction, no significant valvular pathology per echo in 2/16 Wt stable at 174. HLD 
10/18 LDL at 90. On Atorvastatin 80 mg, Welchol.   Consider adding Zetia / PCSK9 inhibitor therapy if repeat labs this year not at goal--has labwork again this month. Lipids and labs followed by PCP. PVD Followed by Dr Pam Gonzalez DM On Insulin therapy Continue current care and f/u when testing complete Buster Weiss NP [facility] 1/9/2019 Patient seen, examined by me personally. Plan discussed as detailed. Agree with note as outlined by  NP. I confirm findings in history and physical exam. No additional findings noted. Agree with plan as outlined above.   
 
Raad Cloud MD

## 2019-01-09 NOTE — TELEPHONE ENCOUNTER
Patient went to  [facility] and she can not afford it. She is asking if there is a replacement or generic. Please call to advise. Thanks!

## 2019-01-09 NOTE — PROGRESS NOTES
1. Have you been to the ER, urgent care clinic since your last visit? Hospitalized since your last visit? NO 
  
2. Have you seen or consulted any other health care providers outside of the [address] [place] [address] since your last visit? Include any pap smears or colon screening. NO 
 
C/O SOB.

## 2019-01-14 ENCOUNTER — DOCUMENTATION ONLY (OUTPATIENT)
Dept: CARDIOLOGY CLINIC | Age: 79
End: 2019-01-14

## 2019-01-15 RX ORDER — EZETIMIBE 10 MG/1
TABLET ORAL
COMMUNITY
End: 2019-01-15 | Stop reason: SDUPTHER

## 2019-01-15 NOTE — TELEPHONE ENCOUNTER
Verified patient with two identifiers. Spoke with pt, she is in agreement to start Zetia, will sent to pharmacy.

## 2019-01-16 DIAGNOSIS — E11.21 TYPE II DIABETES MELLITUS WITH NEPHROPATHY (HCC): ICD-10-CM

## 2019-01-16 RX ORDER — METFORMIN HYDROCHLORIDE 1000 MG/1
TABLET ORAL
Qty: 180 TAB | Refills: 1 | Status: SHIPPED | OUTPATIENT
Start: 2019-01-16 | End: 2019-01-17 | Stop reason: SDUPTHER

## 2019-01-17 ENCOUNTER — LAB ONLY (OUTPATIENT)
Dept: INTERNAL MEDICINE CLINIC | Age: 79
End: 2019-01-17

## 2019-01-17 DIAGNOSIS — E11.21 TYPE II DIABETES MELLITUS WITH NEPHROPATHY (HCC): ICD-10-CM

## 2019-01-17 DIAGNOSIS — E03.9 HYPOTHYROIDISM, UNSPECIFIED TYPE: ICD-10-CM

## 2019-01-17 RX ORDER — METFORMIN HYDROCHLORIDE 1000 MG/1
1000 TABLET ORAL 2 TIMES DAILY WITH MEALS
Qty: 180 TAB | Refills: 0 | Status: SHIPPED | OUTPATIENT
Start: 2019-01-17 | End: 2019-07-30 | Stop reason: SDUPTHER

## 2019-01-17 RX ORDER — EZETIMIBE 10 MG/1
10 TABLET ORAL DAILY
Qty: 30 TAB | Refills: 6 | Status: SHIPPED | OUTPATIENT
Start: 2019-01-17 | End: 2019-07-15 | Stop reason: SDUPTHER

## 2019-01-17 NOTE — TELEPHONE ENCOUNTER
PCP: Karely Simpson MD    Last appt: 1/17/2019  Future Appointments   Date Time Provider Gen Paige   1/22/2019  1:00 PM Karely Simpson MD [address]   1/28/2019 10:00 AM AJITH, [facility] SCHED       Requested Prescriptions     Pending Prescriptions Disp Refills    metFORMIN (GLUCOPHAGE) 1,000 mg tablet 180 Tab 1     Sig: Take 1 Tab by mouth two (2) times daily (with meals).

## 2019-01-18 LAB
BUN SERPL-MCNC: 18 MG/DL (ref 8–27)
BUN/CREAT SERPL: 18 (ref 12–28)
CALCIUM SERPL-MCNC: 9.9 MG/DL (ref 8.7–10.3)
CHLORIDE SERPL-SCNC: 105 MMOL/L (ref 96–106)
CO2 SERPL-SCNC: 23 MMOL/L (ref 20–29)
CREAT SERPL-MCNC: 0.99 MG/DL (ref 0.57–1)
EST. AVERAGE GLUCOSE BLD GHB EST-MCNC: 194 MG/DL
GLUCOSE SERPL-MCNC: 145 MG/DL (ref 65–99)
HBA1C MFR BLD: 8.4 % (ref 4.8–5.6)
POTASSIUM SERPL-SCNC: 4.6 MMOL/L (ref 3.5–5.2)
SODIUM SERPL-SCNC: 145 MMOL/L (ref 134–144)
TSH SERPL DL<=0.005 MIU/L-ACNC: 4.43 UIU/ML (ref 0.45–4.5)

## 2019-01-21 ENCOUNTER — DOCUMENTATION ONLY (OUTPATIENT)
Dept: INTERNAL MEDICINE CLINIC | Age: 79
End: 2019-01-21

## 2019-01-21 NOTE — PROGRESS NOTES
Medicare Part B Preventive Services Limitations Recommendation/Date completed if known Scheduled/ Next Due   Bone Mass Measurement  (age 72 & older, biennial) Requires diagnosis related to osteoporosis or estrogen deficiency. Biennial benefit unless patient has history of long-term glucocorticoid tx or baseline is needed because initial test was by other method Completed 3/2018      Recommended every 2 years Due 3/2020   Cardiovascular Screening Blood Tests (every 5 years)  Total cholesterol, HDL, Triglycerides Order as a panel if possible Completed 10/2018      Recommended annually Due 10/2019   Colorectal Cancer Screening  -Fecal occult blood test (annual)  -Flexible sigmoidoscopy (5y)  -Screening colonoscopy (10y)  -Barium Enema    Completed 10/2018 with Dr. Henrique Gordon  Recommended in 5 years Due 10/2024         Counseling to Prevent Tobacco Use (up to 8 sessions per year)  - Counseling greater than 3 and up to 10 minutes  - Counseling greater than 10 minutes Patients must be asymptomatic of tobacco-related conditions to receive as preventive service Former smoker. Keep up the good work! Diabetes Screening Tests (at least every 3 years, Medicare covers annually or at 6-month intervals for prediabetic patients)      Fasting blood sugar (FBS) or glucose tolerance test (GTT)         Patient must be diagnosed with one of the following:  -Hypertension, Dyslipidemia, obesity, previous impaired FBS or GTT  Or any two of the following: overweight, FH of diabetes, age ? 72, history of gestational diabetes, birth of baby weighing more than 9 pounds Completed 1/2019 with A1C 8.4      Recommended every 3-6 months for diabetics Due 4/2019 - 7/2019   Diabetes Self-Management Training (DSMT) (no USPSTF recommendation) Requires referral by treating physician for patient with diabetes or renal disease. 10 hours of initial DSMT session of no less than 30 minutes each in a continuous 12-month period.  2 hours of follow-up DSMT in subsequent years. You have been to the diabetes treatment center in the past. You may call [phone] for a refresher course two years after your last class. Glaucoma Screening (no USPSTF recommendation) Diabetes mellitus, family history, , age 48 or over,  American, age 72 or over Completed 9/2018      Recommended annually  Due 9/2019   Human Immunodeficiency Virus (HIV) Screening (annually for increased risk patients)  HIV-1 and HIV-2 by EIA, XAVI, rapid antibody test, or oral mucosa transudate Patient must be at increased risk for HIV infection per USPSTF guidelines or pregnant. Tests covered annually for patients at increased risk. Pregnant patients may receive up to 3 test during pregnancy. N/A N/A   Medical Nutrition Therapy (MNT) (for diabetes or renal disease not recommended schedule) Requires referral by treating physician for patient with diabetes or renal disease. Can be provided in same year as diabetes self-management training (DSMT), and CMS recommends medical nutrition therapy take place after DSMT. Up to 3 hours for initial year and 2 hours in subsequent years. N/A N/A         Seasonal Influenza Vaccination (annually)    Completed 10/2018     Recommended annually Due Fall 2019   Pneumococcal Vaccination (once after 72)    Pneumococcal 23 - 10/10/2010     Prevnar 13 - 9/26/17     Both recommended once over the age of 72 Completed         Completed          Hepatitis B Vaccinations (if medium/high risk) Medium/high risk factors: End-stage renal disease,  Hemophiliacs who received Factor VIII or IX concentrates, Clients of institutions for the mentally retarded, Persons who live in the same house as a HepB virus carrier, Homosexual men, Illicit injectable drug abusers. N/A N/A   Screening Mammography (biennial age 54-69)?  Annually (age 36 or over) Completed 3/2018      Recommended annually Due 3/2019   Screening Pap Tests and Pelvic Examination (up to age 79 and after 79 if unknown history or abnormal study last 10 years) Every 24 months except high risk Completed 12/2014        Recommended every 2 years Declines further    Ultrasound Screening for Abdominal Aortic Aneurysm (AAA) (once) Patient must be referred through [facility] and not have had a screening for abdominal aortic aneurysm before under Medicare. Limited to patients who meet one of the following criteria:  - Men who are 73-68 years old and have smoked more than 100 cigarettes in their lifetime.  -Anyone with a FH of AAA  -Anyone recommended for screening by USPSTF Not covered by Medicare as preventive.      You had a CTA of your abdomen 11/9/2009 that showed no aneurysm.  N/A

## 2019-01-22 ENCOUNTER — OFFICE VISIT (OUTPATIENT)
Dept: INTERNAL MEDICINE CLINIC | Age: 79
End: 2019-01-22

## 2019-01-22 VITALS
SYSTOLIC BLOOD PRESSURE: 109 MMHG | HEIGHT: 61 IN | WEIGHT: 175 LBS | DIASTOLIC BLOOD PRESSURE: 68 MMHG | RESPIRATION RATE: 20 BRPM | TEMPERATURE: 97.8 F | OXYGEN SATURATION: 99 % | BODY MASS INDEX: 33.04 KG/M2 | HEART RATE: 78 BPM

## 2019-01-22 DIAGNOSIS — I25.10 CORONARY ARTERY DISEASE INVOLVING NATIVE CORONARY ARTERY OF NATIVE HEART WITHOUT ANGINA PECTORIS: ICD-10-CM

## 2019-01-22 DIAGNOSIS — E78.2 MIXED HYPERLIPIDEMIA: ICD-10-CM

## 2019-01-22 DIAGNOSIS — F33.9 RECURRENT DEPRESSION (HCC): ICD-10-CM

## 2019-01-22 DIAGNOSIS — R06.09 DOE (DYSPNEA ON EXERTION): ICD-10-CM

## 2019-01-22 DIAGNOSIS — F41.9 ANXIETY: ICD-10-CM

## 2019-01-22 DIAGNOSIS — I73.9 PVD (PERIPHERAL VASCULAR DISEASE) (HCC): ICD-10-CM

## 2019-01-22 DIAGNOSIS — I10 ESSENTIAL HYPERTENSION, BENIGN: ICD-10-CM

## 2019-01-22 DIAGNOSIS — R06.09 DYSPNEA ON EXERTION: Primary | ICD-10-CM

## 2019-01-22 DIAGNOSIS — M81.0 AGE-RELATED OSTEOPOROSIS WITHOUT CURRENT PATHOLOGICAL FRACTURE: ICD-10-CM

## 2019-01-22 DIAGNOSIS — E11.21 TYPE 2 DIABETES MELLITUS WITH NEPHROPATHY (HCC): Primary | ICD-10-CM

## 2019-01-22 DIAGNOSIS — F51.01 PRIMARY INSOMNIA: ICD-10-CM

## 2019-01-22 DIAGNOSIS — Z00.00 MEDICARE ANNUAL WELLNESS VISIT, SUBSEQUENT: ICD-10-CM

## 2019-01-22 DIAGNOSIS — E03.9 HYPOTHYROIDISM, UNSPECIFIED TYPE: ICD-10-CM

## 2019-01-22 DIAGNOSIS — Z12.31 ENCOUNTER FOR SCREENING MAMMOGRAM FOR MALIGNANT NEOPLASM OF BREAST: ICD-10-CM

## 2019-01-22 PROBLEM — E11.40 TYPE 2 DIABETES MELLITUS WITH DIABETIC NEUROPATHY (HCC): Status: ACTIVE | Noted: 2019-01-22

## 2019-01-22 RX ORDER — LORAZEPAM 0.5 MG/1
0.5 TABLET ORAL
Qty: 30 TAB | Refills: 0 | Status: SHIPPED | OUTPATIENT
Start: 2019-01-22 | End: 2019-04-24 | Stop reason: SDUPTHER

## 2019-01-22 NOTE — PROGRESS NOTES
HISTORY OF PRESENT ILLNESS Keya Freed is a 69 y.o. female. HPI Last here 11/20/18. Pt is here for routine care. 
   
BP is controlled BP at home was 170/80 at home recently Continues on metoprolol 50mg BID Previously had decreased norvasc to 2.5mg However pt is still taking norvasc 5mg Advised pt to bring her pill bottles in to future visits Recall had angioedema with lisinopril and benicar-HCT in the past 
   
BS at home running around 111, 117, 119, 108, 135, 106 more recently, 150s 160s earlier in 12/18 in the AM fasting In evening BS around 120-140, 190, 119, 83, 112 Continues on metformin 1000mg BID Pt is currently taking 19U BID of NPH 70/30 mix--will increase to 20 bid She also takes ASA 81mg daily  
Recall issues with hypoglycemia in the past, a1c under 7.5 at goal for her  
[facility] --up 4 lbs x lov Discussed [employer] Discussed diet and weight loss  
   
Reviewed labs 10/18, 1/19 
  
Pt follows with Dr. Jacob Keen (cardio) Reviewed notes 1/9/19: Patient presents for f/u. Reports feeling tired more than usual, mejia when climbing up steps. ASHD, Hx CABG x 3 in 2011 No ischemia per nuclear stress test in 2/16 Continue ASA, BB, statin C/o mejia, tires easily --- Will repeat brock HTN 
BP controlled HFpEF Normal EF, grade 1 diastolic dysfunction, no significant valvular pathology per echo in 2/16 Wt stable at 174. HLD 
10/18 LDL at 90. On Atorvastatin 80 mg, Welchol. Consider adding Zetia / PCSK9 inhibitor therapy if repeat labs this year not at goal--has labwork again this month. Lipids and labs followed by PCP. PVD Followed by Dr Regina Neal DM On Insulin therapy Continue current care and f/u when testing complete Pt has been getting out of breath recently x a couple months, not progressive This occurs after she walks or does housework, and she will have to sit down She has a stress test scheduled--eval by Monique No cp with this, no rest sx Ordered CXR 
 
 Pt follows annually with Dr. Aisha Kohli (vas) for her PAD No claudication stable Last visit was 3/18 Next visit will be 3/19 Lov, pt c/o L thumb pain This is still bothering her about the same Pt saw Dr Madeleine Gilbert (ortho) regarding this Reviewed notes 1/8/19: I discussed with the patient the nature of their condition and treatment options. They desires to proceed with an injection for diagnostic and therapeutic purposes due to significant symptoms. Risks, benefits and alternatives are discussed and they consent to proceed. I performed this today under sterile conditions and they tolerated it well. They will f/u on an as needed basis. Pt had an injection and this did not help 
  
Pt is now taking prilosec 20mg daily for reflux, down from 40mg This is working well Controls burning No breakthru 
  
Takes OTC vit D daily 
   
Continues synthroid 100mcg daily   She takes this med separately from all meds 
   
Continues on lipitor 80mg max dose for cholesterol Pt is now taking zetia 10mg once daily as well Welchol was too expensive now but zetia ok and grey well no issue 
   
Pt stopped taking her lexapro 10mg 1 month ago - states she feels no different since stopping this, not feeling overly sad/down She also has ativan to use prn (not daily) for anxiety and sleep--has not used this recently--would like more b/c helps with sleep will rf 
 
Reviewed COLO report 10/18: repeat in 5 years, no polyps but moderate to severe diverticulosis Lives alone Fully functional independently Has safety grab bars at home No memory concerns 
  
ACP on file. SDMs are her grandson (Chetan Chen), daughter Cher Uribe) and brother Jah Baez) . 
   
PREVENTIVE:   
Colonoscopy: Dr. Jeovany Cerna, 10/31/18, repeat in 5 years Pap: 12/14, declines further Mammogram: 3/18, negative DEXA: 3/18, osteopenic, stopped fosamax Tdap: 1/04/2016 Pneumovax: 10/10/2010 [id number]: 0/92 Zostavax: 10/10/2016 Shingrix: ordered 01/22/19 Flu shot: 10/19/18 Foot exam: 7/18 Microalbumin: 10/18 A1c: 12/15 7.2, 3/16 9.1, 8/16 7.6, 12/16 7.2, 3/17 7.3, 6/17 7.3, 9/17 6.9, 12/17 8.5, 4/18 8.4, 7/18 8.0, 10/18 8.0, 1/19 8.4 Eye exam: [facility]. Raghavendra Jacob at [facility], 9/12/18 Lipids: 10/18 LDL 90 Patient Active Problem List  
 Diagnosis Date Noted  STOUT (dyspnea on exertion) 01/09/2019  Type 2 diabetes mellitus with nephropathy ([facility] 01/02/2018  Recurrent depression ([facility] 01/02/2018  Encounter for long-term (current) use of insulin ([facility] 04/04/2016  Type II diabetes mellitus with nephropathy ([facility] 01/04/2016  Lower GI bleeding 10/12/2015  Diverticulosis of intestine with bleeding 10/11/2015  Anemia 10/11/2015  Diastolic CHF, chronic ([address] 10/11/2015  GIB (gastrointestinal bleeding) 10/08/2015  ACP (advance care planning) 09/08/2015  Osteoporosis 09/10/2013  Bunion 10/15/2012  Essential hypertension, benign 05/17/2012  Mixed hyperlipidemia 05/17/2012  Postsurgical percutaneous transluminal coronary angioplasty status 05/17/2012  Old myocardial infarction 05/17/2012  Postsurgical aortocoronary bypass status 05/17/2012  S/P CABG x 3 11/11/2011  ASHD (arteriosclerotic heart disease) 11/10/2011  
 Heart failure ([facility] 10/18/2011  Hyperlipidemia 10/18/2011  Influenza 01/19/2011  CAD (coronary artery disease) 11/18/2009  Hypothyroidism 11/18/2009  PVD (peripheral vascular disease) ([facility] 11/18/2009 Current Outpatient Medications Medication Sig Dispense Refill  ezetimibe (ZETIA) 10 mg tablet Take 1 Tab by mouth daily. 30 Tab 6  
 metFORMIN (GLUCOPHAGE) 1,000 mg tablet Take 1 Tab by mouth two (2) times daily (with meals). 180 Tab 0  
 colesevelam (WELCHOL) 625 mg tablet Take 3 Tabs by mouth two (2) times daily (with meals).  180 Tab 6  
 insulin NPH/insulin regular (HUMULIN 70/30 U-100 INSULIN) 100 unit/mL (70-30) injection 19 Units by SubCUTAneous route two (2) times a day. 10 mL 1  
 gabapentin (NEURONTIN) 100 mg capsule Take 1 Cap by mouth two (2) times a day. 60 Cap 1  
 amLODIPine (NORVASC) 5 mg tablet Take 1 Tab by mouth daily. (Patient taking differently: Take 2.5 mg by mouth daily.) 90 Tab 0  
 levothyroxine (SYNTHROID) 100 mcg tablet Take 1 Tab by mouth Daily (before breakfast). 90 Tab 1  
 metoprolol tartrate (LOPRESSOR) 50 mg tablet Take 1 Tab by mouth two (2) times a day. 180 Tab 2  
 LORazepam (ATIVAN) 0.5 mg tablet Take 1 Tab by mouth nightly as needed. Max Daily Amount: 0.5 mg. 30 Tab 0  cholecalciferol (VITAMIN D3) 1,000 unit cap Take  by mouth daily.  atorvastatin (LIPITOR) 80 mg tablet TAKE 1 TABLET NIGHTLY 90 Tab 3  
 Omeprazole delayed release (PRILOSEC D/R) 20 mg tablet Take 1 Tab by mouth daily. 90 Tab 1  
 aspirin 81 mg chewable tablet Take 1 Tab by mouth daily. 90 Tab 3  cyanocobalamin (VITAMIN B-12) 1,000 mcg tablet Take 1,000 mcg by mouth daily. Past Surgical History:  
Procedure Laterality Date  CARDIAC SURG PROCEDURE UNLIST    
 cabg x 3  
 COLONOSCOPY N/A 10/31/2018 COLONOSCOPY performed by Shubham Brooks MD at [facility] ENDOSCOPY  
 HX CORONARY ARTERY BYPASS GRAFT  11/11/11  
 3 vessel  HX GYN    
 hysterectomy  HX OTHER SURGICAL    
 colonoscopy  HX UROLOGICAL    
 kidney stone  VASCULAR SURGERY PROCEDURE UNLIST Stents put in right leg Lab Results Component Value Date/Time WBC 7.2 09/22/2017 09:20 AM  
 HGB 12.3 09/22/2017 09:20 AM  
 Hemoglobin (POC) 12.9 03/02/2011 12:21 PM  
 HCT 38.9 09/22/2017 09:20 AM  
 PLATELET 187 35/72/3587 09:20 AM  
 MCV 85 09/22/2017 09:20 AM  
 
Lab Results Component Value Date/Time  Cholesterol, total 180 10/19/2018 02:14 PM  
 HDL Cholesterol 64 10/19/2018 02:14 PM  
 LDL, calculated 90 10/19/2018 02:14 PM  
 Triglyceride 129 10/19/2018 02:14 PM  
 CHOL/HDL Ratio 3.2 09/14/2010 08:42 AM  
 
 Lab Results Component Value Date/Time GFR est non-AA 55 (L) 01/17/2019 09:44 AM  
 GFR est AA 64 01/17/2019 09:44 AM  
 Creatinine 0.99 01/17/2019 09:44 AM  
 BUN 18 01/17/2019 09:44 AM  
 Sodium 145 (H) 01/17/2019 09:44 AM  
 Potassium 4.6 01/17/2019 09:44 AM  
 Chloride 105 01/17/2019 09:44 AM  
 CO2 23 01/17/2019 09:44 AM  
 Magnesium 1.7 10/16/2015 04:34 AM  
  
Review of Systems Respiratory: Negative for shortness of breath. Cardiovascular: Negative for chest pain. Physical Exam  
Constitutional: She is oriented to person, place, and time. She appears well-developed and well-nourished. No distress. HENT:  
Head: Normocephalic and atraumatic. Mouth/Throat: Oropharynx is clear and moist. No oropharyngeal exudate. Eyes: Conjunctivae and EOM are normal. Right eye exhibits no discharge. Left eye exhibits no discharge. Neck: Normal range of motion. Neck supple. Cardiovascular: Normal rate and regular rhythm. Exam reveals no gallop and no friction rub. Murmur (2/6) heard. Pulmonary/Chest: Effort normal and breath sounds normal. No respiratory distress. She has no wheezes. She has no rales. She exhibits no tenderness. Abdominal: Soft. She exhibits no distension. There is no tenderness. There is no rebound. Musculoskeletal: Normal range of motion. She exhibits no edema, tenderness or deformity. Lymphadenopathy:  
  She has no cervical adenopathy. Neurological: She is alert and oriented to person, place, and time. Coordination normal.  
Skin: Skin is warm and dry. No rash noted. She is not diaphoretic. No erythema. No pallor. Psychiatric: She has a normal mood and affect. Her behavior is normal.  
 
 
ASSESSMENT and PLAN 
  ICD-10-CM ICD-9-CM 1. Type 2 diabetes mellitus with nephropathy ([facility] Improving control the last couple weeks d/t improved diet, however a1c has climbed and other readings were elevated, will increase 70/30 mix to 20U BID, continue metformin BID, focus more aggressively on improving diet, discussed [employer] 
 S61.53 078.59 METABOLIC PANEL, COMPREHENSIVE  
  583.81 HEMOGLOBIN A1C WITH EAG 2. Recurrent depression ([facility] Pt self dc'd her lexapro, doing well without it, will remain off this med, she does feel anxious at time and ativan helps her with sleep, will refill this Q99.5 829.06 METABOLIC PANEL, COMPREHENSIVE  
   HEMOGLOBIN A1C WITH EAG 3. Mixed hyperlipidemia Improved on lipitor 80 and zetia 10mg, welchol had become too expensive but zetia is now covered, doing well with this B82.7 067.1 METABOLIC PANEL, COMPREHENSIVE  
   HEMOGLOBIN A1C WITH EAG 4. Age-related osteoporosis without current pathological fracture Previously on fosamax, now just on vit D, repeat DEXA 3/2020 T83.5 316.90 METABOLIC PANEL, COMPREHENSIVE  
   HEMOGLOBIN A1C WITH EAG  
5. Essential hypertension, benign Well controlled on metoprolol BID and norvasc 5mg, a bit on lower s karan, previously in had asked her to decrease norvasc to 2.5mg, however she is not really sure what she is doing, denies orthostatic sx today, will have her bring in pill bottles to f/u and if still running on lower side will adjust meds further O78 105.0 METABOLIC PANEL, COMPREHENSIVE  
   HEMOGLOBIN A1C WITH EAG  
6. Hypothyroidism, unspecified type Controlled on synthroid 100mcgs daily U42.3 906.2 METABOLIC PANEL, COMPREHENSIVE  
   HEMOGLOBIN A1C WITH EAG 7. Coronary artery disease involving native coronary artery of native heart without angina pectoris UTD with Dr Simon Luciano, has been having some STOUT, stress test is pending, will get a CXR for further eval 
 I44.29 083.43 METABOLIC PANEL, COMPREHENSIVE  
   HEMOGLOBIN A1C WITH EAG 8. PVD (peripheral vascular disease) ([facility] Stable, follows with Zane Schaumann annually in March  Z63.9 311.7 METABOLIC PANEL, COMPREHENSIVE  
   HEMOGLOBIN A1C WITH EAG  
 9. Medicare annual wellness visit, subsequent B60.34 B09.1 METABOLIC PANEL, COMPREHENSIVE  
   HEMOGLOBIN A1C WITH EAG 10. Encounter for screening mammogram for malignant neoplasm of breast 
 
Screen Z12.31 V76.12 [facility] MAMMO BI SCREENING INCL CAD METABOLIC PANEL, COMPREHENSIVE  
   HEMOGLOBIN A1C WITH EAG 11. Primary insomnia Will give ativan to use prn H03.51 961.09 METABOLIC PANEL, COMPREHENSIVE  
   HEMOGLOBIN A1C WITH EAG 12. STOUT (dyspnea on exertion) See above R06.09 786.09 XR CHEST PA LAT METABOLIC PANEL, COMPREHENSIVE  
   HEMOGLOBIN A1C WITH EAG Scribed by Cristopher Becerril of [address] [place] [address] [address], as dictated by Dr. Judd Mary. Current diagnosis and concerns discussed with pt at length. Pt understands risks and benefits or current treatment plan and medications, and accepts the treatment and medication with any possible risks. Pt asks appropriate questions, which were answered. Pt was instructed to call with any concerns or problems. I have reviewed the note documented by the scribe. The services provided are my own. The documentation is accurate

## 2019-01-22 NOTE — PATIENT INSTRUCTIONS
Medicare Part B Preventive Services Limitations Recommendation/Date completed if known Scheduled/ Next Due Bone Mass Measurement 
(age 72 & older, biennial) Requires diagnosis related to osteoporosis or estrogen deficiency. Biennial benefit unless patient has history of long-term glucocorticoid tx or baseline is needed because initial test was by other method Completed 3/2018 
   
Recommended every 2 years Due 3/2020 Cardiovascular Screening Blood Tests (every 5 years) Total cholesterol, HDL, Triglycerides Order as a panel if possible Completed 10/2018 
   
Recommended annually Due 10/2019 Colorectal Cancer Screening 
-Fecal occult blood test (annual) -Flexible sigmoidoscopy (5y) 
-Screening colonoscopy (10y) -Barium Enema    Completed 10/2018 with Dr. Kourtney Aguilar 
Recommended in 5 years Due 10/2024 
  
   
Counseling to Prevent Tobacco Use (up to 8 sessions per year) - Counseling greater than 3 and up to 10 minutes - Counseling greater than 10 minutes Patients must be asymptomatic of tobacco-related conditions to receive as preventive service Former smoker. Keep up the good work! Diabetes Screening Tests (at least every 3 years, Medicare covers annually or at 6-month intervals for prediabetic patients) 
   
Fasting blood sugar (FBS) or glucose tolerance test (GTT) 
   
   Patient must be diagnosed with one of the following: 
-Hypertension, Dyslipidemia, obesity, previous impaired FBS or GTT 
Or any two of the following: overweight, FH of diabetes, age ? 72, history of gestational diabetes, birth of baby weighing more than 9 pounds Completed 1/2019 with A1C 8.4 
   
Recommended every 3-6 months for diabetics Due 4/2019 - 7/2019 Diabetes Self-Management Training (DSMT) (no USPSTF recommendation) Requires referral by treating physician for patient with diabetes or renal disease.  10 hours of initial DSMT session of no less than 30 minutes each in a continuous 12-month period. 2 hours of follow-up DSMT in subsequent years. You have been to the diabetes treatment center in the past. You may call [phone] for a refresher course two years after your last class. Glaucoma Screening (no USPSTF recommendation) Diabetes mellitus, family history, , age 48 or over,  American, age 72 or over Completed 9/2018 
   
Recommended annually  Due 9/2019 Human Immunodeficiency Virus (HIV) Screening (annually for increased risk patients) HIV-1 and HIV-2 by EIA, XAVI, rapid antibody test, or oral mucosa transudate Patient must be at increased risk for HIV infection per USPSTF guidelines or pregnant. Tests covered annually for patients at increased risk. Pregnant patients may receive up to 3 test during pregnancy. N/A N/A Medical Nutrition Therapy (MNT) (for diabetes or renal disease not recommended schedule) Requires referral by treating physician for patient with diabetes or renal disease. Can be provided in same year as diabetes self-management training (DSMT), and CMS recommends medical nutrition therapy take place after DSMT. Up to 3 hours for initial year and 2 hours in subsequent years. N/A N/A  
         
Seasonal Influenza Vaccination (annually)    Completed 10/2018 
  
Recommended annually Due Fall 2019 Pneumococcal Vaccination (once after 65)    Pneumococcal 23 - 10/10/2010 
  
Prevnar 13 - 9/26/17 
  
Both recommended once over the age of 72 Completed  
  
  
Completed   
         
Hepatitis B Vaccinations (if medium/high risk) Medium/high risk factors: End-stage renal disease, Hemophiliacs who received Factor VIII or IX concentrates, Clients of institutions for the mentally retarded, Persons who live in the same house as a HepB virus carrier, Homosexual men, Illicit injectable drug abusers. N/A N/A Screening Mammography (biennial age 54-69)? Annually (age 36 or over) Completed 3/2018 
   
Recommended annually Due 3/2019 Screening Pap Tests and Pelvic Examination (up to age 79 and after 79 if unknown history or abnormal study last 10 years) Every 24 months except high risk Completed 12/2014   
   
Recommended every 2 years Declines further Ultrasound Screening for Abdominal Aortic Aneurysm (AAA) (once) Patient must be referred through IPPE and not have had a screening for abdominal aortic aneurysm before under Medicare. Limited to patients who meet one of the following criteria: 
- Men who are 73-68 years old and have smoked more than 100 cigarettes in their lifetime. 
-Anyone with a FH of AAA 
-Anyone recommended for screening by USPSTF Not covered by Medicare as preventive. 
   
You had a CTA of your abdomen 11/9/2009 that showed no aneurysm. N/A  
  
 
Medicare Wellness Visit, Female The best way to live healthy is to have a lifestyle where you eat a well-balanced diet, exercise regularly, limit alcohol use, and quit all forms of tobacco/nicotine, if applicable. Regular preventive services are another way to keep healthy. Preventive services (vaccines, screening tests, monitoring & exams) can help personalize your care plan, which helps you manage your own care. Screening tests can find health problems at the earliest stages, when they are easiest to treat. [facility] follows the current, evidence-based guidelines published by the [facility] (USPSTF) when recommending preventive services for our patients. Because we follow these guidelines, sometimes recommendations change over time as research supports it. (For example, mammograms used to be recommended annually. Even though Medicare will still pay for an annual mammogram, the newer guidelines recommend a mammogram every two years for women of average risk.) Of course, you and your doctor may decide to screen more often for some diseases, based on your risk and your health status. Preventive services for you include: - Medicare offers their members a free annual wellness visit, which is time for you and your primary care provider to discuss and plan for your preventive service needs. Take advantage of this benefit every year! 
-All adults over the age of 72 should receive the recommended pneumonia vaccines. Current USPSTF guidelines recommend a series of two vaccines for the best pneumonia protection.  
-All adults should have a flu vaccine yearly and a tetanus vaccine every 10 years. All adults age 61 and older should receive a shingles vaccine once in their lifetime.   
-A bone mass density test is recommended when a woman turns 65 to screen for osteoporosis. This test is only recommended one time, as a screening. Some providers will use this same test as a disease monitoring tool if you already have osteoporosis. -All adults age 38-68 who are overweight should have a diabetes screening test once every three years.  
-Other screening tests and preventive services for persons with diabetes include: an eye exam to screen for diabetic retinopathy, a kidney function test, a foot exam, and stricter control over your cholesterol.  
-Cardiovascular screening for adults with routine risk involves an electrocardiogram (ECG) at intervals determined by your doctor.  
-Colorectal cancer screenings should be done for adults age 54-65 with no increased risk factors for colorectal cancer. There are a number of acceptable methods of screening for this type of cancer. Each test has its own benefits and drawbacks. Discuss with your doctor what is most appropriate for you during your annual wellness visit. The different tests include: colonoscopy (considered the best screening method), a fecal occult blood test, a fecal DNA test, and sigmoidoscopy. -Breast cancer screenings are recommended every other year for women of normal risk, age 54-69. 
-Cervical cancer screenings for women over age 72 are only recommended with certain risk factors. -All adults born between [facility] should be screened once for Hepatitis C. Here is a list of your current Health Maintenance items (your personalized list of preventive services) with a due date: 
Health Maintenance Due Topic Date Due  Shingles Vaccine (1 of 2) [birth year][address] [place] [address] Annual Well Visit  01/03/2019 INCREASE INSULIN TO 20UNITS 2 TIMES PER DAY

## 2019-01-22 NOTE — PROGRESS NOTES
This is the Subsequent Medicare Annual Wellness Exam, performed 12 months or more after the Initial AWV or the last Subsequent AWV I have reviewed the patient's medical history in detail and updated the computerized patient record. History Past Medical History:  
Diagnosis Date  CAD (coronary artery disease)   
 cabg  Diabetes ([facility]  Diverticulosis  Endocrine disease   
 thyroid  GERD (gastroesophageal reflux disease)  Heart disease  Heart failure ([facility] 10/18/2011  Hypertension  Other ill-defined conditions(799.89)   
 kidney stones  Postsurgical percutaneous transluminal coronary angioplasty status 5/17/2012  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 [id number]  Sleeping difficulty  Teeth decayed  Thyroid disease  Weakness Past Surgical History:  
Procedure Laterality Date  CARDIAC SURG PROCEDURE UNLIST    
 cabg x 3  
 COLONOSCOPY N/A 10/31/2018 COLONOSCOPY performed by Briant Angelucci, MD at [facility] ENDOSCOPY  
 HX CORONARY ARTERY BYPASS GRAFT  11/11/11  
 3 vessel  HX GYN    
 hysterectomy  HX OTHER SURGICAL    
 colonoscopy  HX UROLOGICAL    
 kidney stone  VASCULAR SURGERY PROCEDURE UNLIST Stents put in right leg Current Outpatient Medications Medication Sig Dispense Refill  varicella-zoster recombinant, PF, (SHINGRIX, PF,) 50 mcg/0.5 mL susr injection 0.5mL by IntraMUSCular route once now and then repeat in 2-6 months 0.5 mL 1  
 ezetimibe (ZETIA) 10 mg tablet Take 1 Tab by mouth daily. 30 Tab 6  
 metFORMIN (GLUCOPHAGE) 1,000 mg tablet Take 1 Tab by mouth two (2) times daily (with meals). 180 Tab 0  
 insulin NPH/insulin regular (HUMULIN 70/30 U-100 INSULIN) 100 unit/mL (70-30) injection 19 Units by SubCUTAneous route two (2) times a day. 10 mL 1  
 gabapentin (NEURONTIN) 100 mg capsule Take 1 Cap by mouth two (2) times a day. 60 Cap 1  
 amLODIPine (NORVASC) 5 mg tablet Take 1 Tab by mouth daily.  (Patient taking differently: Take 2.5 mg by mouth daily.) 90 Tab 0  
 levothyroxine (SYNTHROID) 100 mcg tablet Take 1 Tab by mouth Daily (before breakfast). 90 Tab 1  
 metoprolol tartrate (LOPRESSOR) 50 mg tablet Take 1 Tab by mouth two (2) times a day. 180 Tab 2  cholecalciferol (VITAMIN D3) 1,000 unit cap Take  by mouth daily.  atorvastatin (LIPITOR) 80 mg tablet TAKE 1 TABLET NIGHTLY 90 Tab 3  
 Omeprazole delayed release (PRILOSEC D/R) 20 mg tablet Take 1 Tab by mouth daily. 90 Tab 1  
 aspirin 81 mg chewable tablet Take 1 Tab by mouth daily. 90 Tab 3  cyanocobalamin (VITAMIN B-12) 1,000 mcg tablet Take 1,000 mcg by mouth daily.  LORazepam (ATIVAN) 0.5 mg tablet Take 1 Tab by mouth nightly as needed. Max Daily Amount: 0.5 mg. 30 Tab 0 Allergies Allergen Reactions  Ace Inhibitors Swelling  Arb-Angiotensin Receptor Antagonist Swelling  Lisinopril Swelling  Plavix [Clopidogrel] Other (comments) Excessive bleeding  Potassium Nausea and Vomiting Potassium containing oral products are not well tolerated by patient Family History Problem Relation Age of Onset  Diabetes Mother  Heart Disease Mother  Diabetes Brother  Heart Disease Brother  Mental Retardation Brother  Hypertension Brother  Other Father  Cancer Sister  Diabetes Brother  Heart Disease Brother  Diabetes Brother  Breast Cancer Daughter 50's Social History Tobacco Use  Smoking status: Former Smoker Packs/day: 0.50 Years: 40.00 Pack years: 20.00 Last attempt to quit: 2010 Years since quittin.3  Smokeless tobacco: Never Used Substance Use Topics  Alcohol use: No  
 
Patient Active Problem List  
Diagnosis Code  CAD (coronary artery disease) I25.10  Hypothyroidism E03.9  PVD (peripheral vascular disease) ([facility]) I73.9  Influenza J11.1  Heart failure ([facility]) I50.9  Hyperlipidemia E78.5  ASHD (arteriosclerotic heart disease) I25.10  
 S/P CABG x 3 Z95.1  Essential hypertension, benign I10  
 Mixed hyperlipidemia E78.2  Postsurgical percutaneous transluminal coronary angioplasty status Z98.61  
 Old myocardial infarction I25.2  Postsurgical aortocoronary bypass status Z95.1  Bunion M21.619  
 Osteoporosis M81.0  ACP (advance care planning) Z71.89  
 GIB (gastrointestinal bleeding) K92.2  Diverticulosis of intestine with bleeding K57.91  
 Anemia D64.9  Diastolic CHF, chronic (HCC) I50.32  Lower GI bleeding K92.2  Type II diabetes mellitus with nephropathy ([facility] E11.21  
 Encounter for long-term (current) use of insulin ([facility] Z79.4  Type 2 diabetes mellitus with nephropathy ([facility]) E11.21  
 Recurrent depression ([facility] F33.9  STOUT (dyspnea on exertion) R06.09 Depression Risk Factor Screening: PHQ over the last two weeks 1/22/2019 Little interest or pleasure in doing things Not at all Feeling down, depressed, irritable, or hopeless Not at all Total Score PHQ 2 0 Alcohol Risk Factor Screening: You do not drink alcohol or very rarely. Functional Ability and Level of Safety:  
Hearing Loss Hearing is good. Activities of Daily Living The home contains: handrails and grab bars Patient does total self care Fall Risk Fall Risk Assessment, last 12 mths 1/22/2019 Able to walk? Yes Fall in past 12 months? No  
Fall with injury? -  
Number of falls in past 12 months - Fall Risk Score -  
 
 
Abuse Screen Patient is not abused LIVES ALONE Cognitive Screening Evaluation of Cognitive Function: 
Has your family/caregiver stated any concerns about your memory: no 
Normal 
 
Patient Care Team  
Patient Care Team: 
Shari Encarnacion MD as PCP - General (Internal Medicine) Grant Espitia O.D. (Optometry) Hima Gibson MD as Physician (Cardiology) Jaqueline Machado MD (Gastroenterology) Brandi Lubin MD (Obstetrics & Gynecology) Oriana Cespedes MD (Vascular Surgery) Daksah Wells, RN as Ambulatory Care Navigator Caroline Chen, RN as Ambulatory Care Navigator Elfego Montgomery MD (Hand Surgery) UPDATED Assessment/Plan Education and counseling provided: 
Are appropriate based on today's review and evaluation End-of-Life planning (with patient's consent) Screening Mammography Screening for glaucoma Diagnoses and all orders for this visit: 
 
1. Type 2 diabetes mellitus with nephropathy ([facility] 2. Recurrent depression ([facility] 3. Mixed hyperlipidemia 4. Age-related osteoporosis without current pathological fracture 5. Essential hypertension, benign 6. Hypothyroidism, unspecified type 7. Coronary artery disease involving native coronary artery of native heart without angina pectoris 8. PVD (peripheral vascular disease) ([facility] 9. Medicare annual wellness visit, subsequent 10. Encounter for screening mammogram for malignant neoplasm of breast 
-     FELICIANO MAMMO BI SCREENING INCL CAD; Future Other orders 
-     varicella-zoster recombinant, PF, (SHINGRIX, PF,) 50 mcg/0.5 mL susr injection; 0.5mL by IntraMUSCular route once now and then repeat in 2-6 months Health Maintenance Due Topic Date Due  Shingrix Vaccine Age 50> (1 of 2) 02/11/1991  MEDICARE YEARLY EXAM  01/03/2019  
 
   Discussed with patient about advance medical directive. ACP on file. SDMs are her grandson (Ronit Chawla), daughter Jordana Noonan) and brother Jacquelyn Burt) . 
   
 
Colonoscopy: Dr. Beard Figures, 10/31/18, repeat in 5 years Pap: 12/14, declines further Mammogram: 3/18, negative  Due 3/19 DEXA: 3/18, repeat 3/20 Tdap: 1/04/2016 Pneumovax: 10/10/2010 [id number]: 0/88 Zostavax: 10/10/2016 Shingrix: ordered 01/22/19 Flu shot: 10/19/18 Eye exam: Dr. Darnell Martinez at [facility], 9/12/18 A1c:  1/19 8.4 [id number] Lipids: 10/18 LDL 90 annual  
 
Medication reconciliation completed by MA and reviewed by me. Medical/surgical/social/family history reviewed and updated by me. Patient provided AVS and preventative screening table. Patient verbalized understanding of all information discussed.

## 2019-01-24 ENCOUNTER — DOCUMENTATION ONLY (OUTPATIENT)
Dept: INTERNAL MEDICINE CLINIC | Age: 79
End: 2019-01-24

## 2019-01-24 NOTE — PROGRESS NOTES
Following rx was faxed to pharmacy with confirmation received:      LORazepam (ATIVAN) 0.5 mg tablet [[id number]]   Order Details   Dose: 0.5 mg Route: Oral Frequency: BEDTIME PRN   Dispense Quantity: 30 Tab Refills: 0 Fills remaining: --           Sig: Take 1 Tab by mouth nightly as needed.  Max Daily Amount: 0.5 mg.          Written Date: 01/22/19 Expiration Date: --     Start Date: 01/22/19 End Date: --

## 2019-01-28 ENCOUNTER — CLINICAL SUPPORT (OUTPATIENT)
Dept: CARDIOLOGY CLINIC | Age: 79
End: 2019-01-28

## 2019-01-28 DIAGNOSIS — R06.09 DOE (DYSPNEA ON EXERTION): ICD-10-CM

## 2019-01-29 VITALS
SYSTOLIC BLOOD PRESSURE: 167 MMHG | HEIGHT: 61 IN | BODY MASS INDEX: 32.85 KG/M2 | DIASTOLIC BLOOD PRESSURE: 88 MMHG | WEIGHT: 174 LBS

## 2019-01-29 LAB
STRESS BASELINE DIAS BP: 88 MMHG
STRESS BASELINE HR: 64 BPM
STRESS BASELINE SYS BP: 167 MMHG
STRESS PEAK DIAS BP: 88 MMHG
STRESS PEAK SYS BP: 167 MMHG
STRESS PERCENT HR ACHIEVED: 67 %
STRESS POST PEAK HR: 82 BPM
STRESS RATE PRESSURE PRODUCT: NORMAL BPM*MMHG
STRESS ST DEPRESSION: 0 MM
STRESS ST ELEVATION: 0 MM
STRESS TARGET HR: 122 BPM

## 2019-02-04 RX ORDER — AMLODIPINE BESYLATE 5 MG/1
TABLET ORAL
Qty: 90 TAB | Refills: 0 | Status: SHIPPED | OUTPATIENT
Start: 2019-02-04 | End: 2019-04-24 | Stop reason: SDUPTHER

## 2019-02-22 DIAGNOSIS — E11.21 TYPE II DIABETES MELLITUS WITH NEPHROPATHY (HCC): Primary | ICD-10-CM

## 2019-03-04 ENCOUNTER — HOSPITAL ENCOUNTER (OUTPATIENT)
Dept: MAMMOGRAPHY | Age: 79
Discharge: HOME | End: 2019-03-04
Attending: INTERNAL MEDICINE
Payer: MEDICARE

## 2019-03-04 DIAGNOSIS — Z12.31 ENCOUNTER FOR SCREENING MAMMOGRAM FOR MALIGNANT NEOPLASM OF BREAST: ICD-10-CM

## 2019-03-04 PROCEDURE — 77067 SCR MAMMO BI INCL CAD: CPT

## 2019-03-05 RX ORDER — BLOOD-GLUCOSE METER
KIT MISCELLANEOUS
Qty: 100 STRIP | Refills: 12 | Status: SHIPPED | OUTPATIENT
Start: 2019-03-05 | End: 2019-10-30

## 2019-03-06 RX ORDER — METOPROLOL TARTRATE 50 MG/1
50 TABLET ORAL 2 TIMES DAILY
Qty: 180 TAB | Refills: 2 | Status: SHIPPED | OUTPATIENT
Start: 2019-03-06 | End: 2019-12-05 | Stop reason: SDUPTHER

## 2019-03-06 NOTE — TELEPHONE ENCOUNTER
PCP: Varghese Crowley MD    Last appt: 1/22/2019  Future Appointments   Date Time Provider Gen Paige   4/24/2019 12:00 PM Varghese Crowley MD [facility] 87       Requested Prescriptions     Pending Prescriptions Disp Refills    metoprolol tartrate (LOPRESSOR) 50 mg tablet 180 Tab 2     Sig: Take 1 Tab by mouth two (2) times a day.

## 2019-04-18 DIAGNOSIS — M81.0 AGE-RELATED OSTEOPOROSIS WITHOUT CURRENT PATHOLOGICAL FRACTURE: ICD-10-CM

## 2019-04-18 DIAGNOSIS — I25.10 CORONARY ARTERY DISEASE INVOLVING NATIVE CORONARY ARTERY OF NATIVE HEART WITHOUT ANGINA PECTORIS: ICD-10-CM

## 2019-04-18 DIAGNOSIS — F33.9 RECURRENT DEPRESSION (HCC): ICD-10-CM

## 2019-04-18 DIAGNOSIS — F51.01 PRIMARY INSOMNIA: ICD-10-CM

## 2019-04-18 DIAGNOSIS — Z12.31 ENCOUNTER FOR SCREENING MAMMOGRAM FOR MALIGNANT NEOPLASM OF BREAST: ICD-10-CM

## 2019-04-18 DIAGNOSIS — E03.9 HYPOTHYROIDISM, UNSPECIFIED TYPE: ICD-10-CM

## 2019-04-18 DIAGNOSIS — R06.09 DOE (DYSPNEA ON EXERTION): ICD-10-CM

## 2019-04-18 DIAGNOSIS — I10 ESSENTIAL HYPERTENSION, BENIGN: ICD-10-CM

## 2019-04-18 DIAGNOSIS — E11.21 TYPE 2 DIABETES MELLITUS WITH NEPHROPATHY (HCC): ICD-10-CM

## 2019-04-18 DIAGNOSIS — Z00.00 MEDICARE ANNUAL WELLNESS VISIT, SUBSEQUENT: ICD-10-CM

## 2019-04-18 DIAGNOSIS — E78.2 MIXED HYPERLIPIDEMIA: ICD-10-CM

## 2019-04-18 DIAGNOSIS — I73.9 PVD (PERIPHERAL VASCULAR DISEASE) (HCC): ICD-10-CM

## 2019-04-19 LAB
ALBUMIN SERPL-MCNC: 4 G/DL (ref 3.5–4.8)
ALBUMIN/GLOB SERPL: 1.4 {RATIO} (ref 1.2–2.2)
ALP SERPL-CCNC: 82 IU/L (ref 39–117)
ALT SERPL-CCNC: 15 IU/L (ref 0–32)
AST SERPL-CCNC: 16 IU/L (ref 0–40)
BILIRUB SERPL-MCNC: 0.6 MG/DL (ref 0–1.2)
BUN SERPL-MCNC: 14 MG/DL (ref 8–27)
BUN/CREAT SERPL: 16 (ref 12–28)
CALCIUM SERPL-MCNC: 9.8 MG/DL (ref 8.7–10.3)
CHLORIDE SERPL-SCNC: 106 MMOL/L (ref 96–106)
CO2 SERPL-SCNC: 23 MMOL/L (ref 20–29)
CREAT SERPL-MCNC: 0.9 MG/DL (ref 0.57–1)
EST. AVERAGE GLUCOSE BLD GHB EST-MCNC: 194 MG/DL
GLOBULIN SER CALC-MCNC: 2.9 G/DL (ref 1.5–4.5)
GLUCOSE SERPL-MCNC: 139 MG/DL (ref 65–99)
HBA1C MFR BLD: 8.4 % (ref 4.8–5.6)
POTASSIUM SERPL-SCNC: 3.8 MMOL/L (ref 3.5–5.2)
PROT SERPL-MCNC: 6.9 G/DL (ref 6–8.5)
SODIUM SERPL-SCNC: 146 MMOL/L (ref 134–144)

## 2019-04-24 ENCOUNTER — OFFICE VISIT (OUTPATIENT)
Dept: INTERNAL MEDICINE CLINIC | Age: 79
End: 2019-04-24

## 2019-04-24 VITALS
DIASTOLIC BLOOD PRESSURE: 76 MMHG | HEART RATE: 76 BPM | HEIGHT: 61 IN | TEMPERATURE: 98 F | WEIGHT: 171 LBS | OXYGEN SATURATION: 97 % | SYSTOLIC BLOOD PRESSURE: 116 MMHG | BODY MASS INDEX: 32.28 KG/M2 | RESPIRATION RATE: 16 BRPM

## 2019-04-24 DIAGNOSIS — G56.01 CARPAL TUNNEL SYNDROME OF RIGHT WRIST: ICD-10-CM

## 2019-04-24 DIAGNOSIS — M25.511 CHRONIC RIGHT SHOULDER PAIN: ICD-10-CM

## 2019-04-24 DIAGNOSIS — F41.9 ANXIETY: ICD-10-CM

## 2019-04-24 DIAGNOSIS — G89.29 CHRONIC RIGHT SHOULDER PAIN: ICD-10-CM

## 2019-04-24 DIAGNOSIS — M79.645 PAIN OF LEFT THUMB: ICD-10-CM

## 2019-04-24 DIAGNOSIS — I73.9 PVD (PERIPHERAL VASCULAR DISEASE) (HCC): ICD-10-CM

## 2019-04-24 DIAGNOSIS — I25.10 ASHD (ARTERIOSCLEROTIC HEART DISEASE): ICD-10-CM

## 2019-04-24 DIAGNOSIS — E66.9 OBESITY (BMI 30.0-34.9): ICD-10-CM

## 2019-04-24 DIAGNOSIS — E03.9 HYPOTHYROIDISM, UNSPECIFIED TYPE: ICD-10-CM

## 2019-04-24 DIAGNOSIS — E11.40 TYPE 2 DIABETES MELLITUS WITH DIABETIC NEUROPATHY, WITH LONG-TERM CURRENT USE OF INSULIN (HCC): Primary | ICD-10-CM

## 2019-04-24 DIAGNOSIS — R53.82 CHRONIC FATIGUE: ICD-10-CM

## 2019-04-24 DIAGNOSIS — I10 ESSENTIAL HYPERTENSION, BENIGN: ICD-10-CM

## 2019-04-24 DIAGNOSIS — M81.0 AGE-RELATED OSTEOPOROSIS WITHOUT CURRENT PATHOLOGICAL FRACTURE: ICD-10-CM

## 2019-04-24 DIAGNOSIS — E78.2 MIXED HYPERLIPIDEMIA: ICD-10-CM

## 2019-04-24 DIAGNOSIS — Z79.4 TYPE 2 DIABETES MELLITUS WITH DIABETIC NEUROPATHY, WITH LONG-TERM CURRENT USE OF INSULIN (HCC): Primary | ICD-10-CM

## 2019-04-24 DIAGNOSIS — F33.9 RECURRENT DEPRESSION (HCC): ICD-10-CM

## 2019-04-24 DIAGNOSIS — D50.8 OTHER IRON DEFICIENCY ANEMIA: ICD-10-CM

## 2019-04-24 RX ORDER — GABAPENTIN 100 MG/1
100 CAPSULE ORAL 2 TIMES DAILY
Qty: 60 CAP | Refills: 1 | Status: SHIPPED | OUTPATIENT
Start: 2019-04-24 | End: 2019-07-15 | Stop reason: SDUPTHER

## 2019-04-24 RX ORDER — AMLODIPINE BESYLATE 5 MG/1
5 TABLET ORAL DAILY
Qty: 90 TAB | Refills: 1 | Status: SHIPPED | OUTPATIENT
Start: 2019-04-24 | End: 2019-10-30 | Stop reason: SDUPTHER

## 2019-04-24 RX ORDER — ATORVASTATIN CALCIUM 80 MG/1
TABLET, FILM COATED ORAL
Qty: 90 TAB | Refills: 1 | Status: SHIPPED | OUTPATIENT
Start: 2019-04-24 | End: 2019-10-14 | Stop reason: SDUPTHER

## 2019-04-24 RX ORDER — LORAZEPAM 0.5 MG/1
0.5 TABLET ORAL
Qty: 30 TAB | Refills: 2 | Status: SHIPPED | OUTPATIENT
Start: 2019-04-24 | End: 2019-06-17 | Stop reason: SDUPTHER

## 2019-04-24 RX ORDER — LEVOTHYROXINE SODIUM 100 UG/1
100 TABLET ORAL
Qty: 90 TAB | Refills: 1 | Status: SHIPPED | OUTPATIENT
Start: 2019-04-24 | End: 2019-12-05 | Stop reason: SDUPTHER

## 2019-04-24 NOTE — PATIENT INSTRUCTIONS
INCREASE INSULIN TO 22 UNITS 2 TIMES PER DAY  
 
IF FASTING SUGARS STILL HIGHER THAN 130 IN 1 WEEK INCREASE AGAIN TO 24UNITS 
 
SCHEDULE [facility] APPOINTMENT FOR July LABS ONE WEEK PRIOR TO FOLLOW UP 
 
[address]

## 2019-04-24 NOTE — PROGRESS NOTES
HISTORY OF PRESENT ILLNESS Hadley Martin is a 67 y.o. female. HPI Last here 1/22/19. Pt is here for routine care. 
   
BP is 116/76 Pt states her BP have been pretty good when she checks at home Continues on metoprolol 50mg BID and norvasc 5mg Denies orthostasis Recall had angioedema with lisinopril and benicar-HCT in the past 
   
In 3/19, BS at home running around 130s, 140s, sometimes 150s, 160s in the AM fasting - lowest are 106, 108, 121, 99 In 3/19, BS in the evenings are 150s, 130s, 190s BS in the last 2 weeksL 85, 95, 129, 117 in the evening, fasting , 109, 114--diet was better then struggles to continue this Continues on metformin 1000mg BID Pt is currently taking 20U BID of NPH 70/30 mix--will increase to 22U BID, and then to 24U if fasting sugars >130 in 1 week Ordered free style Linette Bolivar She also takes ASA 81mg daily  
Recall issues with hypoglycemia in the past, a1c under 7.5 at goal for her  
[facility] today is 171 lbs --down 4 lbs x lov Discussed [employer] Discussed diet and weight loss again 
   
Reviewed labs 4/19 
  
Pt follows with Dr. Annemarie Dominguez (cardio) for cad No sx Last visit was 1/9/19 Needs to schedule f/u  
  
Pt follows annually with Dr. Attila Zhang (vasc) for her PAD No claudication stable Last visit was 3/19 Next visit will be 3/20 Will get notes 
  
Pt saw Dr Lorri Mcmillan (ortho) in the past regarding L thumb pain Last visit was 1/8/19 Pt had an injection and this did not help Pt states that she is still having pain in this Reordered gabapentin for her to try Pt also c/o 3 fingers on her R hand feeling like they have electricity running through them sometimes 
  
Pt is now taking prilosec 20mg every other day for reflux, works well 
  
Takes OTC vit D daily 
   
Continues synthroid 100mcg daily   She takes this med separately from all meds 
   
Continues on lipitor 80mg max dose for cholesterol Pt is now taking zetia 10mg once daily as well Welchol was too expensive now but zetia ok and grey well no issue No longer on lexapro, doing well off medication She also has ativan to use prn (about 2x per week) for sleep needs rf 
 
Pt states that she is tired every morning She has not been checked for sleep apnea She is unsure if she snores Provided information for sleep eval 
 
Pt c/o pain in her R shoulder Recurrent issue Improved with pt in the past 
Reviewed XR R shoulder 7/18: Mild to moderate osteoarthritis Ordered PT Reviewed mammo 3/19: nl 
  
ACP on file. SDMs are her grandson (Suni Keyes), daughter Lonnie Olivia) and brother Lazarus Schools) . 
   
PREVENTIVE:   
Colonoscopy: Dr. Christian Amaya 5 years Pap: 12/14, declines further Mammogram: 3/4/19, negative DEXA: 3/18, osteopenic, stopped fosamax Tdap: 1/04/2016 Pneumovax: 10/10/2010 [id number]: 1/97 Zostavax: 10/10/2016 Shingrix: ordered 01/22/19 Flu shot: 10/19/18 Foot exam: 7/18 Microalbumin: 10/18 A1c: 12/15 7.2, 3/16 9.1, 8/16 7.6, 12/16 7.2, 3/17 7.3, 6/17 7.3, 9/17 6.9, 12/17 8.5, 4/18 8.4, 7/18 8.0, 10/18 8.0, 1/19 8.4, 4/19 8.4 Eye exam: [facility]. Fallon Beltrán at [facility], 9/12/18 Lipids: 10/18 LDL 90 Patient Active Problem List  
 Diagnosis Date Noted  Type 2 diabetes mellitus with diabetic neuropathy ([address] 01/22/2019  
 STOUT (dyspnea on exertion) 01/09/2019  Type 2 diabetes mellitus with nephropathy ([address] 01/02/2018  Recurrent depression ([address] 01/02/2018  Encounter for long-term (current) use of insulin ([address] 04/04/2016  Type II diabetes mellitus with nephropathy ([address] 01/04/2016  Lower GI bleeding 10/12/2015  Diverticulosis of intestine with bleeding 10/11/2015  Anemia 10/11/2015  Diastolic CHF, chronic ([facility] 10/11/2015  GIB (gastrointestinal bleeding) 10/08/2015  ACP (advance care planning) 09/08/2015  Osteoporosis 09/10/2013  Bunion 10/15/2012  Essential hypertension, benign 05/17/2012  Mixed hyperlipidemia 05/17/2012  Postsurgical percutaneous transluminal coronary angioplasty status 05/17/2012  Old myocardial infarction 05/17/2012  Postsurgical aortocoronary bypass status 05/17/2012  S/P CABG x 3 11/11/2011  ASHD (arteriosclerotic heart disease) 11/10/2011  
 [facility] failure ([facility] 10/18/2011  Hyperlipidemia 10/18/2011  Influenza 01/19/2011  CAD (coronary artery disease) 11/18/2009  Hypothyroidism 11/18/2009  PVD (peripheral vascular disease) ([facility] 11/18/2009 Current Outpatient Medications Medication Sig Dispense Refill  metoprolol tartrate (LOPRESSOR) 50 mg tablet Take 1 Tab by mouth two (2) times a day. 180 Tab 2  
 FREESTYLE LITE STRIPS strip USE ONE STRIP TO CHECK GLUCOSE TWICE DAILY 100 Strip 12  
 insulin NPH/insulin regular (HUMULIN 70/30 U-100 INSULIN) 100 unit/mL (70-30) injection 19 Units by SubCUTAneous route two (2) times a day. 10 mL 1  
 amLODIPine (NORVASC) 5 mg tablet TAKE 1 TABLET DAILY 90 Tab 0  
 varicella-zoster recombinant, PF, (SHINGRIX, PF,) 50 mcg/0.5 mL susr injection 0.5mL by IntraMUSCular route once now and then repeat in 2-6 months 0.5 mL 1  
 LORazepam (ATIVAN) 0.5 mg tablet Take 1 Tab by mouth nightly as needed. Max Daily Amount: 0.5 mg. 30 Tab 0  
 ezetimibe (ZETIA) 10 mg tablet Take 1 Tab by mouth daily. 30 Tab 6  
 metFORMIN (GLUCOPHAGE) 1,000 mg tablet Take 1 Tab by mouth two (2) times daily (with meals). 180 Tab 0  
 gabapentin (NEURONTIN) 100 mg capsule Take 1 Cap by mouth two (2) times a day. 60 Cap 1  
 levothyroxine (SYNTHROID) 100 mcg tablet Take 1 Tab by mouth Daily (before breakfast). 90 Tab 1  cholecalciferol (VITAMIN D3) 1,000 unit cap Take  by mouth daily.  atorvastatin (LIPITOR) 80 mg tablet TAKE 1 TABLET NIGHTLY 90 Tab 3  
 Omeprazole delayed release (PRILOSEC D/R) 20 mg tablet Take 1 Tab by mouth daily. 90 Tab 1  
 aspirin 81 mg chewable tablet Take 1 Tab by mouth daily.  90 Tab 3  
  cyanocobalamin (VITAMIN B-12) 1,000 mcg tablet Take 1,000 mcg by mouth daily. Past Surgical History:  
Procedure Laterality Date  CARDIAC SURG PROCEDURE UNLIST    
 cabg x 3  
 COLONOSCOPY N/A 10/31/2018 COLONOSCOPY performed by [facility]cantar [facility] at [facility] ENDOSCOPY  
 HX CORONARY ARTERY BYPASS GRAFT  11/11/11  
 3 vessel  HX GYN    
 hysterectomy  HX OTHER SURGICAL    
 colonoscopy  HX UROLOGICAL    
 kidney stone  VASCULAR SURGERY PROCEDURE UNLIST Stents put in right leg Lab Results Component Value Date/Time WBC 7.2 09/22/2017 09:20 AM  
 HGB 12.3 09/22/2017 09:20 AM  
 Hemoglobin (POC) 12.9 03/02/2011 12:21 PM  
 HCT 38.9 09/22/2017 09:20 AM  
 PLATELET 346 92/67/2662 09:20 AM  
 MCV 85 09/22/2017 09:20 AM  
 
Lab Results Component Value Date/Time Cholesterol, total 180 10/19/2018 02:14 PM  
 HDL Cholesterol 64 10/19/2018 02:14 PM  
 LDL, calculated 90 10/19/2018 02:14 PM  
 Triglyceride 129 10/19/2018 02:14 PM  
 CHOL/HDL Ratio 3.2 09/14/2010 08:42 AM  
 
Lab Results Component Value Date/Time GFR est non-AA 61 04/18/2019 10:16 AM  
 GFR est AA 71 04/18/2019 10:16 AM  
 Creatinine 0.90 04/18/2019 10:16 AM  
 BUN 14 04/18/2019 10:16 AM  
 Sodium 146 (H) 04/18/2019 10:16 AM  
 Potassium 3.8 04/18/2019 10:16 AM  
 Chloride 106 04/18/2019 10:16 AM  
 CO2 23 04/18/2019 10:16 AM  
 Magnesium 1.7 10/16/2015 04:34 AM  
  
Review of Systems Respiratory: Negative for shortness of breath. Cardiovascular: Negative for chest pain. Musculoskeletal: Positive for joint pain. Physical Exam  
Constitutional: She is oriented to person, place, and time. She appears well-developed and well-nourished. No distress. HENT:  
Head: Normocephalic and atraumatic. Mouth/Throat: Oropharynx is clear and moist. No oropharyngeal exudate. Eyes: Conjunctivae and EOM are normal. Right eye exhibits no discharge. Left eye exhibits no discharge. Neck: Normal range of motion. Neck supple. Cardiovascular: Normal rate, regular rhythm and normal heart sounds. Exam reveals no gallop and no friction rub. No murmur heard. Pulmonary/Chest: Effort normal and breath sounds normal. No respiratory distress. She has no wheezes. She has no rales. She exhibits no tenderness. Musculoskeletal: She exhibits no edema, tenderness or deformity. Full ROM but pain with abduction of R arm, full extension, 5/5 strength BLUE Lymphadenopathy:  
  She has no cervical adenopathy. Neurological: She is alert and oriented to person, place, and time. Coordination normal.  
Skin: Skin is warm and dry. No rash noted. She is not diaphoretic. No erythema. No pallor. Psychiatric: She has a normal mood and affect. Her behavior is normal.  
 
 
ASSESSMENT and PLAN 
  ICD-10-CM ICD-9-CM 1. Type 2 diabetes mellitus with diabetic neuropathy, with long-term current use of insulin ([facility] a1c was not at goal lov, repeat a1c still not at goal at 8.4, will increase her 70/30 mix further to 22U, if fasting not at goal in one week increase to 24U BID 
 E11.40 250.60   
 Z79.4 357.2 V58.67 2. Recurrent depression ([facility] No longer on lexapro, controlled without medication F33.9 296.30   
3. Age-related osteoporosis without current pathological fracture Improved last DEXA, no longer on fosamax, westley repeat DEXA in a year, continue vit D for tx M81.0 733.01   
4. Mixed hyperlipidemia Controlled on lipitor and zetia E78.2 272.2 5. ASHD (arteriosclerotic heart disease) Follows with Dr Alissa Beverly, medically managed, continues ASA, UTD from 1/19 I25.10 414.00 6. PVD (peripheral vascular disease) ([facility] Follows with Dr Perry Montgomery, saw him in 3/19, will get notes I73.9 443.9 7. Hypothyroidism, unspecified type Controlled on synthroid 100mcgs daily E03.9 244.9 8. Essential hypertension, benign Controlled on norvasc 5mg and metoprolol BID I10 401.1 9. Other iron deficiency anemia Repeat CBC prior to f/u 
 D50.8 280.8 10. Obesity (BMI 30.0-34. 9) Discussed diet and w/l, discussed [address] 
 E66.9 278.00 11. Anxiety - controlled on ativan 12. Fatigue - get sleep study 13. Thumb pain - follow up with Dr Chad Brown, may need surgery will give gabapentin 14. Carpal tunnel syndrome -  F/u with Chad Brown Scribed by Lori Elliott [address], as dictated by Dr. Donte Patel. Current diagnosis and concerns discussed with pt at length. Pt understands risks and benefits or current treatment plan and medications, and accepts the treatment and medication with any possible risks. Pt asks appropriate questions, which were answered. Pt was instructed to call with any concerns or problems. I have reviewed the note documented by the scribe. The services provided are my own. The documentation is accurate

## 2019-04-25 ENCOUNTER — TELEPHONE (OUTPATIENT)
Dept: INTERNAL MEDICINE CLINIC | Age: 79
End: 2019-04-25

## 2019-04-25 DIAGNOSIS — E11.21 TYPE II DIABETES MELLITUS WITH NEPHROPATHY (HCC): Primary | ICD-10-CM

## 2019-04-25 NOTE — TELEPHONE ENCOUNTER
Called and spoke with pt and she states that she will call and make her appts and then let me know so that her insurance referrals can be processed and faxed.

## 2019-04-25 NOTE — TELEPHONE ENCOUNTER
Nabila,, with [facility] [employer] requesting a call back regarding authorization for physical therapist to be called into at [phone] (option 2). Best contact:[phone]       Pt is requesting a call back from [facility] regarding Dr. Rayshawn Gibbs he is contracted w/[facility]. Best contact is [phone].        Copy/paste Matthew rees

## 2019-04-25 NOTE — TELEPHONE ENCOUNTER
PCP: Denis Muller MD    Last appt: 4/24/2019  Future Appointments   Date Time Provider Gen Paige   7/30/2019 11:30 AM Denis Muller MD [facility] 87       Requested Prescriptions     Pending Prescriptions Disp Refills    flash glucose sensor (FREESTYLE KEYLA 14 DAY SENSOR) kit 4 Kit 3     Sig: one

## 2019-04-25 NOTE — TELEPHONE ENCOUNTER
We have to do the insurance referral.  Referral will be processed and faxed to Dr. Jong Landaverde office today.

## 2019-04-25 NOTE — TELEPHONE ENCOUNTER
----- Message from Rosa M Kumar sent at 4/25/2019  2:37 PM EDT -----  Regarding: Dr. Delfin Wright  Pt is requesting a call back from [facility] regarding Dr. Vita Lin he is contracted w/[facility]. Best contact is [phone].

## 2019-04-30 ENCOUNTER — TELEPHONE (OUTPATIENT)
Dept: INTERNAL MEDICINE CLINIC | Age: 79
End: 2019-04-30

## 2019-04-30 NOTE — TELEPHONE ENCOUNTER
Nabila with [employer] is requesting a prior authorization for a sleep study and physical therapy. [facility] number is [phone] ext [id number].      Copy/Paste from [facility]

## 2019-05-01 ENCOUNTER — TELEPHONE (OUTPATIENT)
Dept: INTERNAL MEDICINE CLINIC | Age: 79
End: 2019-05-01

## 2019-05-01 NOTE — TELEPHONE ENCOUNTER
Pt requesting a return call from [facility] concerning the referral to [facility].        Message received & copied from [facility]

## 2019-05-03 ENCOUNTER — TELEPHONE (OUTPATIENT)
Dept: INTERNAL MEDICINE CLINIC | Age: 79
End: 2019-05-03

## 2019-05-03 NOTE — TELEPHONE ENCOUNTER
Jo Ann Araujo from [facility] advised that prior authoauthorization was not received for the physical therapy. Best contact number is [phone].        Message received & copied from [facility]

## 2019-05-03 NOTE — TELEPHONE ENCOUNTER
Romeo Hunt, with [facility] is needing to speak with the referral dep. She needs a referral to be sent through the [facility] Provider Portal for pt.  Phone: [phone]       Message received & copied from [facility]

## 2019-05-06 NOTE — TELEPHONE ENCOUNTER
Called and spoke with Jose Ramon at [facility] after [de-identified] PT and advised that pt has to have an appt in order for a referral auth to be processed,  appt date given. Referral auth processed and lvm advising pt that request has been done.

## 2019-05-14 DIAGNOSIS — E11.21 TYPE II DIABETES MELLITUS WITH NEPHROPATHY (HCC): ICD-10-CM

## 2019-05-14 RX ORDER — FLASH GLUCOSE SENSOR
KIT MISCELLANEOUS
Qty: 1 KIT | Refills: 0 | Status: SHIPPED | OUTPATIENT
Start: 2019-05-14 | End: 2019-05-31 | Stop reason: SDUPTHER

## 2019-05-15 NOTE — TELEPHONE ENCOUNTER
PCP: Hughes Severance, MD    Last appt: 2019  Future Appointments   Date Time Provider Gen Paige   2019 11:40 AM Radha Fry  [facility]   2019 11:30 AM Hughes Severance, MD [address]    2019 11:30 AM Efraní Samaniego MD [address] [facility]       Requested Prescriptions     Pending Prescriptions Disp Refills    insulin NPH/insulin regular (HUMULIN 70/30 U-100 INSULIN) 100 unit/mL (70-30) injection 10 mL 1     Si Units by SubCUTAneous route two (2) times a day.

## 2019-05-29 ENCOUNTER — TELEPHONE (OUTPATIENT)
Dept: INTERNAL MEDICINE CLINIC | Age: 79
End: 2019-05-29

## 2019-05-29 NOTE — TELEPHONE ENCOUNTER
Patient states she needs refill approval done today as last reader she has is not working so needs refill done asap. Please call when done.  Thank you

## 2019-05-29 NOTE — TELEPHONE ENCOUNTER
Patient states she needs a call back in reference to persistent numbness & tingling in 4 of her fingers that is getting worse. Patient needs to discuss Plan of Care & possibly being referred to specialist or getting an injection. Please call to advise.  Thank you

## 2019-05-30 NOTE — TELEPHONE ENCOUNTER
Called, spoke to pt. Two pt identifiers confirmed. Pt informed per Dr. Lorri Carrera to f/u w/ Dr. Michael Astorga for finger sx. Pt verbalized understanding of information discussed w/ no further questions at this time.

## 2019-05-30 NOTE — TELEPHONE ENCOUNTER
PCP: Kiko Cuenca MD    Last appt: 4/24/2019  Future Appointments   Date Time Provider Gen Pagie   7/2/2019 11:40 AM Renée Padron  [facility]   7/30/2019 11:30 AM Kiko Cuenca MD [address]   7/31/2019 11:30 AM Priya Perkins MD [address] [facility]       Requested Prescriptions     Pending Prescriptions Disp Refills    flash glucose scanning reader (FREESTYLE KEYLA 14 DAY READER) misc 1 Each 0     Sig: daily

## 2019-05-31 DIAGNOSIS — E11.21 TYPE II DIABETES MELLITUS WITH NEPHROPATHY (HCC): Primary | ICD-10-CM

## 2019-05-31 NOTE — TELEPHONE ENCOUNTER
Spoke to pharmacist at [address] [facility]. Pharmacist states that the pt needs the freestyle sensor and not the reader-pended. States 2 kits equals 1mo supply.

## 2019-06-17 ENCOUNTER — TELEPHONE (OUTPATIENT)
Dept: INTERNAL MEDICINE CLINIC | Age: 79
End: 2019-06-17

## 2019-06-17 DIAGNOSIS — F41.9 ANXIETY: ICD-10-CM

## 2019-06-17 DIAGNOSIS — E11.21 TYPE II DIABETES MELLITUS WITH NEPHROPATHY (HCC): ICD-10-CM

## 2019-06-17 RX ORDER — LORAZEPAM 0.5 MG/1
0.5 TABLET ORAL
Qty: 30 TAB | Refills: 2 | Status: SHIPPED | OUTPATIENT
Start: 2019-06-17 | End: 2021-09-02

## 2019-06-17 NOTE — TELEPHONE ENCOUNTER
The following prescription was called into pt's local pharmacy:     LORazepam (ATIVAN) 0.5 mg tablet [[id number]]     Order Details   Dose: 0.5 mg Route: Oral Frequency: BEDTIME PRN for Anxiety   Dispense Quantity: 30 Tab Refills: 2 Fills remaining: --           Sig: Take 1 Tab by mouth nightly as needed for Anxiety.  Max Daily Amount: 0.5 mg.          Written Date: 06/17/19 Expiration Date: --     Start Date: 06/17/19 End Date: --            Ordering Provider:  -- TERRY #:  [id number] NPI:  --    Authorizing Provider:  Hughes Severance, MD TERRY #:  [id number] NPI:  [id number]    Ordering User:  Hughes Severance, MD            Diagnosis Association: Anxiety (F41.9)      Original Order:  LORazepam (ATIVAN) 0.5 mg tablet [[id number]]      Pharmacy:  [facility] [address] [facility], [address] [place] [address] #:  --     Pharmacy Comments:  --          Fill quantity remaining:  -- Fill quantity used:  --

## 2019-06-17 NOTE — TELEPHONE ENCOUNTER
Called, spoke to pt. Two pt identifiers confirmed. Pt informed that refill requests have been pended. Pt verbalized understanding of information discussed w/ no further questions at this time. PCP: Hayder Cruz MD    Last appt: 4/24/2019  Future Appointments   Date Time Provider Gen Paige   7/2/2019 11:40 AM Ernst Alvarado  [facility] [place]   7/30/2019 11:30 AM Hayder Cruz MD [place] [address]   7/31/2019 11:30 AM Sudhir Marino MD [address] [facility]       Requested Prescriptions     Pending Prescriptions Disp Refills    LORazepam (ATIVAN) 0.5 mg tablet 30 Tab 2     Sig: Take 1 Tab by mouth nightly as needed for Anxiety.  Max Daily Amount: 0.5 mg.    flash glucose sensor (FREESTYLE KEYLA 14 DAY SENSOR) kit 4 Kit 3     Sig: one

## 2019-06-17 NOTE — TELEPHONE ENCOUNTER
Pt requested a refill on the 14 day sensor. Pt requested two, if possible. Pt is out. Pt also requested a refill on \"larazepam 5mg\". Pt best contact number is [phone].        Message received & copied from [employer]

## 2019-07-02 ENCOUNTER — OFFICE VISIT (OUTPATIENT)
Dept: SLEEP MEDICINE | Age: 79
End: 2019-07-02

## 2019-07-02 VITALS
SYSTOLIC BLOOD PRESSURE: 132 MMHG | RESPIRATION RATE: 20 BRPM | HEIGHT: 61 IN | OXYGEN SATURATION: 96 % | WEIGHT: 173 LBS | DIASTOLIC BLOOD PRESSURE: 84 MMHG | BODY MASS INDEX: 32.66 KG/M2 | HEART RATE: 69 BPM

## 2019-07-02 DIAGNOSIS — I10 ESSENTIAL HYPERTENSION, BENIGN: ICD-10-CM

## 2019-07-02 DIAGNOSIS — G47.33 OBSTRUCTIVE SLEEP APNEA (ADULT) (PEDIATRIC): Primary | ICD-10-CM

## 2019-07-02 DIAGNOSIS — E11.21 TYPE II DIABETES MELLITUS WITH NEPHROPATHY (HCC): ICD-10-CM

## 2019-07-02 DIAGNOSIS — I25.810 CORONARY ARTERY DISEASE INVOLVING CORONARY BYPASS GRAFT OF NATIVE HEART WITHOUT ANGINA PECTORIS: ICD-10-CM

## 2019-07-02 NOTE — PROGRESS NOTES
[address] [facility]. [place], [address]  Tel.  [phone]  Fax. [address] [facility] [address]  [place], [address] [facility]  Tel.  [phone]  Fax. [phone] [address]  Tel.  [phone]  Fax. [phone]         Subjective: Thuy Montelongo is an 67 y.o. female referred for evaluation for a sleep disorder. She complains of excessive daytime sleepiness associated with frequent night time awakenings and poor sleep quality. She is unsure if she snores as she lives alone. Symptoms began several years ago, gradually worsening since that time. She usually can fall asleep in variable minutes. She denies falling asleep while driving, during conversation. Thuy Montelongo does wake up frequently at night. She is bothered by waking up too early and left unable to get back to sleep. She actually sleeps about 4 hours at night and wakes up about 3 times during the night. She does not work shifts:  . [employer] indicates she does get too little sleep at night. Her bedtime is 1100. She awakens at 0300. She does take naps. She takes 3 naps a week lasting 45 min to an hour. She has the following observed behaviors:  ;  .  Other remarks:      [place] Sleepiness Score: 9    Allergies   Allergen Reactions    Ace Inhibitors Swelling    Arb-Angiotensin Receptor Antagonist Swelling    Lisinopril Swelling    Plavix [Clopidogrel] Other (comments)     Excessive bleeding    Potassium Nausea and Vomiting     Potassium containing oral products are not well tolerated by patient         Current Outpatient Medications:     LORazepam (ATIVAN) 0.5 mg tablet, Take 1 Tab by mouth nightly as needed for Anxiety.  Max Daily Amount: 0.5 mg., Disp: 30 Tab, Rfl: 2    flash glucose sensor (FREESTYLE KEYLA 14 DAY SENSOR) kit, one, Disp: 4 Kit, Rfl: 3    flash glucose sensor (FREESTYLE KEYLA 14 DAY SENSOR) kit, Check glucose twice daily, Disp: 2 Kit, Rfl: 2    flash glucose scanning reader (Purple Blue BoSTYLE KEYLA 14 DAY READER) misc, daily, Disp: 1 Each, Rfl: 2    insulin NPH/insulin regular (HUMULIN 70/30 U-100 INSULIN) 100 unit/mL (70-30) injection, 22 Units by SubCUTAneous route two (2) times a day., Disp: 10 mL, Rfl: 1    atorvastatin (LIPITOR) 80 mg tablet, TAKE 1 TABLET NIGHTLY, Disp: 90 Tab, Rfl: 1    amLODIPine (NORVASC) 5 mg tablet, Take 1 Tab by mouth daily. , Disp: 90 Tab, Rfl: 1    levothyroxine (SYNTHROID) 100 mcg tablet, Take 1 Tab by mouth Daily (before breakfast). , Disp: 90 Tab, Rfl: 1    gabapentin (NEURONTIN) 100 mg capsule, Take 1 Cap by mouth two (2) times a day., Disp: 60 Cap, Rfl: 1    metoprolol tartrate (LOPRESSOR) 50 mg tablet, Take 1 Tab by mouth two (2) times a day., Disp: 180 Tab, Rfl: 2    FREESTYLE LITE STRIPS strip, USE ONE STRIP TO CHECK GLUCOSE TWICE DAILY, Disp: 100 Strip, Rfl: 12    ezetimibe (ZETIA) 10 mg tablet, Take 1 Tab by mouth daily. , Disp: 30 Tab, Rfl: 6    metFORMIN (GLUCOPHAGE) 1,000 mg tablet, Take 1 Tab by mouth two (2) times daily (with meals). , Disp: 180 Tab, Rfl: 0    cholecalciferol (VITAMIN D3) 1,000 unit cap, Take  by mouth daily. , Disp: , Rfl:     Omeprazole delayed release (PRILOSEC D/R) 20 mg tablet, Take 1 Tab by mouth daily. , Disp: 90 Tab, Rfl: 1    aspirin 81 mg chewable tablet, Take 1 Tab by mouth daily. , Disp: 90 Tab, Rfl: 3    cyanocobalamin (VITAMIN B-12) 1,000 mcg tablet, Take 1,000 mcg by mouth daily. 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     She  has a past surgical history that includes hx gyn; vascular surgery procedure unlist; hx coronary artery bypass graft (11/11/11); pr cardiac surg procedure unlist; hx urological; hx other surgical; and colonoscopy (N/A, 10/31/2018). She family history includes Breast Cancer in her daughter; Cancer in her sister; Diabetes in her brother, brother, brother, and mother; Heart Disease in her brother, brother, and mother; Hypertension in her brother; Mental Retardation in her brother; Other in her father. She  reports that she quit smoking about 8 years ago. She has a 20.00 pack-year smoking history. She has never used smokeless tobacco. She reports that she does not drink alcohol or use drugs. Review of Systems:  Constitutional:  No significant weight loss or weight gain. Eyes:  No blurred vision. CVS:  No significant chest pain  Pulm:  No significant shortness of breath  GI:  No significant nausea or vomiting  :  No significant nocturia  Musculoskeletal:  No significant joint pain at night  Skin:  No significant rashes  Neuro:  No significant dizziness   Psych:  No active mood issues    Sleep Review of Systems: notable for + difficulty falling asleep; +frequent awakenings at night;  regular dreaming noted; no nightmares ; no early morning headaches; no memory problems; no concentration issues; no history of any automobile or occupational accidents due to daytime drowsiness. Objective:     Visit Vitals  /84 (BP 1 Location: Left arm, BP Patient Position: Sitting)   Pulse 69   Resp 20   Ht 5' 1\" (1.549 m)   Wt 173 lb (78.5 kg)   SpO2 96%   BMI 32.69 kg/m²         General:   Not in acute distress   Eyes:  Anicteric sclerae, no obvious strabismus   Nose:  No obvious nasal septum deviation    Oropharynx:   Class 3 oropharyngeal outlet, thick tongue base, enlarged and boggy uvula, low-lying soft palate, narrow tonsilo-pharyngeal pilars   Tonsils:   tonsils are present and normal   Neck:   Neck circ.  in \"inches\": 14.5; midline trachea   Chest/Lungs:  Equal lung expansion, clear on auscultation    CVS:  Normal rate, regular rhythm; no JVD   Skin:  Warm to touch; no obvious rashes   Neuro:  No focal deficits ; no obvious tremor    Psych:  Normal affect,  normal countenance;          Assessment:       ICD-10-CM ICD-9-CM    1. Obstructive sleep apnea (adult) (pediatric) G47.33 327.23 POLYSOMNOGRAPHY 1 NIGHT   2. Essential hypertension, benign I10 401.1    3. Coronary artery disease involving coronary bypass graft of native heart without angina pectoris I25.810 414.05    4. 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 STOP-BANG score 3.  * PSG was ordered for initial evaluation. We will follow the American Academy of Sleep Medicine protocol regarding split-night procedures and offering a trial of Positive Airway Pressure (CPAP, BPAP, etc.)  * She was provided information on sleep apnea including coresponding risk factors and the importance of proper treatment. * Counseling was provided regarding proper sleep hygiene and safe driving. *Treatment options for sleep apnea were reviewed. she is not against a trial of PAP if found to have significant sleep apnea. (she will want an in-lab titration if PAP needed. She wants to try it before ordering one. She is unsure that she will tolerate it)  2. Hypertension - she continues on her current regimen. I have reviewed the relationship between hypertension as it relates to sleep-disordered breathing. 3. Coronary Artery Disease - she continues on his current regimen. I have reviewed the relationship between heart disease and sleep disordered breathing. 4.Type II diabetes - she continues on her current regimen. I have reviewed the relationship between sleep disordered breathing as it relates to diabetes. Thank you for allowing us to participate in your patient's medical care.   We'll keep you updated on these investigations.   Electronically signed by    Ren Graves MD  Diplomate in Sleep Medicine  [facility]

## 2019-07-02 NOTE — PATIENT INSTRUCTIONS
[address] [place], [address]  Tel.  [phone]  Fax. 100 [facility] [address]  [place], [address] [facility]  Tel.  [phone]  Fax. [phone] [address] [facility] [address]  Tel.  [phone]  Fax. [phone]     Sleep Apnea: After Your Visit  Your Care Instructions  Sleep apnea occurs when you frequently stop breathing for 10 seconds or longer during sleep. It can be mild to severe, based on the number of times per hour that you stop breathing or have slowed breathing. Blocked or narrowed airways in your nose, mouth, or throat can cause sleep apnea. Your airway can become blocked when your throat muscles and tongue relax during sleep. Sleep apnea is common, occurring in 1 out of 20 individuals. Individuals having any of the following characteristics should be evaluated and treated right away due to high risk and detrimental consequences from untreated sleep apnea:  1. Obesity  2. Congestive Heart failure  3. Atrial Fibrillation  4. Uncontrolled Hypertension  5. Type II Diabetes  6. Night-time Arrhythmias  7. Stroke  8. Pulmonary Hypertension  9. High-risk Driving Populations (pilots, truck drivers, etc.)  10. Patients Considering Weight-loss Surgery    How do you know you have sleep apnea? You probably have sleep apnea if you answer 'yes' to 3 or more of the following questions:  S - Have you been told that you Snore? T - Are you often Tired during the day? O - Has anyone Observed you stop breathing while sleeping? P- Do you have (or are being treated for) high blood Pressure? B - Are you obese (Body Mass Index > 35)? A - Is your Age 48years old or older? N - Is your Neck size greater than 16 inches? G - Are you male Gender? A sleep physician can prescribe a breathing device that prevents tissues in the throat from blocking your airway.  Or your doctor may recommend using a dental device (oral breathing device) to help keep your airway open. In some cases, surgery may be needed to remove enlarged tissues in the throat. Follow-up care is a key part of your treatment and safety. Be sure to make and go to all appointments, and call your doctor if you are having problems. It's also a good idea to know your test results and keep a list of the medicines you take. How can you care for yourself at home? · Lose weight, if needed. It may reduce the number of times you stop breathing or have slowed breathing. · Go to bed at the same time every night. · Sleep on your side. It may stop mild apnea. If you tend to roll onto your back, sew a pocket in the back of your pajama top. Put a tennis ball into the pocket, and stitch the pocket shut. This will help keep you from sleeping on your back. · Avoid alcohol and medicines such as sleeping pills and sedatives before bed. · Do not smoke. Smoking can make sleep apnea worse. If you need help quitting, talk to your doctor about stop-smoking programs and medicines. These can increase your chances of quitting for good. · Prop up the head of your bed 4 to 6 inches by putting bricks under the legs of the bed. · Treat breathing problems, such as a stuffy nose, caused by a cold or allergies. · Use a continuous positive airway pressure (CPAP) breathing machine if lifestyle changes do not help your apnea and your doctor recommends it. The machine keeps your airway from closing when you sleep. · If CPAP does not help you, ask your doctor whether you should try other breathing machines. A bilevel positive airway pressure machine has two types of air pressureâone for breathing in and one for breathing out. Another device raises or lowers air pressure as needed while you breathe. · If your nose feels dry or bleeds when using one of these machines, talk with your doctor about increasing moisture in the air. A humidifier may help.   · If your nose is runny or stuffy from using a breathing machine, talk with your doctor about using decongestants or a corticosteroid nasal spray. When should you call for help? Watch closely for changes in your health, and be sure to contact your doctor if:  · You still have sleep apnea even though you have made lifestyle changes. · You are thinking of trying a device such as CPAP. · You are having problems using a CPAP or similar machine. Where can you learn more? Go to [employer]. Enter D351 in the search box to learn more about \"Sleep Apnea: After Your Visit. \"   © 8523-2087 Healthwise, Incorporated. Care instructions adapted under license by [employer] (which disclaims liability or warranty for this information). This care instruction is for use with your licensed healthcare professional. If you have questions about a medical condition or this instruction, always ask your healthcare professional. Cristhian Decker any warranty or liability for your use of this information. PROPER SLEEP HYGIENE    What to avoid  · Do not have drinks with caffeine, such as coffee or black tea, for 8 hours before bed. · Do not smoke or use other types of tobacco near bedtime. Nicotine is a stimulant and can keep you awake. · Avoid drinking alcohol late in the evening, because it can cause you to wake in the middle of the night. · Do not eat a big meal close to bedtime. If you are hungry, eat a light snack. · Do not drink a lot of water close to bedtime, because the need to urinate may wake you up during the night. · Do not read or watch TV in bed. Use the bed only for sleeping and sexual activity. What to try  · Go to bed at the same time every night, and wake up at the same time every morning. Do not take naps during the day. · Keep your bedroom quiet, dark, and cool. · Get regular exercise, but not within 3 to 4 hours of your bedtime. .  · Sleep on a comfortable pillow and mattress.   · If watching the clock makes you anxious, turn it facing away from you so you cannot see the time. · If you worry when you lie down, start a worry book. Well before bedtime, write down your worries, and then set the book and your concerns aside. · Try meditation or other relaxation techniques before you go to bed. · If you cannot fall asleep, get up and go to another room until you feel sleepy. Do something relaxing. Repeat your bedtime routine before you go to bed again. · Make your house quiet and calm about an hour before bedtime. Turn down the lights, turn off the TV, log off the computer, and turn down the volume on music. This can help you relax after a busy day. Drowsy Driving  The [address] [place] [address] Road Traffic Safety Administration cites drowsiness as a causing factor in more than 639,512 police reported crashes annually, resulting in 76,000 injuries and 1,500 deaths. Other surveys suggest 55% of people polled have driven while drowsy in the past year, 23% had fallen asleep but not crashed, 3% crashed, and 2% had and accident due to drowsy driving. Who is at risk? Young Drivers: One study of drowsy driving accidents states that 55% of the drivers were under 25 years. Of those, 75% were male. Shift Workers and Travelers: People who work overnight or travel across time zones frequently are at higher risk of experiencing Circadian Rhythm Disorders. They are trying to work and function when their body is programed to sleep. Sleep Deprived: Lack of sleep has a serious impact on your ability to pay attention or focus on a task. Consistently getting less than the average of 8 hours your body needs creates partial or cumulative sleep deprivation. Untreated Sleep Disorders: Sleep Apnea, Narcolepsy, R.L.S., and other sleep disorders (untreated) prevent a person from getting enough restful sleep. This leads to excessive daytime sleepiness and increases the risk for drowsy driving accidents by up to 7 times.   Medications / Alcohol: Even over the counter medications can cause drowsiness. Medications that impair a drivers attention should have a warning label. Alcohol naturally makes you sleepy and on its own can cause accidents. Combined with excessive drowsiness its effects are amplified. Signs of Drowsy Driving:   * You don't remember driving the last few miles   * You may drift out of your michelle   * You are unable to focus and your thoughts wander   * You may yawn more often than normal   * You have difficulty keeping your eyes open / nodding off   * Missing traffic signs, speeding, or tailgating  Prevention-   Good sleep hygiene, lifestyle and behavioral choices have the most impact on drowsy driving. There is no substitute for sleep and the average person requires 8 hours nightly. If you find yourself driving drowsy, stop and sleep. Consider the sleep hygiene tips provided during your visit as well. Medication Refill Policy: Refills for all medications require 1 week advance notice. Please have your pharmacy fax a refill request. We are unable to fax, or call in \"controled substance\" medications and you will need to pick these prescriptions up from our office. [employer] Activation    Thank you for requesting access to [employer]. Please follow the instructions below to securely access and download your online medical record. [employer]apa allows you to send messages to your doctor, view your test results, renew your prescriptions, schedule appointments, and more. How Do I Sign Up? 1. In your internet browser, go to [email or web address]. 2. Click on the First Time User? Click Here link in the Sign In box. You will see the New Member Sign Up page. 3. Enter your [employer]apa Access Code exactly as it appears below. You will not need to use this code after youve completed the sign-up process. If you do not sign up before the expiration date, you must request a new code.     Zapa Access Code: [email or web address]  Expires: 8/16/2019 12:15 PM (This is the date your [employer] access code will )    4. Enter the last four digits of your Social Security Number (xxxx) and Date of Birth (mm/dd/yyyy) as indicated and click Submit. You will be taken to the next sign-up page. 5. Create a [email or web address].comt ID. This will be your [employer] login ID and cannot be changed, so think of one that is secure and easy to remember. 6. Create a [employer] password. You can change your password at any time. 7. Enter your Password Reset Question and Answer. This can be used at a later time if you forget your password. 8. Enter your e-mail address. You will receive e-mail notification when new information is available in [address] 9. Click Sign Up. You can now view and download portions of your medical record. 10. Click the Download Summary menu link to download a portable copy of your medical information. Additional Information    If you have questions, please call [phone]. Remember, [employer] is NOT to be used for urgent needs. For medical emergencies, dial 911.

## 2019-07-15 DIAGNOSIS — E11.21 TYPE II DIABETES MELLITUS WITH NEPHROPATHY (HCC): ICD-10-CM

## 2019-07-16 RX ORDER — GABAPENTIN 100 MG/1
100 CAPSULE ORAL 2 TIMES DAILY
Qty: 60 CAP | Refills: 1 | Status: SHIPPED | OUTPATIENT
Start: 2019-07-16 | End: 2019-10-30 | Stop reason: SDUPTHER

## 2019-07-16 RX ORDER — PHENOL/SODIUM PHENOLATE
20 AEROSOL, SPRAY (ML) MUCOUS MEMBRANE DAILY
Qty: 90 TAB | Refills: 1 | Status: SHIPPED | OUTPATIENT
Start: 2019-07-16 | End: 2020-02-18

## 2019-07-16 RX ORDER — EZETIMIBE 10 MG/1
10 TABLET ORAL DAILY
Qty: 30 TAB | Refills: 6 | Status: SHIPPED | OUTPATIENT
Start: 2019-07-16 | End: 2019-08-16 | Stop reason: SDUPTHER

## 2019-07-16 NOTE — TELEPHONE ENCOUNTER
PCP: Jada Simpson MD    Last appt: 2019  Future Appointments   Date Time Provider Gen Paige   2019 11:30 AM Jada Simpson MD [address]   2019 11:30 AM Mira Dakins, MD [address] [facility]   2019  8:30 PM [facility] [address] [facility]       Requested Prescriptions     Pending Prescriptions Disp Refills    Omeprazole delayed release (PRILOSEC D/R) 20 mg tablet 90 Tab 1     Sig: Take 1 Tab by mouth daily.  gabapentin (NEURONTIN) 100 mg capsule 60 Cap 1     Sig: Take 1 Cap by mouth two (2) times a day.  ezetimibe (ZETIA) 10 mg tablet 30 Tab 6     Sig: Take 1 Tab by mouth daily.  insulin NPH/insulin regular (HUMULIN 70/30 U-100 INSULIN) 100 unit/mL (70-30) injection 10 mL 1     Si Units by SubCUTAneous route two (2) times a day.

## 2019-07-17 ENCOUNTER — DOCUMENTATION ONLY (OUTPATIENT)
Dept: INTERNAL MEDICINE CLINIC | Age: 79
End: 2019-07-17

## 2019-07-17 NOTE — PROGRESS NOTES
Letter from [employer] dropped off by patient regarding the back door service. Letter drafted and placed in Dr. Roberto Maurer office for signature, letter will then be mailed out.

## 2019-07-17 NOTE — PROGRESS NOTES
The following prescription was called into pt's local pharmacy:    gabapentin (NEURONTIN) 100 mg capsule [[id number]]     Order Details   Dose: 100 mg Route: Oral Frequency: 2 TIMES DAILY   Dispense Quantity: 60 Cap Refills: 1 Fills remaining: --           Sig: Take 1 Cap by mouth two (2) times a day.          Written Date: 07/16/19 Expiration Date: --     Start Date: 07/16/19 End Date: --

## 2019-07-22 ENCOUNTER — DOCUMENTATION ONLY (OUTPATIENT)
Dept: INTERNAL MEDICINE CLINIC | Age: 79
End: 2019-07-22

## 2019-07-22 NOTE — PROGRESS NOTES
Letter for back door service signed by Dr. Katarzyna Cotton and mailed out to address provided on form.

## 2019-07-24 NOTE — PERIOP NOTES
[facility]  Ambulatory Surgery Unit  Pre-operative Instructions    Surgery/Procedure Date  8/1            Tentative Arrival Time 08:00am      1. On the day of your surgery/procedure, please report to the Ambulatory Surgery Unit Registration Desk and sign in at your designated time. The Ambulatory Surgery Unit is located in [facility] on the [facility] side of the [facility] across from the [facility]. Please have all of your health insurance cards and a photo ID. 2. You must have someone with you to drive you home, as you should not drive a car for 24 hours following anesthesia. Please make arrangements for a responsible adult friend or family member to stay with you for at least the first 24 hours after your surgery. 3. Do not have anything to eat or drink (including water, gum, mints, coffee, juice) after 11:59 PM  7/31. This may not apply to medications prescribed by your physician. (Please note below the special instructions with medications to take the morning of surgery, if applicable.)    4. We recommend you do not drink any alcoholic beverages for 24 hours before and after your surgery. 5. Contact your surgeons office for instructions on the following medications: non-steroidal anti-inflammatory drugs (i.e. Advil, Aleve), vitamins, and supplements. (Some surgeons will want you to stop these medications prior to surgery and others may allow you to take them)   **If you are currently taking Plavix, Coumadin, Aspirin and/or other blood-thinning agents, contact your surgeon for instructions. ** Your surgeon will partner with the physician prescribing these medications to determine if it is safe to stop or if you need to continue taking. Please do not stop taking these medications without instructions from your surgeon.     6. In an effort to help prevent surgical site infection, we ask that you shower with an anti-bacterial soap (i.e. Dial/Safeguard, or the soap provided to you at your preadmission testing appointment) for 3 days prior to and on the morning of surgery, using a fresh towel after each shower. (Please begin this process with fresh bed linens.) Do not apply any lotions, powders, or deodorants after the shower on the day of your procedure. If applicable, please do not shave the operative site for 48 hours prior to surgery. 7. Wear comfortable clothes. Wear glasses instead of contacts. Do not bring any jewelry or money (other than copays or fees as instructed). Do not wear make-up, particularly mascara, the morning of your surgery. Do not wear nail polish, particularly if you are having foot /hand surgery. Wear your hair loose or down, no ponytails, buns, mk pins or clips. All body piercings must be removed. 8. You should understand that if you do not follow these instructions your surgery may be cancelled. If your physical condition changes (i.e. fever, cold or flu) please contact your surgeon as soon as possible. 9. It is important that you be on time. If a situation occurs where you may be late, or if you have any questions or problems, please call [phone].    10. Your surgery time may be subject to change. You will receive a phone call the day prior to surgery to confirm your arrival time. Special Instructions: Take all medications and inhalers, as prescribed, on the morning of surgery with a sip of water EXCEPT: metformin and insulin      Insulin Dependent Diabetic patients: Take your diabetic medications as prescribed the day before surgery. Hold all diabetic medications the day of surgery. If you are scheduled to arrive for surgery after 8:00 AM, and your AM blood sugar is >200, please call Ambulatory Surgery. I understand a pre-operative phone call will be made to verify my surgery time. In the event that I am not available, I give permission for a message to be left on my answering service and/or with another person? yes    Reviewed by phone with pt, verbalized understanding     ___________________      ___________________      ________________  (Signature of Patient)          (Witness)                   (Date and Time)

## 2019-07-24 NOTE — PERIOP NOTES
Spoke with Micah Davila in Dr. Ashley Teran office. Dr. Madeline Mcallister does not require ASA hold for procedure. Lm on  fo rpt to not interrupt her ASA.

## 2019-07-25 DIAGNOSIS — F33.9 RECURRENT DEPRESSION (HCC): ICD-10-CM

## 2019-07-25 DIAGNOSIS — E03.9 HYPOTHYROIDISM, UNSPECIFIED TYPE: ICD-10-CM

## 2019-07-25 DIAGNOSIS — I73.9 PVD (PERIPHERAL VASCULAR DISEASE) (HCC): ICD-10-CM

## 2019-07-25 DIAGNOSIS — E11.40 TYPE 2 DIABETES MELLITUS WITH DIABETIC NEUROPATHY, WITH LONG-TERM CURRENT USE OF INSULIN (HCC): ICD-10-CM

## 2019-07-25 DIAGNOSIS — F41.9 ANXIETY: ICD-10-CM

## 2019-07-25 DIAGNOSIS — I10 ESSENTIAL HYPERTENSION, BENIGN: ICD-10-CM

## 2019-07-25 DIAGNOSIS — M81.0 AGE-RELATED OSTEOPOROSIS WITHOUT CURRENT PATHOLOGICAL FRACTURE: ICD-10-CM

## 2019-07-25 DIAGNOSIS — E78.2 MIXED HYPERLIPIDEMIA: ICD-10-CM

## 2019-07-25 DIAGNOSIS — Z79.4 TYPE 2 DIABETES MELLITUS WITH DIABETIC NEUROPATHY, WITH LONG-TERM CURRENT USE OF INSULIN (HCC): ICD-10-CM

## 2019-07-25 DIAGNOSIS — E66.9 OBESITY (BMI 30.0-34.9): ICD-10-CM

## 2019-07-25 DIAGNOSIS — I25.10 ASHD (ARTERIOSCLEROTIC HEART DISEASE): ICD-10-CM

## 2019-07-25 DIAGNOSIS — D50.8 OTHER IRON DEFICIENCY ANEMIA: ICD-10-CM

## 2019-07-26 LAB
BUN SERPL-MCNC: 14 MG/DL (ref 8–27)
BUN/CREAT SERPL: 17 (ref 12–28)
CALCIUM SERPL-MCNC: 9.9 MG/DL (ref 8.7–10.3)
CHLORIDE SERPL-SCNC: 105 MMOL/L (ref 96–106)
CO2 SERPL-SCNC: 20 MMOL/L (ref 20–29)
CREAT SERPL-MCNC: 0.82 MG/DL (ref 0.57–1)
ERYTHROCYTE [DISTWIDTH] IN BLOOD BY AUTOMATED COUNT: 14.8 % (ref 12.3–15.4)
EST. AVERAGE GLUCOSE BLD GHB EST-MCNC: 169 MG/DL
GLUCOSE SERPL-MCNC: 109 MG/DL (ref 65–99)
HBA1C MFR BLD: 7.5 % (ref 4.8–5.6)
HCT VFR BLD AUTO: 42 % (ref 34–46.6)
HGB BLD-MCNC: 13.2 G/DL (ref 11.1–15.9)
MCH RBC QN AUTO: 27.6 PG (ref 26.6–33)
MCHC RBC AUTO-ENTMCNC: 31.4 G/DL (ref 31.5–35.7)
MCV RBC AUTO: 88 FL (ref 79–97)
PLATELET # BLD AUTO: 193 X10E3/UL (ref 150–450)
POTASSIUM SERPL-SCNC: 4.1 MMOL/L (ref 3.5–5.2)
RBC # BLD AUTO: 4.79 X10E6/UL (ref 3.77–5.28)
SODIUM SERPL-SCNC: 144 MMOL/L (ref 134–144)
WBC # BLD AUTO: 8.5 X10E3/UL (ref 3.4–10.8)

## 2019-07-30 ENCOUNTER — OFFICE VISIT (OUTPATIENT)
Dept: INTERNAL MEDICINE CLINIC | Age: 79
End: 2019-07-30

## 2019-07-30 ENCOUNTER — ANESTHESIA EVENT (OUTPATIENT)
Dept: SURGERY | Age: 79
End: 2019-07-30
Payer: MEDICARE

## 2019-07-30 VITALS
HEIGHT: 62 IN | WEIGHT: 173 LBS | TEMPERATURE: 98 F | SYSTOLIC BLOOD PRESSURE: 136 MMHG | BODY MASS INDEX: 31.83 KG/M2 | HEART RATE: 70 BPM | RESPIRATION RATE: 16 BRPM | OXYGEN SATURATION: 97 % | DIASTOLIC BLOOD PRESSURE: 76 MMHG

## 2019-07-30 DIAGNOSIS — E11.40 TYPE 2 DIABETES MELLITUS WITH DIABETIC NEUROPATHY, WITH LONG-TERM CURRENT USE OF INSULIN (HCC): ICD-10-CM

## 2019-07-30 DIAGNOSIS — M81.0 AGE-RELATED OSTEOPOROSIS WITHOUT CURRENT PATHOLOGICAL FRACTURE: ICD-10-CM

## 2019-07-30 DIAGNOSIS — Z79.4 TYPE 2 DIABETES MELLITUS WITH DIABETIC NEUROPATHY, WITH LONG-TERM CURRENT USE OF INSULIN (HCC): ICD-10-CM

## 2019-07-30 DIAGNOSIS — I25.10 CORONARY ARTERY DISEASE INVOLVING NATIVE CORONARY ARTERY OF NATIVE HEART WITHOUT ANGINA PECTORIS: ICD-10-CM

## 2019-07-30 DIAGNOSIS — F33.9 RECURRENT DEPRESSION (HCC): ICD-10-CM

## 2019-07-30 DIAGNOSIS — I50.32 DIASTOLIC CHF, CHRONIC (HCC): ICD-10-CM

## 2019-07-30 DIAGNOSIS — I10 ESSENTIAL HYPERTENSION, BENIGN: ICD-10-CM

## 2019-07-30 DIAGNOSIS — E11.21 TYPE 2 DIABETES MELLITUS WITH NEPHROPATHY (HCC): Primary | ICD-10-CM

## 2019-07-30 DIAGNOSIS — I73.9 PVD (PERIPHERAL VASCULAR DISEASE) (HCC): ICD-10-CM

## 2019-07-30 DIAGNOSIS — E11.21 TYPE II DIABETES MELLITUS WITH NEPHROPATHY (HCC): ICD-10-CM

## 2019-07-30 DIAGNOSIS — D50.8 OTHER IRON DEFICIENCY ANEMIA: ICD-10-CM

## 2019-07-30 DIAGNOSIS — E66.9 OBESITY (BMI 30.0-34.9): ICD-10-CM

## 2019-07-30 DIAGNOSIS — E03.9 HYPOTHYROIDISM, UNSPECIFIED TYPE: ICD-10-CM

## 2019-07-30 DIAGNOSIS — E78.2 MIXED HYPERLIPIDEMIA: ICD-10-CM

## 2019-07-30 PROBLEM — R06.09 DOE (DYSPNEA ON EXERTION): Status: RESOLVED | Noted: 2019-01-09 | Resolved: 2019-07-30

## 2019-07-30 RX ORDER — METFORMIN HYDROCHLORIDE 1000 MG/1
1000 TABLET ORAL 2 TIMES DAILY WITH MEALS
Qty: 180 TAB | Refills: 0 | Status: SHIPPED | OUTPATIENT
Start: 2019-07-30 | End: 2019-12-05 | Stop reason: SDUPTHER

## 2019-07-30 NOTE — PATIENT INSTRUCTIONS
Office Policies    Phone calls/patient messages:            Please allow up to 24 hours for someone in the office to contact you about your call or message. Be mindful your provider may be out of the office or your message may require further review. We encourage you to use Currently for your messages as this is a faster, more efficient way to communicate with our office                         Medication Refills:            Prescription medications require 48-72 business hours to process. We encourage you to use Currently for your refills. For controlled medications: Please allow 72 business hours to process. Certain medications may require you to  a written prescription at our office. NO narcotic/controlled medications will be prescribed after 4pm Monday through Friday or on weekends              Form/Paperwork Completion:            Please note a $25 fee may incur for all paperwork for completed by our providers. We ask that you allow 7-10 business days. Pre-payment is due prior to picking up/faxing the completed form. You may also download your forms to Currently to have your doctor print off.

## 2019-07-30 NOTE — ANESTHESIA PREPROCEDURE EVALUATION
Anesthetic History   No history of anesthetic complications            Review of Systems / Medical History  Patient summary reviewed, nursing notes reviewed and pertinent labs reviewed    Pulmonary          Smoker (EX, 20 pk yr, quit 2010)      Comments: Former smoker 20 pack yrs   Neuro/Psych         Psychiatric history    Comments: Recurrent depression Cardiovascular    Hypertension      CHF    CAD, PAD (stents rt leg), CABG (3 vessel in 2011) and hyperlipidemia    Exercise tolerance: <4 METS  Comments: ECHO from 2016 showed a 55% EF with trivial MR and TR   GI/Hepatic/Renal     GERD    Renal disease: stones      Comments: GI bleed  Hx of diverticulosis with bleeding Endo/Other    Diabetes: poorly controlled, type 2, using insulin  Hypothyroidism: well controlled  Obesity and anemia     Other Findings   Comments: Bilateral Carpal Tunnel Syndrome    Osteoporosis  Diverticulosis         Physical Exam    Airway  Mallampati: II  TM Distance: 4 - 6 cm  Neck ROM: normal range of motion   Mouth opening: Normal     Cardiovascular    Rhythm: regular  Rate: normal         Dental    Dentition: Lower partial plate and Poor dentition  Comments: Missing multiple teeth, none loose   Pulmonary  Breath sounds clear to auscultation              Comments: Shallow inspirations Abdominal  GI exam deferred       Other Findings            Anesthetic Plan    ASA: 3  Anesthesia type: MAC    Monitoring Plan: BIS      Induction: Intravenous  Anesthetic plan and risks discussed with: Patient      Previous grade 1 view for CABG

## 2019-07-30 NOTE — PROGRESS NOTES
HISTORY OF PRESENT ILLNESS  Cyndi Osorio is a 67 y.o. female. HPI   Last here 4/24/19. Pt is here for routine care.      BP is 136/76  Continues on metoprolol 50mg BID and norvasc 5mg  Denies orthostasis  Recall had angioedema with lisinopril and benicar-HCT in the past      BS at home 120 121 132 111 112 102 107 129 81 103 85 112 125  86 117 120 180  in the AM  In the PM 140s, 150s, 170, 125 76 202 83   Continues on metformin 1000mg BID  Pt is currently taking 22U BID of NPH 70/30 mix--increased since lov, doing well no lows  Discussed not taking her insulin the AM of her surg   She also takes ASA 81mg daily   Recall issues with hypoglycemia in the past, a1c under 7.5 at goal for her       Wt today is 173--up 2 lbs x lov   Discussed [employer]  Discussed diet and weight loss again      Reviewed labs 7/19   Will get labs today      Pt follows with Dr. Candelario Leal (cardio) for cad  No sx  Last visit was 1/9/19   Reviewed stress test 1/19: unchanged   next visit is 7/31/19 --no sx of cad    Pt follows annually with Dr. Alicia Mejias ([facility]) for her PAD  No claudication stable   Last visit was 3/19  Next visit will be 3/20  Will get notes     Pt saw Dr Stephanie Alvarez (ortho) regarding L thumb pain  Reviewed notes 7/15/19: Does desire surgical management of her severe right carpal tunnel syndrome. Risks benefits alternatives of an open carpal tunnel release are discussed her and she consents to proceed. A surgical date is chosen.   Carpal tunnel surg 8/19 with Dr Mita Saleh about insulin syringes after she gets her surgery   Discussed f/u with surgeon  Will call back for pens if she needs them post surg      Pt is now taking prilosec 20mg every other day for reflux, works well     Takes OTC vit D daily      Continues synthroid 100mcg daily    She takes this med separately from all meds      Continues on lipitor 80mg max dose for cholesterol   Pt is now taking zetia 10mg once daily as well  Welchol was too expensive now but zetia ok and grey well no issue     No longer on lexapro, doing well off medication not sad or down 7/19  She also has ativan to use prn (about 2x per week)      Lov, pt c/o being overly tired   Pt had sleep eval with Dr Eusebio Mcclendon   Reviewed notes 7/02/19: * The patient currently has a Moderate Risk for having sleep apnea. STOP-BANG score 3.  * PSG was ordered for initial evaluation. We will follow the American Academy of Sleep Medicine protocol regarding split-night procedures and offering a trial of Positive Airway Pressure (CPAP, BPAP, etc.)  * She was provided information on sleep apnea including coresponding risk factors and the importance of proper treatment. * Counseling was provided regarding proper sleep hygiene and safe driving. *Treatment options for sleep apnea were reviewed. she is not against a trial of PAP if found to have significant sleep apnea. (she will want an in-lab titration if PAP needed. She wants to try it before ordering one. She is unsure that she will tolerate it)  2. Hypertension - she continues on her current regimen. I have reviewed the relationship between hypertension as it relates to sleep-disordered breathing. 3. Coronary Artery Disease - she continues on his current regimen. I have reviewed the relationship between heart disease and sleep disordered breathing. 4.Type II diabetes - she continues on her current regimen.   I have reviewed the relationship between sleep disordered breathing as it relates to diabetes.   she c      She canceled sleep study, will reschedule once hand surgery complete    Lov, Pt c/o pain in her R shoulder  Has started PT and is doing this, helping--has gone 2 times so far    Reviewed mammo 3/19: nl     ACP on file. SDMs are her grandson Spenser Leader), daughter Santa Coats) and brother Rafal Cerda) .      PREVENTIVE:    Colonoscopy: Dr. Chary Washington 5 years  Pap: 12/14, declines further   Mammogram: 3/4/19, negative   DEXA: 3/18, osteopenic, stopped fosamax   Tdap: 1/04/2016  Pneumovax: 10/10/2010  [id number]: 9/17  Zostavax: 10/10/2016  Shingrix: 1st round completed 4/19, 2nd round due   Flu shot: 10/19/18  Foot exam:07/30/19   Microalbumin: 10/18  A1c:  12/16 7.2, 3/17 7.3, 6/17 7.3, 9/17 6.9, 12/17 8.5, 4/18 8.4, 7/18 8.0, 10/18 8.0, 1/19 8.4, 4/19 8.4, 7/19 7.5   [facility] exam: [facility] Rhea Madison [facility], 9/12/18  Lipids: 10/18 LDL 90       Patient Active Problem List    Diagnosis Date Noted    Type 2 diabetes mellitus with diabetic neuropathy ([facility] [address] 01/22/2019    STOUT (dyspnea on exertion) 01/09/2019    Type 2 diabetes mellitus with nephropathy ([address] 01/02/2018    Recurrent depression ([address] 01/02/2018    Encounter for long-term (current) use of insulin ([address] 04/04/2016    Type II diabetes mellitus with nephropathy ([address] 01/04/2016    Diverticulosis of intestine with bleeding 10/11/2015    Anemia 43/91/5547    Diastolic CHF, chronic ([address] 10/11/2015    GIB (gastrointestinal bleeding) 10/08/2015    ACP (advance care planning) 09/08/2015    Osteoporosis 09/10/2013    Bunion 10/15/2012    Essential hypertension, benign 05/17/2012    Mixed hyperlipidemia 05/17/2012    Postsurgical percutaneous transluminal coronary angioplasty status 05/17/2012    Postsurgical aortocoronary bypass status 05/17/2012    S/P CABG x 3 11/11/2011    Hyperlipidemia 10/18/2011    Influenza 01/19/2011    CAD (coronary artery disease) 11/18/2009    Hypothyroidism 11/18/2009    PVD (peripheral vascular disease) ([address] 11/18/2009     Current Outpatient Medications   Medication Sig Dispense Refill    Omeprazole delayed release (PRILOSEC D/R) 20 mg tablet Take 1 Tab by mouth daily. 90 Tab 1    gabapentin (NEURONTIN) 100 mg capsule Take 1 Cap by mouth two (2) times a day. 60 Cap 1    ezetimibe (ZETIA) 10 mg tablet Take 1 Tab by mouth daily.  30 Tab 6    insulin NPH/insulin regular (HUMULIN 70/30 U-100 INSULIN) 100 unit/mL (70-30) injection 22 Units by SubCUTAneous route two (2) times a day. 10 mL 0    LORazepam (ATIVAN) 0.5 mg tablet Take 1 Tab by mouth nightly as needed for Anxiety. Max Daily Amount: 0.5 mg. 30 Tab 2    flash glucose sensor (FREESTYLE KEYLA 14 DAY SENSOR) kit one 4 Kit 3    flash glucose sensor (FREESTYLE KEYLA 14 DAY SENSOR) kit Check glucose twice daily 2 Kit 2    flash glucose scanning reader (FREESTYLE KEYLA 14 DAY READER) misc daily 1 Each 2    atorvastatin (LIPITOR) 80 mg tablet TAKE 1 TABLET NIGHTLY 90 Tab 1    amLODIPine (NORVASC) 5 mg tablet Take 1 Tab by mouth daily. 90 Tab 1    levothyroxine (SYNTHROID) 100 mcg tablet Take 1 Tab by mouth Daily (before breakfast). 90 Tab 1    metoprolol tartrate (LOPRESSOR) 50 mg tablet Take 1 Tab by mouth two (2) times a day. 180 Tab 2    FREESTYLE LITE STRIPS strip USE ONE STRIP TO CHECK GLUCOSE TWICE DAILY 100 Strip 12    metFORMIN (GLUCOPHAGE) 1,000 mg tablet Take 1 Tab by mouth two (2) times daily (with meals). 180 Tab 0    cholecalciferol (VITAMIN D3) 1,000 unit cap Take 1,000 Units by mouth daily.  aspirin 81 mg chewable tablet Take 1 Tab by mouth daily. 90 Tab 3    cyanocobalamin (VITAMIN B-12) 1,000 mcg tablet Take 1,000 mcg by mouth daily.        Past Surgical History:   Procedure Laterality Date    CARDIAC SURG PROCEDURE UNLIST      cabg x 3    COLONOSCOPY N/A 10/31/2018    COLON[facility] by Iv[facility] ENDOSCOPY    HX CORONARY ARTERY BYPASS GRAFT  11/11/11    3 vessel    HX GYN      hysterectomy    HX HEART CATHETERIZATION      HX UROLOGICAL  1970s    kidney stone    VASCULAR SURGERY PROCEDURE UNLIST      Stents put in right leg      Lab Results   Component Value Date/Time    WBC 8.5 07/25/2019 09:36 AM    HGB 13.2 07/25/2019 09:36 AM    Hemoglobin (POC) 12.9 03/02/2011 12:21 PM    HCT 42.0 07/25/2019 09:36 AM    PLATELET 576 59/92/4816 09:36 AM    MCV 88 07/25/2019 09:36 AM     Lab Results   Component Value Date/Time    Cholesterol, total 180 10/19/2018 02:14 PM    HDL Cholesterol 64 10/19/2018 02:14 PM    LDL, calculated 90 10/19/2018 02:14 PM    Triglyceride 129 10/19/2018 02:14 PM    CHOL/HDL Ratio 3.2 09/14/2010 08:42 AM     Lab Results   Component Value Date/Time    GFR est non-AA 69 07/25/2019 09:36 AM    GFR est AA 79 07/25/2019 09:36 AM    Creatinine 0.82 07/25/2019 09:36 AM    BUN 14 07/25/2019 09:36 AM    Sodium 144 07/25/2019 09:36 AM    Potassium 4.1 07/25/2019 09:36 AM    Chloride 105 07/25/2019 09:36 AM    CO2 20 07/25/2019 09:36 AM    Magnesium 1.7 10/16/2015 04:34 AM        Review of Systems   Respiratory: Negative for shortness of breath. Cardiovascular: Negative for chest pain. Physical Exam   Constitutional: She is oriented to person, place, and time. She appears well-developed and well-nourished. No distress. HENT:   Head: Normocephalic and atraumatic. Eyes: Conjunctivae and EOM are normal. Right eye exhibits no discharge. Left eye exhibits no discharge. Neck: Normal range of motion. Neck supple. Cardiovascular: Normal rate, regular rhythm and normal heart sounds. Exam reveals no gallop and no friction rub. No murmur heard. Pulmonary/Chest: Effort normal and breath sounds normal. No respiratory distress. She has no wheezes. She has no rales. She exhibits no tenderness. Musculoskeletal: She exhibits no edema, tenderness or deformity. Lymphadenopathy:     She has no cervical adenopathy. Neurological: She is alert and oriented to person, place, and time. Coordination abnormal.   Monofilament nl BLE, good peripheral pulses, no ulcers    Skin: Skin is warm and dry. No rash noted. She is not diaphoretic. No erythema. No pallor. Psychiatric: She has a normal mood and affect. Her behavior is normal.       ASSESSMENT and PLAN    ICD-10-CM ICD-9-CM    1.  Type 2 diabetes mellitus with nephropathy (HCC)              Just had labs completed, a1c improved to 7.5 which is adequate for her age, continue NPH 70/30 mix, 22U BID, continue metformin 1000 BID     Hold insulin morning of surgery E11.21 250.40      583.81    2. PVD (peripheral vascular disease) ([facility] [address]            Follows with Dr Yaya Moore, just saw him in 3/19, will see him annually, asymptomatic  I73.9 443.9    3. Diastolic CHF, chronic (HCC)              Medically managed, utd with Dr Bill Rios  I50.32 428.32      428.0    4. Coronary artery disease involving native coronary artery of native heart without angina pectoris              Medically managed, utd with Dr Bill Rios  I25.10 414.01    5. Type 2 diabetes mellitus with diabetic neuropathy, with long-term current use of insulin (HCC)        See above  E11.40 250.60     Z79.4 357.2      V58.67    6. Recurrent depression ([facility] [address]              Improved, doing well w/o lexapro, has ativan to use a few times per week if needed  F33.9 296.30    7. Hypothyroidism, unspecified type              Check TFTs prior to f/u, has been controlled on synthroid 100 mcgs daily  E03.9 244.9    8. Essential hypertension, benign              Controlled on metropolol and norvasc  I10 401.1    9. Age-related osteoporosis without current pathological fracture            Previously on fossamax, improved on last DEXA, repeat dexa 3/2020. Continue vit D  M81.0 733.01    10. Mixed hyperlipidemia            Controlled on lipitor and zetia, repeat lipids with next blood draw  E78.2 272.2    11. Other iron deficiency anemia          Resolved on last labs    D50.8 280.8    12. Obesity (BMI 30.0-34. 9)        Work on diet wt loss and [employer]  E66.9 278.00           Scribed by Jose Gillespie of [address] [place] [address] [address], as dictated by Dr. Erich Loco. Current diagnosis and concerns discussed with pt at length. Pt understands risks and benefits or current treatment plan and medications, and accepts the treatment and medication with any possible risks. Pt asks appropriate questions, which were answered. Pt was instructed to call with any concerns or problems.       I have reviewed the note documented by the scribe. The services provided are my own.   The documentation is accurate

## 2019-07-31 NOTE — H&P
Comes today for EMG follow-up. Notes that symptoms are worse on the right at this time. EMG is notable for severe carpal tunnel syndrome on the right and a mild left median neuropathy. Past medical history past surgical history review of systems reviewed on prior intake form     Objective:   Constitutional:  No acute distress. Well nourished. Well developed. Eyes:  Sclera are nonicteric. Respiratory:  No labored breathing. Cardiovascular:  No marked edema. Skin:  No marked skin ulcers. Neurological:  see below  Psychiatric: Alert and oriented x3. Musculoskeletal   Examination of bilateral upper extremities is stable from prior.     Radiographs:       No imaging obtained      Assessment:      1. Bilateral carpal tunnel syndrome          Plan:   Does desire surgical management of her severe right carpal tunnel syndrome. Risks benefits alternatives of an open carpal tunnel release are discussed her and she consents to proceed. A surgical date is chosen. Date of Surgery Update:  Ct Zimmerman was seen and examined. History and physical has been reviewed. The patient has been examined.  There have been no significant clinical changes since the completion of the originally dated History and Physical.    Signed By: Carlitos Delgado MD     August 1, 2019 9:06 AM

## 2019-08-01 ENCOUNTER — HOSPITAL ENCOUNTER (OUTPATIENT)
Age: 79
Setting detail: OUTPATIENT SURGERY
Discharge: HOME | End: 2019-08-01
Attending: ORTHOPAEDIC SURGERY | Admitting: ORTHOPAEDIC SURGERY
Payer: MEDICARE

## 2019-08-01 ENCOUNTER — ANESTHESIA (OUTPATIENT)
Dept: SURGERY | Age: 79
End: 2019-08-01
Payer: MEDICARE

## 2019-08-01 VITALS
RESPIRATION RATE: 22 BRPM | HEIGHT: 62 IN | HEART RATE: 68 BPM | WEIGHT: 172 LBS | OXYGEN SATURATION: 97 % | BODY MASS INDEX: 31.65 KG/M2 | SYSTOLIC BLOOD PRESSURE: 147 MMHG | TEMPERATURE: 97.8 F | DIASTOLIC BLOOD PRESSURE: 72 MMHG

## 2019-08-01 DIAGNOSIS — G89.18 POSTOPERATIVE PAIN: Primary | ICD-10-CM

## 2019-08-01 LAB
GLUCOSE BLD STRIP.AUTO-MCNC: 112 MG/DL (ref 65–100)
GLUCOSE BLD STRIP.AUTO-MCNC: 120 MG/DL (ref 65–100)
SERVICE CMNT-IMP: ABNORMAL
SERVICE CMNT-IMP: ABNORMAL

## 2019-08-01 PROCEDURE — 77030021352 HC CBL LD SYS DISP COVD -B: Performed by: ORTHOPAEDIC SURGERY

## 2019-08-01 PROCEDURE — 74011250636 HC RX REV CODE- 250/636: Performed by: NURSE ANESTHETIST, CERTIFIED REGISTERED

## 2019-08-01 PROCEDURE — 77030000032 HC CUF TRNQT ZIMM -B: Performed by: ORTHOPAEDIC SURGERY

## 2019-08-01 PROCEDURE — 77030002916 HC SUT ETHLN J&J -A: Performed by: ORTHOPAEDIC SURGERY

## 2019-08-01 PROCEDURE — 76030000000 HC AMB SURG OR TIME 0.5 TO 1: Performed by: ORTHOPAEDIC SURGERY

## 2019-08-01 PROCEDURE — 76060000061 HC AMB SURG ANES 0.5 TO 1 HR: Performed by: ORTHOPAEDIC SURGERY

## 2019-08-01 PROCEDURE — 76210000050 HC AMBSU PH II REC 0.5 TO 1 HR: Performed by: ORTHOPAEDIC SURGERY

## 2019-08-01 PROCEDURE — 74011250636 HC RX REV CODE- 250/636: Performed by: ANESTHESIOLOGY

## 2019-08-01 PROCEDURE — 77030040356 HC CORD BPLR FRCP COVD -A: Performed by: ORTHOPAEDIC SURGERY

## 2019-08-01 PROCEDURE — 77030018836 HC SOL IRR NACL ICUM -A: Performed by: ORTHOPAEDIC SURGERY

## 2019-08-01 PROCEDURE — 74011250636 HC RX REV CODE- 250/636: Performed by: ORTHOPAEDIC SURGERY

## 2019-08-01 PROCEDURE — 77030020255 HC SOL INJ LR 1000ML BG: Performed by: ORTHOPAEDIC SURGERY

## 2019-08-01 PROCEDURE — 76210000040 HC AMBSU PH I REC FIRST 0.5 HR: Performed by: ORTHOPAEDIC SURGERY

## 2019-08-01 PROCEDURE — 74011000250 HC RX REV CODE- 250: Performed by: ORTHOPAEDIC SURGERY

## 2019-08-01 PROCEDURE — 82962 GLUCOSE BLOOD TEST: CPT

## 2019-08-01 RX ORDER — FENTANYL CITRATE 50 UG/ML
INJECTION, SOLUTION INTRAMUSCULAR; INTRAVENOUS AS NEEDED
Status: DISCONTINUED | OUTPATIENT
Start: 2019-08-01 | End: 2019-08-01 | Stop reason: HOSPADM

## 2019-08-01 RX ORDER — FENTANYL CITRATE 50 UG/ML
25 INJECTION, SOLUTION INTRAMUSCULAR; INTRAVENOUS
Status: DISCONTINUED | OUTPATIENT
Start: 2019-08-01 | End: 2019-08-01 | Stop reason: HOSPADM

## 2019-08-01 RX ORDER — SODIUM CHLORIDE 0.9 % (FLUSH) 0.9 %
5-40 SYRINGE (ML) INJECTION AS NEEDED
Status: DISCONTINUED | OUTPATIENT
Start: 2019-08-01 | End: 2019-08-01 | Stop reason: HOSPADM

## 2019-08-01 RX ORDER — BUPIVACAINE HYDROCHLORIDE 2.5 MG/ML
INJECTION, SOLUTION EPIDURAL; INFILTRATION; INTRACAUDAL AS NEEDED
Status: DISCONTINUED | OUTPATIENT
Start: 2019-08-01 | End: 2019-08-01 | Stop reason: HOSPADM

## 2019-08-01 RX ORDER — DIPHENHYDRAMINE HYDROCHLORIDE 50 MG/ML
12.5 INJECTION, SOLUTION INTRAMUSCULAR; INTRAVENOUS
Status: DISCONTINUED | OUTPATIENT
Start: 2019-08-01 | End: 2019-08-01 | Stop reason: HOSPADM

## 2019-08-01 RX ORDER — ONDANSETRON 2 MG/ML
INJECTION INTRAMUSCULAR; INTRAVENOUS AS NEEDED
Status: DISCONTINUED | OUTPATIENT
Start: 2019-08-01 | End: 2019-08-01 | Stop reason: HOSPADM

## 2019-08-01 RX ORDER — PROPOFOL 10 MG/ML
INJECTION, EMULSION INTRAVENOUS AS NEEDED
Status: DISCONTINUED | OUTPATIENT
Start: 2019-08-01 | End: 2019-08-01 | Stop reason: HOSPADM

## 2019-08-01 RX ORDER — SODIUM CHLORIDE, SODIUM LACTATE, POTASSIUM CHLORIDE, CALCIUM CHLORIDE 600; 310; 30; 20 MG/100ML; MG/100ML; MG/100ML; MG/100ML
25 INJECTION, SOLUTION INTRAVENOUS CONTINUOUS
Status: DISCONTINUED | OUTPATIENT
Start: 2019-08-01 | End: 2019-08-01 | Stop reason: HOSPADM

## 2019-08-01 RX ORDER — CEFAZOLIN SODIUM/WATER 2 G/20 ML
2 SYRINGE (ML) INTRAVENOUS ONCE
Status: COMPLETED | OUTPATIENT
Start: 2019-08-01 | End: 2019-08-01

## 2019-08-01 RX ORDER — MORPHINE SULFATE 10 MG/ML
2 INJECTION, SOLUTION INTRAMUSCULAR; INTRAVENOUS
Status: DISCONTINUED | OUTPATIENT
Start: 2019-08-01 | End: 2019-08-01 | Stop reason: HOSPADM

## 2019-08-01 RX ORDER — SODIUM CHLORIDE 0.9 % (FLUSH) 0.9 %
5-40 SYRINGE (ML) INJECTION EVERY 8 HOURS
Status: DISCONTINUED | OUTPATIENT
Start: 2019-08-01 | End: 2019-08-01 | Stop reason: HOSPADM

## 2019-08-01 RX ORDER — LIDOCAINE HYDROCHLORIDE 10 MG/ML
INJECTION, SOLUTION EPIDURAL; INFILTRATION; INTRACAUDAL; PERINEURAL AS NEEDED
Status: DISCONTINUED | OUTPATIENT
Start: 2019-08-01 | End: 2019-08-01 | Stop reason: HOSPADM

## 2019-08-01 RX ORDER — TRAMADOL HYDROCHLORIDE 50 MG/1
50 TABLET ORAL
Qty: 20 TAB | Refills: 0 | Status: SHIPPED | OUTPATIENT
Start: 2019-08-01 | End: 2019-08-06

## 2019-08-01 RX ORDER — LIDOCAINE HYDROCHLORIDE 10 MG/ML
0.1 INJECTION, SOLUTION EPIDURAL; INFILTRATION; INTRACAUDAL; PERINEURAL AS NEEDED
Status: DISCONTINUED | OUTPATIENT
Start: 2019-08-01 | End: 2019-08-01 | Stop reason: HOSPADM

## 2019-08-01 RX ORDER — LIDOCAINE HYDROCHLORIDE 20 MG/ML
INJECTION, SOLUTION EPIDURAL; INFILTRATION; INTRACAUDAL; PERINEURAL AS NEEDED
Status: DISCONTINUED | OUTPATIENT
Start: 2019-08-01 | End: 2019-08-01 | Stop reason: HOSPADM

## 2019-08-01 RX ORDER — MIDAZOLAM HYDROCHLORIDE 1 MG/ML
INJECTION, SOLUTION INTRAMUSCULAR; INTRAVENOUS AS NEEDED
Status: DISCONTINUED | OUTPATIENT
Start: 2019-08-01 | End: 2019-08-01 | Stop reason: HOSPADM

## 2019-08-01 RX ORDER — ONDANSETRON 2 MG/ML
4 INJECTION INTRAMUSCULAR; INTRAVENOUS AS NEEDED
Status: DISCONTINUED | OUTPATIENT
Start: 2019-08-01 | End: 2019-08-01 | Stop reason: HOSPADM

## 2019-08-01 RX ADMIN — FENTANYL CITRATE 50 MCG: 50 INJECTION, SOLUTION INTRAMUSCULAR; INTRAVENOUS at 09:14

## 2019-08-01 RX ADMIN — Medication 2 G: at 09:22

## 2019-08-01 RX ADMIN — MIDAZOLAM HYDROCHLORIDE 1 MG: 1 INJECTION, SOLUTION INTRAMUSCULAR; INTRAVENOUS at 09:14

## 2019-08-01 RX ADMIN — PROPOFOL 20 MG: 10 INJECTION, EMULSION INTRAVENOUS at 09:39

## 2019-08-01 RX ADMIN — LIDOCAINE HYDROCHLORIDE 40 MG: 20 INJECTION, SOLUTION EPIDURAL; INFILTRATION; INTRACAUDAL; PERINEURAL at 09:20

## 2019-08-01 RX ADMIN — ONDANSETRON HYDROCHLORIDE 4 MG: 2 INJECTION, SOLUTION INTRAMUSCULAR; INTRAVENOUS at 09:22

## 2019-08-01 RX ADMIN — PROPOFOL 10 MG: 10 INJECTION, EMULSION INTRAVENOUS at 09:35

## 2019-08-01 RX ADMIN — PROPOFOL 30 MG: 10 INJECTION, EMULSION INTRAVENOUS at 09:28

## 2019-08-01 RX ADMIN — PROPOFOL 30 MG: 10 INJECTION, EMULSION INTRAVENOUS at 09:26

## 2019-08-01 RX ADMIN — SODIUM CHLORIDE, SODIUM LACTATE, POTASSIUM CHLORIDE, AND CALCIUM CHLORIDE 25 ML/HR: 600; 310; 30; 20 INJECTION, SOLUTION INTRAVENOUS at 09:00

## 2019-08-01 NOTE — DISCHARGE INSTRUCTIONS
[facility]  Post-operative instructions  For: Jose Angel Cottonlman    Your first postop appointment should be scheduled with Dr. Adriana Hdz for 2-3 weeks post-op. [address], [address]  [address], [place]  Phone: [phone]  Hand Therapy Phone: [phone]  Fax: [phone]    Please follow these instructions for a safe and speedy recovery:    1. Surgical Bandage: Leave the bandage in place until 2 weeks after surgery. Please keep it clean and dry. To shower or bathe, apply a plastic bag or GLAD Press'n Seal® plastic wrap around the bandage or simply sponge bathe. After 2 weeks, you can remove the dressing and get incision wet but NO SOAKING. 2. Elevation: Hand swelling is best prevented by keeping your hand elevated above the level of your heart at all times, night and day. The opposite, dangling your hand below your waist, will cause additional pain, swelling, and later stiffness. You can elevate the hand in a sling or by propping it on a pillow at night. Occasionally, we will provide you with a custom-made foam block for elevating the arm. Ice compresses may help but do not rep[lace elevation. Frequently, extreme pain is caused by a tight bandage, which should be loosened. If pain is severe and progressive, call us at [phone] during the day (ask for immediate connection to Dr. Claudell Lindsay Team) or during the night (ask for the on-call physician). 3. Medication: You will be provided with an appropriate pain medication (over-the-counter or prescription). Please fill this at a pharmacy promptly so you will have it available when all local anesthetic wears off. Take this to relieve pain as directed on the bottle. Please refrain from driving, drinking alcohol, and making important medical decisions while taking the medication. Please call us if you need something stronger.  Medication changes or refills must be made before 5pm or through your pharmacy. 4. Weight bearing: Do NOT bear any weight on the operative extremity for the first 2 weeks after surgery. After 2 weeks, you have a 5 pound weight lifting restriction. I want to thank you for choosing us for your hand care needs. My staff and I are committed to providing all our customers with the highest quality hand surgery and subsequent hand therapy care as possible. We want your recovery to be comfortable. If you have clinical non-emergent questions about your surgery or other hand conditions please call [phone] and ask for my team. Your call will be returned within 24 hours. TO PREVENT AN INFECTION      1. [address] YOUR HANDS     To prevent infection, good handwashing is the most important thing you or your caregiver can do.  Wash your hands with soap and water or use the hand  we gave you before you touch any wounds. 2. SHOWER     Use the antibacterial soap we gave you when you take a shower.  Shower with this soap until your wounds are healed.  To reach all areas of your body, you may need someone to help you.  Dont forget to clean your belly button with every shower. 3.  USE CLEAN SHEETS     Use freshly cleaned sheets on your bed after surgery.  To keep the surgery site clean, do not allow pets to sleep with you while your wound is still healing. 4. STOP SMOKING     Stop smoking, or at least cut back on smoking     Smoking slows your healing. 5.  CONTROL YOUR BLOOD SUGAR     High blood sugars slow wound healing.  If you are diabetic, control your blood sugar levels before and after your surgery. DO NOT TAKE TYLENOL/ACETAMINOPHEN WITH PERCOCET, [facility], [address] TAKE NARCOTIC PAIN MEDICATIONS WITH FOOD     Narcotics tend to be constipating, we suggest taking a stool softener such as Colace or Miralax (follow package instructions).   DO NOT DRIVE WHILE TAKING NARCOTIC PAIN MEDICATIONS. DO NOT TAKE SLEEPING MEDICATIONS OR ANTIANXIETY MEDICATIONS WHILE TAKING NARCOTIC PAIN MEDICATIONS,  ESPECIALLY THE NIGHT OF ANESTHESIA! CPAP PATIENTS BE SURE TO WEAR MACHINE WHENEVER NAPPING OR SLEEPING! DISCHARGE SUMMARY from Nurse    The following personal items collected during your admission are returned to you:   Dental Appliance: Dental Appliances: None  Vision: Visual Aid: Glasses(in PACU)  Hearing Aid:    Jewelry: Jewelry: None  Clothing: Clothing: With patient  Other Valuables:    Valuables sent to safe:        PATIENT INSTRUCTIONS:    After General Anesthesia or Intravenous Sedation, for 24 hours or while taking prescription Narcotics:        Someone should be with you for the next 24 hours. For your own safety, a responsible adult must drive you home. · Limit your activities  · Recommended activity: Rest today, up with assistance today. Do not climb stairs or shower unattended for the next 24 hours. · Please start with a soft bland diet and advance as tolerated (no nausea) to regular diet. · If you have a sore throat you should try the following: fluids, warm salt water gargles, or throat lozenges. If it does not improve after several days please follow up with your primary physician. · Do not drive and operate hazardous machinery  · Do not make important personal or business decisions  · Do  not drink alcoholic beverages  · If you have not urinated within 8 hours after discharge, please contact your surgeon on call.     Report the following to your surgeon:  · Excessive pain, swelling, redness or odor of or around the surgical area  · Temperature over 100.5  · Nausea and vomiting lasting longer than 4 hours or if unable to take medications  · Any signs of decreased circulation or nerve impairment to extremity: change in color, persistent  numbness, tingling, coldness or increase pain      · You will receive a Post Operative Call from one of the Recovery Room Nurses on the day after your surgery to check on you. It is very important for us to know how you are recovering after your surgery. If you have an issue or need to speak with someone, please call your surgeon, do not wait for the post operative call. · You may receive an e-mail or letter in the mail from [employer] regarding your experience with us in the Ambulatory Surgery Unit. Your feedback is valuable to us and we appreciate your participation in the survey. · If the above instructions are not adequate or you are having problems after your surgery, call the physician at their office number. · We wish you a speedy recovery ? What to do at Home:      *  Please give a list of your current medications to your Primary Care Provider. *  Please update this list whenever your medications are discontinued, doses are      changed, or new medications (including over-the-counter products) are added. *  Please carry medication information at all times in case of emergency situations. If you have not received your influenza and/or pneumococcal vaccine, please follow up with your primary care physician. The discharge information has been reviewed with the patient and caregiver. The patient and caregiver verbalized understanding.

## 2019-08-01 NOTE — ANESTHESIA POSTPROCEDURE EVALUATION
Procedure(s):  RIGHT CARPAL TUNNEL RELEASE.    general    Anesthesia Post Evaluation        Patient location during evaluation: PACU  Note status: Adequate. Level of consciousness: responsive to verbal stimuli and sleepy but conscious  Pain management: satisfactory to patient  Airway patency: patent  Anesthetic complications: no  Cardiovascular status: acceptable  Respiratory status: acceptable  Hydration status: acceptable  Comments: +Post-Anesthesia Evaluation and Assessment    Patient: Nehemias Junior MRN: [id number]  SSN: [id number]   YOB: 1940  Age: 67 y.o. Sex: female      Cardiovascular Function/Vital Signs    /63   Pulse 67   Temp 36.5 °C (97.7 °F)   Resp 18   Ht 5' 2\" (1.575 m)   Wt 78 kg (172 lb)   SpO2 100%   BMI 31.46 kg/m²     Patient is status post Procedure(s):  RIGHT CARPAL TUNNEL RELEASE. Nausea/Vomiting: Controlled. Postoperative hydration reviewed and adequate. Pain:  Pain Scale 1: Numeric (0 - 10) (08/01/19 0955)  Pain Intensity 1: 0 (08/01/19 0955)   Managed. Neurological Status:   Neuro (WDL): Exceptions to WDL (08/01/19 0955)   At baseline. Mental Status and Level of Consciousness: Arousable. Pulmonary Status:   O2 Device: Nasal cannula (08/01/19 0955)   Adequate oxygenation and airway patent. Complications related to anesthesia: None    Post-anesthesia assessment completed. No concerns.     Signed By: Carmelina Merlos MD    8/1/2019  Post anesthesia nausea and vomiting:  controlled      Vitals Value Taken Time   /63 8/1/2019  9:50 AM   Temp 36.5 °C (97.7 °F) 8/1/2019  9:50 AM   Pulse 67 8/1/2019  9:50 AM   Resp 18 8/1/2019  9:50 AM   SpO2 100 % 8/1/2019  9:55 AM

## 2019-08-01 NOTE — PERIOP NOTES
Brittany Rajiv  1940  [id number]    Situation:  Verbal report given from: RN and CRNA  Procedure: Procedure(s):  RIGHT CARPAL TUNNEL RELEASE    Background:    Preoperative diagnosis: Bilateral carpal tunnel syndrome [G56.03]    Postoperative diagnosis: Bilateral carpal tunnel syndrome [G56.03]    :  Dr. Reece Koroma    Assistant(s): Circ-1: Neetu Matta RN  Scrub RN-1: Corrinne Pringle, RN    Specimens: * No specimens in log *    Assessment:  Intra-procedure medications         Anesthesia gave intra-procedure sedation and medications, see anesthesia flow sheet     Intravenous fluids: LR@ KVO     Vital signs stable.  Pt very groggy but breathing well on own      Recommendation:    Permission to share finding with friend/[place]ano : yes

## 2019-08-01 NOTE — PERIOP NOTES
Pt. Alert. Denies pain or chill. Discharge instructions reviewed with caregiver and patient. Allowed and answered questions. Tolerating PO fluids. Both state ready for discharge. 1045 Discharged to car without incident.  Requesting sling to keep from putting weight on-placed on pt and shown family

## 2019-08-01 NOTE — OP NOTES
PATIENT NAME:  Wil Gauthier    SURGEON:  Sarah Castillo MD    DATE OF SURGERY:  8/1/2019    LOCATION: [facility]    PREOPERATIVE DIAGNOSIS:   Right carpal tunnel syndrome    POSTOPERATIVE DIAGNOSIS:  Same    PROCEDURE:  Right Open carpal tunnel decompression             ANESTHESIA:  Local (1% lidocaine with 0.25% bupivicaine in a 1:1 mixture) with sedation     BLOOD LOSS:  Minimal    TOURNIQUET TIME:  9 min    OPERATIVE INDICATIONS: The patient is a 67 y.o. old female who has developed progressive right carpal tunnel syndrome, unresponsive to all conservative treatment. The patient has classic symptoms and signs of median nerve entrapment including median distribution paresthesias and numbness, nocturnal wakening, positive Tinel's sign, and positive wrist flexion test. Symptoms have failed to respond consistently to conservative treatment such that patient has elected to undergo carpal tunnel decompression. She understands the alternatives to surgery, the nature of this elective procedure, the usual recovery, possible variations in healing, and the potential for shortcomings and complications (including but not exclusive to bleeding, infection, scar tenderness,  weakness, residual numbness, or thenar muscle weakness). DESCRIPTION  OF PROCEDURE: Patient identified correctly in the pre-operative holding area and correct extremity marked. Was then taken stable to the operating room and placed supine with the operative extremity on a hand table. After sedation was administered by the anesthesia team, the right hand and forearm were prepped and draped in a sterile field. A timeout was taken and the operative site was confirmed. Local anesthesia was instilled into the wound. The extremity was then elevated and exsanguinated and an upper arm tourniquet was inflated to 250 mm of mercury.  An incision was made in the proximal palm in line with the radial side of the ring finger from the distal wrist crease to Kaplans line. Dissection was carried through the subcutaneous tissue and the transverse carpal ligament was visualized. This was released under direct visualization first distally followed by proximally and carried up past the wrist wrist crease. A complete decompression was confirmed by direct visualization of the decompressed median nerve as well as palpation. No other synovial pathology was noted. The tourniquet was then released. The nerve was noted to become hyperemic. Copious irrigation was performed. Hemostasis was obtained with bipolar cautery and the wound was closed with 3-0 nylon sutures. A sterile dressing was then applied leaving the fingers free for range of motion. The patient tolerated the procedure well and was discharged to the recovery area uneventfully. All instrument needle and lap counts were correct at the end of the case.

## 2019-08-01 NOTE — PERIOP NOTES
Permission received to review discharge instructions and discuss private health information with family friend, Jacqueline Armand. Patient states that daughter will be with them for at least 24 hours following today's procedure. Francis Paws applied at this time and set to appropriate temperature. Patient given remote and instructed on use. Will continue to monitor.

## 2019-08-08 DIAGNOSIS — E11.21 TYPE II DIABETES MELLITUS WITH NEPHROPATHY (HCC): ICD-10-CM

## 2019-08-08 NOTE — TELEPHONE ENCOUNTER
PCP: Gordon Landry MD    Last appt: 2019  Future Appointments   Date Time Provider Gen Paige   10/30/2019  1:00 PM Gordon Landry MD [facility]       Requested Prescriptions     Pending Prescriptions Disp Refills    insulin NPH/insulin regular (HUMULIN 70/30 U-100 INSULIN) 100 unit/mL (70-30) injection 10 mL 0     Si Units by SubCUTAneous route two (2) times a day.

## 2019-08-08 NOTE — TELEPHONE ENCOUNTER
Pt requesting refill of Humulin 70/30 insulin at [facility], already on file.        Message received & copied from [facility]

## 2019-08-16 RX ORDER — EZETIMIBE 10 MG/1
10 TABLET ORAL DAILY
Qty: 30 TAB | Refills: 6 | Status: SHIPPED | OUTPATIENT
Start: 2019-08-16 | End: 2020-08-17

## 2019-09-05 DIAGNOSIS — E11.21 TYPE II DIABETES MELLITUS WITH NEPHROPATHY (HCC): ICD-10-CM

## 2019-09-05 NOTE — TELEPHONE ENCOUNTER
PCP: Derek Gutierrez MD    Last appt: 2019  Future Appointments   Date Time Provider Gen Paige   2019 11:30 AM Michael Magaña MD [address] [facility]   10/30/2019  1:00 PM Derek Gutierrez MD [facility]       Requested Prescriptions     Pending Prescriptions Disp Refills    insulin NPH/insulin regular (HUMULIN 70/30 U-100 INSULIN) 100 unit/mL (70-30) injection 10 mL 1     Si Units by SubCUTAneous route two (2) times a day.

## 2019-09-23 ENCOUNTER — OFFICE VISIT (OUTPATIENT)
Dept: CARDIOLOGY CLINIC | Age: 79
End: 2019-09-23

## 2019-09-23 VITALS
WEIGHT: 175.8 LBS | OXYGEN SATURATION: 96 % | HEART RATE: 68 BPM | RESPIRATION RATE: 16 BRPM | BODY MASS INDEX: 32.35 KG/M2 | SYSTOLIC BLOOD PRESSURE: 110 MMHG | DIASTOLIC BLOOD PRESSURE: 70 MMHG | HEIGHT: 62 IN

## 2019-09-23 DIAGNOSIS — E78.2 MIXED HYPERLIPIDEMIA: ICD-10-CM

## 2019-09-23 DIAGNOSIS — Z95.1 S/P CABG X 3: ICD-10-CM

## 2019-09-23 DIAGNOSIS — I50.32 DIASTOLIC CHF, CHRONIC (HCC): ICD-10-CM

## 2019-09-23 DIAGNOSIS — I25.10 CORONARY ARTERY DISEASE INVOLVING NATIVE CORONARY ARTERY OF NATIVE HEART WITHOUT ANGINA PECTORIS: Primary | ICD-10-CM

## 2019-09-23 DIAGNOSIS — Z98.61 POSTSURGICAL PERCUTANEOUS TRANSLUMINAL CORONARY ANGIOPLASTY STATUS: ICD-10-CM

## 2019-09-23 NOTE — PROGRESS NOTES
Carla Gann DNP, ANP-BC  Subjective/HPI:     Liliana Gerber is a 67 y.o. female is here for routine f/u. The patient denies chest pain/resting shortness of breath, orthopnea, PND, LE edema, palpitations, syncope, presyncope or fatigue. Patient reports mild intermittent dyspnea on exertion, she is limited more so by discomfort in her legs secondary to PAD followed by Dr. Lucia Weeks. Able to walk 1 block and then has to stop secondary to discomfort.     PCP Provider  Xavier Hilliard MD  Past Medical History:   Diagnosis Date    Anxiety     CAD (coronary artery disease)     cabg     Diabetes [facility]     Diverticulosis     GERD (gastroesophageal reflux disease)     Heart disease     Heart failure ([facility] 10/18/2011    High cholesterol     Hypertension     Hypothyroidism     Kidney stones     Postsurgical percutaneous transluminal coronary angioplasty status 5/17/2012    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    [facility]    Sleeping difficulty     Teeth decayed     Weakness       Past Surgical History:   Procedure Laterality Date    CARDIAC SURG PROCEDURE UNLIST      cabg x 3    COLONOSCOPY N/A 10/31/2018    COLONOSCOPY [facility]лена [facility]oyle [facility]    HX CORONARY ARTERY BYPASS GRAFT  11/11/11    3 vessel    HX GYN      hysterectomy    HX HEART CATHETERIZATION      HX UROLOGICAL  1970s    kidney stone    VASCULAR SURGERY PROCEDURE UNLIST      Stents put in right leg     Allergies   Allergen Reactions    Ace Inhibitors Swelling    Arb-Angiotensin Receptor Antagonist Swelling    Lisinopril Swelling    Plavix [Clopidogrel] Other (comments)     Excessive bleeding    Potassium Nausea and Vomiting     Potassium containing oral products are not well tolerated by patient      Family History   Problem Relation Age of Onset    Diabetes Mother     Heart Disease Mother     Diabetes Brother     Heart Disease Brother     Mental Retardation Brother     Hypertension Brother     Other Father  Cancer Sister     Diabetes Brother     Heart Disease Brother     Diabetes Brother     Breast Cancer Daughter         52's      Current Outpatient Medications   Medication Sig    insulin NPH/insulin regular (HUMULIN 70/30 U-100 INSULIN) 100 unit/mL (70-30) injection 22 Units by SubCUTAneous route two (2) times a day.  ezetimibe (ZETIA) 10 mg tablet Take 1 Tab by mouth daily.  metFORMIN (GLUCOPHAGE) 1,000 mg tablet Take 1 Tab by mouth two (2) times daily (with meals).  Omeprazole delayed release (PRILOSEC D/R) 20 mg tablet Take 1 Tab by mouth daily.  gabapentin (NEURONTIN) 100 mg capsule Take 1 Cap by mouth two (2) times a day.  LORazepam (ATIVAN) 0.5 mg tablet Take 1 Tab by mouth nightly as needed for Anxiety. Max Daily Amount: 0.5 mg.    flash glucose sensor (FREESTYLE KEYLA 14 DAY SENSOR) kit one    flash glucose sensor (FREESTYLE KEYLA 14 DAY SENSOR) kit Check glucose twice daily    flash glucose scanning reader (FREESTYLE KEYLA 14 DAY READER) misc daily    atorvastatin (LIPITOR) 80 mg tablet TAKE 1 TABLET NIGHTLY    amLODIPine (NORVASC) 5 mg tablet Take 1 Tab by mouth daily.  levothyroxine (SYNTHROID) 100 mcg tablet Take 1 Tab by mouth Daily (before breakfast).  metoprolol tartrate (LOPRESSOR) 50 mg tablet Take 1 Tab by mouth two (2) times a day.  FREESTYLE LITE STRIPS strip USE ONE STRIP TO CHECK GLUCOSE TWICE DAILY    cholecalciferol (VITAMIN D3) 1,000 unit cap Take 1,000 Units by mouth daily.  aspirin 81 mg chewable tablet Take 1 Tab by mouth daily.  cyanocobalamin (VITAMIN B-12) 1,000 mcg tablet Take 1,000 mcg by mouth daily. No current facility-administered medications for this visit.        Vitals:    09/23/19 1129 09/23/19 1137   BP: 112/70 110/70   Pulse: 68    Resp: 16    SpO2: 96%    Weight: 175 lb 12.8 oz (79.7 kg)    Height: 5' 2\" (1.575 m)      Social History     Socioeconomic History    Marital status:      Spouse name: Not on file    Number of children: Not on file    Years of education: Not on file    Highest education level: Not on file   Occupational History    Not on file   Social Needs    Financial resource strain: Not on file    Food insecurity:     Worry: Not on file     Inability: Not on file    Transportation needs:     Medical: Not on file     Non-medical: Not on file   Tobacco Use    Smoking status: Former Smoker     Packs/day: 0.50     Years: 40.00     Pack years: 20.00     Last attempt to quit: 2010     Years since quittin.0    Smokeless tobacco: Never Used   Substance and Sexual Activity    Alcohol use: No    Drug use: No    Sexual activity: Never   Lifestyle    Physical activity:     Days per week: Not on file     Minutes per session: Not on file    Stress: Not on file   Relationships    Social connections:     Talks on phone: Not on file     Gets together: Not on file     Attends Shinto service: Not on file     Active member of club or organization: Not on file     Attends meetings of clubs or organizations: Not on file     Relationship status: Not on file    Intimate partner violence:     Fear of current or ex partner: Not on file     Emotionally abused: Not on file     Physically abused: Not on file     Forced sexual activity: Not on file   Other Topics Concern    Not on file   Social History Narrative    Not on file       I have reviewed the nurses notes, vitals, problem list, allergy list, medical history, family, social history and medications. Review of Symptoms:    General: Pt denies excessive weight gain or loss. Pt is able to conduct ADL's  HEENT: Denies blurred vision, headaches, epistaxis and difficulty swallowing. Respiratory: Denies shortness of breath, STOUT, wheezing or stridor.   Cardiovascular: Denies precordial pain, palpitations, edema or PND  Gastrointestinal: Denies poor appetite, indigestion, abdominal pain or blood in stool  Musculoskeletal: Intermittent bilateral lower extremity discomfort with ambulation  Neurologic: Denies tremor, paresthesias, or sensory motor disturbance  Skin: Denies rash, itching or texture change. Physical Exam:      General: Well developed, in no acute distress, cooperative and alert  HEENT: No carotid bruits, no JVD, trach is midline. Neck Supple, PERRL, EOM intact. Heart:  Normal S1/S2 negative S3 or S4. Regular, no murmur, gallop or rub. Respiratory: Clear bilaterally x 4, no wheezing or rales  Abdomen:   Soft, non-tender, no masses, bowel sounds are active. Extremities:  No edema, normal cap refill, no cyanosis, atraumatic. Neuro: A&Ox3, speech clear, gait stable. Skin: Skin color is normal. No rashes or lesions.  Non diaphoretic  Vascular: +1 bilateral posterior tibialis, +2 bilateral radial    Cardiographics    ECG: Sinus  Results for orders placed or performed during the hospital encounter of 10/11/12   EKG, 12 LEAD, INITIAL   Result Value Ref Range    Ventricular Rate 53 BPM    Atrial Rate 53 BPM    P-R Interval 212 ms    QRS Duration 88 ms    Q-T Interval 406 ms    QTC Calculation (Bezet) 380 ms    Calculated P Axis 59 degrees    Calculated R Axis 43 degrees    Calculated T Axis 25 degrees    Diagnosis       Sinus bradycardia with 1st degree AV block  Septal infarct (cited on or before 13-JUL-2009)  When compared with ECG of 10-NOV-2011 12:13,  Questionable change in initial forces of Anteroseptal leads  QT has shortened  Confirmed by Zhao Encarnacion MD, Akira Wilson ([id number]) on 10/11/2012 5:25:43 PM         Cardiology Labs:  Lab Results   Component Value Date/Time    Cholesterol, total 180 10/19/2018 02:14 PM    HDL Cholesterol 64 10/19/2018 02:14 PM    LDL, calculated 90 10/19/2018 02:14 PM    Triglyceride 129 10/19/2018 02:14 PM    CHOL/HDL Ratio 3.2 09/14/2010 08:42 AM       Lab Results   Component Value Date/Time    Sodium 144 07/25/2019 09:36 AM    Potassium 4.1 07/25/2019 09:36 AM    Chloride 105 07/25/2019 09:36 AM    CO2 20 07/25/2019 09:36 AM Anion gap 7 10/16/2015 04:34 AM    Glucose 109 (H) 07/25/2019 09:36 AM    BUN 14 07/25/2019 09:36 AM    Creatinine 0.82 07/25/2019 09:36 AM    BUN/Creatinine ratio 17 07/25/2019 09:36 AM    GFR est AA 79 07/25/2019 09:36 AM    GFR est non-AA 69 07/25/2019 09:36 AM    Calcium 9.9 07/25/2019 09:36 AM    Bilirubin, total 0.6 04/18/2019 10:16 AM    AST (SGOT) 16 04/18/2019 10:16 AM    Alk. phosphatase 82 04/18/2019 10:16 AM    Protein, total 6.9 04/18/2019 10:16 AM    Albumin 4.0 04/18/2019 10:16 AM    Globulin 3.4 10/16/2015 04:34 AM    A-G Ratio 1.4 04/18/2019 10:16 AM    ALT (SGPT) 15 04/18/2019 10:16 AM           Assessment:     Assessment:     Diagnoses and all orders for this visit:    1. Coronary artery disease involving native coronary artery of native heart without angina pectoris  -     AMB POC EKG ROUTINE W/ 12 LEADS, INTER & REP    2. Diastolic CHF, chronic (HCC)    3. Mixed hyperlipidemia    4. S/P CABG x 3    5. Postsurgical percutaneous transluminal coronary angioplasty status        ICD-10-CM ICD-9-CM    1. Coronary artery disease involving native coronary artery of native heart without angina pectoris I25.10 414.01 AMB POC EKG ROUTINE W/ 12 LEADS, INTER & REP   2. Diastolic CHF, chronic (HCC) I50.32 428.32      428.0    3. Mixed hyperlipidemia E78.2 272.2    4. S/P CABG x 3 Z95.1 V45.81    5. Postsurgical percutaneous transluminal coronary angioplasty status Z98.61 V45.82      Orders Placed This Encounter    AMB POC EKG ROUTINE W/ 12 LEADS, INTER & REP     Order Specific Question:   Reason for Exam:     Answer:   ROUTINE        Plan:     1. Atherosclerotic heart disease: History of CABG x3, denies exertional chest pain. 2.  Diastolic heart failure: Has dyspnea on exertion multifactorial, able to perform activities of daily life without limitations, [place] heart class IIb. Patient admits to weight gain due to dietary indiscretion.   Also leg cramping suggestive of PAD followed by vascular surgery. 3.  Hypertension: Controlled 110/70 continue current therapy  4. Hyperlipidemia: On high intensity statin Lipitor 80 mg with 10 mg Zetia, labs followed by primary care    Stable from cardiac perspective follow-up in 6 months    Star Carpio NP    This note was created using voice recognition software. Despite editing, there may be syntax errors. [facility]    9/23/2019         Patient seen, examined by me personally. Plan discussed as detailed. Agree with note as outlined by  NP. I confirm findings in history and physical exam. No additional findings noted. Agree with plan as outlined above.      Nadja rAias MD

## 2019-09-23 NOTE — PROGRESS NOTES
1. Have you been to the ER, urgent care clinic since your last visit? Hospitalized since your last visit? NO    2. Have you seen or consulted any other health care providers outside of the [address] [place] [address] since your last visit? Include any pap smears or colon screening. NO      6 MONTH FOLLOW UP. C/O SLIGHT SOB OFF AND ON.

## 2019-10-14 RX ORDER — ATORVASTATIN CALCIUM 80 MG/1
TABLET, FILM COATED ORAL
Qty: 90 TAB | Refills: 1 | Status: SHIPPED | OUTPATIENT
Start: 2019-10-14 | End: 2019-10-30 | Stop reason: SDUPTHER

## 2019-10-24 DIAGNOSIS — I50.32 DIASTOLIC CHF, CHRONIC (HCC): ICD-10-CM

## 2019-10-24 DIAGNOSIS — E11.21 TYPE 2 DIABETES MELLITUS WITH NEPHROPATHY (HCC): ICD-10-CM

## 2019-10-24 DIAGNOSIS — E11.40 TYPE 2 DIABETES MELLITUS WITH DIABETIC NEUROPATHY, WITH LONG-TERM CURRENT USE OF INSULIN (HCC): ICD-10-CM

## 2019-10-24 DIAGNOSIS — E78.2 MIXED HYPERLIPIDEMIA: ICD-10-CM

## 2019-10-24 DIAGNOSIS — F33.9 RECURRENT DEPRESSION (HCC): ICD-10-CM

## 2019-10-24 DIAGNOSIS — Z79.4 TYPE 2 DIABETES MELLITUS WITH DIABETIC NEUROPATHY, WITH LONG-TERM CURRENT USE OF INSULIN (HCC): ICD-10-CM

## 2019-10-24 DIAGNOSIS — E11.21 TYPE II DIABETES MELLITUS WITH NEPHROPATHY (HCC): ICD-10-CM

## 2019-10-24 DIAGNOSIS — I10 ESSENTIAL HYPERTENSION, BENIGN: ICD-10-CM

## 2019-10-24 DIAGNOSIS — E03.9 HYPOTHYROIDISM, UNSPECIFIED TYPE: ICD-10-CM

## 2019-10-24 DIAGNOSIS — D50.8 OTHER IRON DEFICIENCY ANEMIA: ICD-10-CM

## 2019-10-24 DIAGNOSIS — I25.10 CORONARY ARTERY DISEASE INVOLVING NATIVE CORONARY ARTERY OF NATIVE HEART WITHOUT ANGINA PECTORIS: ICD-10-CM

## 2019-10-24 DIAGNOSIS — I73.9 PVD (PERIPHERAL VASCULAR DISEASE) (HCC): ICD-10-CM

## 2019-10-24 DIAGNOSIS — M81.0 AGE-RELATED OSTEOPOROSIS WITHOUT CURRENT PATHOLOGICAL FRACTURE: ICD-10-CM

## 2019-10-24 DIAGNOSIS — E66.9 OBESITY (BMI 30.0-34.9): ICD-10-CM

## 2019-10-25 LAB
ALBUMIN SERPL-MCNC: 4.3 G/DL (ref 3.5–4.8)
ALBUMIN/GLOB SERPL: 1.4 {RATIO} (ref 1.2–2.2)
ALP SERPL-CCNC: 83 IU/L (ref 39–117)
ALT SERPL-CCNC: 12 IU/L (ref 0–32)
AST SERPL-CCNC: 14 IU/L (ref 0–40)
BILIRUB SERPL-MCNC: 0.5 MG/DL (ref 0–1.2)
BUN SERPL-MCNC: 17 MG/DL (ref 8–27)
BUN/CREAT SERPL: 19 (ref 12–28)
CALCIUM SERPL-MCNC: 9.9 MG/DL (ref 8.7–10.3)
CHLORIDE SERPL-SCNC: 107 MMOL/L (ref 96–106)
CHOLEST SERPL-MCNC: 149 MG/DL (ref 100–199)
CO2 SERPL-SCNC: 22 MMOL/L (ref 20–29)
CREAT SERPL-MCNC: 0.91 MG/DL (ref 0.57–1)
EST. AVERAGE GLUCOSE BLD GHB EST-MCNC: 163 MG/DL
GLOBULIN SER CALC-MCNC: 3 G/DL (ref 1.5–4.5)
GLUCOSE SERPL-MCNC: 150 MG/DL (ref 65–99)
HBA1C MFR BLD: 7.3 % (ref 4.8–5.6)
HDLC SERPL-MCNC: 54 MG/DL
LDLC SERPL CALC-MCNC: 74 MG/DL (ref 0–99)
POTASSIUM SERPL-SCNC: 4.1 MMOL/L (ref 3.5–5.2)
PROT SERPL-MCNC: 7.3 G/DL (ref 6–8.5)
SODIUM SERPL-SCNC: 144 MMOL/L (ref 134–144)
TRIGL SERPL-MCNC: 106 MG/DL (ref 0–149)
TSH SERPL DL<=0.005 MIU/L-ACNC: 0.51 UIU/ML (ref 0.45–4.5)
VLDLC SERPL CALC-MCNC: 21 MG/DL (ref 5–40)

## 2019-10-29 DIAGNOSIS — E11.21 TYPE II DIABETES MELLITUS WITH NEPHROPATHY (HCC): ICD-10-CM

## 2019-10-29 RX ORDER — AMLODIPINE BESYLATE 5 MG/1
5 TABLET ORAL DAILY
Qty: 90 TAB | Refills: 1 | Status: CANCELLED | OUTPATIENT
Start: 2019-10-29

## 2019-10-29 RX ORDER — PHENOL/SODIUM PHENOLATE
20 AEROSOL, SPRAY (ML) MUCOUS MEMBRANE DAILY
Qty: 90 TAB | Refills: 1 | Status: CANCELLED | OUTPATIENT
Start: 2019-10-29

## 2019-10-29 RX ORDER — ATORVASTATIN CALCIUM 80 MG/1
TABLET, FILM COATED ORAL
Qty: 90 TAB | Refills: 1 | Status: CANCELLED | OUTPATIENT
Start: 2019-10-29

## 2019-10-30 ENCOUNTER — OFFICE VISIT (OUTPATIENT)
Dept: INTERNAL MEDICINE CLINIC | Age: 79
End: 2019-10-30

## 2019-10-30 VITALS
OXYGEN SATURATION: 97 % | RESPIRATION RATE: 16 BRPM | HEART RATE: 63 BPM | TEMPERATURE: 97.6 F | SYSTOLIC BLOOD PRESSURE: 125 MMHG | BODY MASS INDEX: 32.39 KG/M2 | HEIGHT: 62 IN | WEIGHT: 176 LBS | DIASTOLIC BLOOD PRESSURE: 78 MMHG

## 2019-10-30 DIAGNOSIS — Z79.4 TYPE 2 DIABETES MELLITUS WITH DIABETIC NEUROPATHY, WITH LONG-TERM CURRENT USE OF INSULIN (HCC): ICD-10-CM

## 2019-10-30 DIAGNOSIS — I25.10 CORONARY ARTERY DISEASE INVOLVING NATIVE CORONARY ARTERY OF NATIVE HEART WITHOUT ANGINA PECTORIS: ICD-10-CM

## 2019-10-30 DIAGNOSIS — I10 ESSENTIAL HYPERTENSION, BENIGN: ICD-10-CM

## 2019-10-30 DIAGNOSIS — I50.32 DIASTOLIC CHF, CHRONIC (HCC): ICD-10-CM

## 2019-10-30 DIAGNOSIS — I73.9 PVD (PERIPHERAL VASCULAR DISEASE) (HCC): ICD-10-CM

## 2019-10-30 DIAGNOSIS — E11.21 TYPE II DIABETES MELLITUS WITH NEPHROPATHY (HCC): ICD-10-CM

## 2019-10-30 DIAGNOSIS — E78.2 MIXED HYPERLIPIDEMIA: ICD-10-CM

## 2019-10-30 DIAGNOSIS — E03.9 HYPOTHYROIDISM, UNSPECIFIED TYPE: ICD-10-CM

## 2019-10-30 DIAGNOSIS — M54.6 CHRONIC LEFT-SIDED THORACIC BACK PAIN: ICD-10-CM

## 2019-10-30 DIAGNOSIS — M54.6 PAIN IN THORACIC SPINE: Primary | ICD-10-CM

## 2019-10-30 DIAGNOSIS — E11.21 TYPE 2 DIABETES MELLITUS WITH NEPHROPATHY (HCC): Primary | ICD-10-CM

## 2019-10-30 DIAGNOSIS — F41.9 ANXIETY: ICD-10-CM

## 2019-10-30 DIAGNOSIS — M81.0 AGE-RELATED OSTEOPOROSIS WITHOUT CURRENT PATHOLOGICAL FRACTURE: ICD-10-CM

## 2019-10-30 DIAGNOSIS — E66.9 OBESITY (BMI 30.0-34.9): ICD-10-CM

## 2019-10-30 DIAGNOSIS — G89.29 CHRONIC LEFT-SIDED THORACIC BACK PAIN: ICD-10-CM

## 2019-10-30 DIAGNOSIS — G62.9 NEUROPATHY: ICD-10-CM

## 2019-10-30 DIAGNOSIS — F33.9 RECURRENT DEPRESSION (HCC): ICD-10-CM

## 2019-10-30 DIAGNOSIS — E11.40 TYPE 2 DIABETES MELLITUS WITH DIABETIC NEUROPATHY, WITH LONG-TERM CURRENT USE OF INSULIN (HCC): ICD-10-CM

## 2019-10-30 LAB
ALBUMIN UR QL STRIP: 150 MG/L
CREATININE, URINE POC: 200 MG/DL
MICROALBUMIN/CREAT RATIO POC: >300 MG/G

## 2019-10-30 RX ORDER — AMLODIPINE BESYLATE 5 MG/1
5 TABLET ORAL DAILY
Qty: 90 TAB | Refills: 1 | Status: SHIPPED | OUTPATIENT
Start: 2019-10-30 | End: 2020-04-28

## 2019-10-30 RX ORDER — ATORVASTATIN CALCIUM 80 MG/1
TABLET, FILM COATED ORAL
Qty: 90 TAB | Refills: 1 | Status: SHIPPED | OUTPATIENT
Start: 2019-10-30 | End: 2020-03-23 | Stop reason: SDUPTHER

## 2019-10-30 RX ORDER — GABAPENTIN 100 MG/1
200 CAPSULE ORAL 2 TIMES DAILY
Qty: 120 CAP | Refills: 4 | Status: SHIPPED | OUTPATIENT
Start: 2019-10-30 | End: 2020-05-18

## 2019-10-30 NOTE — PATIENT INSTRUCTIONS
WHEN HAVING A SMALL EARLY MEAL ONLY TAKE 18UNITS OF INSULIN FOR YOUR NIGHT TIME DOSE    CALL IF GETTING LOWS BELOW 70 MORE THAN 1 TIME PER WEEK

## 2019-10-30 NOTE — PROGRESS NOTES
HISTORY OF PRESENT ILLNESS  Jessica Lewis is a 67 y.o. female. HPI   Last here 7/30/19. Pt is here for routine care.      BP is controlled   No home BP readings   Continues on metoprolol 50mg BID and norvasc 5mg  Denies orthostasis  Recall had angioedema with lisinopril and benicar-HCT in the past      She is diabetic    BS at home 108 111 99 101 128 90 112 79 74 117 95 118 189 62 74 in the AM fasted   In the PM 120s, 130s, 140s, 209, 174   Pt states there are certain days she eats less and eats earlier which is when she get low sugars   Continues on metformin 1000mg BID  Pt is currently taking 22U BID of NPH 70/30 mix--increased since lov, doing well no lows  Discussed to take 18U in evening dose when she is having earlier dinner that is smaller to prevent lows  She also takes ASA 81mg daily   Recall issues with hypoglycemia in the past, a1c under 7.5 at goal for her   [facility] today is 176 lbs-- up 3 lbs x lov   Discussed [employer]  Discussed diet and weight loss again      Reviewed labs 10/19   Will get labs today      Pt follows with Dr. Cherie Triplett (cardio) for cad  No sx  Reviewed notes 9/23/19: 1. Atherosclerotic heart disease: History of CABG x3, denies exertional chest pain. 2.  Diastolic heart failure: Has dyspnea on exertion multifactorial, able to perform activities of daily life without limitations, [place] heart class IIb. Patient admits to weight gain due to dietary indiscretion. Also leg cramping suggestive of PAD followed by vascular surgery. 3.  Hypertension: Controlled 110/70 continue current therapy  4.   Hyperlipidemia: On high intensity statin Lipitor 80 mg with 10 mg Zetia, labs followed by primary care  Will f/u in 6 mos      Pt follows annually with Dr. Kirk Angelo (vasc) for her PAD  No claudication stable   Last visit was 3/19  Next visit will be 3/20  assymptomatic       Pt saw Dr Bisi Bryant (ortho) for carpal tunnel   Had surgery for this 8/1/19    reviewed notes 1/35/62: Silicone sleeve given for incisional protection. One more week before soaking or scar massage. Three more weeks of 5 lb weightlifting limit. I will see her back as-needed basis.    Has been getting injection,  states she is still having pain   She is unsure if this is heading   States she has shooting pain that goes down her R hand   Discussed nerves take a long time to heal but to f/u with dr Bandar Giron for this  Discussed higher dose gabapentin will help        Pt is now taking prilosec 20mg every other day for reflux, works well     Takes OTC vit D daily      Continues synthroid 100mcg daily    She takes this med separately from all meds      Continues on lipitor 80mg max dose for cholesterol   Pt is now taking zetia 10mg once daily as well  Lanny Guillermo was too expensive now but zetia ok and grey well no issue     No longer on lexapro, doing well off medication not sad or down 10/19   She also has ativan to use prn (about 2x per week) helps with stress and sleep       Pt had sleep eval with Dr Juarez Roles   Last visit was 7/02/19      Pt continues on gabapentin 100 mg for foot tingling bid  States her feet have been hurting more and hands tingle as well   Discussed she can increase this       Pt c/o L lower back pain x 1 mo--mid back on the left hurts, mostly with movement  Affects her walking  Denies any injury or falls   Discussed this sounds like a muscle spasms, too high to be sciatic nerves   Discussed tylenol otc but pt states this is not helping   Ordered PT   Will get XR        ACP on file. SDMs are her grandson Vikram Ramsey), daughter Lily Guaman) and brother Irma East .      PREVENTIVE:    Colonoscopy: Dr. Zachary Robledo 5 years  Pap: 12/14, declines further   Mammogram: 3/4/19, negative   DEXA: 3/18, osteopenic, stopped fosamax   Tdap: 1/04/2016  Pneumovax: 10/10/2010  [id number]: 9/17  Zostavax: 10/10/2016  Shingrix: 1st round completed 4/19, 2nd round completed   Flu shot: fall 2019   Foot exam:07/30/19 Microalbumin: 10/18--due  A1c:  , 6/17 7.3, 9/17 6.9, 12/17 8.5, 4/18 8.4, 7/18 8.0, 10/18 8.0, 1/19 8.4, 4/19 8.4, 7/19 7.5, 10/19 7.3    Eye exam: Dr. Chuy West at [facility], 9/12/18--due  Lipids: 10/19 LDL 74     Patient Active Problem List    Diagnosis Date Noted    Type 2 diabetes mellitus with diabetic neuropathy ([facility] [address] 01/22/2019    Type 2 diabetes mellitus with nephropathy ([facility] 01/02/2018    Recurrent depression ([facility] 01/02/2018    Diverticulosis of intestine with bleeding 10/11/2015    Anemia 40/60/5821    Diastolic CHF, chronic ([facility] 10/11/2015    GIB (gastrointestinal bleeding) 10/08/2015    Osteoporosis 09/10/2013    Essential hypertension, benign 05/17/2012    Mixed hyperlipidemia 05/17/2012    Postsurgical percutaneous transluminal coronary angioplasty status 05/17/2012    S/P CABG x 3 11/11/2011    CAD (coronary artery disease) 11/18/2009    Hypothyroidism 11/18/2009    PVD (peripheral vascular disease) ([facility] 11/18/2009     Current Outpatient Medications   Medication Sig Dispense Refill    atorvastatin (LIPITOR) 80 mg tablet TAKE ONE TABLET BY MOUTH NIGHTLY 90 Tab 1    insulin NPH/insulin regular (HUMULIN 70/30 U-100 INSULIN) 100 unit/mL (70-30) injection 22 Units by SubCUTAneous route two (2) times a day. 10 mL 1    ezetimibe (ZETIA) 10 mg tablet Take 1 Tab by mouth daily. 30 Tab 6    metFORMIN (GLUCOPHAGE) 1,000 mg tablet Take 1 Tab by mouth two (2) times daily (with meals). 180 Tab 0    Omeprazole delayed release (PRILOSEC D/R) 20 mg tablet Take 1 Tab by mouth daily. 90 Tab 1    gabapentin (NEURONTIN) 100 mg capsule Take 1 Cap by mouth two (2) times a day. 60 Cap 1    LORazepam (ATIVAN) 0.5 mg tablet Take 1 Tab by mouth nightly as needed for Anxiety.  Max Daily Amount: 0.5 mg. 30 Tab 2    flash glucose sensor (FREESTYLE KEYLA 14 DAY SENSOR) kit one 4 Kit 3    flash glucose sensor (FREESTYLE KEYLA 14 DAY SENSOR) kit Check glucose twice daily 2 Kit 2    flash glucose scanning reader (FREESTYLE KEYLA 14 DAY READER) misc daily 1 Each 2    amLODIPine (NORVASC) 5 mg tablet Take 1 Tab by mouth daily. 90 Tab 1    levothyroxine (SYNTHROID) 100 mcg tablet Take 1 Tab by mouth Daily (before breakfast). 90 Tab 1    metoprolol tartrate (LOPRESSOR) 50 mg tablet Take 1 Tab by mouth two (2) times a day. 180 Tab 2    FREESTYLE LITE STRIPS strip USE ONE STRIP TO CHECK GLUCOSE TWICE DAILY 100 Strip 12    cholecalciferol (VITAMIN D3) 1,000 unit cap Take 1,000 Units by mouth daily.  aspirin 81 mg chewable tablet Take 1 Tab by mouth daily. 90 Tab 3    cyanocobalamin (VITAMIN B-12) 1,000 mcg tablet Take 1,000 mcg by mouth daily.        Past Surgical History:   Procedure Laterality Date    CARDIAC SURG PROCEDURE UNLIST      cabg x 3    COLONOSCOPY N/A 10/31/2018    COLONOSCOPY performed by Barbie Godinez MD at [facility] ENDOSCOPY    HX CORONARY ARTERY BYPASS GRAFT  11/11/11    3 vessel    HX GYN      hysterectomy    HX HEART CATHETERIZATION      HX UROLOGICAL  1970s    kidney stone    VASCULAR SURGERY PROCEDURE UNLIST      Stents put in right leg      Lab Results   Component Value Date/Time    WBC 8.5 07/25/2019 09:36 AM    HGB 13.2 07/25/2019 09:36 AM    Hemoglobin (POC) 12.9 03/02/2011 12:21 PM    HCT 42.0 07/25/2019 09:36 AM    PLATELET 796 72/68/2769 09:36 AM    MCV 88 07/25/2019 09:36 AM     Lab Results   Component Value Date/Time    Cholesterol, total 149 10/24/2019 09:27 AM    HDL Cholesterol 54 10/24/2019 09:27 AM    LDL, calculated 74 10/24/2019 09:27 AM    Triglyceride 106 10/24/2019 09:27 AM    CHOL/HDL Ratio 3.2 09/14/2010 08:42 AM     Lab Results   Component Value Date/Time    GFR est non-AA 61 10/24/2019 09:27 AM    GFR est AA 70 10/24/2019 09:27 AM    Creatinine 0.91 10/24/2019 09:27 AM    BUN 17 10/24/2019 09:27 AM    Sodium 144 10/24/2019 09:27 AM    Potassium 4.1 10/24/2019 09:27 AM    Chloride 107 (H) 10/24/2019 09:27 AM    CO2 22 10/24/2019 09:27 AM Magnesium 1.7 10/16/2015 04:34 AM        Review of Systems   Respiratory: Negative for shortness of breath. Cardiovascular: Negative for chest pain. Physical Exam   Constitutional: She is oriented to person, place, and time. She appears well-developed and well-nourished. No distress. HENT:   Head: Normocephalic and atraumatic. Mouth/Throat: Oropharynx is clear and moist. No oropharyngeal exudate. Eyes: Conjunctivae and EOM are normal. Right eye exhibits no discharge. Left eye exhibits no discharge. Neck: Normal range of motion. Neck supple. Cardiovascular: Normal rate, regular rhythm and normal heart sounds. Exam reveals no gallop and no friction rub. No murmur heard. Pulmonary/Chest: Effort normal and breath sounds normal. No respiratory distress. She has no wheezes. She has no rales. She exhibits no tenderness. Musculoskeletal: Normal range of motion. She exhibits tenderness. She exhibits no edema or deformity. Lymphadenopathy:     She has no cervical adenopathy. Neurological: She is alert and oriented to person, place, and time. Coordination abnormal.   Skin: Skin is warm and dry. No rash noted. She is not diaphoretic. No erythema. No pallor. Psychiatric: She has a normal mood and affect. Her behavior is normal.       ASSESSMENT and PLAN    ICD-10-CM ICD-9-CM    1. Type 2 diabetes mellitus with nephropathy (HCC)              Adequately controlled for age on 25 U BID NPH and metformin, will have pt take 18U for evening dose when having a small dinner and eating early  E11.21 250.40      583.81    2. PVD (peripheral vascular disease) ([email or web address]              Sees dr Gauri Sharma annually in march  I73.9 443.9    3. Diastolic CHF, chronic ([email or web address]              Medically managed follows with dr Lisa Adler [facility] since July  I50.32 428.32      428.0    4.  Coronary artery disease involving native coronary artery of native heart without angina pectoris                Stable medically managed  I25.10 414.01 5. Type 2 diabetes mellitus with diabetic neuropathy, with long-term current use of insulin ([facility]              See above  E11.40 250.60     Z79.4 357.2      V58.67    6. Recurrent depression ([facility]              Controlled w/o meds prev on lexapro doing well  F33.9 296.30    7. Hypothyroidism, unspecified type              At goal on synthroid 100 mcgs  E03.9 244.9    8. Essential hypertension, benign              Controlled on metoprolol and norvasc  I10 401.1    9. Age-related osteoporosis without current pathological fracture              Stable prev on fosamax repeat dexa in 3/19  M81.0 733.01    10. Mixed hyperlipidemia          Controlled on lipitor and zetia  E78.2 272.2    11. Obesity (BMI 30.0-34. 9)            Discussed ww  E66.9 278.00       12. Neuropathy- will increase gabapentin to 200 mg BID   13. Anxiety- uses ativan qhs prn about 2-3 x a week   14. Thoracic back pain- check t spine plain film and set up with PT         Scribed by Asael Garcia of Allyson Arias, as dictated by Dr. Abimbola Haskins. Current diagnosis and concerns discussed with pt at length. Pt understands risks and benefits or current treatment plan and medications, and accepts the treatment and medication with any possible risks. Pt asks appropriate questions, which were answered. Pt was instructed to call with any concerns or problems. I have reviewed the note documented by the scribe. The services provided are my own.   The documentation is accurate

## 2019-11-20 ENCOUNTER — TELEPHONE (OUTPATIENT)
Dept: INTERNAL MEDICINE CLINIC | Age: 79
End: 2019-11-20

## 2019-11-20 NOTE — TELEPHONE ENCOUNTER
Caller's first and last name: n/a   Reason for call: Pt needs a new order for back specialist to [facility].    Callback required yes/no and why:  yes   Best contact number(s): [phone]   Details to clarify the request: n/a      Copy/paste Matthew rees

## 2019-12-04 DIAGNOSIS — E11.21 TYPE II DIABETES MELLITUS WITH NEPHROPATHY (HCC): ICD-10-CM

## 2019-12-04 NOTE — TELEPHONE ENCOUNTER
Caller (if not patient): N/A   Relationship of caller (if not patient): N/A   Best contact number(s): [phone]   Name of medication and dosage if known:  \" Levothyroxine 100mg\" , \"Metoprolol 50mg\", Metformin 1000mg   Is patient out of this medication (yes/no): Yes   Pharmacy name: [facility] listed in chart? (yes/no): yes   Pharmacy phone number: [phone]   Date of last visit: Wednesday, October 30, 2019 01:00 PM   Details to clarify the request: Pt stated  that she [facility] metformin 1000mg.       [facility] [place]

## 2019-12-05 ENCOUNTER — TELEPHONE (OUTPATIENT)
Dept: INTERNAL MEDICINE CLINIC | Age: 79
End: 2019-12-05

## 2019-12-05 RX ORDER — METFORMIN HYDROCHLORIDE 1000 MG/1
1000 TABLET ORAL 2 TIMES DAILY WITH MEALS
Qty: 180 TAB | Refills: 0 | Status: SHIPPED | OUTPATIENT
Start: 2019-12-05 | End: 2020-03-23 | Stop reason: SDUPTHER

## 2019-12-05 RX ORDER — LEVOTHYROXINE SODIUM 100 UG/1
100 TABLET ORAL
Qty: 90 TAB | Refills: 1 | Status: SHIPPED | OUTPATIENT
Start: 2019-12-05 | End: 2020-03-06 | Stop reason: SDUPTHER

## 2019-12-05 RX ORDER — METOPROLOL TARTRATE 50 MG/1
50 TABLET ORAL 2 TIMES DAILY
Qty: 180 TAB | Refills: 2 | Status: SHIPPED | OUTPATIENT
Start: 2019-12-05 | End: 2020-08-24

## 2019-12-05 NOTE — TELEPHONE ENCOUNTER
Called, spoke to pt. Two pt identifiers confirmed. Pt informed per Dr. Travis Candelario to decrease insulin to 16U BID. Pt was taking 22U BID. Took 18U yesterday evening (not morning) when her sugar was 61 and then 18U this morning with sugar of 66. Pt asking if she should continue w/ 16U. Pt asking if sugar above 80, at what dose should she take of the insulin. Pt informed Dr. Travis Candelario will be notified. Pt states a detailed vm can be left. Pt verbalized understanding of information discussed w/ no further questions at this time.

## 2019-12-05 NOTE — TELEPHONE ENCOUNTER
MD Suraj Moreira LPN   Caller: Unspecified (Today, 11:27 AM)             Continue 16U bid to avoid hypoglycemia     If sugars start to climb over 140 fasting increase back to 18Units

## 2019-12-05 NOTE — TELEPHONE ENCOUNTER
Called, spoke to pt. Two pt identifiers confirmed. Pt informed per Dr. Momo Callahan continue 16U BID to avoid hypoglycemia. Pt informed per Dr. Momo Callahan if sugars start to climb over 140 fasting, increase back to 18Units. Pt verbalized understanding of information discussed w/ no further questions at this time.

## 2019-12-05 NOTE — TELEPHONE ENCOUNTER
Called, spoke to pt. Two pt identifiers confirmed. Pt states that she would like the 3 medications sent to [facility]. Pt reports having fasting glucose of 66 on 12/4/19 and 66 this morning. Reports taking 18U of Humulin 70/30 yesterday evening and this morning. Pt informed Dr. Waqas Deshpande will be notified. Pt verbalized understanding of information discussed w/ no further questions at this time.

## 2019-12-05 NOTE — TELEPHONE ENCOUNTER
PT CALLED WITH LOW GLUCOSE IN 60'S TO MORNINGS IN A ROW    DECREASE INSULIN FROM 18UNITS BID TO 16 UNITS BID

## 2019-12-16 DIAGNOSIS — E11.21 TYPE II DIABETES MELLITUS WITH NEPHROPATHY (HCC): ICD-10-CM

## 2019-12-16 RX ORDER — INSULIN HUMAN 100 [IU]/ML
INJECTION, SUSPENSION SUBCUTANEOUS
Qty: 10 ML | Refills: 0 | Status: SHIPPED | OUTPATIENT
Start: 2019-12-16 | End: 2020-01-19

## 2020-01-19 DIAGNOSIS — E11.21 TYPE II DIABETES MELLITUS WITH NEPHROPATHY (HCC): ICD-10-CM

## 2020-01-19 RX ORDER — INSULIN HUMAN 100 [IU]/ML
INJECTION, SUSPENSION SUBCUTANEOUS
Qty: 10 ML | Refills: 0 | Status: SHIPPED | OUTPATIENT
Start: 2020-01-19 | End: 2020-02-07

## 2020-01-21 DIAGNOSIS — F33.9 RECURRENT DEPRESSION (HCC): ICD-10-CM

## 2020-01-21 DIAGNOSIS — E78.2 MIXED HYPERLIPIDEMIA: ICD-10-CM

## 2020-01-21 DIAGNOSIS — G89.29 CHRONIC LEFT-SIDED THORACIC BACK PAIN: ICD-10-CM

## 2020-01-21 DIAGNOSIS — I50.32 DIASTOLIC CHF, CHRONIC (HCC): ICD-10-CM

## 2020-01-21 DIAGNOSIS — G62.9 NEUROPATHY: ICD-10-CM

## 2020-01-21 DIAGNOSIS — E11.21 TYPE II DIABETES MELLITUS WITH NEPHROPATHY (HCC): ICD-10-CM

## 2020-01-21 DIAGNOSIS — E11.40 TYPE 2 DIABETES MELLITUS WITH DIABETIC NEUROPATHY, WITH LONG-TERM CURRENT USE OF INSULIN (HCC): ICD-10-CM

## 2020-01-21 DIAGNOSIS — E03.9 HYPOTHYROIDISM, UNSPECIFIED TYPE: ICD-10-CM

## 2020-01-21 DIAGNOSIS — F41.9 ANXIETY: ICD-10-CM

## 2020-01-21 DIAGNOSIS — E11.21 TYPE 2 DIABETES MELLITUS WITH NEPHROPATHY (HCC): ICD-10-CM

## 2020-01-21 DIAGNOSIS — M81.0 AGE-RELATED OSTEOPOROSIS WITHOUT CURRENT PATHOLOGICAL FRACTURE: ICD-10-CM

## 2020-01-21 DIAGNOSIS — E66.9 OBESITY (BMI 30.0-34.9): ICD-10-CM

## 2020-01-21 DIAGNOSIS — Z79.4 TYPE 2 DIABETES MELLITUS WITH DIABETIC NEUROPATHY, WITH LONG-TERM CURRENT USE OF INSULIN (HCC): ICD-10-CM

## 2020-01-21 DIAGNOSIS — I73.9 PVD (PERIPHERAL VASCULAR DISEASE) (HCC): ICD-10-CM

## 2020-01-21 DIAGNOSIS — M54.6 CHRONIC LEFT-SIDED THORACIC BACK PAIN: ICD-10-CM

## 2020-01-21 DIAGNOSIS — I25.10 CORONARY ARTERY DISEASE INVOLVING NATIVE CORONARY ARTERY OF NATIVE HEART WITHOUT ANGINA PECTORIS: ICD-10-CM

## 2020-01-21 DIAGNOSIS — I10 ESSENTIAL HYPERTENSION, BENIGN: ICD-10-CM

## 2020-01-22 LAB
ALBUMIN SERPL-MCNC: 3.9 G/DL (ref 3.7–4.7)
ALBUMIN/GLOB SERPL: 1.4 {RATIO} (ref 1.2–2.2)
ALP SERPL-CCNC: 92 IU/L (ref 39–117)
ALT SERPL-CCNC: 14 IU/L (ref 0–32)
AST SERPL-CCNC: 14 IU/L (ref 0–40)
BILIRUB SERPL-MCNC: 0.6 MG/DL (ref 0–1.2)
BUN SERPL-MCNC: 14 MG/DL (ref 8–27)
BUN/CREAT SERPL: 16 (ref 12–28)
CALCIUM SERPL-MCNC: 9.9 MG/DL (ref 8.7–10.3)
CHLORIDE SERPL-SCNC: 102 MMOL/L (ref 96–106)
CO2 SERPL-SCNC: 24 MMOL/L (ref 20–29)
CREAT SERPL-MCNC: 0.9 MG/DL (ref 0.57–1)
EST. AVERAGE GLUCOSE BLD GHB EST-MCNC: 166 MG/DL
GLOBULIN SER CALC-MCNC: 2.7 G/DL (ref 1.5–4.5)
GLUCOSE SERPL-MCNC: 118 MG/DL (ref 65–99)
HBA1C MFR BLD: 7.4 % (ref 4.8–5.6)
POTASSIUM SERPL-SCNC: 4 MMOL/L (ref 3.5–5.2)
PROT SERPL-MCNC: 6.6 G/DL (ref 6–8.5)
SODIUM SERPL-SCNC: 142 MMOL/L (ref 134–144)

## 2020-01-28 ENCOUNTER — DOCUMENTATION ONLY (OUTPATIENT)
Dept: INTERNAL MEDICINE CLINIC | Age: 80
End: 2020-01-28

## 2020-01-28 NOTE — PROGRESS NOTES
Medicare Part B Preventive Services Limitations Recommendation/Date completed if known Scheduled/ Next Due   Bone Mass Measurement  (age 72 & older, biennial) Requires diagnosis related to osteoporosis or estrogen deficiency. Biennial benefit unless patient has history of long-term glucocorticoid tx or baseline is needed because initial test was by other method Completed 3/2018      Recommended every 2 years Due 3/2020   Cardiovascular Screening Blood Tests (every 5 years)  Total cholesterol, HDL, Triglycerides Order as a panel if possible Completed 10/2019      Recommended annually Due 10/2020   Colorectal Cancer Screening  -Fecal occult blood test (annual)  -Flexible sigmoidoscopy (5y)  -Screening colonoscopy (10y)  -Barium Enema    Completed 10/2018 with Dr. Jose Gerber      Recommended in 5 years Due 10/2024         Counseling to Prevent Tobacco Use (up to 8 sessions per year)  - Counseling greater than 3 and up to 10 minutes  - Counseling greater than 10 minutes Patients must be asymptomatic of tobacco-related conditions to receive as preventive service Former smoker. Keep up the good work! Diabetes Screening Tests (at least every 3 years, Medicare covers annually or at 6-month intervals for prediabetic patients)      Fasting blood sugar (FBS) or glucose tolerance test (GTT)         Patient must be diagnosed with one of the following:  -Hypertension, Dyslipidemia, obesity, previous impaired FBS or GTT  Or any two of the following: overweight, FH of diabetes, age ? 72, history of gestational diabetes, birth of baby weighing more than 9 pounds Completed 1/2020 with A1C 7.4       Recommended every 3-6 months for diabetics Due 4/20-7/20    Diabetes Self-Management Training (DSMT) (no USPSTF recommendation) Requires referral by treating physician for patient with diabetes or renal disease. 10 hours of initial DSMT session of no less than 30 minutes each in a continuous 12-month period.  2 hours of follow-up DSMT in subsequent years. You have been to the diabetes treatment center in the past. You may call [phone] for a refresher course two years after your last class. Glaucoma Screening (no USPSTF recommendation) Diabetes mellitus, family history, , age 48 or over,  American, age 72 or over Completed 9/2018      Recommended annually  Due 9/2019   Human Immunodeficiency Virus (HIV) Screening (annually for increased risk patients)  HIV-1 and HIV-2 by EIA, XAVI, rapid antibody test, or oral mucosa transudate Patient must be at increased risk for HIV infection per USPSTF guidelines or pregnant. Tests covered annually for patients at increased risk. Pregnant patients may receive up to 3 test during pregnancy. N/A N/A   Medical Nutrition Therapy (MNT) (for diabetes or renal disease not recommended schedule) Requires referral by treating physician for patient with diabetes or renal disease. Can be provided in same year as diabetes self-management training (DSMT), and CMS recommends medical nutrition therapy take place after DSMT. Up to 3 hours for initial year and 2 hours in subsequent years. N/A N/A             Seasonal Influenza Vaccination (annually)    Completed fall 2019      Recommended annually Due Fall 2020    Pneumococcal Vaccination (once after 72)    Pneumococcal 23 - 10/10/2010     Prevnar 13 - 9/26/17     Both recommended once over the age of 72 Completed         Completed              Hepatitis B Vaccinations (if medium/high risk) Medium/high risk factors: End-stage renal disease,  Hemophiliacs who received Factor VIII or IX concentrates, Clients of institutions for the mentally retarded, Persons who live in the same house as a HepB virus carrier, Homosexual men, Illicit injectable drug abusers. N/A N/A   Screening Mammography (biennial age 54-69)?  Annually (age 36 or over) Completed 3/2019      Recommended annually Due 3/2020   Screening Pap Tests and Pelvic Examination (up to age 79 and after 70 if unknown history or abnormal study last 10 years) Every 24 months except high risk Completed 12/2014        Recommended every 2 years Declines further    Ultrasound Screening for Abdominal Aortic Aneurysm (AAA) (once) Patient must be referred through [facility] and not have had a screening for abdominal aortic aneurysm before under Medicare. Limited to patients who meet one of the following criteria:  - Men who are 73-68 years old and have smoked more than 100 cigarettes in their lifetime.  -Anyone with a FH of AAA  -Anyone recommended for screening by USPSTF Not covered by Medicare as preventive.      You had a CTA of your abdomen 11/9/2009 that showed no aneurysm.  N/A

## 2020-01-29 ENCOUNTER — OFFICE VISIT (OUTPATIENT)
Dept: INTERNAL MEDICINE CLINIC | Age: 80
End: 2020-01-29

## 2020-01-29 VITALS
TEMPERATURE: 97.7 F | HEIGHT: 62 IN | DIASTOLIC BLOOD PRESSURE: 81 MMHG | HEART RATE: 61 BPM | RESPIRATION RATE: 16 BRPM | OXYGEN SATURATION: 97 % | WEIGHT: 179 LBS | BODY MASS INDEX: 32.94 KG/M2 | SYSTOLIC BLOOD PRESSURE: 139 MMHG

## 2020-01-29 DIAGNOSIS — D50.8 OTHER IRON DEFICIENCY ANEMIA: ICD-10-CM

## 2020-01-29 DIAGNOSIS — G62.9 NEUROPATHY: ICD-10-CM

## 2020-01-29 DIAGNOSIS — Z78.0 POSTMENOPAUSAL STATE: ICD-10-CM

## 2020-01-29 DIAGNOSIS — Z00.00 MEDICARE ANNUAL WELLNESS VISIT, SUBSEQUENT: ICD-10-CM

## 2020-01-29 DIAGNOSIS — E03.9 HYPOTHYROIDISM, UNSPECIFIED TYPE: ICD-10-CM

## 2020-01-29 DIAGNOSIS — I25.10 CORONARY ARTERY DISEASE INVOLVING NATIVE CORONARY ARTERY OF NATIVE HEART WITHOUT ANGINA PECTORIS: ICD-10-CM

## 2020-01-29 DIAGNOSIS — I73.9 PVD (PERIPHERAL VASCULAR DISEASE) (HCC): ICD-10-CM

## 2020-01-29 DIAGNOSIS — M54.50 CHRONIC BILATERAL LOW BACK PAIN WITHOUT SCIATICA: ICD-10-CM

## 2020-01-29 DIAGNOSIS — M81.0 AGE-RELATED OSTEOPOROSIS WITHOUT CURRENT PATHOLOGICAL FRACTURE: ICD-10-CM

## 2020-01-29 DIAGNOSIS — I50.32 DIASTOLIC CHF, CHRONIC (HCC): ICD-10-CM

## 2020-01-29 DIAGNOSIS — G89.29 CHRONIC BILATERAL LOW BACK PAIN WITHOUT SCIATICA: ICD-10-CM

## 2020-01-29 DIAGNOSIS — E66.9 OBESITY (BMI 30.0-34.9): ICD-10-CM

## 2020-01-29 DIAGNOSIS — E11.21 TYPE 2 DIABETES MELLITUS WITH NEPHROPATHY (HCC): Primary | ICD-10-CM

## 2020-01-29 DIAGNOSIS — Z12.31 ENCOUNTER FOR SCREENING MAMMOGRAM FOR MALIGNANT NEOPLASM OF BREAST: ICD-10-CM

## 2020-01-29 DIAGNOSIS — Z23 ENCOUNTER FOR IMMUNIZATION: ICD-10-CM

## 2020-01-29 DIAGNOSIS — E11.40 TYPE 2 DIABETES MELLITUS WITH DIABETIC NEUROPATHY, WITH LONG-TERM CURRENT USE OF INSULIN (HCC): ICD-10-CM

## 2020-01-29 DIAGNOSIS — F33.9 RECURRENT DEPRESSION (HCC): ICD-10-CM

## 2020-01-29 DIAGNOSIS — E78.2 MIXED HYPERLIPIDEMIA: ICD-10-CM

## 2020-01-29 DIAGNOSIS — Z79.4 TYPE 2 DIABETES MELLITUS WITH DIABETIC NEUROPATHY, WITH LONG-TERM CURRENT USE OF INSULIN (HCC): ICD-10-CM

## 2020-01-29 DIAGNOSIS — I10 ESSENTIAL HYPERTENSION, BENIGN: ICD-10-CM

## 2020-01-29 NOTE — PROGRESS NOTES
HISTORY OF PRESENT ILLNESS  Jessenia King is a 67 y.o. female.   HPI   Last here 10/30/19. Pt is here for routine care.      BP is controlled   BP at home 135/77   Continues on metoprolol 50mg BID and norvasc 5mg  Denies orthostasis  Recall had angioedema with lisinopril and benicar-HCT in the past        She is diabetic    BS at home 122 117 111 121 127 109 143 134 89 99 101 147 84 144 69 60 86   She states she gets those lows when she eats a bit earlier   Before dinner 105 229 204 194 82 94 110 101 130 162 150 161 110 198 83   Continues on metformin 1000mg BID  Pt is currently taking 22U BID of NPH 70/30 mix--increased since lov, doing well no lows  Lov, Discussed to take 18U in evening dose when she is having earlier dinner that is smaller to prevent lows  She states she does this which helps prevent lows 1/20   She also takes ASA 81mg daily   Recall issues with hypoglycemia in the past, a1c under 7.5 at goal for her   [facility] today is 176 lbs-- up 3 lbs x lov   Discussed [employer]  Discussed diet and weight loss again      Reviewed labs 1/20   Will get labs today      Pt follows with Dr. Galdino Vides (cardio) for cad  No sx  Last visit was 9/23/19   Will f/u in 3/20   No sx doing well      Pt follows annually with Dr. Angus Hartmann (vasc) for her PAD  No claudication stable   Last visit was 3/19  Next visit will be 3/20  assymptomatic        Pt saw Dr Julee Blunt (ortho) for carpal tunnel   Had surgery for this 8/1/19   Last visit was 8/19/19   Still gets tingling in hands at times        Pt is now taking prilosec 20mg every other day for reflux, works well     Takes OTC vit D daily      Continues synthroid 100mcg daily    She takes this med separately from all meds      Continues on lipitor 80mg max dose for cholesterol   Pt is now taking zetia 10mg once daily as well  Welchol was too expensive now but zetia ok and grey well no issue     No longer on lexapro, doing well off medication not sad or down 1/20   She also has ativan to use prn (about 1-2x a month ) helps with stress and sleep         Pt had sleep eval with Dr Maude Muse   Last visit was 7/02/19      Pt continues on gabapentin 100 mg for foot tingling bid  Overall helps        Lov, Pt c/o L lower back pain x 1 mo--mid back on the left hurts, mostly with movement  Reviewed XR 10/30/19: impression: Mild DJD.   Pt is currently completing PT   She wonders if a ointment would help with back pain   Discussed not likely for back pain, discussed pt is best for this    Discussed tylenol   Discussed ortho if not getting better         Lives with her daughter     Pt is functionally independent     No memory concerns   Knows the month, day, year   Can recall 3/3 objects          ACP on file. SDMs is  daughter Zahra Chavez) and Yane Ramírez        PREVENTIVE:    Colonoscopy: Dr. Enriqueta Leal 5 years  Pap: 12/14, declines further   Mammogram: 3/4/19, negative, due 3/20   DEXA: 3/18, osteopenic, stopped fosamax, due 3/20   Tdap: 1/04/2016  Pneumovax: 01/29/20   [id number]: 9/17  Zostavax: 10/10/2016  Shingrix: 1st round completed 4/19, 2nd round completed   Flu shot: fall 2019   Foot exam:07/30/19   Microalbumin: 10/19   A1c:   4/18 8.4, 7/18 8.0, 10/18 8.0, 1/19 8.4, 4/19 8.4, 7/19 7.5, 10/19 7.3  1/20 7.4   [facility] exam: [facility], 9/12/18--due, pt thinks she may have gone 9/19, will call for notes   Lipids: 10/19 LDL 74    Patient Active Problem List    Diagnosis Date Noted    Type 2 diabetes mellitus with diabetic neuropathy ([address] 01/22/2019    Type 2 diabetes mellitus with nephropathy ([address] 01/02/2018    Recurrent depression ([address] 01/02/2018    Diverticulosis of intestine with bleeding 10/11/2015    Anemia 76/67/6880    Diastolic CHF, chronic ([address] 10/11/2015    GIB (gastrointestinal bleeding) 10/08/2015    Osteoporosis 09/10/2013    Essential hypertension, benign 05/17/2012    Mixed hyperlipidemia 05/17/2012    Postsurgical percutaneous transluminal coronary angioplasty status 05/17/2012    S/P CABG x 3 11/11/2011    CAD (coronary artery disease) 11/18/2009    Hypothyroidism 11/18/2009    PVD (peripheral vascular disease) ([facility] 11/18/2009     Current Outpatient Medications   Medication Sig Dispense Refill    HUMULIN 70/30 U-100 INSULIN 100 unit/mL (70-30) injection INJECT 22 UNITS SUBCUTANEOUSLY TWICE DAILY 10 mL 0    metoprolol tartrate (LOPRESSOR) 50 mg tablet Take 1 Tab by mouth two (2) times a day. 180 Tab 2    levothyroxine (SYNTHROID) 100 mcg tablet Take 1 Tab by mouth Daily (before breakfast). 90 Tab 1    metFORMIN (GLUCOPHAGE) 1,000 mg tablet Take 1 Tab by mouth two (2) times daily (with meals). 180 Tab 0    atorvastatin (LIPITOR) 80 mg tablet TAKE ONE TABLET BY MOUTH NIGHTLY 90 Tab 1    amLODIPine (NORVASC) 5 mg tablet Take 1 Tab by mouth daily. 90 Tab 1    gabapentin (NEURONTIN) 100 mg capsule Take 2 Caps by mouth two (2) times a day. Max Daily Amount: 400 mg. 120 Cap 4    ezetimibe (ZETIA) 10 mg tablet Take 1 Tab by mouth daily. 30 Tab 6    Omeprazole delayed release (PRILOSEC D/R) 20 mg tablet Take 1 Tab by mouth daily. 90 Tab 1    LORazepam (ATIVAN) 0.5 mg tablet Take 1 Tab by mouth nightly as needed for Anxiety. Max Daily Amount: 0.5 mg. 30 Tab 2    cholecalciferol (VITAMIN D3) 1,000 unit cap Take 1,000 Units by mouth daily.  aspirin 81 mg chewable tablet Take 1 Tab by mouth daily. 90 Tab 3    cyanocobalamin (VITAMIN B-12) 1,000 mcg tablet Take 1,000 mcg by mouth daily.        Past Surgical History:   Procedure Laterality Date    CARDIAC SURG PROCEDURE UNLIST      cabg x 3    COLONOSCOPY N/A 10/31/2018    COLONOSCOPY performed by Liss Ortiz [facility] at [facility] ENDOSCOPY    HX CORONARY ARTERY BYPASS GRAFT  11/11/11    3 vessel    HX GYN      hysterectomy    HX HEART CATHETERIZATION      HX UROLOGICAL  1970s    kidney stone    VASCULAR SURGERY PROCEDURE UNLIST      Stents put in right leg      Lab Results Component Value Date/Time    WBC 8.5 07/25/2019 09:36 AM    HGB 13.2 07/25/2019 09:36 AM    Hemoglobin (POC) 12.9 03/02/2011 12:21 PM    HCT 42.0 07/25/2019 09:36 AM    PLATELET 825 18/57/5119 09:36 AM    MCV 88 07/25/2019 09:36 AM     Lab Results   Component Value Date/Time    Cholesterol, total 149 10/24/2019 09:27 AM    HDL Cholesterol 54 10/24/2019 09:27 AM    LDL, calculated 74 10/24/2019 09:27 AM    Triglyceride 106 10/24/2019 09:27 AM    CHOL/HDL Ratio 3.2 09/14/2010 08:42 AM     Lab Results   Component Value Date/Time    GFR est non-AA 61 01/21/2020 10:15 AM    GFR est AA 71 01/21/2020 10:15 AM    Creatinine 0.90 01/21/2020 10:15 AM    BUN 14 01/21/2020 10:15 AM    Sodium 142 01/21/2020 10:15 AM    Potassium 4.0 01/21/2020 10:15 AM    Chloride 102 01/21/2020 10:15 AM    CO2 24 01/21/2020 10:15 AM    Magnesium 1.7 10/16/2015 04:34 AM        Review of Systems   Respiratory: Negative for shortness of breath. Cardiovascular: Negative for chest pain. Physical Exam  Constitutional:       General: She is not in acute distress. Appearance: She is well-developed. She is not diaphoretic. HENT:      Head: Normocephalic and atraumatic. Right Ear: Tympanic membrane, ear canal and external ear normal.      Left Ear: Tympanic membrane, ear canal and external ear normal.      Mouth/Throat:      Mouth: Mucous membranes are moist.      Pharynx: Oropharynx is clear. No oropharyngeal exudate or posterior oropharyngeal erythema. Eyes:      General: No scleral icterus. Right eye: No discharge. Left eye: No discharge. Conjunctiva/sclera: Conjunctivae normal.      Pupils: Pupils are equal, round, and reactive to light. Neck:      Musculoskeletal: Normal range of motion and neck supple. Vascular: No carotid bruit. Cardiovascular:      Rate and Rhythm: Normal rate and regular rhythm. Heart sounds: Normal heart sounds. No murmur. No friction rub. No gallop.     Pulmonary: Effort: Pulmonary effort is normal. No respiratory distress. Breath sounds: Normal breath sounds. No wheezing or rales. Chest:      Chest wall: No tenderness. Abdominal:      General: There is no distension. Palpations: Abdomen is soft. There is no mass. Tenderness: There is no abdominal tenderness. There is no guarding or rebound. Hernia: No hernia is present. Musculoskeletal: Normal range of motion. General: No tenderness or deformity. Right lower leg: No edema. Left lower leg: No edema. Lymphadenopathy:      Cervical: No cervical adenopathy. Skin:     General: Skin is warm and dry. Coloration: Skin is not pale. Findings: No erythema or rash. Neurological:      Mental Status: She is alert and oriented to person, place, and time. Coordination: Coordination normal.   Psychiatric:         Mood and Affect: Mood normal.         Behavior: Behavior normal.         ASSESSMENT and PLAN    ICD-10-CM ICD-9-CM    1. Type 2 diabetes mellitus with nephropathy (HCC)                    a1c adequatley controlled on nph 70/30 mix BID continues metformin bid as well continue no change to dose  E11.21 250.40      583.81    2. PVD (peripheral vascular disease) ([email or web address]                    Follows with dr Neela Burgess annually stable no currently claudication will see him again in march  I73.9 443.9    3. Diastolic CHF, chronic ([address]                    Medically managed follows with dr Juliane Soto no recent excerbation clinically euvolemic  I50.32 428.32      428.0    4. Type 2 diabetes mellitus with diabetic neuropathy, with long-term current use of insulin (HCC)                    See above  E11.40 250.60     Z79.4 357.2      V58.67    5. Recurrent depression ([address]                  Overall controlled w/o meds was prev on lexapro now doing well w/o it  F33.9 296.30    6.  Coronary artery disease involving native coronary artery of native heart without angina pectoris                  Medically managed asymptomatic hx of cabg  I25.10 414.01    7. Hypothyroidism, unspecified type                  Controlled on synthroid 100 mcgs daily  E03.9 244.9    8. Essential hypertension, benign                Controlled on metoprolol and norvasc continue  I10 401.1    9. Mixed hyperlipidemia                  Controlled on lipitor and zetia  E78.2 272.2    10. Age-related osteoporosis without current pathological fracture                  Prev on fossamax due for dexa in 3/20  M81.0 733.01    11. Other iron deficiency anemia                Will repeat cbc with next set of labs blood counts returned to nl in July  D50.8 280.8    12. Obesity (BMI 30.0-34. 9)            Discussed portion control low carb diet ww  E66.9 278.00         13. Low back pain--- currently doing pt appears to be msk discussed ortho if not improving and tylenol otc   14. Neuropathy--- improved with gabapentin still has some tingling in her hands       Depression screen reviewed and negative. Scribed by Yuridia Scale of [address], as dictated by Dr. Lori Davidson. Current diagnosis and concerns discussed with pt at length. Pt understands risks and benefits or current treatment plan and medications, and accepts the treatment and medication with any possible risks. Pt asks appropriate questions, which were answered. Pt was instructed to call with any concerns or problems. I have reviewed the note documented by the scribe. The services provided are my own.   The documentation is accurate

## 2020-01-29 NOTE — PATIENT INSTRUCTIONS
Medicare Wellness Visit, Female The best way to live healthy is to have a lifestyle where you eat a well-balanced diet, exercise regularly, limit alcohol use, and quit all forms of tobacco/nicotine, if applicable. Regular preventive services are another way to keep healthy. Preventive services (vaccines, screening tests, monitoring & exams) can help personalize your care plan, which helps you manage your own care. Screening tests can find health problems at the earliest stages, when they are easiest to treat. [facility] follows the current, evidence-based guidelines published by the [facility] Zafar Cortes ([facility]) when recommending preventive services for our patients. Because we follow these guidelines, sometimes recommendations change over time as research supports it. (For example, mammograms used to be recommended annually. Even though Medicare will still pay for an annual mammogram, the newer guidelines recommend a mammogram every two years for women of average risk). Of course, you and your doctor may decide to screen more often for some diseases, based on your risk and your co-morbidities (chronic disease you are already diagnosed with). Preventive services for you include: - Medicare offers their members a free annual wellness visit, which is time for you and your primary care provider to discuss and plan for your preventive service needs. Take advantage of this benefit every year! 
-All adults over the age of 72 should receive the recommended pneumonia vaccines. Current USPSTF guidelines recommend a series of two vaccines for the best pneumonia protection.  
-All adults should have a flu vaccine yearly and a tetanus vaccine every 10 years.  
-All adults age 48 and older should receive the shingles vaccines (series of two vaccines). -All adults age 38-68 who are overweight should have a diabetes screening test once every three years. -All adults born between [birth year] and 1965 should be screened once for Hepatitis C. 
-Other screening tests and preventive services for persons with diabetes include: an eye exam to screen for diabetic retinopathy, a kidney function test, a foot exam, and stricter control over your cholesterol.  
-Cardiovascular screening for adults with routine risk involves an electrocardiogram (ECG) at intervals determined by your doctor.  
-Colorectal cancer screenings should be done for adults age 54-65 with no increased risk factors for colorectal cancer. There are a number of acceptable methods of screening for this type of cancer. Each test has its own benefits and drawbacks. Discuss with your doctor what is most appropriate for you during your annual wellness visit. The different tests include: colonoscopy (considered the best screening method), a fecal occult blood test, a fecal DNA test, and sigmoidoscopy. 
 
-A bone mass density test is recommended when a woman turns 65 to screen for osteoporosis. This test is only recommended one time, as a screening. Some providers will use this same test as a disease monitoring tool if you already have osteoporosis. -Breast cancer screenings are recommended every other year for women of normal risk, age 54-69. 
-Cervical cancer screenings for women over age 72 are only recommended with certain risk factors. Here is a list of your current Health Maintenance items (your personalized list of preventive services) with a due date: 
Health Maintenance Due Topic Date Due  Pneumococcal Vaccine (2 of 2 - PPSV23) 09/26/2018  Shingles Vaccine (2 of 2) 06/21/2019 Leigh Ann Annual Well Visit  01/23/2020 Medicare Part B Preventive Services Limitations Recommendation/Date completed if known Scheduled/ Next Due Bone Mass Measurement 
(age 72 & older, biennial) Requires diagnosis related to osteoporosis or estrogen deficiency.  Biennial benefit unless patient has history of long-term glucocorticoid tx or baseline is needed because initial test was by other method Completed 3/2018 
   
Recommended every 2 years Due 3/2020 Cardiovascular Screening Blood Tests (every 5 years) Total cholesterol, HDL, Triglycerides Order as a panel if possible Completed 10/2019 
   
Recommended annually Due 10/2020 Colorectal Cancer Screening 
-Fecal occult blood test (annual) -Flexible sigmoidoscopy (5y) 
-Screening colonoscopy (10y) -Barium Enema    Completed 10/2018 with Dr. Padmini Antonio 
   
Recommended in 5 years Due 10/2024 
  
   
Counseling to Prevent Tobacco Use (up to 8 sessions per year) - Counseling greater than 3 and up to 10 minutes - Counseling greater than 10 minutes Patients must be asymptomatic of tobacco-related conditions to receive as preventive service Former smoker. Keep up the good work! Diabetes Screening Tests (at least every 3 years, Medicare covers annually or at 6-month intervals for prediabetic patients) 
   
Fasting blood sugar (FBS) or glucose tolerance test (GTT) 
   
   Patient must be diagnosed with one of the following: 
-Hypertension, Dyslipidemia, obesity, previous impaired FBS or GTT 
Or any two of the following: overweight, FH of diabetes, age ? 72, history of gestational diabetes, birth of baby weighing more than 9 pounds Completed 1/2020 with A1C 7.4  
   
Recommended every 3-6 months for diabetics Due 4/20-7/20 Diabetes Self-Management Training (DSMT) (no USPSTF recommendation) Requires referral by treating physician for patient with diabetes or renal disease. 10 hours of initial DSMT session of no less than 30 minutes each in a continuous 12-month period. 2 hours of follow-up DSMT in subsequent years. You have been to the diabetes treatment center in the past. You may call [phone] for a refresher course two years after your last class.   
Glaucoma Screening (no USPSTF recommendation) Diabetes mellitus, family history, , age 48 or over,  American, age 72 or over Completed 9/2018 
   
Recommended annually  Due now Human Immunodeficiency Virus (HIV) Screening (annually for increased risk patients) HIV-1 and HIV-2 by EIA, XAVI, rapid antibody test, or oral mucosa transudate Patient must be at increased risk for HIV infection per USPSTF guidelines or pregnant. Tests covered annually for patients at increased risk. Pregnant patients may receive up to 3 test during pregnancy. N/A N/A Medical Nutrition Therapy (MNT) (for diabetes or renal disease not recommended schedule) Requires referral by treating physician for patient with diabetes or renal disease. Can be provided in same year as diabetes self-management training (DSMT), and CMS recommends medical nutrition therapy take place after DSMT. Up to 3 hours for initial year and 2 hours in subsequent years. N/A N/A  
         
Seasonal Influenza Vaccination (annually)    Completed fall 2019  
  
Recommended annually Due Fall 2020 Pneumococcal Vaccination (once after 65)    Pneumococcal 23 - 01/29/20  
  
Prevnar 13 - 9/26/17 
  
Both recommended once over the age of 72 Completed  
  
  
Completed   
         
Hepatitis B Vaccinations (if medium/high risk) Medium/high risk factors: End-stage renal disease, Hemophiliacs who received Factor VIII or IX concentrates, Clients of institutions for the mentally retarded, Persons who live in the same house as a HepB virus carrier, Homosexual men, Illicit injectable drug abusers. N/A N/A Screening Mammography (biennial age 54-69)? Annually (age 36 or over) Completed 3/2019 
   
Recommended annually Due 3/2020 Screening Pap Tests and Pelvic Examination (up to age 79 and after 79 if unknown history or abnormal study last 10 years) Every 24 months except high risk Completed 12/2014   
   
Recommended every 2 years Declines further Ultrasound Screening for Abdominal Aortic Aneurysm (AAA) (once) Patient must be referred through [facility] and not have had a screening for abdominal aortic aneurysm before under Medicare. Limited to patients who meet one of the following criteria: 
- Men who are 73-68 years old and have smoked more than 100 cigarettes in their lifetime. 
-Anyone with a FH of AAA 
-Anyone recommended for screening by USPSTF Not covered by Medicare as preventive. 
   
You had a CTA of your abdomen 11/9/2009 that showed no aneurysm. N/A  
  
 check on your eye exam was due in September Do not schedule bone density before march

## 2020-01-29 NOTE — PROGRESS NOTES
This is the Subsequent Medicare Annual Wellness Exam, performed 12 months or more after the Initial AWV or the last Subsequent AWV    I have reviewed the patient's medical history in detail and updated the computerized patient record.      History     Patient Active Problem List   Diagnosis Code    CAD (coronary artery disease) I25.10    Hypothyroidism E03.9    PVD (peripheral vascular disease) ([facility] I73.9    S/P CABG x 3 Z95.1    Essential hypertension, benign I10    Mixed hyperlipidemia E78.2    Postsurgical percutaneous transluminal coronary angioplasty status Z98.61    Osteoporosis M81.0    GIB (gastrointestinal bleeding) K92.2    Diverticulosis of intestine with bleeding K57.91    Anemia K44.0    Diastolic CHF, chronic (HCC) I50.32    Type 2 diabetes mellitus with nephropathy (HCC) E11.21    Recurrent depression (HCC) F33.9    Type 2 diabetes mellitus with diabetic neuropathy (HCC) E11.40     Past Medical History:   Diagnosis Date    Anxiety     CAD (coronary artery disease)     cabg     Diabetes ([facility]     Diverticulosis     GERD (gastroesophageal reflux disease)     Heart disease     [facility]    High cholesterol     Hypertension     Hypothyroidism     Kidney stones     Postsurgical percutaneous transluminal coronary angioplasty status 5/17/2012    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    [facility]    Sleeping difficulty     Teeth decayed     Weakness       Past Surgical History:   Procedure Laterality Date    CARDIAC SURG PROCEDURE UNLIST      cabg x 3    COLONOSCOPY N/A 10/31/2018    COL[facility]COPY [facility] ENDOSCOPY    HX CORONARY ARTERY BYPASS GRAFT  11/11/11    3 vessel    HX GYN      hysterectomy    HX HEART CATHETERIZATION      HX UROLOGICAL  1970s    kidney stone    VASCULAR SURGERY PROCEDURE UNLIST      Stents put in right leg     Current Outpatient Medications   Medication Sig Dispense Refill    HUMULIN 70/30 U-100 INSULIN 100 unit/mL (70-30) injection INJECT 22 UNITS SUBCUTANEOUSLY TWICE DAILY 10 mL 0    metoprolol tartrate (LOPRESSOR) 50 mg tablet Take 1 Tab by mouth two (2) times a day. 180 Tab 2    levothyroxine (SYNTHROID) 100 mcg tablet Take 1 Tab by mouth Daily (before breakfast). 90 Tab 1    metFORMIN (GLUCOPHAGE) 1,000 mg tablet Take 1 Tab by mouth two (2) times daily (with meals). 180 Tab 0    atorvastatin (LIPITOR) 80 mg tablet TAKE ONE TABLET BY MOUTH NIGHTLY 90 Tab 1    amLODIPine (NORVASC) 5 mg tablet Take 1 Tab by mouth daily. 90 Tab 1    gabapentin (NEURONTIN) 100 mg capsule Take 2 Caps by mouth two (2) times a day. Max Daily Amount: 400 mg. 120 Cap 4    ezetimibe (ZETIA) 10 mg tablet Take 1 Tab by mouth daily. 30 Tab 6    Omeprazole delayed release (PRILOSEC D/R) 20 mg tablet Take 1 Tab by mouth daily. 90 Tab 1    LORazepam (ATIVAN) 0.5 mg tablet Take 1 Tab by mouth nightly as needed for Anxiety. Max Daily Amount: 0.5 mg. 30 Tab 2    cholecalciferol (VITAMIN D3) 1,000 unit cap Take 1,000 Units by mouth daily.  aspirin 81 mg chewable tablet Take 1 Tab by mouth daily. 90 Tab 3    cyanocobalamin (VITAMIN B-12) 1,000 mcg tablet Take 1,000 mcg by mouth daily.        Allergies   Allergen Reactions    Ace Inhibitors Swelling    Arb-Angiotensin Receptor Antagonist Swelling    Lisinopril Swelling    Plavix [Clopidogrel] Other (comments)     Excessive bleeding    Potassium Nausea and Vomiting     Potassium containing oral products are not well tolerated by patient       Family History   Problem Relation Age of Onset    Diabetes Mother     Heart Disease Mother     Diabetes Brother     Heart Disease Brother     Mental Retardation Brother     Hypertension Brother     Other Father     Cancer Sister     Diabetes Brother     Heart Disease Brother     Diabetes Brother     Breast Cancer Daughter         50's     Social History     Tobacco Use    Smoking status: Former Smoker     Packs/day: 0.50     Years: 40.00     Pack years: 20.00     Last attempt to quit: 2010     Years since quittin.3    Smokeless tobacco: Never Used   Substance Use Topics    Alcohol use: No       Depression Risk Factor Screening:     3 most recent PHQ Screens 2020   Little interest or pleasure in doing things Not at all   Feeling down, depressed, irritable, or hopeless Not at all   Total Score PHQ 2 0       Alcohol Risk Factor Screening:   Do you average 1 drink per night or more than 7 drinks a week:  No    On any one occasion in the past three months have you have had more than 3 drinks containing alcohol:  No      Functional Ability and Level of Safety:   Hearing: Hearing is good. Activities of Daily Living: The home contains: handrails and grab bars  Patient does total self care    Ambulation: with no difficulty    Fall Risk:  Fall Risk Assessment, last 12 mths 2020   Able to walk? Yes   Fall in past 12 months?  No   Fall with injury? -   Number of falls in past 12 months -   Fall Risk Score -       Abuse Screen:  Patient is not abused  Daughter lives with her  safe  Cognitive Screening   Has your family/caregiver stated any concerns about your memory: no  Cognitive Screening: Normal - Mini Cog Test    Patient Care Team   Patient Care Team:  Torri Fernandes MD as PCP - General (Internal Medicine)  Torri Fernandes MD as PCP - REHABILITATION [facility] Empaneled Provider  Kaylyn Milligan O.D. (Optometry)  Kolton Gupta MD as Physician (Cardiology)  Nicholas Javier MD (Gastroenterology)  Rd Morel MD (Obstetrics & Gynecology)  Shubham Barreto MD (Vascular Surgery)  Triston Campoverde MD (Hand Surgery)   updated    Assessment/Plan   Education and counseling provided:  Are appropriate based on today's review and evaluation  End-of-Life planning (with patient's consent)  Screening Mammography  Screening Pap and pelvic (covered once every 2 years)  Screening for glaucoma    Diagnoses and all orders for this visit:    1. Type 2 diabetes mellitus with nephropathy ([facility])  -     METABOLIC PANEL, BASIC; Future  -     CBC W/O DIFF; Future  -     HEMOGLOBIN A1C WITH EAG; Future  -     TSH 3RD GENERATION; Future    2. PVD (peripheral vascular disease) ([facility])  -     METABOLIC PANEL, BASIC; Future  -     CBC W/O DIFF; Future  -     HEMOGLOBIN A1C WITH EAG; Future  -     TSH 3RD GENERATION; Future    3. Diastolic CHF, chronic ([facility])  -     METABOLIC PANEL, BASIC; Future  -     CBC W/O DIFF; Future  -     HEMOGLOBIN A1C WITH EAG; Future  -     TSH 3RD GENERATION; Future    4. Type 2 diabetes mellitus with diabetic neuropathy, with long-term current use of insulin ([facility])  -     METABOLIC PANEL, BASIC; Future  -     CBC W/O DIFF; Future  -     HEMOGLOBIN A1C WITH EAG; Future  -     TSH 3RD GENERATION; Future    5. Recurrent depression ([facility]  -     METABOLIC PANEL, BASIC; Future  -     CBC W/O DIFF; Future  -     HEMOGLOBIN A1C WITH EAG; Future  -     TSH 3RD GENERATION; Future    6. Coronary artery disease involving native coronary artery of native heart without angina pectoris  -     METABOLIC PANEL, BASIC; Future  -     CBC W/O DIFF; Future  -     HEMOGLOBIN A1C WITH EAG; Future  -     TSH 3RD GENERATION; Future    7. Hypothyroidism, unspecified type  -     METABOLIC PANEL, BASIC; Future  -     CBC W/O DIFF; Future  -     HEMOGLOBIN A1C WITH EAG; Future  -     TSH 3RD GENERATION; Future    8. Essential hypertension, benign  -     METABOLIC PANEL, BASIC; Future  -     CBC W/O DIFF; Future  -     HEMOGLOBIN A1C WITH EAG; Future  -     TSH 3RD GENERATION; Future    9. Mixed hyperlipidemia  -     METABOLIC PANEL, BASIC; Future  -     CBC W/O DIFF; Future  -     HEMOGLOBIN A1C WITH EAG; Future  -     TSH 3RD GENERATION; Future    10. Age-related osteoporosis without current pathological fracture  -     METABOLIC PANEL, BASIC; Future  -     CBC W/O DIFF; Future  -     HEMOGLOBIN A1C WITH EAG;  Future  -     TSH 3RD GENERATION; Future    11. Other iron deficiency anemia  -     METABOLIC PANEL, BASIC; Future  -     CBC W/O DIFF; Future  -     HEMOGLOBIN A1C WITH EAG; Future  -     TSH 3RD GENERATION; Future    12. Obesity (BMI 17.0-49.2)  -     METABOLIC PANEL, BASIC; Future  -     CBC W/O DIFF; Future  -     HEMOGLOBIN A1C WITH EAG; Future  -     TSH 3RD GENERATION; Future    13. Medicare annual wellness visit, subsequent    14. Encounter for screening mammogram for malignant neoplasm of breast  -     FELICIANO MAMMO BI SCREENING INCL CAD; Future    15. Postmenopausal state  -     DEXA BONE DENSITY STUDY AXIAL; Future        Health Maintenance Due   Topic Date Due    Pneumococcal 65+ years (2 of 2 - PPSV23) 09/26/2018    Shingrix Vaccine Age 50> (2 of 2) 06/21/2019    MEDICARE YEARLY EXAM  01/23/2020     ACP on file. SDMs is  daughter Marta Aguirre) and Manju Sanchez          Colonoscopy: Dr. Heide Coronado 5 years  Pap: 12/14, declines further   Mammogram: 3/4/19, negative, due 3/20   DEXA: 3/18, osteopenic, stopped fosamax, due 3/20     Tdap: 1/04/2016  Pneumovax: 01/29/20   [id number]: 9/17  Zostavax: 10/10/2016  Shingrix: 1st round completed 4/19, 2nd round completed   Flu shot: fall 2019     [place] exam[place], 9/12/18--due, pt thinks she may have gone 9/19, will call for notes     A1c:    1/20 7.4  q3mo  Lipids: 10/19 LDL 74 annual     Medication reconciliation completed by MA and reviewed by me. Medical/surgical/social/family history reviewed and updated by me. Patient provided AVS and preventative screening table. Patient verbalized understanding of all information discussed.

## 2020-02-07 DIAGNOSIS — E11.21 TYPE II DIABETES MELLITUS WITH NEPHROPATHY (HCC): ICD-10-CM

## 2020-02-07 RX ORDER — INSULIN HUMAN 100 [IU]/ML
INJECTION, SUSPENSION SUBCUTANEOUS
Qty: 10 ML | Refills: 0 | Status: SHIPPED | OUTPATIENT
Start: 2020-02-07 | End: 2020-03-06 | Stop reason: SDUPTHER

## 2020-02-13 NOTE — TELEPHONE ENCOUNTER
PCP: Fabian Hermosillo MD    Last appt: 2020  Future Appointments   Date Time Provider Gen Paige   3/6/2020 11:00  [address] FELICIANO 1 [address] RLMAMMO [facility] IM   3/6/2020 11:30  [address] DEXA 1 [address] RLDEXA LABURNUM IM   3/31/2020 10:45 AM Ravindra, Verner Cuba, MD [facility] CIPRIANO [facility]   2020  1:45 PM [facility]       Requested Prescriptions     Pending Prescriptions Disp Refills    flash glucose sensor (FREESTYLE EKYLA 14 DAY SENSOR) kit 1 Kit 0     Si Kit by Does Not Apply route daily.

## 2020-02-18 RX ORDER — OMEPRAZOLE 20 MG/1
CAPSULE, DELAYED RELEASE ORAL
Qty: 90 CAP | Refills: 0 | Status: SHIPPED | OUTPATIENT
Start: 2020-02-18 | End: 2020-05-18

## 2020-02-28 DIAGNOSIS — E11.21 TYPE 2 DIABETES MELLITUS WITH NEPHROPATHY (HCC): Primary | ICD-10-CM

## 2020-02-28 NOTE — TELEPHONE ENCOUNTER
Patient requesting Immediate Approval as this was requested on Monday, 2/24/20 & was never received from the Pharmacy. Pharmacy is [facility] [address]. Thank you      Patient is requesting approval for 2 kits.

## 2020-03-02 NOTE — TELEPHONE ENCOUNTER
PCP: Sue Maurer MD    Last appt: 2020  Future Appointments   Date Time Provider Gen Paige   3/6/2020 11:00 AM [facility] FELICIANO 1 [facility] RLMAMMO [facility] IM   3/6/2020 11:30 AM [facility] DEXA 1 [facility] RLDEXA LABUR[facility] IM   3/31/2020 10:45 AM Radha Whitmore MD [facility] CIPRIANO [facility]   2020  1:45 PM Sue Maurer MD [facility] 87       Requested Prescriptions     Pending Prescriptions Disp Refills    flash glucose sensor (FREESTYLE KEYLA 14 DAY SENSOR) kit 2 Kit 2     Si Kit by Does Not Apply route daily.

## 2020-03-06 ENCOUNTER — TELEPHONE (OUTPATIENT)
Dept: INTERNAL MEDICINE CLINIC | Age: 80
End: 2020-03-06

## 2020-03-06 ENCOUNTER — HOSPITAL ENCOUNTER (OUTPATIENT)
Dept: BONE DENSITY | Age: 80
Discharge: HOME | End: 2020-03-06
Attending: INTERNAL MEDICINE
Payer: MEDICARE

## 2020-03-06 ENCOUNTER — HOSPITAL ENCOUNTER (OUTPATIENT)
Dept: MAMMOGRAPHY | Age: 80
Discharge: HOME | End: 2020-03-06
Attending: INTERNAL MEDICINE
Payer: MEDICARE

## 2020-03-06 DIAGNOSIS — Z12.31 ENCOUNTER FOR SCREENING MAMMOGRAM FOR MALIGNANT NEOPLASM OF BREAST: ICD-10-CM

## 2020-03-06 DIAGNOSIS — E11.21 TYPE II DIABETES MELLITUS WITH NEPHROPATHY (HCC): ICD-10-CM

## 2020-03-06 DIAGNOSIS — Z78.0 POSTMENOPAUSAL STATE: ICD-10-CM

## 2020-03-06 PROCEDURE — 77067 SCR MAMMO BI INCL CAD: CPT

## 2020-03-06 PROCEDURE — 77080 DXA BONE DENSITY AXIAL: CPT

## 2020-03-06 RX ORDER — LEVOTHYROXINE SODIUM 100 UG/1
100 TABLET ORAL
Qty: 90 TAB | Refills: 1 | Status: SHIPPED | OUTPATIENT
Start: 2020-03-06 | End: 2020-12-07

## 2020-03-06 NOTE — TELEPHONE ENCOUNTER
Called, spoke to pt. Two pt identifiers confirmed. Pt informed per Erlin Arias that they need pt's updated medicare part B insurance card in order to run the claim for coverage. Pt verbalized understanding of information discussed w/ no further questions at this time.

## 2020-03-06 NOTE — TELEPHONE ENCOUNTER
PCP: Marin Basurto MD    Last appt: 1/29/2020  Future Appointments   Date Time Provider Gen Paige   3/31/2020 10:45 AM Cullen Laura MD [facility]   4/29/2020  1:45 PM Marin Basurto MD [facility] 87       Requested Prescriptions     Pending Prescriptions Disp Refills    levothyroxine (SYNTHROID) 100 mcg tablet 90 Tab 1     Sig: Take 1 Tab by mouth Daily (before breakfast).     insulin NPH/insulin regular (HUMULIN 70/30 U-100 INSULIN) 100 unit/mL (70-30) injection 10 mL 1     Sig: INJECT 22 UNITS SUBCUTANEOUSLY TWICE DAILY

## 2020-03-06 NOTE — TELEPHONE ENCOUNTER
Called, spoke to pt. Two pt identifiers confirmed. Pt states that she needs her Raina Messinae sensor--informed that it was approved 3/2/20 w/ 2R. Informed pt LPN RR will contact pharmacy. Pt asking for Levothyroxine and humulin 70/30--22u BID refilled--pended refill encounter 3/6/20. Pt verbalized understanding of information discussed w/ no further questions at this time.

## 2020-03-06 NOTE — TELEPHONE ENCOUNTER
#[phone] pt will be leaving at 10 am but will return home after. She shouldn't be too long, noon. Pt states she can't get her medications, including the dm sensor. Pt states she needs all her medications sent to the pharmacy. She did not give me names of medications. Please call pt to work all this out and get her refills in that need to be in. Thanks.

## 2020-03-06 NOTE — TELEPHONE ENCOUNTER
Spoke to Celina at [place]. [place] states they received Ruben order from 3/2/20. Celina states that they need the pt's updated medicare Part B info--pt will need to bring in Part B insurance card. Will notify pt.

## 2020-03-23 DIAGNOSIS — E11.21 TYPE 2 DIABETES MELLITUS WITH NEPHROPATHY (HCC): ICD-10-CM

## 2020-03-23 DIAGNOSIS — E11.21 TYPE II DIABETES MELLITUS WITH NEPHROPATHY (HCC): ICD-10-CM

## 2020-03-23 RX ORDER — METFORMIN HYDROCHLORIDE 1000 MG/1
1000 TABLET ORAL 2 TIMES DAILY WITH MEALS
Qty: 180 TAB | Refills: 0 | Status: SHIPPED | OUTPATIENT
Start: 2020-03-23 | End: 2020-03-28

## 2020-03-23 RX ORDER — ATORVASTATIN CALCIUM 80 MG/1
TABLET, FILM COATED ORAL
Qty: 90 TAB | Refills: 1 | Status: SHIPPED | OUTPATIENT
Start: 2020-03-23 | End: 2020-07-16 | Stop reason: SDUPTHER

## 2020-03-23 RX ORDER — FLASH GLUCOSE SENSOR
1 KIT MISCELLANEOUS DAILY
Qty: 2 KIT | Refills: 2 | Status: SHIPPED | OUTPATIENT
Start: 2020-03-23 | End: 2020-04-29 | Stop reason: SDUPTHER

## 2020-03-23 NOTE — TELEPHONE ENCOUNTER
Patient is requesting refills to be done just in case of emergency & refills will be on file at the pharmacy when/if needed due to Coronavirus issue. [facility]acy is [facility]. Please call if any questions. Thank you.

## 2020-03-27 DIAGNOSIS — E11.21 TYPE II DIABETES MELLITUS WITH NEPHROPATHY (HCC): ICD-10-CM

## 2020-03-28 RX ORDER — METFORMIN HYDROCHLORIDE 1000 MG/1
TABLET ORAL
Qty: 180 TAB | Refills: 0 | Status: SHIPPED | OUTPATIENT
Start: 2020-03-28 | End: 2020-07-16 | Stop reason: SDUPTHER

## 2020-04-28 RX ORDER — AMLODIPINE BESYLATE 5 MG/1
TABLET ORAL
Qty: 90 TAB | Refills: 0 | Status: SHIPPED | OUTPATIENT
Start: 2020-04-28 | End: 2020-04-29 | Stop reason: SDUPTHER

## 2020-04-28 NOTE — PROGRESS NOTES
HISTORY OF PRESENT ILLNESS  Holly Raymond is a 79 y.o. female. HPI   Last here 20. Pt is here for routine care.   This is an established visit completed with telemedicine was completed with video assist  the patient acknowledges and agrees to this method of visitation  22min       BP is controlled   BP at home 135/78  Continues on metoprolol 50mg BID and norvasc 5mg  Denies orthostasis  Recall had angioedema with lisinopril and benicar-HCT in the past        She is diabetic    BS at home 99, 128, 140, 146, 114, 121, 111, 84, 124, 127, 102, 118, 135, 109, 127, 113, 133  No low readings  Continues on metformin 1000mg BID  Pt is currently taking 22U BID of NPH 70/30 mix--increased since lov, doing well no lows  Lov, Discussed to take 18U in evening dose when she is having earlier dinner that is smaller to prevent lows--has used this a few times  She states she does this which helps prevent lows    She also takes ASA 81mg daily   Recall issues with hypoglycemia in the past, a1c under 7.5 at goal for her       Wt was  176 lbs--now about 180lb   Discussed WW--disussed portions and avoiding overweating  Discussed diet and weight loss again    She is staying home and avoiding crowds   She has known a few friends who  from this so she is being careful   Goes to store weekly   Daughter helps her also       Reviewed labs      Pt follows with Dr. Conchita Palomares (cardio) for cad  No sx  Last visit was 19   Will f/u in 3/20 --rescheduled  No sx doing well      Pt follows annually with Dr. Emelia Reddy (vasc) for her PAD  No claudication stable   Last visit was 3/19  Next visit will be 3/20--was actually rescheduled  assymptomatic        Pt saw Dr Noemi Bond (ortho) for carpal tunnel   Had surgery for this 19   Last visit was 19   Still gets tingling in hands at times        Pt is now taking prilosec 20mg every other day for reflux, works well     Takes OTC vit D daily      Continues synthroid 100mcg daily    She takes this med separately from all meds      Continues on lipitor 80mg max dose for cholesterol   Pt is now taking zetia 10mg once daily as well  Welchol was too expensive now but zetia ok and grey well no issue     No longer on lexapro, doing well off medication not sad or down 1/20   She also has ativan to use prn-- (about 1-2x a month ) helps with stress and sleep         Pt had sleep eval with Dr Loenela De Leon  Last visit was 7/02/19      Pt continues on gabapentin 100 mg for foot tingling bid  Overall helps        Lov, Pt c/o L lower back pain x 1 mo--mid back on the left hurts, mostly with movement  Pt was currently completing PT --now doing her exercises at home  This has improved her back       reviewed mammo and dxa       ACP on file. SDMs is  daughter Kyleigh Franco) and Tish Berrios        PREVENTIVE:    Colonoscopy: Dr. Librado Mccoy 5 years  Pap: 12/14, declines further   Mammogram:  3/20   DEXA: 3/20, osteopenic, stopped fosamax,   Tdap: 1/04/2016  Pneumovax: 01/29/20   [id number]: 9/17  Zostavax: 10/10/2016  Shingrix: 1st round completed 4/19, 2nd round completed   Flu shot: fall 2019   Foot exam:07/30/19   Microalbumin: 10/19   A1c:   4/18 8.4, 7/18 8.0, 10/18 8.0, 1/19 8.4, 4/19 8.4, 7/19 7.5, 10/19 7.3  1/20 7.4   Eye exam: Dr. Perdomo Degree at [facility], 9/12/18--due, pt thinks she may have gone 9/19, will call for notes   Lipids: 10/19 LDL 74    Patient Active Problem List   Diagnosis Code    CAD (coronary artery disease) I25.10    Hypothyroidism E03.9    PVD (peripheral vascular disease) ([facility] I73.9    S/P CABG x 3 Z95.1    Essential hypertension, benign I10    Mixed hyperlipidemia E78.2    Postsurgical percutaneous transluminal coronary angioplasty status Z98.61    Osteoporosis M81.0    GIB (gastrointestinal bleeding) K92.2    Diverticulosis of intestine with bleeding K57.91    Anemia N86.9    Diastolic CHF, chronic (HCC) I50.32    Type 2 diabetes mellitus with nephropathy ([facility] E11.21    Rec[facility] F33.9    Type 2 diabetes mellitus with diabetic neuropathy (HCC) E11.40     Current Outpatient Medications   Medication Sig Dispense Refill    amLODIPine (NORVASC) 5 mg tablet Take 1 tablet by mouth once daily 90 Tab 0    metFORMIN (GLUCOPHAGE) 1,000 mg tablet TAKE 1 TABLET BY MOUTH TWICE DAILY WITH MEALS 180 Tab 0    atorvastatin (LIPITOR) 80 mg tablet TAKE ONE TABLET BY MOUTH NIGHTLY 90 Tab 1    flash glucose sensor (FreeStyle Ruben 14 Day Sensor) kit 1 Kit by Does Not Apply route daily. 2 Kit 2    insulin NPH/insulin regular (HumuLIN 70/30 U-100 Insulin) 100 unit/mL (70-30) injection INJECT 22 UNITS SUBCUTANEOUSLY TWICE DAILY 10 mL 1    levothyroxine (SYNTHROID) 100 mcg tablet Take 1 Tab by mouth Daily (before breakfast). 90 Tab 1    omeprazole (PRILOSEC) 20 mg capsule TAKE 1 CAPSULE BY MOUTH ONCE DAILY 90 Cap 0    metoprolol tartrate (LOPRESSOR) 50 mg tablet Take 1 Tab by mouth two (2) times a day. 180 Tab 2    gabapentin (NEURONTIN) 100 mg capsule Take 2 Caps by mouth two (2) times a day. Max Daily Amount: 400 mg. 120 Cap 4    ezetimibe (ZETIA) 10 mg tablet Take 1 Tab by mouth daily. 30 Tab 6    LORazepam (ATIVAN) 0.5 mg tablet Take 1 Tab by mouth nightly as needed for Anxiety. Max Daily Amount: 0.5 mg. 30 Tab 2    cholecalciferol (VITAMIN D3) 1,000 unit cap Take 1,000 Units by mouth daily.  aspirin 81 mg chewable tablet Take 1 Tab by mouth daily. 90 Tab 3    cyanocobalamin (VITAMIN B-12) 1,000 mcg tablet Take 1,000 mcg by mouth daily.         Lab Results   Component Value Date/Time    Cholesterol, total 149 10/24/2019 09:27 AM    HDL Cholesterol 54 10/24/2019 09:27 AM    LDL, calculated 74 10/24/2019 09:27 AM    Triglyceride 106 10/24/2019 09:27 AM    CHOL/HDL Ratio 3.2 09/14/2010 08:42 AM     Lab Results   Component Value Date/Time    GFR est non-AA 61 01/21/2020 10:15 AM    GFR est AA 71 01/21/2020 10:15 AM    Creatinine 0.90 01/21/2020 10:15 AM    BUN 14 01/21/2020 10:15 AM    Sodium 142 01/21/2020 10:15 AM    Potassium 4.0 01/21/2020 10:15 AM    Chloride 102 01/21/2020 10:15 AM    CO2 24 01/21/2020 10:15 AM    Magnesium 1.7 10/16/2015 04:34 AM        Review of Systems   Respiratory: Negative for shortness of breath. Cardiovascular: Negative for chest pain. Physical Exam    ASSESSMENT and PLAN    ICD-10-CM ICD-9-CM    1. Type 2 diabetes mellitus with nephropathy (HCC)      Continues on metformin 1000mg BID  Pt is currently taking 22U BID of NPH 70/30 mix--increased since lov, doing well no lows  Lov, Discussed to take 18U in evening dose when she is having earlier dinner that is smaller to prevent lows E11.21 250.40      583.81    2. Type 2 diabetes mellitus with diabetic neuropathy, with long-term current use of insulin (HCC) E11.40 250.60        See above Z79.4 357.2      V58.67    3. PVD (peripheral vascular disease) ([facility]    No sx med managed. I73.9 443.9    4. Diastolic CHF, chronic ([facility])    Med managed  I50.32 428.32      428.0    5. Recurrent depression ([facility]    Doing well w/o meds on a daily basis    Does ativan prn at night not daily  F33.9 296.30    6. Hypothyroidism, unspecified type    Controlled on syn 100mcg E03.9 244.9    7. Coronary artery disease involving native coronary artery of native heart without angina pectoris I25.10 414.01    8. Mixed hyperlipidemia    Continues on lipitor 80mg max dose for cholesterol   Pt is now taking zetia 10mg once daily as well   E78.2 272.2    9. Essential hypertension, benign    Controlled  on metoprolol 50mg BID and norvasc 5mg   I10 401.1    10. Age-related osteoporosis without current pathological fracture    Due for dxa    Currently on vit d no longer on fosamax M81.0 733.01        Arin Law is a 79 y.o. female being evaluated by a Virtual Visit (video visit) encounter to address concerns as mentioned above. A caregiver was present when appropriate.  Due to this being a TeleHealth encounter (During DGRYB-40 public health emergency), evaluation of the following organ systems was limited: Vitals/Constitutional/EENT/Resp/CV/GI//MS/Neuro/Skin/Heme-Lymph-Imm. Pursuant to the emergency declaration under the [address] [place] [address], [address] [place] [address] and the [employer] and [employer] Act, this Virtual Visit was conducted with patient's (and/or legal guardian's) consent, to reduce the risk of exposure to COVID-19 and provide necessary medical care. --Leida Melissa MD on 4/29/2020 at 1:59 PM    An electronic signature was used to authenticate this note.

## 2020-04-29 ENCOUNTER — VIRTUAL VISIT (OUTPATIENT)
Dept: INTERNAL MEDICINE CLINIC | Age: 80
End: 2020-04-29

## 2020-04-29 DIAGNOSIS — M81.0 AGE-RELATED OSTEOPOROSIS WITHOUT CURRENT PATHOLOGICAL FRACTURE: ICD-10-CM

## 2020-04-29 DIAGNOSIS — E03.9 HYPOTHYROIDISM, UNSPECIFIED TYPE: ICD-10-CM

## 2020-04-29 DIAGNOSIS — I50.32 DIASTOLIC CHF, CHRONIC (HCC): ICD-10-CM

## 2020-04-29 DIAGNOSIS — I73.9 PVD (PERIPHERAL VASCULAR DISEASE) (HCC): ICD-10-CM

## 2020-04-29 DIAGNOSIS — I25.10 CORONARY ARTERY DISEASE INVOLVING NATIVE CORONARY ARTERY OF NATIVE HEART WITHOUT ANGINA PECTORIS: ICD-10-CM

## 2020-04-29 DIAGNOSIS — E11.40 TYPE 2 DIABETES MELLITUS WITH DIABETIC NEUROPATHY, WITH LONG-TERM CURRENT USE OF INSULIN (HCC): ICD-10-CM

## 2020-04-29 DIAGNOSIS — E78.2 MIXED HYPERLIPIDEMIA: ICD-10-CM

## 2020-04-29 DIAGNOSIS — I10 ESSENTIAL HYPERTENSION, BENIGN: ICD-10-CM

## 2020-04-29 DIAGNOSIS — F33.9 RECURRENT DEPRESSION (HCC): ICD-10-CM

## 2020-04-29 DIAGNOSIS — Z79.4 TYPE 2 DIABETES MELLITUS WITH DIABETIC NEUROPATHY, WITH LONG-TERM CURRENT USE OF INSULIN (HCC): ICD-10-CM

## 2020-04-29 DIAGNOSIS — E11.21 TYPE 2 DIABETES MELLITUS WITH NEPHROPATHY (HCC): Primary | ICD-10-CM

## 2020-04-29 RX ORDER — AMLODIPINE BESYLATE 5 MG/1
TABLET ORAL
Qty: 90 TAB | Refills: 0 | Status: SHIPPED | OUTPATIENT
Start: 2020-04-29 | End: 2020-07-20

## 2020-04-29 RX ORDER — FLASH GLUCOSE SENSOR
1 KIT MISCELLANEOUS DAILY
Qty: 2 KIT | Refills: 2 | Status: SHIPPED | OUTPATIENT
Start: 2020-04-29 | End: 2020-11-04 | Stop reason: SDUPTHER

## 2020-05-15 ENCOUNTER — VIRTUAL VISIT (OUTPATIENT)
Dept: CARDIOLOGY CLINIC | Age: 80
End: 2020-05-15

## 2020-05-15 VITALS
DIASTOLIC BLOOD PRESSURE: 103 MMHG | BODY MASS INDEX: 33.31 KG/M2 | WEIGHT: 181 LBS | SYSTOLIC BLOOD PRESSURE: 136 MMHG | HEIGHT: 62 IN | HEART RATE: 60 BPM

## 2020-05-15 DIAGNOSIS — E78.2 MIXED HYPERLIPIDEMIA: ICD-10-CM

## 2020-05-15 DIAGNOSIS — Z95.1 S/P CABG X 3: ICD-10-CM

## 2020-05-15 DIAGNOSIS — I25.10 CORONARY ARTERY DISEASE INVOLVING NATIVE CORONARY ARTERY OF NATIVE HEART WITHOUT ANGINA PECTORIS: Primary | ICD-10-CM

## 2020-05-15 DIAGNOSIS — I10 ESSENTIAL HYPERTENSION, BENIGN: ICD-10-CM

## 2020-05-15 NOTE — PROGRESS NOTES
Cardiology Telemedicine Encounter    Johanna Miller is a 79 y.o. female who was seen by synchronous (real-time) audio-video technology on 5/15/2020           Subjective/HPI:     Johanna Miller is a 79 y.o. female is here for routine follow-up via virtual visit. She has a PMHx of ASHD s/p CABG. Denies any chest pain, edema, palpitations. Has mild SOB which is unchanged. Lipids at goal.          PCP Provider  Shanti Farfan MD    Past Medical History:   Diagnosis Date    Anxiety     CAD (coronary artery disease)     cabg     Diabetes ([facility]     Diverticulosis     GERD (gastroesophageal reflux disease)     Heart disease     Heart failure ([facility] 10/18/2011    High cholesterol     Hypertension     Hypothyroidism     Kidney stones     Postsurgical percutaneous transluminal coronary angioplasty status 5/17/2012    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    [facility]    Sleeping difficulty     Teeth decayed     Weakness         Allergies   Allergen Reactions    Ace Inhibitors Swelling    Arb-Angiotensin Receptor Antagonist Swelling    Lisinopril Swelling    Plavix [Clopidogrel] Other (comments)     Excessive bleeding    Potassium Nausea and Vomiting     Potassium containing oral products are not well tolerated by patient        Outpatient Encounter Medications as of 5/15/2020   Medication Sig Dispense Refill    flash glucose sensor ([employer] Ruben 14 Day Sensor) kit 1 Kit by Does Not Apply route daily.  Check glucose bid 2 Kit 2    amLODIPine (NORVASC) 5 mg tablet Take 1 tablet by mouth once daily 90 Tab 0    metFORMIN (GLUCOPHAGE) 1,000 mg tablet TAKE 1 TABLET BY MOUTH TWICE DAILY WITH MEALS 180 Tab 0    atorvastatin (LIPITOR) 80 mg tablet TAKE ONE TABLET BY MOUTH NIGHTLY 90 Tab 1    insulin NPH/insulin regular (HumuLIN 70/30 U-100 Insulin) 100 unit/mL (70-30) injection INJECT 22 UNITS SUBCUTANEOUSLY TWICE DAILY 10 mL 1    levothyroxine (SYNTHROID) 100 mcg tablet Take 1 Tab by mouth Daily (before breakfast). 90 Tab 1    omeprazole (PRILOSEC) 20 mg capsule TAKE 1 CAPSULE BY MOUTH ONCE DAILY 90 Cap 0    metoprolol tartrate (LOPRESSOR) 50 mg tablet Take 1 Tab by mouth two (2) times a day. 180 Tab 2    gabapentin (NEURONTIN) 100 mg capsule Take 2 Caps by mouth two (2) times a day. Max Daily Amount: 400 mg. 120 Cap 4    ezetimibe (ZETIA) 10 mg tablet Take 1 Tab by mouth daily. 30 Tab 6    LORazepam (ATIVAN) 0.5 mg tablet Take 1 Tab by mouth nightly as needed for Anxiety. Max Daily Amount: 0.5 mg. 30 Tab 2    cholecalciferol (VITAMIN D3) 1,000 unit cap Take 1,000 Units by mouth daily.  aspirin 81 mg chewable tablet Take 1 Tab by mouth daily. 90 Tab 3    cyanocobalamin (VITAMIN B-12) 1,000 mcg tablet Take 1,000 mcg by mouth daily. No facility-administered encounter medications on file as of 5/15/2020. Review of Symptoms:    Review of Systems   Constitutional: Negative. Negative for chills and fever. HENT: Negative. Negative for hearing loss. Respiratory: Positive for shortness of breath. Negative for cough and hemoptysis. Cardiovascular: Negative. Negative for chest pain, palpitations, orthopnea, claudication, leg swelling and PND. Gastrointestinal: Negative. Negative for nausea and vomiting. Musculoskeletal: Negative for myalgias. Skin: Negative. Negative for rash. Neurological: Negative. Negative for dizziness, loss of consciousness and headaches. Physical Exam:      General: Well developed, in no acute distress, cooperative and alert  HEENT: trach is midline. PEERL, EOM intact. Respiratory: No audible wheezing, no acute respiratory distress, normal effort of breathing  Extremities:  No cyanosis, atraumatic. No lower extremity edema. Neuro: A&Ox3, speech clear  Skin: Skin color is normal. No rashes or lesions. Non diaphoretic  Due to this being a TeleHealth evaluation, many elements of the physical examination are unable to be assessed. Cardiology Labs:    Lab Results   Component Value Date/Time    Cholesterol, total 149 10/24/2019 09:27 AM    HDL Cholesterol 54 10/24/2019 09:27 AM    LDL, calculated 74 10/24/2019 09:27 AM    LDL, calculated 90 10/19/2018 02:14 PM    LDL, calculated 78 09/22/2017 09:20 AM    VLDL, calculated 21 10/24/2019 09:27 AM    CHOL/HDL Ratio 3.2 09/14/2010 08:42 AM       Lab Results   Component Value Date/Time    Hemoglobin A1c 7.4 (H) 01/21/2020 10:15 AM    Hemoglobin A1c (POC) 7.2 (A) 09/10/2013 11:42 AM       Lab Results   Component Value Date/Time    Sodium 142 01/21/2020 10:15 AM    Potassium 4.0 01/21/2020 10:15 AM    Chloride 102 01/21/2020 10:15 AM    CO2 24 01/21/2020 10:15 AM    Glucose 118 (H) 01/21/2020 10:15 AM    BUN 14 01/21/2020 10:15 AM    Creatinine 0.90 01/21/2020 10:15 AM    BUN/Creatinine ratio 16 01/21/2020 10:15 AM    GFR est AA 71 01/21/2020 10:15 AM    GFR est non-AA 61 01/21/2020 10:15 AM    Calcium 9.9 01/21/2020 10:15 AM    Anion gap 7 10/16/2015 04:34 AM    Bilirubin, total 0.6 01/21/2020 10:15 AM    ALT (SGPT) 14 01/21/2020 10:15 AM    AST (SGOT) 14 01/21/2020 10:15 AM    Alk. phosphatase 92 01/21/2020 10:15 AM    Protein, total 6.6 01/21/2020 10:15 AM    Albumin 3.9 01/21/2020 10:15 AM    Globulin 3.4 10/16/2015 04:34 AM    A-G Ratio 1.4 01/21/2020 10:15 AM          Assessment:       ICD-10-CM ICD-9-CM    1. Coronary artery disease involving native coronary artery of native heart without angina pectoris I25.10 414.01    2. S/P CABG x 3 Z95.1 V45.81    3. Mixed hyperlipidemia E78.2 272.2    4. Essential hypertension, benign I10 401.1         Plan:     1. Coronary artery disease involving native coronary artery of native heart without angina pectoris  Stable, no angina. Stress test normal 1/19.    2. S/P CABG x 3      3. Mixed hyperlipidemia  Lipids at goal 7/19. Repeat labs pending. Continue statin. 4. Essential hypertension, benign  Well controlled.     F/u in 6 months. Faye Gautam MD        This visit was completed using [employer] Virtual Visit telemedicine services    Pursuant to the emergency declaration under the [address] [place] [address] waiver authority and the Coronavirus Preparedness and [employer] Act, this Virtual Visit was conducted, with patient's consent, to reduce the patient's risk of exposure to COVID-19 and provide continuity of care for an established patient. Services were provided through a video synchronous discussion virtually to substitute for in-person clinic visit. no

## 2020-05-18 DIAGNOSIS — E11.21 TYPE II DIABETES MELLITUS WITH NEPHROPATHY (HCC): ICD-10-CM

## 2020-05-18 RX ORDER — GABAPENTIN 100 MG/1
CAPSULE ORAL
Qty: 120 CAP | Refills: 0 | Status: SHIPPED | OUTPATIENT
Start: 2020-05-18 | End: 2020-07-20

## 2020-05-18 RX ORDER — OMEPRAZOLE 20 MG/1
CAPSULE, DELAYED RELEASE ORAL
Qty: 90 CAP | Refills: 0 | Status: SHIPPED | OUTPATIENT
Start: 2020-05-18 | End: 2020-08-17

## 2020-06-26 DIAGNOSIS — E11.21 TYPE II DIABETES MELLITUS WITH NEPHROPATHY (HCC): ICD-10-CM

## 2020-07-15 ENCOUNTER — TELEPHONE (OUTPATIENT)
Dept: INTERNAL MEDICINE CLINIC | Age: 80
End: 2020-07-15

## 2020-07-15 DIAGNOSIS — E03.9 HYPOTHYROIDISM, UNSPECIFIED TYPE: ICD-10-CM

## 2020-07-15 DIAGNOSIS — E11.21 TYPE 2 DIABETES MELLITUS WITH NEPHROPATHY (HCC): ICD-10-CM

## 2020-07-15 DIAGNOSIS — I10 ESSENTIAL HYPERTENSION, BENIGN: Primary | ICD-10-CM

## 2020-07-15 DIAGNOSIS — E78.2 MIXED HYPERLIPIDEMIA: ICD-10-CM

## 2020-07-16 DIAGNOSIS — E11.21 TYPE II DIABETES MELLITUS WITH NEPHROPATHY (HCC): ICD-10-CM

## 2020-07-16 RX ORDER — ATORVASTATIN CALCIUM 80 MG/1
TABLET, FILM COATED ORAL
Qty: 90 TAB | Refills: 1 | Status: SHIPPED | OUTPATIENT
Start: 2020-07-16 | End: 2021-04-20

## 2020-07-16 RX ORDER — METFORMIN HYDROCHLORIDE 1000 MG/1
TABLET ORAL
Qty: 180 TAB | Refills: 0 | Status: SHIPPED | OUTPATIENT
Start: 2020-07-16 | End: 2021-04-15

## 2020-07-16 NOTE — TELEPHONE ENCOUNTER
Called, spoke to pt. Two pt identifiers confirmed. Pt asking for lab orders be mailed to go to [facility]; labs ordered. Pt verbalized understanding of information discussed w/ no further questions at this time. Pt requesting refill on lipitor and meformin--pended in refill encounter. Mailed to pt.

## 2020-07-16 NOTE — TELEPHONE ENCOUNTER
PCP: Kassy Pierre MD    Last appt: 4/29/2020  Future Appointments   Date Time Provider Gen Paige   7/31/2020 12:00 PM Kassy Pierre MD [facility]       Requested Prescriptions     Pending Prescriptions Disp Refills    atorvastatin (LIPITOR) 80 mg tablet 90 Tab 1     Sig: TAKE ONE TABLET BY MOUTH NIGHTLY    metFORMIN (GLUCOPHAGE) 1,000 mg tablet 180 Tab 0     Sig: TAKE 1 TABLET BY MOUTH TWICE DAILY WITH MEALS

## 2020-07-20 DIAGNOSIS — E11.21 TYPE II DIABETES MELLITUS WITH NEPHROPATHY (HCC): ICD-10-CM

## 2020-07-20 RX ORDER — GABAPENTIN 100 MG/1
CAPSULE ORAL
Qty: 120 CAP | Refills: 0 | Status: SHIPPED | OUTPATIENT
Start: 2020-07-20 | End: 2020-09-14

## 2020-07-20 RX ORDER — AMLODIPINE BESYLATE 5 MG/1
TABLET ORAL
Qty: 90 TAB | Refills: 0 | Status: SHIPPED | OUTPATIENT
Start: 2020-07-20 | End: 2020-11-04 | Stop reason: SDUPTHER

## 2020-07-28 LAB
ALBUMIN SERPL-MCNC: 4.2 G/DL (ref 3.7–4.7)
ALBUMIN/GLOB SERPL: 1.6 {RATIO} (ref 1.2–2.2)
ALP SERPL-CCNC: 87 IU/L (ref 39–117)
ALT SERPL-CCNC: 10 IU/L (ref 0–32)
AST SERPL-CCNC: 14 IU/L (ref 0–40)
BILIRUB SERPL-MCNC: 0.6 MG/DL (ref 0–1.2)
BUN SERPL-MCNC: 18 MG/DL (ref 8–27)
BUN/CREAT SERPL: 19 (ref 12–28)
CALCIUM SERPL-MCNC: 10.4 MG/DL (ref 8.7–10.3)
CHLORIDE SERPL-SCNC: 102 MMOL/L (ref 96–106)
CHOLEST SERPL-MCNC: 139 MG/DL (ref 100–199)
CO2 SERPL-SCNC: 24 MMOL/L (ref 20–29)
CREAT SERPL-MCNC: 0.97 MG/DL (ref 0.57–1)
ERYTHROCYTE [DISTWIDTH] IN BLOOD BY AUTOMATED COUNT: 15.3 % (ref 11.7–15.4)
EST. AVERAGE GLUCOSE BLD GHB EST-MCNC: 189 MG/DL
GLOBULIN SER CALC-MCNC: 2.7 G/DL (ref 1.5–4.5)
GLUCOSE SERPL-MCNC: 126 MG/DL (ref 65–99)
HBA1C MFR BLD: 8.2 % (ref 4.8–5.6)
HCT VFR BLD AUTO: 41.5 % (ref 34–46.6)
HDLC SERPL-MCNC: 52 MG/DL
HGB BLD-MCNC: 13.3 G/DL (ref 11.1–15.9)
LDLC SERPL CALC-MCNC: 68 MG/DL (ref 0–99)
MCH RBC QN AUTO: 26.9 PG (ref 26.6–33)
MCHC RBC AUTO-ENTMCNC: 32 G/DL (ref 31.5–35.7)
MCV RBC AUTO: 84 FL (ref 79–97)
PLATELET # BLD AUTO: 208 X10E3/UL (ref 150–450)
POTASSIUM SERPL-SCNC: 4.2 MMOL/L (ref 3.5–5.2)
PROT SERPL-MCNC: 6.9 G/DL (ref 6–8.5)
RBC # BLD AUTO: 4.95 X10E6/UL (ref 3.77–5.28)
SODIUM SERPL-SCNC: 144 MMOL/L (ref 134–144)
TRIGL SERPL-MCNC: 94 MG/DL (ref 0–149)
TSH SERPL DL<=0.005 MIU/L-ACNC: 0.78 UIU/ML (ref 0.45–4.5)
VLDLC SERPL CALC-MCNC: 19 MG/DL (ref 5–40)
WBC # BLD AUTO: 9.2 X10E3/UL (ref 3.4–10.8)

## 2020-07-29 NOTE — PROGRESS NOTES
HISTORY OF PRESENT ILLNESS  Jt Belle is a 79 y.o. female. HPI  Last here 4/29/20. Pt is here for routine care. This is an established visit completed with telemedicine was completed with   the patient acknowledges and agrees to this method of visitation   22min      BP at home 138/84 140/105  130/70 at [facility] on metoprolol 50mg BID and norvasc 5mg  Denies orthostasis  Recall had angioedema with lisinopril and benicar-HCT in the past      She is diabetic    BS at home 157, 160, 144, 153, 164,196, 149, 170 in am  The lowest BS 84, 97  Continues on metformin 1000mg BID  Will increase NPH 70/30 mix from 22U to 24U  she takes 18U in evening dose when she is having earlier dinner that is smaller to prevent lows--has used this a few times  She states she does this which helps prevent lows 7/20   She also takes ASA 81mg daily   Recall issues with hypoglycemia in the past, a1c under 7.5 at goal for her       Wt was  176 lbs--now about 180lb   Discussed WW--disussed portions and avoiding overweating  Discussed diet and weight loss again     Reviewed labs 7/20      Pt follows with Dr. Loulou Interiano (cardio) for cad  No sx  Last visit was 5/15/20: 1. Coronary artery disease involving native coronary artery of native heart without angina pectoris  Stable, no angina. Stress test normal 1/19.  2. S/P CABG x 3  3. Mixed hyperlipidemia  Lipids at goal 7/19. Repeat labs pending. Continue statin. 4. Essential hypertension, benign  Well controlled. F/u in 6 months.   No sx doing well      Pt follows annually with Dr. Freddy Dumont ([facility]) for her PAD  No claudication stable   Last visit was 7/20: checked ABIs in legs pt reports     She states both her legs are weak x 1 month ago  She denies pain and they don't give out  She has pain in her back    She can't walk for a long time  The weakness is in the bottom part of her leg from knee down  Will get xrays of neck and lower neck  Discussed neck step is mri or back      Pt saw Dr Yvonne Dunn (ortho) for carpal tunnel   Had surgery for this 8/1/19   Last visit was 8/19/19        Pt is now taking prilosec 20mg every other day for reflux, works well     Takes OTC vit D daily      Continues synthroid 100mcg daily    She takes this med separately from all meds      Continues on lipitor 80mg max dose for cholesterol   Pt is now taking zetia 10mg once daily as well  Welchol was too expensive now but zetia ok and grey well no issue     No longer on lexapro, doing well off medication not sad or down 7/20   She also has ativan to use prn-- (about 1-2x a month ) helps with stress and sleep         Pt had sleep eval with Dr Juliann Dowell  Last visit was 7/02/19   She needs to complete sleep study     Pt continues on gabapentin 100 mg for pain in hand   Overall helps      ACP on file. SDMs is  daughter Dallas Weber) and Desiree Montelongo Chew        PREVENTIVE:    Colonoscopy: Dr. Sergio Hernandez 5 years  Pap: 12/14, declines further   Mammogram:  3/20   DEXA: 3/20, osteopenic, stopped fosamax,   Tdap: 1/04/2016  Pneumovax: 01/29/20   TERPURK50: 0/48  Zostavax: 10/10/2016  Shingrix: 1st round completed 4/19, 2nd round completed   Flu shot: fall 1773   Foot exam:07/30/19   Microalbumin: 10/19   A1c:   4/18 8.4, 7/18 8.0, 10/18 8.0, 1/19 8.4, 4/19 8.4, 7/19 7.5, 10/19 7.3  1/20 7.4 7/20 8.2  Eye exam: Dr. Jo Suresh at [facility], 9/12/18--due, pt thinks she went recently will call for notes   Lipids: 7/20 LDL 68    Patient Active Problem List    Diagnosis Date Noted    Type 2 diabetes mellitus with diabetic neuropathy ([facility] 01/22/2019    Type 2 diabetes mellitus with nephropathy ([facility] 01/02/2018    Recurrent depression ([facility] 01/02/2018    Diverticulosis of intestine with bleeding 10/11/2015    Anemia 29/22/4358    [facility], [facility] 10/11/2015    GIB (gastrointestinal bleeding) 10/08/2015    Osteoporosis 09/10/2013    Essential hypertension, benign 05/17/2012    Mixed hyperlipidemia 05/17/2012    Postsurgical percutaneous transluminal coronary angioplasty status 05/17/2012    S/P CABG x 3 11/11/2011    CAD (coronary artery disease) 11/18/2009    Hypothyroidism 11/18/2009    PVD (peripheral vascular disease) ([facility] 11/18/2009     Current Outpatient Medications   Medication Sig Dispense Refill    insulin NPH/insulin regular (HumuLIN 70/30 U-100 Insulin) 100 unit/mL (70-30) injection INJECT 24 UNITS SUBCUTANEOUSLY TWICE DAILY 10 mL 2    amLODIPine (NORVASC) 5 mg tablet Take 1 tablet by mouth once daily 90 Tab 0    gabapentin (NEURONTIN) 100 mg capsule Take 2 capsules by mouth twice daily 120 Cap 0    atorvastatin (LIPITOR) 80 mg tablet TAKE ONE TABLET BY MOUTH NIGHTLY 90 Tab 1    metFORMIN (GLUCOPHAGE) 1,000 mg tablet TAKE 1 TABLET BY MOUTH TWICE DAILY WITH MEALS 180 Tab 0    omeprazole (PRILOSEC) 20 mg capsule Take 1 capsule by mouth once daily 90 Cap 0    flash glucose sensor (FreeStyle Ruben 14 Day Sensor) kit 1 Kit by Does Not Apply route daily. Check glucose bid 2 Kit 2    levothyroxine (SYNTHROID) 100 mcg tablet Take 1 Tab by mouth Daily (before breakfast). 90 Tab 1    metoprolol tartrate (LOPRESSOR) 50 mg tablet Take 1 Tab by mouth two (2) times a day. 180 Tab 2    ezetimibe (ZETIA) 10 mg tablet Take 1 Tab by mouth daily. 30 Tab 6    LORazepam (ATIVAN) 0.5 mg tablet Take 1 Tab by mouth nightly as needed for Anxiety. Max Daily Amount: 0.5 mg. 30 Tab 2    cholecalciferol (VITAMIN D3) 1,000 unit cap Take 1,000 Units by mouth daily.  aspirin 81 mg chewable tablet Take 1 Tab by mouth daily. 90 Tab 3    cyanocobalamin (VITAMIN B-12) 1,000 mcg tablet Take 1,000 mcg by mouth daily.        Past Surgical History:   Procedure Laterality Date    CARDIAC SURG PROCEDURE UNLIST      cabg x 3    COLONOSCOPY N/A 10/31/2018    COLONOSCOPY performed by Elías Coe MD at [facility] ENDOSCOPY    HX CORONARY ARTERY BYPASS GRAFT  11/11/11    3 vessel    HX GYN hysterectomy    HX HEART CATHETERIZATION      HX UROLOGICAL  1970s    kidney stone    VASCULAR SURGERY PROCEDURE UNLIST      Stents put in right leg      Lab Results   Component Value Date/Time    WBC 9.2 07/27/2020 10:03 AM    HGB 13.3 07/27/2020 10:03 AM    Hemoglobin (POC) 12.9 03/02/2011 12:21 PM    HCT 41.5 07/27/2020 10:03 AM    PLATELET 227 68/68/8713 10:03 AM    MCV 84 07/27/2020 10:03 AM     Lab Results   Component Value Date/Time    Cholesterol, total 139 07/27/2020 10:03 AM    HDL Cholesterol 52 07/27/2020 10:03 AM    LDL, calculated 68 07/27/2020 10:03 AM    Triglyceride 94 07/27/2020 10:03 AM    CHOL/HDL Ratio 3.2 09/14/2010 08:42 AM     Lab Results   Component Value Date/Time    GFR est non-AA 56 (L) 07/27/2020 10:03 AM    GFR est AA 64 07/27/2020 10:03 AM    Creatinine 0.97 07/27/2020 10:03 AM    BUN 18 07/27/2020 10:03 AM    Sodium 144 07/27/2020 10:03 AM    Potassium 4.2 07/27/2020 10:03 AM    Chloride 102 07/27/2020 10:03 AM    CO2 24 07/27/2020 10:03 AM    Magnesium 1.7 10/16/2015 04:34 AM        Review of Systems   Respiratory: Negative for shortness of breath. Cardiovascular: Negative for chest pain. Physical Exam    ASSESSMENT and PLAN    ICD-10-CM ICD-9-CM    1. Type 2 diabetes mellitus with nephropathy (HCC)  E11.21 250.40        Patient is on 70/30 insulin mix takes 22 units twice daily    Home readings have all climbed due to dietary indiscretion    A1c is climbed as well    Increase to 24 units twice daily    Closely monitor blood sugars and work on improving diet    She reports her eye exam is up-to-date we will get notes for review    Monitor renal function  583.81    2. Type 2 diabetes mellitus with diabetic neuropathy, with long-term current use of insulin (HCC)  E11.40 250.60    See above using gabapentin for neuropathy which works well Z79.4 357.2      V58.67    3.  Essential hypertension, benign     Controlled on metoprolol and Norvasc continue to change the dose I10 401.1    4. PVD (peripheral vascular disease) ([facility]     Up-to-date with Dr. Andrea Zuniga had ABIs completed no intervention needed I73.9 443.9    5. Recurrent depression ([facility]     No longer on antidepressant doing well F33.9 296.30    6. Coronary artery disease involving native coronary artery of native heart without angina pectoris     No active signs or symptoms of CAD medically managed up-to-date with cardiology I25.10 414.01    7. Age-related osteoporosis without current pathological fracture     Previously on Fosamax improved on recent bone density vitamin D for continued treatment    Of note she has mild high calcium will monitor this M81.0 733.01    8. Hypothyroidism, unspecified type       On Synthroid checked TSH  At goal 100 mcg E03.9 244.9    9. Mixed hyperlipidemia     On max dose Lipitor and Zetia continue E78.2 272.2    10. Diastolic CHF, chronic (HCC)  I50.32 428.32    Medically managed  428.0    11. Weakness of both legs     Patient reports a vague weakness in her legs she did just see vascular and was not thought to have any significant blockage    May be sciatic or even from degeneration in her cervical spine    We will start with plain film continue gabapentin MRI next if needed R29.898 729.89 XR SPINE LUMB 2 OR 3 V   12. Cervicalgia see above M54.2 723.1 XR SPINE CERV 4 OR 5 V      Depression screen reviewed and negative      Scribed by Misael Vyas of [facility], as dictated by Dr. Floyd De Santiago. Current diagnosis and concerns discussed with pt at length. Pt understands risks and benefits or current treatment plan and medications, and accepts the treatment and medication with any possible risks. Pt asks appropriate questions, which were answered. Pt was instructed to call with any concerns or problems. I have reviewed the note documented by the scribe. The services provided are my own.   The documentation is accurate     Yoana Alexis, who was evaluated through a synchronous (real-time) audio encounter, and/or her healthcare decision maker, is aware that it is a billable service, with coverage as determined by her insurance carrier. She provided verbal consent to proceed: Yes, and patient identification was verified. It was conducted pursuant to the emergency declaration under the [address] [place] [address], [address] [place] [address] authority and the [employer]. A caregiver was present when appropriate. Ability to conduct physical exam was limited. I was at home. The patient was at home.

## 2020-07-31 ENCOUNTER — VIRTUAL VISIT (OUTPATIENT)
Dept: INTERNAL MEDICINE CLINIC | Age: 80
End: 2020-07-31

## 2020-07-31 DIAGNOSIS — M81.0 AGE-RELATED OSTEOPOROSIS WITHOUT CURRENT PATHOLOGICAL FRACTURE: ICD-10-CM

## 2020-07-31 DIAGNOSIS — E03.9 HYPOTHYROIDISM, UNSPECIFIED TYPE: ICD-10-CM

## 2020-07-31 DIAGNOSIS — R29.898 WEAKNESS OF BOTH LEGS: ICD-10-CM

## 2020-07-31 DIAGNOSIS — M54.2 CERVICALGIA: ICD-10-CM

## 2020-07-31 DIAGNOSIS — E11.40 TYPE 2 DIABETES MELLITUS WITH DIABETIC NEUROPATHY, WITH LONG-TERM CURRENT USE OF INSULIN (HCC): ICD-10-CM

## 2020-07-31 DIAGNOSIS — Z79.4 TYPE 2 DIABETES MELLITUS WITH DIABETIC NEUROPATHY, WITH LONG-TERM CURRENT USE OF INSULIN (HCC): ICD-10-CM

## 2020-07-31 DIAGNOSIS — E11.21 TYPE 2 DIABETES MELLITUS WITH NEPHROPATHY (HCC): Primary | ICD-10-CM

## 2020-07-31 DIAGNOSIS — I73.9 PVD (PERIPHERAL VASCULAR DISEASE) (HCC): ICD-10-CM

## 2020-07-31 DIAGNOSIS — I10 ESSENTIAL HYPERTENSION, BENIGN: ICD-10-CM

## 2020-07-31 DIAGNOSIS — F33.9 RECURRENT DEPRESSION (HCC): ICD-10-CM

## 2020-07-31 DIAGNOSIS — I50.32 DIASTOLIC CHF, CHRONIC (HCC): ICD-10-CM

## 2020-07-31 DIAGNOSIS — E78.2 MIXED HYPERLIPIDEMIA: ICD-10-CM

## 2020-07-31 DIAGNOSIS — I25.10 CORONARY ARTERY DISEASE INVOLVING NATIVE CORONARY ARTERY OF NATIVE HEART WITHOUT ANGINA PECTORIS: ICD-10-CM

## 2020-08-03 ENCOUNTER — TELEPHONE (OUTPATIENT)
Dept: INTERNAL MEDICINE CLINIC | Age: 80
End: 2020-08-03

## 2020-08-06 ENCOUNTER — HOSPITAL ENCOUNTER (OUTPATIENT)
Dept: GENERAL RADIOLOGY | Age: 80
Discharge: HOME | End: 2020-08-06
Payer: MEDICARE

## 2020-08-06 DIAGNOSIS — M54.2 CERVICALGIA: ICD-10-CM

## 2020-08-06 DIAGNOSIS — R29.898 WEAKNESS OF BOTH LEGS: ICD-10-CM

## 2020-08-06 PROCEDURE — 72100 X-RAY EXAM L-S SPINE 2/3 VWS: CPT

## 2020-08-06 PROCEDURE — 72052 X-RAY EXAM NECK SPINE 6/>VWS: CPT

## 2020-08-09 NOTE — PROGRESS NOTES
Please call patient back about results  Let her know arthritic changes, will get mri c spine non con if hand sx persist

## 2020-08-17 RX ORDER — OMEPRAZOLE 20 MG/1
CAPSULE, DELAYED RELEASE ORAL
Qty: 90 CAP | Refills: 0 | Status: SHIPPED | OUTPATIENT
Start: 2020-08-17 | End: 2020-11-04 | Stop reason: SDUPTHER

## 2020-08-17 RX ORDER — EZETIMIBE 10 MG/1
TABLET ORAL
Qty: 30 TAB | Refills: 0 | Status: SHIPPED | OUTPATIENT
Start: 2020-08-17 | End: 2020-09-22

## 2020-08-20 NOTE — PROGRESS NOTES
Called, spoke to pt. Two pt identifiers confirmed. Pt informed per Dr. Gustafson Reasonhakeem xr shows arthritic changes, and if sx persist/worsen, will get mri c spine non con. Pt verbalized understanding of information discussed w/ no further questions at this time.

## 2020-08-24 RX ORDER — METOPROLOL TARTRATE 50 MG/1
TABLET ORAL
Qty: 180 TAB | Refills: 0 | Status: SHIPPED | OUTPATIENT
Start: 2020-08-24 | End: 2020-11-20

## 2020-09-14 DIAGNOSIS — E11.21 TYPE II DIABETES MELLITUS WITH NEPHROPATHY (HCC): ICD-10-CM

## 2020-09-14 RX ORDER — GABAPENTIN 100 MG/1
CAPSULE ORAL
Qty: 120 CAP | Refills: 0 | Status: SHIPPED | OUTPATIENT
Start: 2020-09-14 | End: 2020-11-09

## 2020-09-22 RX ORDER — EZETIMIBE 10 MG/1
TABLET ORAL
Qty: 30 TAB | Refills: 0 | Status: SHIPPED | OUTPATIENT
Start: 2020-09-22 | End: 2020-10-21

## 2020-09-30 ENCOUNTER — TELEPHONE (OUTPATIENT)
Dept: INTERNAL MEDICINE CLINIC | Age: 80
End: 2020-09-30

## 2020-09-30 DIAGNOSIS — E11.40 TYPE 2 DIABETES MELLITUS WITH DIABETIC NEUROPATHY, WITH LONG-TERM CURRENT USE OF INSULIN (HCC): Primary | ICD-10-CM

## 2020-09-30 DIAGNOSIS — Z79.4 TYPE 2 DIABETES MELLITUS WITH DIABETIC NEUROPATHY, WITH LONG-TERM CURRENT USE OF INSULIN (HCC): Primary | ICD-10-CM

## 2020-09-30 NOTE — TELEPHONE ENCOUNTER
Called out and spoke to pt. Two pt identifiers confirmed. Pt reports sugar levels past x5 days:  Fasting morning and evening is 2-3hrs between snack and dinner. 9/26/20: 101 am; 77 pm  9/27/20: 103 am; 59 pm  9/28/20: 101 am; 61 pm  9/29/20: 76 am; 57 pm  9/30/20: 67 am   Pt asymptomatic. On Humulin 70/30 mix 24U BID. Pt informed Dr. Milagro Buchanan and Dr. Lilia Mike will be notified. Pt verbalized understanding of information discussed w/ no further questions at this time. Per Dr. Lilia Mike, over the next week, pt should scan glucose TID/q8hrs. If morning/lunch meal is heavier, keep insulin at 24U; if not, do 22U. Per Dr. Lilia Mike, she can call pt next week to go over further readings.

## 2020-09-30 NOTE — TELEPHONE ENCOUNTER
Called out and spoke to pt. Two pt identifiers confirmed. Pt informed per Dr. Suze Foster to scan glucose TID over the next week. Pt states breakfast and lunch are lighter meals and dinner is more full. Pt informed per Dr. Suze Foster to decrease morning insulin dose to 22U and keep evening at 24U. Informed pt that Dr. Suze Foster will contact pt next week for glucose f/u. Pt verbalized understanding of information discussed w/ no further questions at this time.

## 2020-09-30 NOTE — TELEPHONE ENCOUNTER
[facility]               General Message/Vendor Calls     Caller's first and last name:       Reason for call:   Glucose count has been low for the last five days. Callback required yes/no and why:   Yes, to discuss Glucose levels in the evening     Best contact number(s):   [phone]     Details to clarify the request:   Pt has not felt any different just concerned of the low count.      [place]

## 2020-10-13 DIAGNOSIS — E11.21 TYPE II DIABETES MELLITUS WITH NEPHROPATHY (HCC): ICD-10-CM

## 2020-10-20 LAB
BUN SERPL-MCNC: 20 MG/DL (ref 8–27)
BUN/CREAT SERPL: 19 (ref 12–28)
CALCIUM SERPL-MCNC: 9.9 MG/DL (ref 8.7–10.3)
CHLORIDE SERPL-SCNC: 106 MMOL/L (ref 96–106)
CO2 SERPL-SCNC: 27 MMOL/L (ref 20–29)
CREAT SERPL-MCNC: 1.05 MG/DL (ref 0.57–1)
EST. AVERAGE GLUCOSE BLD GHB EST-MCNC: 186 MG/DL
GLUCOSE SERPL-MCNC: 147 MG/DL (ref 65–99)
HBA1C MFR BLD: 8.1 % (ref 4.8–5.6)
POTASSIUM SERPL-SCNC: 3.7 MMOL/L (ref 3.5–5.2)
SODIUM SERPL-SCNC: 145 MMOL/L (ref 134–144)

## 2020-10-21 RX ORDER — EZETIMIBE 10 MG/1
TABLET ORAL
Qty: 30 TAB | Refills: 0 | Status: SHIPPED | OUTPATIENT
Start: 2020-10-21 | End: 2020-11-20

## 2020-11-02 NOTE — PROGRESS NOTES
HISTORY OF PRESENT ILLNESS  Estephania Mendez is a 79 y.o. female.   HPI     Last here 7/31/20 Pt is here for routine care.      She c/o sob on exertion, winded not wheezing  She got an albuterol inhaler from [facility] 9/20 and has been using this -- this has helped some   Discussed f/u with cardio   Denies swelling in legs, CP or discomfort   Last stress test 1/19 no change  Will repeat echo and get cxr    She c/o pain in R shoulder x couple mos   She is unsure if it is arthritis or rotator cuff   Denies injury   Full ROM -- endorses pain with movement  Reviewed xray 2018 : arthritis in shoulder  Discussed PT or see ortho   Will give referral to ortho     BP is 138/80  BP at home 138/84 130/70   Continues on metoprolol 50mg BID and norvasc 5mg  Recall had angioedema with lisinopril and benicar-HCT in the past      She is diabetic    BS at home  117 131 135 107 99 79 124 130 120 113 107 104 100 136 121 118 120 am 88 145 202 119 183 205 122 151 140 207 115 73 113 119 before dinner  Low 67 in am and before dinner   Continues on metformin 1000mg BID  lov, increased NPH 70/30 mix from 22U to 24U  Will decrease nighttime dose to 20U  she takes 18U in evening dose when she is having earlier dinner that is smaller to prevent lows--has used this a few times  She states she does this which helps prevent lows 11/20   She also takes ASA 81mg daily   Recall issues with hypoglycemia in the past, a1c under 7.5 at goal for her       Wt is 186 lbs - up 10 lbs x lov  176 lbs lov  Discussed WW--disussed portions and avoiding overweating  Discussed diet and weight loss again     Reviewed labs        Pt follows with Dr. Beth Fraire (cardio) for cad  Last visit was 5/15/20, q 6 mos  No sx doing well   Discussed f/u with cardio due to sob      Pt follows annually with Dr. Harika Gaines (vasc) for her PAD  No claudication stable   Last visit was 7/20:   F/u q year     Lov, She states both her legs are weak   Reviewed xr l spine 8/6/20: Spondylosis.   Reviewed xr c spine 8/6/20: Prominent spondylosis C5-6.     Pt saw Dr Joshua Townsend (ortho) for carpal tunnel   Had surgery for this 8/1/19   Last visit was 8/19/19      Pt is now taking prilosec 20mg every other day for reflux, works well     Takes OTC vit D daily      Continues synthroid 100mcg daily    She takes this med separately from all meds      Continues on lipitor 80mg max dose for cholesterol   Pt is now taking zetia 10mg once daily as well  Welchol was too expensive now but zetia ok and grey well no issue     No longer on lexapro, doing well off medication not sad or down 11/20   She also has ativan to use prn-- (about 1-2x a month ) helps with stress and sleep      Pt had sleep eval with Dr Mabel Jackson  Last visit was 7/02/19   She needs to complete sleep study-- reminded today 11/20     Pt continues on gabapentin 100 mg for pain in hand   Overall helps      ACP on file. SDMs is  daughter Jaskaran Guaman) and Myron Gauthier        PREVENTIVE:    Colonoscopy: Dr. Anisha Dixon 5 years  Pap: 12/14, declines further   Mammogram:  3/20   DEXA: 3/20, osteopenic, stopped fosamax,   Tdap: 1/04/2016  Pneumovax: 01/29/20   [id number]: 6/31  Zostavax: 10/10/2016  Shingrix: 1st round completed 4/19, 2nd round completed   Flu shot: 10/20  Foot exam:11/04/20  Microalbumin: 10/19, will try to get today 11/04/20  A1c:   4/18 8.4, 7/18 8.0, 10/18 8.0, 1/19 8.4, 4/19 8.4, 7/19 7.5, 10/19 7.3  1/20 7.4 7/20 8.2 10/20 8.1  [facility] exam: [facility] Holland [facility], 9/12/18- she believes she had one this year   Lipids: 7/20 LDL 76    Patient Active Problem List    Diagnosis Date Noted    Type 2 diabetes mellitus with diabetic neuropathy ([facility]) 01/22/2019    Type 2 diabetes mellitus with nephropathy ([facility]) 01/02/2018    Recurrent depression ([facility]) 01/02/2018    Diverticulosis of intestine with bleeding 10/11/2015    Anemia 03/67/1753    Diastolic CHF, chronic ([facility]) 10/11/2015    GIB (gastrointestinal bleeding) 10/08/2015    Osteoporosis 09/10/2013    Essential hypertension, benign 05/17/2012    Mixed hyperlipidemia 05/17/2012    Postsurgical percutaneous transluminal coronary angioplasty status 05/17/2012    S/P CABG x 3 11/11/2011    CAD (coronary artery disease) 11/18/2009    Hypothyroidism 11/18/2009    PVD (peripheral vascular disease) ([facility] [address] 11/18/2009     Current Outpatient Medications   Medication Sig Dispense Refill    flash glucose sensor (FreeStyle Ruben 14 Day Sensor) kit 1 Kit by Does Not Apply route daily. Check glucose bid 2 Kit 2    amLODIPine (NORVASC) 5 mg tablet Take 1 tablet  by mouth once daily 90 Tab 1    omeprazole (PRILOSEC) 20 mg capsule Take 1 capsule by mouth once daily 90 Cap 1    ezetimibe (ZETIA) 10 mg tablet Take 1 tablet by mouth once daily 30 Tab 0    insulin NPH/insulin regular (HumuLIN 70/30 U-100 Insulin) 100 unit/mL (70-30) injection INJECT 24 UNITS SUBCUTANEOUSLY TWICE DAILY 10 mL 0    gabapentin (NEURONTIN) 100 mg capsule Take 2 capsules by mouth twice daily 120 Cap 0    metoprolol tartrate (LOPRESSOR) 50 mg tablet Take 1 tablet by mouth twice daily 180 Tab 0    atorvastatin (LIPITOR) 80 mg tablet TAKE ONE TABLET BY MOUTH NIGHTLY 90 Tab 1    metFORMIN (GLUCOPHAGE) 1,000 mg tablet TAKE 1 TABLET BY MOUTH TWICE DAILY WITH MEALS 180 Tab 0    levothyroxine (SYNTHROID) 100 mcg tablet Take 1 Tab by mouth Daily (before breakfast). 90 Tab 1    LORazepam (ATIVAN) 0.5 mg tablet Take 1 Tab by mouth nightly as needed for Anxiety. Max Daily Amount: 0.5 mg. 30 Tab 2    cholecalciferol (VITAMIN D3) 1,000 unit cap Take 1,000 Units by mouth daily.  aspirin 81 mg chewable tablet Take 1 Tab by mouth daily. 90 Tab 3    cyanocobalamin (VITAMIN B-12) 1,000 mcg tablet Take 1,000 mcg by mouth daily.        Past Surgical History:   Procedure Laterality Date    CARDIAC SURG PROCEDURE UNLIST      cabg x 3    COLONOSCOPY N/A 10/31/2018    COLONOSCOPY performed by Alexandria Carli Mesa MD at [facility] ENDOSCOPY    HX CORONARY ARTERY BYPASS GRAFT  11/11/11    3 vessel    HX GYN      hysterectomy    HX HEART CATHETERIZATION      HX UROLOGICAL  1970s    kidney stone    VASCULAR SURGERY PROCEDURE UNLIST      Stents put in right leg      Lab Results   Component Value Date/Time    WBC 9.2 07/27/2020 10:03 AM    HGB 13.3 07/27/2020 10:03 AM    Hemoglobin (POC) 12.9 03/02/2011 12:21 PM    HCT 41.5 07/27/2020 10:03 AM    PLATELET 785 37/87/5035 10:03 AM    MCV 84 07/27/2020 10:03 AM     Lab Results   Component Value Date/Time    Cholesterol, total 139 07/27/2020 10:03 AM    HDL Cholesterol 52 07/27/2020 10:03 AM    LDL, calculated 68 07/27/2020 10:03 AM    Triglyceride 94 07/27/2020 10:03 AM    CHOL/HDL Ratio 3.2 09/14/2010 08:42 AM     Lab Results   Component Value Date/Time    GFR est non-AA 51 (L) 10/19/2020 09:57 AM    GFR est AA 58 (L) 10/19/2020 09:57 AM    Creatinine 1.05 (H) 10/19/2020 09:57 AM    BUN 20 10/19/2020 09:57 AM    Sodium 145 (H) 10/19/2020 09:57 AM    Potassium 3.7 10/19/2020 09:57 AM    Chloride 106 10/19/2020 09:57 AM    CO2 27 10/19/2020 09:57 AM    Magnesium 1.7 10/16/2015 04:34 AM        Review of Systems   Respiratory: Positive for shortness of breath. Cardiovascular: Negative for chest pain. Physical Exam  Constitutional:       General: She is not in acute distress. Appearance: Normal appearance. She is not ill-appearing, toxic-appearing or diaphoretic. HENT:      Head: Normocephalic and atraumatic. Right Ear: External ear normal.      Left Ear: External ear normal.   Eyes:      General:         Right eye: No discharge. Left eye: No discharge. Conjunctiva/sclera: Conjunctivae normal.      Pupils: Pupils are equal, round, and reactive to light. Neck:      Musculoskeletal: Normal range of motion and neck supple. Cardiovascular:      Rate and Rhythm: Normal rate and regular rhythm. Pulses: Normal pulses.       Heart sounds: Murmur (1/6) present. No friction rub. No gallop. Pulmonary:      Effort: No respiratory distress. Breath sounds: Normal breath sounds. No wheezing or rales. Chest:      Chest wall: No tenderness. Musculoskeletal: Normal range of motion. Right lower leg: No edema. Left lower leg: No edema. Skin:     General: Skin is warm and dry. Neurological:      Mental Status: She is alert and oriented to person, place, and time. Mental status is at baseline. Coordination: Coordination normal.      Gait: Gait normal.      Comments: Sensory exam of the foot is normal, tested with the monofilament. Good pulses, no lesions or ulcers, good peripheral pulses. Psychiatric:         Mood and Affect: Mood normal.         Behavior: Behavior normal.         ASSESSMENT and PLAN    ICD-10-CM ICD-9-CM    1. Type 2 diabetes mellitus with diabetic neuropathy, with long-term current use of insulin ([facility] [address]       Patient is on NPH 70/30 insulin mix    Currently taking 24 units twice daily    Getting low readings in the middle of the night sometimes says she has to eat before going to bed    We will decrease evening dose to 20 units    Continue 20 4 in the morning    Doing this to avoid hypoglycemia    Continue metformin at 1000 mg twice daily         E11.40 250.60     Z79.4 357.2      V58.67    2.  Type 2 diabetes mellitus with nephropathy (HCC)  E11.21 250.40 flash glucose sensor (FreeStyle Ruben 14 Day Sensor) kit   See above stable uses gabapentin  583.81 HM DIABETES FOOT EXAM   3. Diastolic CHF, chronic (HCC)  I50.32 428.32 ECHO ADULT COMPLETE     428.0    4. [facility]peripheral [facility] disease) [facility]           Patient follows with cardiology having some progressive dyspnea on exertion concern for cardiac cause    No chest pain or chest discomfort    She is due to see her cardiologist  Will get chest x-ray today we will get echocardiogram    Last stress test was in January 2019 and was unchanged from prior     I73.9 443.9 5. Recurrent depression ([facility] [address]       Doing well without medication    Previously was on Lexapro       F33.9 296.30    6. Essential hypertension, benign         Home readings good    Currently on metoprolol 50 twice daily and Norvasc 5 mg    Continue no change dose     I10 401.1    7. Coronary artery disease involving native coronary artery of native heart without angina pectoris       Medically managed euvolemic    However having STOUT see above     I25.10 414.01    8. Hypothyroidism, unspecified type        On Synthroid 100 mcg daily    TSH at goal last check     E03.9 244.9    9. Mixed hyperlipidemia       Control max dose Lipitor    And Zetia     E78.2 272.2    10. Age-related osteoporosis without current pathological fracture        Previously on Fosamax improved DEXA last March repeat DEXA every 2 years M81.0 733.01    11. STOUT (dyspnea on exertion)       Concern for cardiac etiology check chest x-ray and echo    Recent labs are good    Was given an albuterol inhaler her care more unclear if this is helping    If no cardiac cause found will need pulmonary evaluation    Currently needs to see cardiologist and will set up appointment     R06.00 786.09 ECHO ADULT COMPLETE      XR CHEST PA LAT   12. Snoring       still needs sleep study to evaluate for sleep apnea     R06.83 786.09 SLEEP MEDICINE REFERRAL   13. Chronic right shoulder pain        Concern for mild rotator cuff tear also had arthritis on last x-ray we will set up with Ortho M25.511 719.41 REFERRAL TO ORTHOPEDIC SURGERY    G89.29 338.29       14 peripheral artery disease up-to-date with vascular     Scribed by [facility], as dictated by Dr. Marta Morales. Current diagnosis and concerns discussed with pt at length. Pt understands risks and benefits or current treatment plan and medications, and accepts the treatment and medication with any possible risks. Pt asks appropriate questions, which were answered.  Pt was instructed to call with any concerns or problems. I have reviewed the note documented by the scribe. The services provided are my own.   The documentation is accurate

## 2020-11-04 ENCOUNTER — OFFICE VISIT (OUTPATIENT)
Dept: INTERNAL MEDICINE CLINIC | Age: 80
End: 2020-11-04
Payer: MEDICARE

## 2020-11-04 VITALS
HEIGHT: 62 IN | TEMPERATURE: 97.4 F | DIASTOLIC BLOOD PRESSURE: 80 MMHG | SYSTOLIC BLOOD PRESSURE: 138 MMHG | BODY MASS INDEX: 34.34 KG/M2 | HEART RATE: 76 BPM | OXYGEN SATURATION: 95 % | WEIGHT: 186.6 LBS | RESPIRATION RATE: 16 BRPM

## 2020-11-04 DIAGNOSIS — E78.2 MIXED HYPERLIPIDEMIA: ICD-10-CM

## 2020-11-04 DIAGNOSIS — I73.9 PVD (PERIPHERAL VASCULAR DISEASE) (HCC): ICD-10-CM

## 2020-11-04 DIAGNOSIS — Z79.4 TYPE 2 DIABETES MELLITUS WITH DIABETIC NEUROPATHY, WITH LONG-TERM CURRENT USE OF INSULIN (HCC): Primary | ICD-10-CM

## 2020-11-04 DIAGNOSIS — R06.09 DOE (DYSPNEA ON EXERTION): ICD-10-CM

## 2020-11-04 DIAGNOSIS — M81.0 AGE-RELATED OSTEOPOROSIS WITHOUT CURRENT PATHOLOGICAL FRACTURE: ICD-10-CM

## 2020-11-04 DIAGNOSIS — E03.9 HYPOTHYROIDISM, UNSPECIFIED TYPE: ICD-10-CM

## 2020-11-04 DIAGNOSIS — I25.10 CORONARY ARTERY DISEASE INVOLVING NATIVE CORONARY ARTERY OF NATIVE HEART WITHOUT ANGINA PECTORIS: ICD-10-CM

## 2020-11-04 DIAGNOSIS — E11.40 TYPE 2 DIABETES MELLITUS WITH DIABETIC NEUROPATHY, WITH LONG-TERM CURRENT USE OF INSULIN (HCC): Primary | ICD-10-CM

## 2020-11-04 DIAGNOSIS — R06.83 SNORING: ICD-10-CM

## 2020-11-04 DIAGNOSIS — M25.511 CHRONIC RIGHT SHOULDER PAIN: ICD-10-CM

## 2020-11-04 DIAGNOSIS — I50.32 DIASTOLIC CHF, CHRONIC (HCC): ICD-10-CM

## 2020-11-04 DIAGNOSIS — E11.21 TYPE 2 DIABETES MELLITUS WITH NEPHROPATHY (HCC): ICD-10-CM

## 2020-11-04 DIAGNOSIS — G89.29 CHRONIC RIGHT SHOULDER PAIN: ICD-10-CM

## 2020-11-04 DIAGNOSIS — I10 ESSENTIAL HYPERTENSION, BENIGN: ICD-10-CM

## 2020-11-04 DIAGNOSIS — F33.9 RECURRENT DEPRESSION (HCC): ICD-10-CM

## 2020-11-04 LAB
ALBUMIN UR QL STRIP: 150 MG/L
CREATININE, URINE POC: 300 MG/DL
MICROALBUMIN/CREAT RATIO POC: NORMAL MG/G

## 2020-11-04 PROCEDURE — 1101F PT FALLS ASSESS-DOCD LE1/YR: CPT | Performed by: INTERNAL MEDICINE

## 2020-11-04 PROCEDURE — G8417 CALC BMI ABV UP PARAM F/U: HCPCS | Performed by: INTERNAL MEDICINE

## 2020-11-04 PROCEDURE — 82044 UR ALBUMIN SEMIQUANTITATIVE: CPT | Performed by: INTERNAL MEDICINE

## 2020-11-04 PROCEDURE — G8427 DOCREV CUR MEDS BY ELIG CLIN: HCPCS | Performed by: INTERNAL MEDICINE

## 2020-11-04 PROCEDURE — G8754 DIAS BP LESS 90: HCPCS | Performed by: INTERNAL MEDICINE

## 2020-11-04 PROCEDURE — G8536 NO DOC ELDER MAL SCRN: HCPCS | Performed by: INTERNAL MEDICINE

## 2020-11-04 PROCEDURE — G8752 SYS BP LESS 140: HCPCS | Performed by: INTERNAL MEDICINE

## 2020-11-04 PROCEDURE — 99214 OFFICE O/P EST MOD 30 MIN: CPT | Performed by: INTERNAL MEDICINE

## 2020-11-04 PROCEDURE — 1090F PRES/ABSN URINE INCON ASSESS: CPT | Performed by: INTERNAL MEDICINE

## 2020-11-04 PROCEDURE — G9717 DOC PT DX DEP/BP F/U NT REQ: HCPCS | Performed by: INTERNAL MEDICINE

## 2020-11-04 PROCEDURE — 3052F HG A1C>EQUAL 8.0%<EQUAL 9.0%: CPT | Performed by: INTERNAL MEDICINE

## 2020-11-04 RX ORDER — FLASH GLUCOSE SENSOR
1 KIT MISCELLANEOUS DAILY
Qty: 2 KIT | Refills: 2 | Status: SHIPPED | OUTPATIENT
Start: 2020-11-04 | End: 2021-01-31

## 2020-11-04 RX ORDER — AMLODIPINE BESYLATE 5 MG/1
TABLET ORAL
Qty: 90 TAB | Refills: 1 | Status: SHIPPED | OUTPATIENT
Start: 2020-11-04 | End: 2021-07-13

## 2020-11-04 RX ORDER — OMEPRAZOLE 20 MG/1
CAPSULE, DELAYED RELEASE ORAL
Qty: 90 CAP | Refills: 1 | Status: SHIPPED | OUTPATIENT
Start: 2020-11-04 | End: 2021-05-11

## 2020-11-04 NOTE — PATIENT INSTRUCTIONS
Continue 24 units insulin in morning Decrease evening to 20 units. Schedule follow up with cardiology

## 2020-11-06 ENCOUNTER — TELEPHONE (OUTPATIENT)
Dept: INTERNAL MEDICINE CLINIC | Age: 80
End: 2020-11-06

## 2020-11-06 NOTE — TELEPHONE ENCOUNTER
----- Message from Lucinda Tate sent at 11/6/2020  1:41 PM EST -----  Regarding: Dr Michael Stephens (first and last name if not the patient or from practice): n/a  Caller's relationship to patient (if not from a practice): n/a  Name of caller (if calling from a practice): n/a  Patient insurance Type: Medicare, [place]  Name of practice: [facility] [address] [place] [address]   Specialist's title, first, and last name: Dr Bharati Villegas  Specialist Type: Sleep Disorders  Office Phone Number: [phone]  Fax number: [phone]  Date and time of appointment: waiting on an appointment  Reason for appointment: Disturbed sleep  Details to clarify the request: Pt advised that these two referrals can be sent together to her insurance.      Copy/Paste [facility]

## 2020-11-06 NOTE — TELEPHONE ENCOUNTER
----- Message from Giorgi Rajput sent at 11/6/2020  1:40 PM EST -----  Regarding: Dr Araceli Cruz (first and last name if not the patient or from practice): n/a  Caller's relationship to patient (if not from a practice): n/a  Name of caller (if calling from a practice): n/a  Patient insurance Type: Medicare, [place]  Name of practice: [facility]   Specialist's title, first, and last name: Dr. Snehal Curtis  Specialist Type: Orthopedic  Office Phone Number: [phone]  Fax number: [phone]  Date and time of appointment: 11/13/2020 9:40am  Reason for appointment: Chronic R Shoulder Pain  Details to clarify the request: Pt advised that the office is still waiting on the referral from  [facility] [place] and so is her insurance company.      Copy/Paste [facility]

## 2020-11-08 DIAGNOSIS — E11.21 TYPE II DIABETES MELLITUS WITH NEPHROPATHY (HCC): ICD-10-CM

## 2020-11-09 DIAGNOSIS — E11.21 TYPE II DIABETES MELLITUS WITH NEPHROPATHY (HCC): ICD-10-CM

## 2020-11-09 RX ORDER — GABAPENTIN 100 MG/1
CAPSULE ORAL
Qty: 120 CAP | Refills: 0 | Status: SHIPPED | OUTPATIENT
Start: 2020-11-09 | End: 2020-12-29

## 2020-11-12 ENCOUNTER — VIRTUAL VISIT (OUTPATIENT)
Dept: SLEEP MEDICINE | Age: 80
End: 2020-11-12
Payer: MEDICARE

## 2020-11-12 DIAGNOSIS — G47.33 OBSTRUCTIVE SLEEP APNEA (ADULT) (PEDIATRIC): Primary | ICD-10-CM

## 2020-11-12 DIAGNOSIS — I25.810 CORONARY ARTERY DISEASE INVOLVING CORONARY BYPASS GRAFT OF NATIVE HEART WITHOUT ANGINA PECTORIS: ICD-10-CM

## 2020-11-12 DIAGNOSIS — E11.21 TYPE II DIABETES MELLITUS WITH NEPHROPATHY (HCC): ICD-10-CM

## 2020-11-12 DIAGNOSIS — I10 ESSENTIAL HYPERTENSION, BENIGN: ICD-10-CM

## 2020-11-12 PROCEDURE — G9717 DOC PT DX DEP/BP F/U NT REQ: HCPCS | Performed by: INTERNAL MEDICINE

## 2020-11-12 PROCEDURE — 99214 OFFICE O/P EST MOD 30 MIN: CPT | Performed by: INTERNAL MEDICINE

## 2020-11-12 PROCEDURE — 1101F PT FALLS ASSESS-DOCD LE1/YR: CPT | Performed by: INTERNAL MEDICINE

## 2020-11-12 PROCEDURE — 1090F PRES/ABSN URINE INCON ASSESS: CPT | Performed by: INTERNAL MEDICINE

## 2020-11-12 PROCEDURE — G8756 NO BP MEASURE DOC: HCPCS | Performed by: INTERNAL MEDICINE

## 2020-11-12 PROCEDURE — 3052F HG A1C>EQUAL 8.0%<EQUAL 9.0%: CPT | Performed by: INTERNAL MEDICINE

## 2020-11-12 PROCEDURE — G8427 DOCREV CUR MEDS BY ELIG CLIN: HCPCS | Performed by: INTERNAL MEDICINE

## 2020-11-12 NOTE — PATIENT INSTRUCTIONS
217 [facility]., [place]. [address], [address] Tel.  [phone] Fax. 100 [facility] [address] [place], [address] Tel.  [phone] Fax. [phone] [address] Tel.  [phone] Fax. [phone] Sleep Apnea: After Your Visit Your Care Instructions Sleep apnea occurs when you frequently stop breathing for 10 seconds or longer during sleep. It can be mild to severe, based on the number of times per hour that you stop breathing or have slowed breathing. Blocked or narrowed airways in your nose, mouth, or throat can cause sleep apnea. Your airway can become blocked when your throat muscles and tongue relax during sleep. Sleep apnea is common, occurring in 1 out of 20 individuals. Individuals having any of the following characteristics should be evaluated and treated right away due to high risk and detrimental consequences from untreated sleep apnea: 
1. Obesity 2. Congestive Heart failure 3. Atrial Fibrillation 4. Uncontrolled Hypertension 5. Type II Diabetes 6. Night-time Arrhythmias 7. Stroke 8. Pulmonary Hypertension 9. High-risk Driving Populations (pilots, truck drivers, etc.) 10. Patients Considering Weight-loss Surgery How do you know you have sleep apnea? You probably have sleep apnea if you answer 'yes' to 3 or more of the following questions: S - Have you been told that you Snore? T - Are you often Tired during the day? O - Has anyone Observed you stop breathing while sleeping? P- Do you have (or are being treated for) high blood Pressure? B - Are you obese (Body Mass Index > 35)? A - Is your Age 48years old or older? N - Is your Neck size greater than 16 inches? G - Are you male Gender? A sleep physician can prescribe a breathing device that prevents tissues in the throat from blocking your airway.  Or your doctor may recommend using a dental device (oral breathing device) to help keep your airway open. In some cases, surgery may be needed to remove enlarged tissues in the throat. Follow-up care is a key part of your treatment and safety. Be sure to make and go to all appointments, and call your doctor if you are having problems. It's also a good idea to know your test results and keep a list of the medicines you take. How can you care for yourself at home? · Lose weight, if needed. It may reduce the number of times you stop breathing or have slowed breathing. · Go to bed at the same time every night. · Sleep on your side. It may stop mild apnea. If you tend to roll onto your back, sew a pocket in the back of your pajama top. Put a tennis ball into the pocket, and stitch the pocket shut. This will help keep you from sleeping on your back. · Avoid alcohol and medicines such as sleeping pills and sedatives before bed. · Do not smoke. Smoking can make sleep apnea worse. If you need help quitting, talk to your doctor about stop-smoking programs and medicines. These can increase your chances of quitting for good. · Prop up the head of your bed 4 to 6 inches by putting bricks under the legs of the bed. · Treat breathing problems, such as a stuffy nose, caused by a cold or allergies. · Use a continuous positive airway pressure (CPAP) breathing machine if lifestyle changes do not help your apnea and your doctor recommends it. The machine keeps your airway from closing when you sleep. · If CPAP does not help you, ask your doctor whether you should try other breathing machines. A bilevel positive airway pressure machine has two types of air pressureâone for breathing in and one for breathing out. Another device raises or lowers air pressure as needed while you breathe. · If your nose feels dry or bleeds when using one of these machines, talk with your doctor about increasing moisture in the air. A humidifier may help.  
· If your nose is runny or stuffy from using a breathing machine, talk with your doctor about using decongestants or a corticosteroid nasal spray. When should you call for help? Watch closely for changes in your health, and be sure to contact your doctor if: 
· You still have sleep apnea even though you have made lifestyle changes. · You are thinking of trying a device such as CPAP. · You are having problems using a CPAP or similar machine. Where can you learn more? Go to [employer]. Enter I012 in the search box to learn more about \"Sleep Apnea: After Your Visit. \"  
© 7509-6673 Healthwise, Incorporated. Care instructions adapted under license by [employer] (which disclaims liability or warranty for this information). This care instruction is for use with your licensed healthcare professional. If you have questions about a medical condition or this instruction, always ask your healthcare professional. Holly Rojas any warranty or liability for your use of this information. PROPER SLEEP HYGIENE What to avoid · Do not have drinks with caffeine, such as coffee or black tea, for 8 hours before bed. · Do not smoke or use other types of tobacco near bedtime. Nicotine is a stimulant and can keep you awake. · Avoid drinking alcohol late in the evening, because it can cause you to wake in the middle of the night. · Do not eat a big meal close to bedtime. If you are hungry, eat a light snack. · Do not drink a lot of water close to bedtime, because the need to urinate may wake you up during the night. · Do not read or watch TV in bed. Use the bed only for sleeping and sexual activity. What to try · Go to bed at the same time every night, and wake up at the same time every morning. Do not take naps during the day. · Keep your bedroom quiet, dark, and cool. · Get regular exercise, but not within 3 to 4 hours of your bedtime. Conchetta Peaks · Sleep on a comfortable pillow and mattress. · If watching the clock makes you anxious, turn it facing away from you so you cannot see the time. · If you worry when you lie down, start a worry book. Well before bedtime, write down your worries, and then set the book and your concerns aside. · Try meditation or other relaxation techniques before you go to bed. · If you cannot fall asleep, get up and go to another room until you feel sleepy. Do something relaxing. Repeat your bedtime routine before you go to bed again. · Make your house quiet and calm about an hour before bedtime. Turn down the lights, turn off the TV, log off the computer, and turn down the volume on music. This can help you relax after a busy day. Drowsy Driving [employer] cites drowsiness as a causing factor in more than 234,649 police reported crashes annually, resulting in 76,000 injuries and 1,500 deaths. Other surveys suggest 55% of people polled have driven while drowsy in the past year, 23% had fallen asleep but not crashed, 3% crashed, and 2% had and accident due to drowsy driving. Who is at risk? Young Drivers: One study of drowsy driving accidents states that 55% of the drivers were under 25 years. Of those, 75% were male. Shift Workers and Travelers: People who work overnight or travel across time zones frequently are at higher risk of experiencing Circadian Rhythm Disorders. They are trying to work and function when their body is programed to sleep. Sleep Deprived: Lack of sleep has a serious impact on your ability to pay attention or focus on a task. Consistently getting less than the average of 8 hours your body needs creates partial or cumulative sleep deprivation. Untreated Sleep Disorders: Sleep Apnea, Narcolepsy, R.L.S., and other sleep disorders (untreated) prevent a person from getting enough restful sleep.  This leads to excessive daytime sleepiness and increases the risk for drowsy driving accidents by up to 7 times. Medications / Alcohol: Even over the counter medications can cause drowsiness. Medications that impair a drivers attention should have a warning label. Alcohol naturally makes you sleepy and on its own can cause accidents. Combined with excessive drowsiness its effects are amplified. Signs of Drowsy Driving: * You don't remember driving the last few miles * You may drift out of your michelle * You are unable to focus and your thoughts wander * You may yawn more often than normal 
 * You have difficulty keeping your eyes open / nodding off * Missing traffic signs, speeding, or tailgating Prevention-  
Good sleep hygiene, lifestyle and behavioral choices have the most impact on drowsy driving. There is no substitute for sleep and the average person requires 8 hours nightly. If you find yourself driving drowsy, stop and sleep. Consider the sleep hygiene tips provided during your visit as well. Medication Refill Policy: Refills for all medications require 1 week advance notice. Please have your pharmacy fax a refill request. We are unable to fax, or call in \"controled substance\" medications and you will need to pick these prescriptions up from our office. Spark CRM Activation Thank you for requesting access to Spark CRM. Please follow the instructions below to securely access and download your online medical record. Spark CRM allows you to send messages to your doctor, view your test results, renew your prescriptions, schedule appointments, and more. How Do I Sign Up? 1. In your internet browser, go to [email or web address] 2. Click on the First Time User? Click Here link in the Sign In box. You will see the New Member Sign Up page. 3. Enter your Spark CRM Access Code exactly as it appears below. You will not need to use this code after youve completed the sign-up process.  If you do not sign up before the expiration date, you must request a new code. Fuhu Access Code: [employer] Expires: 2020 10:41 AM (This is the date your Fuhu access code will ) 4. Enter the last four digits of your Social Security Number (xxxx) and Date of Birth (mm/dd/yyyy) as indicated and click Submit. You will be taken to the next sign-up page. 5. Create a Fuhu ID. This will be your Fuhu login ID and cannot be changed, so think of one that is secure and easy to remember. 6. Create a Fuhu password. You can change your password at any time. 7. Enter your Password Reset Question and Answer. This can be used at a later time if you forget your password. 8. Enter your e-mail address. You will receive e-mail notification when new information is available in [address] 9. Click Sign Up. You can now view and download portions of your medical record. 10. Click the Download Summary menu link to download a portable copy of your medical information. Additional Information If you have questions, please call [phone]. Remember, Fuhu is NOT to be used for urgent needs. For medical emergencies, dial 911.

## 2020-11-12 NOTE — PROGRESS NOTES
217 [facility]., [place]. [place], [address]  Tel.  [phone]  Fax. 100 [facility] 60  [place], [address] [facility]  Tel.  [phone]  Fax. [phone] [address] [address] Reina [address]el  Tel.  [phone]  Fax. [phone]         Subjective:        Telemedicine visit performed with verbal consent of the patient. Patient called and identity confirmed with 2 patient identifers    Patient was seen at home with her daughter  Jordan Mckinney is a 79 y.o. female who was seen by synchronous (real-time) audio-video technology on 11/12/2020. Consent:  She and/or her healthcare decision maker is aware that this patient-initiated Telehealth encounter is the equivalent to a face to face encounter in the sleep disorder center and has provided verbal consent to proceed: Yes    I was in the office while conducting this encounter. Jordan Mckinney is an 79 y.o. female referred for evaluation for a sleep disorder. She complains of snoring, frequent awakenings at night associated with difficulty falling asleep once awakened. Symptoms began several years ago, unchanged since that time. She usually can fall asleep in variable minutes. Family or house members note snoring. She denies falling asleep while during conversation. She was seen last year and was scheduled for an in lab sleep test but she did not proceed with it. She is interested in a home test.  Jordan Mckinney does wake up frequently at night. She is bothered by waking up too early and left unable to get back to sleep. She actually sleeps about 4 hours at night and wakes up about 3 times during the night. She does not work shifts:  . Christopher Peter indicates she does get too little sleep at night. Her bedtime is 1100. She awakens at 0900. She does not take naps. She takes   naps a week lasting  . She has the following observed behaviors: Loud snoring, Light snoring, Sleep talking;  .   Other remarks: [place] Sleepiness Score: 3     Allergies   Allergen Reactions    Ace Inhibitors Swelling    Arb-Angiotensin Receptor Antagonist Swelling    Lisinopril Swelling    Plavix [Clopidogrel] Other (comments)     Excessive bleeding    Potassium Nausea and Vomiting     Potassium containing oral products are not well tolerated by patient         Current Outpatient Medications:     gabapentin (NEURONTIN) 100 mg capsule, Take 2 capsules by mouth twice daily, Disp: 120 Cap, Rfl: 0    insulin NPH/insulin regular (HumuLIN 70/30 U-100 Insulin) 100 unit/mL (70-30) injection, INJECT 24 UNITS SUBCUTANEOUSLY TWICE DAILY, Disp: 10 mL, Rfl: 0    flash glucose sensor (FreeStyle Ruben 14 Day Sensor) kit, 1 Kit by Does Not Apply route daily. Check glucose bid, Disp: 2 Kit, Rfl: 2    amLODIPine (NORVASC) 5 mg tablet, Take 1 tablet  by mouth once daily, Disp: 90 Tab, Rfl: 1    omeprazole (PRILOSEC) 20 mg capsule, Take 1 capsule by mouth once daily, Disp: 90 Cap, Rfl: 1    ezetimibe (ZETIA) 10 mg tablet, Take 1 tablet by mouth once daily, Disp: 30 Tab, Rfl: 0    metoprolol tartrate (LOPRESSOR) 50 mg tablet, Take 1 tablet by mouth twice daily, Disp: 180 Tab, Rfl: 0    atorvastatin (LIPITOR) 80 mg tablet, TAKE ONE TABLET BY MOUTH NIGHTLY, Disp: 90 Tab, Rfl: 1    metFORMIN (GLUCOPHAGE) 1,000 mg tablet, TAKE 1 TABLET BY MOUTH TWICE DAILY WITH MEALS, Disp: 180 Tab, Rfl: 0    levothyroxine (SYNTHROID) 100 mcg tablet, Take 1 Tab by mouth Daily (before breakfast). , Disp: 90 Tab, Rfl: 1    LORazepam (ATIVAN) 0.5 mg tablet, Take 1 Tab by mouth nightly as needed for Anxiety. Max Daily Amount: 0.5 mg., Disp: 30 Tab, Rfl: 2    cholecalciferol (VITAMIN D3) 1,000 unit cap, Take 1,000 Units by mouth daily. , Disp: , Rfl:     aspirin 81 mg chewable tablet, Take 1 Tab by mouth daily. , Disp: 90 Tab, Rfl: 3    cyanocobalamin (VITAMIN B-12) 1,000 mcg tablet, Take 1,000 mcg by mouth daily. 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 She  has a past surgical history that includes hx gyn; vascular surgery procedure unlist; hx coronary artery bypass graft (11/11/11); pr cardiac surg procedure unlist; colonoscopy (N/A, 10/31/2018); hx heart catheterization; and hx urological (1970s). She family history includes Breast Cancer in her daughter; Cancer in her sister; Diabetes in her brother, brother, brother, and mother; Heart Disease in her brother, brother, and mother; Hypertension in her brother; Mental Retardation in her brother; Other in her father. She  reports that she quit smoking about 10 years ago. She has a 20.00 pack-year smoking history. She has never used smokeless tobacco. She reports that she does not drink alcohol or use drugs.        Objective:     Weight 185 lb  Vital Signs: (As obtained by patient/caregiver at home)        Constitutional: [x] Appears well-developed and well-nourished [x] No apparent distress      [] Abnormal -     Mental status: [x] Alert and awake  [x] Oriented to person/place/time [x] Able to follow commands    [] Abnormal -     Eyes:   EOM    [x]  Normal    [] Abnormal -   Sclera  [x]  Normal    [] Abnormal -          Discharge [x]  None visible   [] Abnormal -     HENT: [x] Normocephalic, atraumatic  [] Abnormal -   [x] Mouth/Throat: Mucous membranes are moist             External Ears [x] Normal  [] Abnormal -    Neck: [x] No visualized mass [] Abnormal -     Pulmonary/Chest: [x] Respiratory effort normal   [x] No visualized signs of difficulty breathing or respiratory distress        [] Abnormal -       Neurological:        [x] No Facial Asymmetry (Cranial nerve 7 motor function) (limited exam due to video visit)          [x] No gaze palsy        [] Abnormal -          Skin:        [x] No significant exanthematous lesions or discoloration noted on facial skin         [] Abnormal -            Psychiatric:       [x] Normal Affect [] Abnormal -       Other pertinent observable physical exam findings:-          Assessment:       ICD-10-CM ICD-9-CM    1. Obstructive sleep apnea (adult) (pediatric)  G47.33 327.23    2. Essential hypertension, benign  I10 401.1    3. Coronary artery disease involving coronary bypass graft of native heart without angina pectoris  I25.810 414.05    4. Type II diabetes mellitus with nephropathy (HCC)  E11.21 250.40      583.81          Plan:       * Sleep testing was ordered for initial evaluation. she strongly prefers a home sleep apnea test.    * She was provided information on sleep apnea including coresponding risk factors and the importance of proper treatment. * Treatment options for sleep apnea were reviewed today. Patient agrees to a trial of PAP therapy if indicated. She will be interested in a PAP titration if found to have sleep apnea. * Counseling was provided regarding proper sleep hygiene, appropriate sleep schedule, need for sleep environment safety and safe driving. * Effect of sleep disturbance on weight was reviewed. We have recommended a dedicated weight loss through appropriate diet and an exercise regimen as significant weight reduction has been shown to reduce severity of obstructive sleep apnea. * Patient agrees to telephone follow-up by sleep technologist shortly after sleep study to review results and plan final management. 2. Hypertension - she continues on her current regimen. I have reviewed the relationship between hypertension as it relates to sleep-disordered breathing. 3. Coronary Artery Disease - she continues on her current regimen.   I have reviewed the relationship between heart disease and sleep disordered breathing. 4. Type II diabetes - she continues on her current regimen. I have reviewed the relationship between sleep disordered breathing as it relates to diabetes. The treatment plan was reviewed with the patient in detail and reviewed with the patient . she understands that the lead technologist will be calling her  with the results and assisting with the next step in the treatment plan as outlined today during the consultation with me. All of her questions were addressed. Thank you for allowing us to participate in your patient's medical care. We'll keep you updated on these investigations. Pursuant to the emergency declaration under the [facility], [facility] waiver authority and the [employer] Act, this Virtual  Visit was conducted, with patient's consent, to reduce the patient's risk of exposure to COVID-19 and provide continuity of care for an established patient. Services were provided through a video synchronous discussion virtually to substitute for in-person clinic visit.     Doreen Schulte MD    Electronically signed by    Sebastian Gonzales MD  Diplomate in Sleep Medicine  [facility]

## 2020-11-12 NOTE — Clinical Note
Thank you for the referral.  I will keep you informed of her progress.   [address],  [facility]

## 2020-11-17 ENCOUNTER — HOSPITAL ENCOUNTER (OUTPATIENT)
Dept: NON INVASIVE DIAGNOSTICS | Age: 80
Discharge: HOME | End: 2020-11-17
Attending: INTERNAL MEDICINE
Payer: MEDICARE

## 2020-11-17 VITALS
HEIGHT: 62 IN | SYSTOLIC BLOOD PRESSURE: 138 MMHG | WEIGHT: 186.51 LBS | DIASTOLIC BLOOD PRESSURE: 80 MMHG | BODY MASS INDEX: 34.32 KG/M2

## 2020-11-17 DIAGNOSIS — I50.32 DIASTOLIC CHF, CHRONIC (HCC): ICD-10-CM

## 2020-11-17 DIAGNOSIS — R06.09 DOE (DYSPNEA ON EXERTION): ICD-10-CM

## 2020-11-17 PROCEDURE — 93306 TTE W/DOPPLER COMPLETE: CPT

## 2020-11-18 LAB
AV R PG: 48.15 MMHG
ECHO AO ROOT DIAM: 2.32 CM
ECHO AR MAX VEL PISA: 346.96 CM/S
ECHO AV AREA PEAK VELOCITY: 1.45 CM2
ECHO AV AREA/BSA PEAK VELOCITY: 0.8 CM2/M2
ECHO AV PEAK GRADIENT: 7.68 MMHG
ECHO AV PEAK VELOCITY: 138.6 CM/S
ECHO AV REGURGITANT PHT: 736.06 MS
ECHO EST RA PRESSURE: 10 MMHG
ECHO LA AREA 4C: 18.3 CM2
ECHO LA TO AORTIC ROOT RATIO: 1.75
ECHO LA VOL 4C: 55.27 ML (ref 22–52)
ECHO LA VOLUME INDEX A4C: 29.79 ML/M2 (ref 16–28)
ECHO LV E' LATERAL VELOCITY: 7.39 CM/S
ECHO LV E' SEPTAL VELOCITY: 3.53 CM/S
ECHO LV EDV A4C: 66.11 ML
ECHO LV EDV INDEX A4C: 35.6 ML/M2
ECHO LV EJECTION FRACTION A4C: 71 PERCENT
ECHO LV ESV A4C: 18.97 ML
ECHO LV ESV INDEX A4C: 10.2 ML/M2
ECHO LV INTERNAL DIMENSION DIASTOLIC MMODE: 3.92 CM
ECHO LV INTERNAL DIMENSION SYSTOLIC MMODE: 2.77 CM
ECHO LV IVSD MMODE: 1.47 CM
ECHO LV IVSS MMODE: 1.59 CM
ECHO LV POSTERIOR WALL DIASTOLIC MMODE: 1.53 CM
ECHO LV POSTERIOR WALL SYSTOLIC MMODE: 2.08 CM
ECHO LVOT DIAM: 1.9 CM
ECHO LVOT PEAK GRADIENT: 2.02 MMHG
ECHO LVOT PEAK VELOCITY: 71.05 CM/S
ECHO MV A VELOCITY: 90.12 CM/S
ECHO MV E DECELERATION TIME (DT): 225.64 MS
ECHO MV E VELOCITY: 61.55 CM/S
ECHO MV E/A RATIO: 0.68
ECHO MV E/E' LATERAL: 8.33
ECHO MV E/E' RATIO (AVERAGED): 12.88
ECHO MV E/E' SEPTAL: 17.44
ECHO PV PEAK INSTANTANEOUS GRADIENT SYSTOLIC: 2.6 MMHG
ECHO PV REGURGITANT MAX VELOCITY: 80.61 CM/S
ECHO RIGHT VENTRICULAR SYSTOLIC PRESSURE (RVSP): 19.46 MMHG
ECHO RV INTERNAL DIMENSION: 2.92 CM
ECHO TV REGURGITANT MAX VELOCITY: 153.82 CM/S
ECHO TV REGURGITANT PEAK GRADIENT: 9.46 MMHG

## 2020-11-20 RX ORDER — METOPROLOL TARTRATE 50 MG/1
TABLET ORAL
Qty: 180 TAB | Refills: 0 | Status: SHIPPED | OUTPATIENT
Start: 2020-11-20 | End: 2021-03-01

## 2020-11-20 RX ORDER — EZETIMIBE 10 MG/1
TABLET ORAL
Qty: 30 TAB | Refills: 0 | Status: SHIPPED | OUTPATIENT
Start: 2020-11-20 | End: 2020-12-15

## 2020-12-07 DIAGNOSIS — E11.21 TYPE II DIABETES MELLITUS WITH NEPHROPATHY (HCC): ICD-10-CM

## 2020-12-07 RX ORDER — LEVOTHYROXINE SODIUM 100 UG/1
TABLET ORAL
Qty: 90 TAB | Refills: 0 | Status: SHIPPED | OUTPATIENT
Start: 2020-12-07 | End: 2021-03-10

## 2020-12-15 RX ORDER — EZETIMIBE 10 MG/1
TABLET ORAL
Qty: 30 TAB | Refills: 0 | Status: SHIPPED | OUTPATIENT
Start: 2020-12-15 | End: 2021-01-17

## 2020-12-29 DIAGNOSIS — E11.21 TYPE II DIABETES MELLITUS WITH NEPHROPATHY (HCC): ICD-10-CM

## 2020-12-29 RX ORDER — GABAPENTIN 100 MG/1
CAPSULE ORAL
Qty: 120 CAP | Refills: 0 | Status: SHIPPED | OUTPATIENT
Start: 2020-12-29 | End: 2021-02-15

## 2020-12-30 NOTE — PROGRESS NOTES
PT DAILY TREATMENT NOTE - [facility] -15    Patient Name: Alexa Tyler  Date:10/4/2017  : 1940  [x]  Patient  Verified  Payor: Jaimee Debora / Plan: [address] [place] [address] HMO / Product Type: Managed Care Medicare /    In time: 10:04 AM  Out time: 11:02 AM  Total Treatment Time (min): 58 minutes  Total Timed Codes (min): 58 minutes  1:1 Treatment Time ( only): 45 minutes   Visit #: 8     Treatment Area: r26.89    SUBJECTIVE  Pain Level (0-10 scale): 0/10  Any medication changes, allergies to medications, adverse drug reactions, diagnosis change, or new procedure performed?: [x] No    [] Yes (see summary sheet for update)  Subjective functional status/changes:   [] No changes reported  The Pt reports that her balance is much improved overall. She states that she is stumbling less frequently and feeling less unstable on her feet. She feels comfortable with her HEP and continuing independently.     OBJECTIVE    30 min Therapeutic Exercise:  [x] See flow sheet :   Rationale: increase ROM, increase strength, improve coordination, improve balance and increase proprioception to improve the patients ability to ambulate and perform ADLs with less difficulty    28 min Neuromuscular Re-education:  [x]  See flow sheet :   Rationale: increase ROM, increase strength, improve coordination, improve balance and increase proprioception  to improve the patients ability to ambulate and perform ADLs with less risk of falling          With   [x] TE   [] TA   [] neuro   [x] other: Patient Education: [x] Review HEP    [] Progressed/Changed HEP based on:   [] positioning   [] body mechanics   [] transfers   [] heat/ice application    [x] other: Pt educated how to safely progress her HEP independently     Other Objective/Functional Measures: FOTO 60/100 (45/100 at initial evaluation)  30s sit to stand: 11 (no hands)  MSTSIB: trial 1- 30s, trial 2- 30s, trial 5- 30s, trial 6-10s     Pain Level (0-10 scale) post treatment: 0/10    ASSESSMENT/Changes in Function:       []  See Plan of Care  []  See progress note/recertification  [x]  See Discharge Summary         Progress towards goals / Updated goals:  Short Term Goals: To be accomplished in 6 treatments:  1. The Pt will be independent and compliant with her HEP- met  Long Term Goals: To be accomplished in 12 treatments:  1. The Pt will score the MCII on her FOTO survey demonstrating improved overall function- met FOTO 60/100 at discharge  2. The Pt will be able to stand/walk >/=45 minutes without feelings of weakness or instability to allow the Pt to be able to perform ADLs with less difficulty- met  3. The Pt will be able to perform >/= 11 sit to stands in 30s demonstrating improved LE strength- met 11  4.  The Pt will score >/= 85/120 on her mSTSIB demonstrating improved balance and neuromuscular control- met mSTSIB 100/120     PLAN  []  Upgrade activities as tolerated     []  Continue plan of care  []  Update interventions per flow sheet       [x]  Discharge due to: Pt has met therapeutic goals  []  Other:_      Mauricio Bal, PT 10/4/2017  10:22 AM never tested/No. PERNELL screening performed.  STOP BANG Legend: 0-2 = LOW Risk; 3-4 = INTERMEDIATE Risk; 5-8 = HIGH Risk

## 2021-01-13 ENCOUNTER — TELEPHONE (OUTPATIENT)
Dept: INTERNAL MEDICINE CLINIC | Age: 81
End: 2021-01-13

## 2021-01-13 DIAGNOSIS — E03.9 HYPOTHYROIDISM, UNSPECIFIED TYPE: ICD-10-CM

## 2021-01-13 DIAGNOSIS — E11.21 TYPE 2 DIABETES MELLITUS WITH NEPHROPATHY (HCC): Primary | ICD-10-CM

## 2021-01-13 NOTE — TELEPHONE ENCOUNTER
General Message/Vendor Calls     Caller's first and last name:pt       Reason for call:lab order       Callback required yes/no and why:yes to put lab order before appt       Best contact number(s):[phone]       Details to clarify the request:pt wants to get labs done before appt on 2/3, please advise       Klarissa Vinson

## 2021-01-14 NOTE — TELEPHONE ENCOUNTER
MD Cony Keith LPN   Caller: Unspecified (Yesterday,  3:04 PM)             A1c, tsh, Ruthann Gomes; labs ordered.

## 2021-01-14 NOTE — TELEPHONE ENCOUNTER
Called out and spoke to pt. Two pt identifiers confirmed. Pt informed per Dr. Margo pal ordered and mailed to pt. Pt verbalized understanding of information discussed w/ no further questions at this time.

## 2021-01-17 RX ORDER — EZETIMIBE 10 MG/1
TABLET ORAL
Qty: 30 TAB | Refills: 0 | Status: SHIPPED | OUTPATIENT
Start: 2021-01-17 | End: 2021-02-17

## 2021-01-18 ENCOUNTER — VIRTUAL VISIT (OUTPATIENT)
Dept: SLEEP MEDICINE | Age: 81
End: 2021-01-18
Payer: MEDICARE

## 2021-01-18 DIAGNOSIS — G47.33 OBSTRUCTIVE SLEEP APNEA (ADULT) (PEDIATRIC): Primary | ICD-10-CM

## 2021-01-18 PROCEDURE — 99442 PR PHYS/QHP TELEPHONE EVALUATION 11-20 MIN: CPT | Performed by: INTERNAL MEDICINE

## 2021-01-18 NOTE — PATIENT INSTRUCTIONS
[address] [facility] [address] [facility] [address] [place]. [address] [facility], [address] Tel.  [phone] Fax. [address] [facility] [address] [place], [address] [facility] Tel.  [phone] Fax. [phone] [address]  Tel.  [phone] Fax. [phone] Sleep Apnea: After Your Visit Your Care Instructions Sleep apnea occurs when you frequently stop breathing for 10 seconds or longer during sleep. It can be mild to severe, based on the number of times per hour that you stop breathing or have slowed breathing. Blocked or narrowed airways in your nose, mouth, or throat can cause sleep apnea. Your airway can become blocked when your throat muscles and tongue relax during sleep. Sleep apnea is common, occurring in 1 out of 20 individuals. Individuals having any of the following characteristics should be evaluated and treated right away due to high risk and detrimental consequences from untreated sleep apnea: 
1. Obesity 2. Congestive Heart failure 3. Atrial Fibrillation 4. Uncontrolled Hypertension 5. Type II Diabetes 6. Night-time Arrhythmias 7. Stroke 8. Pulmonary Hypertension 9. High-risk Driving Populations (pilots, truck drivers, etc.) 10. Patients Considering Weight-loss Surgery How do you know you have sleep apnea? You probably have sleep apnea if you answer 'yes' to 3 or more of the following questions: S - Have you been told that you Snore? T - Are you often Tired during the day? O - Has anyone Observed you stop breathing while sleeping? P- Do you have (or are being treated for) high blood Pressure? B - Are you obese (Body Mass Index > 35)? A - Is your Age 48years old or older? N - Is your Neck size greater than 16 inches? G - Are you male Gender? A sleep physician can prescribe a breathing device that prevents tissues in the throat from blocking your airway. Or your doctor may recommend using a dental device (oral breathing device) to help keep your airway open. In some cases, surgery may be needed to remove enlarged tissues in the throat. Follow-up care is a key part of your treatment and safety. Be sure to make and go to all appointments, and call your doctor if you are having problems. It's also a good idea to know your test results and keep a list of the medicines you take. How can you care for yourself at home? · Lose weight, if needed. It may reduce the number of times you stop breathing or have slowed breathing. · Go to bed at the same time every night. · Sleep on your side. It may stop mild apnea. If you tend to roll onto your back, sew a pocket in the back of your pajama top. Put a tennis ball into the pocket, and stitch the pocket shut. This will help keep you from sleeping on your back. · Avoid alcohol and medicines such as sleeping pills and sedatives before bed. · Do not smoke. Smoking can make sleep apnea worse. If you need help quitting, talk to your doctor about stop-smoking programs and medicines. These can increase your chances of quitting for good. · Prop up the head of your bed 4 to 6 inches by putting bricks under the legs of the bed. · Treat breathing problems, such as a stuffy nose, caused by a cold or allergies. · Use a continuous positive airway pressure (CPAP) breathing machine if lifestyle changes do not help your apnea and your doctor recommends it. The machine keeps your airway from closing when you sleep. · If CPAP does not help you, ask your doctor whether you should try other breathing machines. A bilevel positive airway pressure machine has two types of air pressureâone for breathing in and one for breathing out. Another device raises or lowers air pressure as needed while you breathe. · If your nose feels dry or bleeds when using one of these machines, talk with your doctor about increasing moisture in the air. A humidifier may help. · If your nose is runny or stuffy from using a breathing machine, talk with your doctor about using decongestants or a corticosteroid nasal spray. When should you call for help? Watch closely for changes in your health, and be sure to contact your doctor if: 
· You still have sleep apnea even though you have made lifestyle changes. · You are thinking of trying a device such as CPAP. · You are having problems using a CPAP or similar machine. Where can you learn more? Go to [employer]. Enter I268 in the search box to learn more about \"Sleep Apnea: After Your Visit. \"  
© 4861-4777 [employer]wise, [employer]. Care instructions adapted under license by [employer] (which disclaims liability or warranty for this information). This care instruction is for use with your licensed healthcare professional. If you have questions about a medical condition or this instruction, always ask your healthcare professional. Mat Heavenly any warranty or liability for your use of this information. PROPER SLEEP HYGIENE What to avoid · Do not have drinks with caffeine, such as coffee or black tea, for 8 hours before bed. · Do not smoke or use other types of tobacco near bedtime. Nicotine is a stimulant and can keep you awake. · Avoid drinking alcohol late in the evening, because it can cause you to wake in the middle of the night. · Do not eat a big meal close to bedtime. If you are hungry, eat a light snack. · Do not drink a lot of water close to bedtime, because the need to urinate may wake you up during the night. · Do not read or watch TV in bed. Use the bed only for sleeping and sexual activity. What to try · Go to bed at the same time every night, and wake up at the same time every morning. Do not take naps during the day. · Keep your bedroom quiet, dark, and cool. · Get regular exercise, but not within 3 to 4 hours of your bedtime. Lily Brooks · Sleep on a comfortable pillow and mattress. · If watching the clock makes you anxious, turn it facing away from you so you cannot see the time. · If you worry when you lie down, start a worry book. Well before bedtime, write down your worries, and then set the book and your concerns aside. · Try meditation or other relaxation techniques before you go to bed. · If you cannot fall asleep, get up and go to another room until you feel sleepy. Do something relaxing. Repeat your bedtime routine before you go to bed again. · Make your house quiet and calm about an hour before bedtime. Turn down the lights, turn off the TV, log off the computer, and turn down the volume on music. This can help you relax after a busy day. Drowsy Driving [employer] cites drowsiness as a causing factor in more than 146,179 police reported crashes annually, resulting in 76,000 injuries and 1,500 deaths. Other surveys suggest 55% of people polled have driven while drowsy in the past year, 23% had fallen asleep but not crashed, 3% crashed, and 2% had and accident due to drowsy driving. Who is at risk? Young Drivers: One study of drowsy driving accidents states that 55% of the drivers were under 25 years. Of those, 75% were male. Shift Workers and Travelers: People who work overnight or travel across time zones frequently are at higher risk of experiencing Circadian Rhythm Disorders. They are trying to work and function when their body is programed to sleep. Sleep Deprived: Lack of sleep has a serious impact on your ability to pay attention or focus on a task. Consistently getting less than the average of 8 hours your body needs creates partial or cumulative sleep deprivation. Untreated Sleep Disorders: Sleep Apnea, Narcolepsy, R.L.S., and other sleep disorders (untreated) prevent a person from getting enough restful sleep. This leads to excessive daytime sleepiness and increases the risk for drowsy driving accidents by up to 7 times. Medications / Alcohol: Even over the counter medications can cause drowsiness. Medications that impair a drivers attention should have a warning label. Alcohol naturally makes you sleepy and on its own can cause accidents. Combined with excessive drowsiness its effects are amplified. Signs of Drowsy Driving: * You don't remember driving the last few miles * You may drift out of your michelle * You are unable to focus and your thoughts wander * You may yawn more often than normal 
 * You have difficulty keeping your eyes open / nodding off * Missing traffic signs, speeding, or tailgating Prevention-  
Good sleep hygiene, lifestyle and behavioral choices have the most impact on drowsy driving. There is no substitute for sleep and the average person requires 8 hours nightly. If you find yourself driving drowsy, stop and sleep. Consider the sleep hygiene tips provided during your visit as well. Medication Refill Policy: Refills for all medications require 1 week advance notice. Please have your pharmacy fax a refill request. We are unable to fax, or call in \"controled substance\" medications and you will need to pick these prescriptions up from our office. MyChart Activation Thank you for requesting access to [facility]. Please follow the instructions below to securely access and download your online medical record. [employer] allows you to send messages to your doctor, view your test results, renew your prescriptions, schedule appointments, and more. How Do I Sign Up? 1. In your internet browser, go to [email or web address]. 2. Click on the First Time User? Click Here link in the Sign In box. You will see the New Member Sign Up page. 3. Enter your ONE Change Access Code exactly as it appears below. You will not need to use this code after youve completed the sign-up process. If you do not sign up before the expiration date, you must request a new code. ONE Change Access Code: [email or web address] Expires: 3/4/2021  1:14 PM (This is the date your ONE Change access code will ) 4. Enter the last four digits of your Social Security Number (xxxx) and Date of Birth (mm/dd/yyyy) as indicated and click Submit. You will be taken to the next sign-up page. 5. Create a ONE Change ID. This will be your ONE Change login ID and cannot be changed, so think of one that is secure and easy to remember. 6. Create a ONE Change password. You can change your password at any time. 7. Enter your Password Reset Question and Answer. This can be used at a later time if you forget your password. 8. Enter your e-mail address. You will receive e-mail notification when new information is available in [address]. 9. Click Sign Up. You can now view and download portions of your medical record. 10. Click the Download Summary menu link to download a portable copy of your medical information. Additional Information If you have questions, please call [phone]. Remember, ONE Change is NOT to be used for urgent needs. For medical emergencies, dial 911.

## 2021-01-18 NOTE — PROGRESS NOTES
Paty Beasley is a 79 y.o. female, evaluated via audio-only technology on 1/18/2021 for Sleep Problem (Np refd by Dr. Cathi Campo for snoring ) and Other ([phone])  . 12  Subjective:                       [address] [place]. [place], [address]  Tel.  [phone]  Fax. 100 [facility] [address]  [place], [address] [facility]  Tel.  [phone]  Fax. [phone] [address] [place] [address]  Tel.  [phone]  Fax. [phone]       Telemedicine visit performed with verbal consent of the patient. Patient called and identity confirmed with 2 patient identifers    Patient was seen at home  Paty Beasley is a 79 y.o. female who was seen by synchronous (real-time) audio-video technology on 1/18/2021. Consent:  She and/or her healthcare decision maker is aware that this patient-initiated Telehealth encounter is the equivalent to a face to face encounter in the sleep disorder center and has provided verbal consent to proceed: Yes    I was at home while conducting this encounter. S>Ammon FERRER Sheree Tejeda is a 79 y.o. female seen at this telephone visit for afollow-up. She was seen in November and was to have a home test but it was not yet done. She does say that her sleep has improved but still snores. She is now sleeping longer stretches (4 or more hours at a time)    Allergies   Allergen Reactions    Ace Inhibitors Swelling    Arb-Angiotensin Receptor Antagonist Swelling    Lisinopril Swelling    Plavix [Clopidogrel] Other (comments)     Excessive bleeding    Potassium Nausea and Vomiting     Potassium containing oral products are not well tolerated by patient       She has a current medication list which includes the following prescription(s): ezetimibe, insulin nph/insulin regular, gabapentin, euthyrox, metoprolol tartrate, freestyle yenifer 14 day sensor, amlodipine, omeprazole, atorvastatin, metformin, lorazepam, cholecalciferol, aspirin, and cyanocobalamin. 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 Ringling Sleepiness Score: 13   and Modified F.O.S.Q. Score Total / 2: 16.5      A>    ICD-10-CM ICD-9-CM    1. Obstructive sleep apnea (adult) (pediatric)  G47.33 327.23 SLEEP STUDY UNATTENDED, 4 CHANNEL     P>    Sleep testing ordered  she strongly prefers a home sleep apnea test.    Treatment options for sleep apnea were reviewed. She is agreeable to a PAP titration if found to have significant sleep apnea. The treatment plan was reviewed with the patient in detail and reviewed with the patient. she understands that the lead technologist will be calling her  with the results and assisting with the next step in the treatment plan as outlined today during the consultation with me. All of her questions were addressed. All of her questions were addressed. Maurice Haynes, who was evaluated through a patient-initiated, synchronous (real-time) audio only encounter, and/or her healthcare decision maker, is aware that it is a billable service, with coverage as determined by her insurance carrier. She provided verbal consent to proceed: Yes. She has not had a related appointment within my department in the past 7 days or scheduled within the next 24 hours.       Total Time: minutes: 11-20 minutes    Denver Matin, MD

## 2021-01-20 NOTE — PROGRESS NOTES
[address] [place] [address] [address] [facility]  [phone]     Subjective: Nestor Zayas is a 79 y.o. female is here for routine f/u. She has pmhx CAD s/p CABG x 3, HTN, HLD, HFpEF, DM and LORENZO. Last seen by us via VV in 5/2020. The patient denies chest pain/ , orthopnea, PND, LE edema, palpitations, syncope, or presyncope. C/o SOB with minimal exertion. Previous smoker. Has prn albuterol which seems to help. Sleep study pending.     Patient Active Problem List    Diagnosis Date Noted    Type 2 diabetes mellitus with diabetic neuropathy ([address] 01/22/2019    Type 2 diabetes mellitus with nephropathy ([address] 01/02/2018    Recurrent depression ([address] 01/02/2018    Diverticulosis of intestine with bleeding 10/11/2015    Anemia 18/23/1538    Diastolic CHF, chronic ([address] 10/11/2015    GIB (gastrointestinal bleeding) 10/08/2015    Osteoporosis 09/10/2013    Essential hypertension, benign 05/17/2012    Mixed hyperlipidemia 05/17/2012    Postsurgical percutaneous transluminal coronary angioplasty status 05/17/2012    S/P CABG x 3 11/11/2011    CAD (coronary artery disease) 11/18/2009    Hypothyroidism 11/18/2009    PVD (peripheral vascular disease) ([address] 11/18/2009      Bernardo Graves MD  Past Medical History:   Diagnosis Date    Anxiety     CAD (coronary artery disease)     cabg     Diabetes ([address]     Diverticulosis     GERD (gastroesophageal reflux disease)     Heart disease     Heart failure ([address] 10/18/2011    High cholesterol     Hypertension     Hypothyroidism     Kidney stones     Postsurgical percutaneous transluminal coronary angioplasty status 5/17/2012    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    [address]    Sleeping difficulty     Teeth decayed     Weakness       Past Surgical History:   Procedure Laterality Date    COLONOSCOPY N/A 10/31/2018    COLONOS[facility] Artur Merchant MD [facility] ENDOSCOPY    HX CORONARY ARTERY BYPASS GRAFT  11/11/11    3 vessel    HX GYN      hysterectomy    HX HEART CATHETERIZATION      HX UROLOGICAL  1970s    kidney stone    FL CARDIAC SURG PROCEDURE UNLIST      cabg x 3    VASCULAR SURGERY PROCEDURE UNLIST      Stents put in right leg     Allergies   Allergen Reactions    Ace Inhibitors Swelling    Arb-Angiotensin Receptor Antagonist Swelling    Lisinopril Swelling    Plavix [Clopidogrel] Other (comments)     Excessive bleeding    Potassium Nausea and Vomiting     Potassium containing oral products are not well tolerated by patient      Family History   Problem Relation Age of Onset    Diabetes Mother     Heart Disease Mother     Diabetes Brother     Heart Disease Brother     Mental Retardation Brother     Hypertension Brother     Other Father     Cancer Sister     Diabetes Brother     Heart Disease Brother     Diabetes Brother     Breast Cancer Daughter         52's      Social History     Socioeconomic History    Marital status:      Spouse name: Not on file    Number of children: Not on file    Years of education: Not on file    Highest education level: Not on file   Occupational History    Not on file   Social Needs    Financial resource strain: Not on file    Food insecurity     Worry: Not on file     Inability: Not on file    Transportation needs     Medical: Not on file     Non-medical: Not on file   Tobacco Use    Smoking status: Former Smoker     Packs/day: 0.50     Years: 40.00     Pack years: 20.00     Quit date: 9/20/2010     Years since quitting: 10.3    Smokeless tobacco: Never Used   Substance and Sexual Activity    Alcohol use: No    Drug use: No    Sexual activity: Never   Lifestyle    Physical activity     Days per week: Not on file     Minutes per session: Not on file    Stress: Not on file   Relationships    Social connections     Talks on phone: Not on file     Gets together: Not on file     Attends [employer] service: Not on file     Active member of club or organization: Not on file     Attends meetings of clubs or organizations: Not on file     Relationship status: Not on file    Intimate partner violence     Fear of current or ex partner: Not on file     Emotionally abused: Not on file     Physically abused: Not on file     Forced sexual activity: Not on file   Other Topics Concern    Not on file   Social History Narrative    Not on file      Current Outpatient Medications   Medication Sig    ezetimibe (ZETIA) 10 mg tablet Take 1 tablet by mouth once daily    insulin NPH/insulin regular (HumuLIN 70/30 U-100 Insulin) 100 unit/mL (70-30) injection INJECT 24 UNITS SUBCUTANEOUSLY TWICE DAILY    gabapentin (NEURONTIN) 100 mg capsule Take 2 capsules by mouth twice daily    Euthyrox 100 mcg tablet TAKE 1 TABLET BY MOUTH ONCE DAILY BEFORE BREAKFAST    metoprolol tartrate (LOPRESSOR) 50 mg tablet Take 1 tablet by mouth twice daily    flash glucose sensor (FreeStyle Ruben 14 Day Sensor) kit 1 Kit by Does Not Apply route daily. Check glucose bid    amLODIPine (NORVASC) 5 mg tablet Take 1 tablet  by mouth once daily    omeprazole (PRILOSEC) 20 mg capsule Take 1 capsule by mouth once daily    atorvastatin (LIPITOR) 80 mg tablet TAKE ONE TABLET BY MOUTH NIGHTLY    metFORMIN (GLUCOPHAGE) 1,000 mg tablet TAKE 1 TABLET BY MOUTH TWICE DAILY WITH MEALS    LORazepam (ATIVAN) 0.5 mg tablet Take 1 Tab by mouth nightly as needed for Anxiety. Max Daily Amount: 0.5 mg.    cholecalciferol (VITAMIN D3) 1,000 unit cap Take 1,000 Units by mouth daily.  aspirin 81 mg chewable tablet Take 1 Tab by mouth daily.  cyanocobalamin (VITAMIN B-12) 1,000 mcg tablet Take 1,000 mcg by mouth daily. No current facility-administered medications for this visit.           Review of Symptoms:  11 systems reviewed, negative other than as stated in the HPI    Physical ExamPhysical Exam:    Vitals:    01/22/21 1043 01/22/21 1044   BP: (!) 150/80 (!) 140/76   Pulse: 63    Resp: 18    SpO2: 98%    Weight: 186 lb 4.8 oz (84.5 kg)    Height: 5' 2\" (1.575 m)      Body mass index is 34.07 kg/m². General PE  Gen:  NAD  Mental Status - Alert. General Appearance - Not in acute distress. HEENT:  PERRL, no carotid bruits or JVD  Chest and Lung Exam   Inspection: Accessory muscles - No use of accessory muscles in breathing. Auscultation:   Breath sounds: - Normal.   Cardiovascular   Inspection: Jugular vein - Bilateral - Inspection Normal.   Palpation/Percussion:   Apical Impulse: - Normal.   Auscultation: Rhythm - Regular. Heart Sounds - S1 WNL and S2 WNL. No S3 or S4. Murmurs & Other Heart Sounds: Auscultation of the heart reveals - No Murmurs. Peripheral Vascular   Upper Extremity: Inspection - Bilateral - No Cyanotic nailbeds or Digital clubbing. Lower Extremity:   Palpation: Edema - Bilateral - No edema. Abdomen:   Soft, non-tender, bowel sounds are active.   Neuro: A&O times 3, CN and motor grossly WNL    Labs:   Lab Results   Component Value Date/Time    Cholesterol, total 139 07/27/2020 10:03 AM    Cholesterol, total 149 10/24/2019 09:27 AM    Cholesterol, total 180 10/19/2018 02:14 PM    Cholesterol, total 157 09/22/2017 09:20 AM    Cholesterol, total 134 06/22/2017 09:41 AM    HDL Cholesterol 52 07/27/2020 10:03 AM    HDL Cholesterol 54 10/24/2019 09:27 AM    HDL Cholesterol 64 10/19/2018 02:14 PM    HDL Cholesterol 56 09/22/2017 09:20 AM    HDL Cholesterol 60 06/22/2017 09:41 AM    LDL, calculated 68 07/27/2020 10:03 AM    LDL, calculated 74 10/24/2019 09:27 AM    LDL, calculated 90 10/19/2018 02:14 PM    LDL, calculated 78 09/22/2017 09:20 AM    LDL, calculated 53 06/22/2017 09:41 AM    Triglyceride 94 07/27/2020 10:03 AM    Triglyceride 106 10/24/2019 09:27 AM    Triglyceride 129 10/19/2018 02:14 PM    Triglyceride 116 09/22/2017 09:20 AM    Triglyceride 103 06/22/2017 09:41 AM    CHOL/HDL Ratio 3.2 09/14/2010 08:42 AM    CHOL/HDL Ratio 3.0 03/09/2010 09:15 AM    CHOL/HDL Ratio 3.1 09/15/2009 08:34 AM     No results found for: CPK, CPKX, CPX  Lab Results   Component Value Date/Time    Sodium 145 (H) 10/19/2020 09:57 AM    Potassium 3.7 10/19/2020 09:57 AM    Chloride 106 10/19/2020 09:57 AM    CO2 27 10/19/2020 09:57 AM    Anion gap 7 10/16/2015 04:34 AM    Glucose 147 (H) 10/19/2020 09:57 AM    BUN 20 10/19/2020 09:57 AM    Creatinine 1.05 (H) 10/19/2020 09:57 AM    BUN/Creatinine ratio 19 10/19/2020 09:57 AM    GFR est AA 58 (L) 10/19/2020 09:57 AM    GFR est non-AA 51 (L) 10/19/2020 09:57 AM    Calcium 9.9 10/19/2020 09:57 AM    Bilirubin, total 0.6 07/27/2020 10:03 AM    Alk. phosphatase 87 07/27/2020 10:03 AM    Protein, total 6.9 07/27/2020 10:03 AM    Albumin 4.2 07/27/2020 10:03 AM    Globulin 3.4 10/16/2015 04:34 AM    A-G Ratio 1.6 07/27/2020 10:03 AM    ALT (SGPT) 10 07/27/2020 10:03 AM       EKG:       Assessment:     Assessment:      1. Diastolic CHF, chronic ([address]    2. Coronary artery disease involving native coronary artery of native heart without angina pectoris    3. S/P CABG x 3    4. Mixed hyperlipidemia    5. Essential hypertension, benign        Orders Placed This Encounter    AMB POC EKG ROUTINE W/ 12 LEADS, INTER & REP     Order Specific Question:   Reason for Exam:     Answer:   routine        Plan:     Coronary artery disease involving native coronary artery of native heart without angina pectoris  S/P CABG x 3 in 2011  On ASA BB statin. Progressive SOB. Angina equivalent v/s pulmonary will schedule stress test. Advised to take inhalers before activity and discuss with pcp.      HFpEF  Normal EF mild AR/MR per echo in 11/2020  Stable. HTN  Controlled with current therapy       Mixed hyperlipidemia  7/2020 LDL 68 On statin, Zetia.   Labs and lipids per PCP    LORENZO  Awaiting evaluation.     DM  On insulin regimen     Continue current care and f/u in 6 months if stress test abnormal.    Norina Lesch, MD

## 2021-01-22 ENCOUNTER — OFFICE VISIT (OUTPATIENT)
Dept: CARDIOLOGY CLINIC | Age: 81
End: 2021-01-22
Payer: MEDICARE

## 2021-01-22 VITALS
HEIGHT: 62 IN | DIASTOLIC BLOOD PRESSURE: 76 MMHG | WEIGHT: 186.3 LBS | HEART RATE: 63 BPM | OXYGEN SATURATION: 98 % | SYSTOLIC BLOOD PRESSURE: 140 MMHG | RESPIRATION RATE: 18 BRPM | BODY MASS INDEX: 34.28 KG/M2

## 2021-01-22 DIAGNOSIS — Z95.1 S/P CABG X 3: ICD-10-CM

## 2021-01-22 DIAGNOSIS — E78.2 MIXED HYPERLIPIDEMIA: ICD-10-CM

## 2021-01-22 DIAGNOSIS — I10 ESSENTIAL HYPERTENSION, BENIGN: ICD-10-CM

## 2021-01-22 DIAGNOSIS — I25.10 CORONARY ARTERY DISEASE INVOLVING NATIVE CORONARY ARTERY OF NATIVE HEART WITHOUT ANGINA PECTORIS: ICD-10-CM

## 2021-01-22 DIAGNOSIS — I50.32 DIASTOLIC CHF, CHRONIC (HCC): Primary | ICD-10-CM

## 2021-01-22 PROCEDURE — 93000 ELECTROCARDIOGRAM COMPLETE: CPT | Performed by: INTERNAL MEDICINE

## 2021-01-22 PROCEDURE — 1101F PT FALLS ASSESS-DOCD LE1/YR: CPT | Performed by: INTERNAL MEDICINE

## 2021-01-22 PROCEDURE — G8427 DOCREV CUR MEDS BY ELIG CLIN: HCPCS | Performed by: INTERNAL MEDICINE

## 2021-01-22 PROCEDURE — 99214 OFFICE O/P EST MOD 30 MIN: CPT | Performed by: INTERNAL MEDICINE

## 2021-01-22 PROCEDURE — G9717 DOC PT DX DEP/BP F/U NT REQ: HCPCS | Performed by: INTERNAL MEDICINE

## 2021-01-22 PROCEDURE — G8536 NO DOC ELDER MAL SCRN: HCPCS | Performed by: INTERNAL MEDICINE

## 2021-01-22 PROCEDURE — G8753 SYS BP > OR = 140: HCPCS | Performed by: INTERNAL MEDICINE

## 2021-01-22 PROCEDURE — 1090F PRES/ABSN URINE INCON ASSESS: CPT | Performed by: INTERNAL MEDICINE

## 2021-01-22 PROCEDURE — G8754 DIAS BP LESS 90: HCPCS | Performed by: INTERNAL MEDICINE

## 2021-01-22 PROCEDURE — G8417 CALC BMI ABV UP PARAM F/U: HCPCS | Performed by: INTERNAL MEDICINE

## 2021-01-22 NOTE — LETTER
1/22/2021 Patient: Amber Ortiz YOB: 1940 Date of Visit: 1/22/2021 Alix Alejandra MD 
[address] [address] [address] [employer] Dear Alix Alejandra MD, Thank you for referring Ms. Chin Pike to [facility] for evaluation. My notes for this consultation are attached. If you have questions, please do not hesitate to call me. I look forward to following your patient along with you. Sincerely, Levi Lyn MD

## 2021-01-22 NOTE — PROGRESS NOTES
Chief Complaint   Patient presents with    Coronary Artery Disease     6 Month follow up -     Shortness of Breath     upon little exertion      1. Have you been to the ER, urgent care clinic since your last visit? Hospitalized since your last visit? No     2. Have you seen or consulted any other health care providers outside of the [address] [place] [address] since your last visit? Include any pap smears or colon screening.   No

## 2021-01-23 DIAGNOSIS — I25.10 CORONARY ARTERY DISEASE INVOLVING NATIVE CORONARY ARTERY OF NATIVE HEART WITHOUT ANGINA PECTORIS: ICD-10-CM

## 2021-01-23 DIAGNOSIS — Z95.1 S/P CABG X 3: ICD-10-CM

## 2021-01-26 DIAGNOSIS — E11.21 TYPE II DIABETES MELLITUS WITH NEPHROPATHY (HCC): ICD-10-CM

## 2021-01-27 ENCOUNTER — HOSPITAL ENCOUNTER (OUTPATIENT)
Dept: SLEEP MEDICINE | Age: 81
Discharge: HOME | End: 2021-01-27
Payer: MEDICARE

## 2021-01-27 ENCOUNTER — DOCUMENTATION ONLY (OUTPATIENT)
Dept: SLEEP MEDICINE | Age: 81
End: 2021-01-27

## 2021-01-27 LAB
ALBUMIN SERPL-MCNC: 4.5 G/DL (ref 3.7–4.7)
ALBUMIN/GLOB SERPL: 1.6 {RATIO} (ref 1.2–2.2)
ALP SERPL-CCNC: 97 IU/L (ref 39–117)
ALT SERPL-CCNC: 14 IU/L (ref 0–32)
AST SERPL-CCNC: 22 IU/L (ref 0–40)
BILIRUB SERPL-MCNC: 0.6 MG/DL (ref 0–1.2)
BUN SERPL-MCNC: 15 MG/DL (ref 8–27)
BUN/CREAT SERPL: 14 (ref 12–28)
CALCIUM SERPL-MCNC: 10.3 MG/DL (ref 8.7–10.3)
CHLORIDE SERPL-SCNC: 104 MMOL/L (ref 96–106)
CO2 SERPL-SCNC: 27 MMOL/L (ref 20–29)
CREAT SERPL-MCNC: 1.06 MG/DL (ref 0.57–1)
EST. AVERAGE GLUCOSE BLD GHB EST-MCNC: 192 MG/DL
GLOBULIN SER CALC-MCNC: 2.9 G/DL (ref 1.5–4.5)
GLUCOSE SERPL-MCNC: 118 MG/DL (ref 65–99)
HBA1C MFR BLD: 8.3 % (ref 4.8–5.6)
POTASSIUM SERPL-SCNC: 4.2 MMOL/L (ref 3.5–5.2)
PROT SERPL-MCNC: 7.4 G/DL (ref 6–8.5)
SODIUM SERPL-SCNC: 146 MMOL/L (ref 134–144)
TSH SERPL DL<=0.005 MIU/L-ACNC: 1.43 UIU/ML (ref 0.45–4.5)

## 2021-01-27 PROCEDURE — 95806 SLEEP STUDY UNATT&RESP EFFT: CPT

## 2021-01-27 NOTE — PROGRESS NOTES
S>Ammon Benton is a 79 y.o. female seen today to receive a home sleep testing unit (HST). · Patient was educated on proper hookup and operation of the HST via detailed instruction sheet (per COVID-19 precautions)  · Instruction forms with after hours contact and documentation were signed. O>    There were no vitals taken for this visit. A>  No diagnosis found. P>  · General information regarding operations and maintenance of the device was provided. · Follow-up appointment was made to return the HST. She will be contacted once the results have been reviewed. · She was asked to contact our office for any problems regarding her home sleep test study.

## 2021-01-30 DIAGNOSIS — E11.21 TYPE 2 DIABETES MELLITUS WITH NEPHROPATHY (HCC): ICD-10-CM

## 2021-01-31 RX ORDER — FLASH GLUCOSE SENSOR
KIT MISCELLANEOUS
Qty: 1 KIT | Refills: 1 | Status: SHIPPED | OUTPATIENT
Start: 2021-01-31 | End: 2021-03-01

## 2021-02-01 NOTE — PROGRESS NOTES
HISTORY OF PRESENT ILLNESS  Danuta Bowers is a 79 y.o. female. HPI     Last here 11/4/20 Pt is here for routine care.  Julious Congress, She c/o sob on exertion   Reviewed cxr 11/4/20 negative  Reviewed echo 11/17/20: ef 55-60%  She c/o this again today  She has stress test scheduled for tomorrow 2/4/21     BP today is  175/104, repeat improved 136/86   at [facility] 140/76  No home Bp readings   Continues on metoprolol 50mg BID and norvasc 5mg  Recall had angioedema with lisinopril and benicar-HCT in the past      She is diabetic    BS at home 102 81 79 107 118 111 109 85 104 150 140 in am 103 99 161 171 107 111 169 low 115 94 125 144 150 170 in afternoon - last 2 weeks  She believes she has been doing better with her diet    170 154 140 168 166 179 110 in am 183 200 192 211 198 185 167 107 83 196 162 in afternoon (December)  Continues on metformin 1000mg BID  On NPH 70/30 mix 22U,and  nighttime dose 20U  she takes 18U in evening dose when she is having earlier dinner that is smaller to prevent lows   Discussed if she has good readings, decrease by 2 U and if they climb to increase by 2U  She also takes ASA 81mg daily   Recall issues with hypoglycemia in the past, a1c under 7.5 at goal for her       Wt is 186 lbs - stable x lov    Discussed WW--disussed portions and avoiding overweating  Discussed diet and weight loss again     Reviewed labs      Pt follows with Dr. Syed Owens (cardio) for cad  Reviewed note 1/22/21: Coronary artery disease involving native coronary artery of native heart without angina pectoris  S/P CABG x 3 in 2011  On ASA BB statin. Progressive SOB. Angina equivalent v/s pulmonary will schedule stress test. Advised to take inhalers before activity and discuss with pcp. HFpEF  Normal EF mild AR/MR per echo in 11/2020  Stable. HTN  Controlled with current therapy  Mixed hyperlipidemia  7/2020 LDL 68 On statin, Zetia. Labs and lipids per PCP  LORENZO  Awaiting evaluation.   DM  On insulin regimen  She has stress test tomorrow 2/4/21  She is using albuterol inhaler - will order for her      Pt follows annually with Dr. Jose Herbert ([facility]) for her PAD  No claudication stable   Last visit was 7/20:   F/u q year     Lov, She states both her legs are weak   Reviewed xr l spine 8/6/20: Spondylosis. Reviewed xr c spine 8/6/20: Prominent spondylosis C5-6.     Pt saw Dr Naa Dimas (ortho) for carpal tunnel   Had surgery for this 8/1/19   Last visit was 8/19/19     She has been seeing Dr. Jah Mcintosh (ortho) for R shoulder pain   Reviewed note 12/11/20: : I explained that she may discontinue physical therapy but advised her to continue working on range of motion and strengthening using at-home exercises. She may return to normal activity as tolerated and will follow-up PRN. She is completing PT     Pt is now taking prilosec 20mg every other day for reflux, works well     Takes OTC vit D daily      Continues synthroid 100mcg daily    She takes this med separately from all meds      Continues on lipitor 80mg max dose for cholesterol   Pt is now taking zetia 10mg once daily as well  Welchol was too expensive now but zetia ok and grey well no issue     No longer on lexapro, doing well off medication not sad or down 2/21  She also has ativan to use prn-- (about 1-2x a month ) helps with stress and sleep      Pt had sleep eval with Dr Harrison Rodriguez)    Last there 1/18/21: Sleep testing ordered  she strongly prefers a home sleep apnea test.  Treatment options for sleep apnea were reviewed. She is agreeable to a PAP titration if found to have significant sleep apnea. The treatment plan was reviewed with the patient in detail and reviewed with the patient. she understands that the lead technologist will be calling her  with the results and assisting with the next step in the treatment plan as outlined today during the consultation with me. All of her questions were addressed.    She completed sleep study 1/27/21 - has not gotten results yet    She will be seeing podiatry next week 2/9/21     Pt continues on gabapentin 100 mg for pain in hand   Overall helps continues on this    Discussed covid vaccine      Has safety equip, doesn't wear hearing aids  Pt lives with daughter     Pt is functionally independent       No memory concerns   Knows the month, date, year, location   Can recall 3/3 objects      ACP on file. SDMs is  daughter Ale Tobar) and Nguyen Drilling Chew        PREVENTIVE:    Colonoscopy: Dr. Ander Osborne 5 years  Pap: 12/14, declines further   Mammogram:  3/20, due 3/21 ordered  DEXA: 3/20, osteopenic, stopped fosamax,   Tdap: 1/04/2016  Pneumovax: 01/29/20   [id number]: 9/17  Zostavax: 10/10/2016  Shingrix: completed both  Flu shot: 10/20  Foot exam:11/04/20  Microalbumin: 11/04/20  A1c:   4/18 8.4, 7/18 8.0, 10/18 8.0, 1/19 8.4, 4/19 8.4, 7/19 7.5, 10/19 7.3  1/20 7.4 7/20 8.2 10/20 8.1 1/21 8.3  Eye exam: Dr. Allison Valadez at [facility], 4/20? Lipids: 7/20 LDL 68    Patient Active Problem List    Diagnosis Date Noted    Type 2 diabetes mellitus with diabetic neuropathy ([address] 01/22/2019    Type 2 diabetes mellitus with nephropathy ([address] 01/02/2018    Recurrent depression ([address] 01/02/2018    Diverticulosis of intestine with bleeding 10/11/2015    Anemia 70/81/8209    Diastolic CHF, chronic ([address] 10/11/2015    GIB (gastrointestinal bleeding) 10/08/2015    Osteoporosis 09/10/2013    Essential hypertension, benign 05/17/2012    Mixed hyperlipidemia 05/17/2012    Postsurgical percutaneous transluminal coronary angioplasty status 05/17/2012    S/P CABG x 3 11/11/2011    CAD (coronary artery disease) 11/18/2009    Hypothyroidism 11/18/2009    PVD (peripheral vascular disease) ([address] 11/18/2009     Current Outpatient Medications   Medication Sig Dispense Refill    albuterol (PROVENTIL HFA, VENTOLIN HFA, PROAIR HFA) 90 mcg/actuation inhaler Take 1 Puff by inhalation every six (6) hours as needed for Wheezing.  1 Inhaler 0    FreeStyle Ruebn 14 Day Sensor kit USE TO CHECK BLOOD GLUCOSE TWICE DAILY 1 Kit 1    insulin NPH/insulin regular (HumuLIN 70/30 U-100 Insulin) 100 unit/mL (70-30) injection INJECT 24 UNITS SUBCUTANEOUSLY TWICE DAILY 10 mL 0    ezetimibe (ZETIA) 10 mg tablet Take 1 tablet by mouth once daily 30 Tab 0    gabapentin (NEURONTIN) 100 mg capsule Take 2 capsules by mouth twice daily 120 Cap 0    Euthyrox 100 mcg tablet TAKE 1 TABLET BY MOUTH ONCE DAILY BEFORE BREAKFAST 90 Tab 0    metoprolol tartrate (LOPRESSOR) 50 mg tablet Take 1 tablet by mouth twice daily 180 Tab 0    amLODIPine (NORVASC) 5 mg tablet Take 1 tablet  by mouth once daily 90 Tab 1    omeprazole (PRILOSEC) 20 mg capsule Take 1 capsule by mouth once daily 90 Cap 1    atorvastatin (LIPITOR) 80 mg tablet TAKE ONE TABLET BY MOUTH NIGHTLY 90 Tab 1    metFORMIN (GLUCOPHAGE) 1,000 mg tablet TAKE 1 TABLET BY MOUTH TWICE DAILY WITH MEALS 180 Tab 0    LORazepam (ATIVAN) 0.5 mg tablet Take 1 Tab by mouth nightly as needed for Anxiety. Max Daily Amount: 0.5 mg. 30 Tab 2    cholecalciferol (VITAMIN D3) 1,000 unit cap Take 1,000 Units by mouth daily.  aspirin 81 mg chewable tablet Take 1 Tab by mouth daily. 90 Tab 3    cyanocobalamin (VITAMIN B-12) 1,000 mcg tablet Take 1,000 mcg by mouth daily.        Past Surgical History:   Procedure Laterality Date    COLONOSCOPY N/A 10/31/2018    COLONOSCOPY performed by Marianne Mirza MD at \[facility]"" ENDOSCOPY    HX CORONARY ARTERY BYPASS GRAFT  11/11/11    3 vessel    HX GYN      hysterectomy    HX HEART CATHETERIZATION      HX UROLOGICAL  1970s    kidney stone    [place] CARDIAC SURG PROCEDURE UNLIST      cabg x 3    VASCULAR SURGERY PROCEDURE UNLIST      Stents put in right leg      Lab Results   Component Value Date/Time    WBC 9.2 07/27/2020 10:03 AM    HGB 13.3 07/27/2020 10:03 AM    Hemoglobin (POC) 12.9 03/02/2011 12:21 PM    HCT 41.5 07/27/2020 10:03 AM    PLATELET 792 98/06/4145 10:03 AM MCV 84 07/27/2020 10:03 AM     Lab Results   Component Value Date/Time    Cholesterol, total 139 07/27/2020 10:03 AM    HDL Cholesterol 52 07/27/2020 10:03 AM    LDL, calculated 68 07/27/2020 10:03 AM    Triglyceride 94 07/27/2020 10:03 AM    CHOL/HDL Ratio 3.2 09/14/2010 08:42 AM     Lab Results   Component Value Date/Time    GFR est non-AA 50 (L) 01/26/2021 09:55 AM    GFR est AA 58 (L) 01/26/2021 09:55 AM    Creatinine 1.06 (H) 01/26/2021 09:55 AM    BUN 15 01/26/2021 09:55 AM    Sodium 146 (H) 01/26/2021 09:55 AM    Potassium 4.2 01/26/2021 09:55 AM    Chloride 104 01/26/2021 09:55 AM    CO2 27 01/26/2021 09:55 AM    Magnesium 1.7 10/16/2015 04:34 AM        Review of Systems   Respiratory: Negative for shortness of breath. Cardiovascular: Negative for chest pain. Physical Exam  Constitutional:       General: She is not in acute distress. Appearance: Normal appearance. She is not ill-appearing, toxic-appearing or diaphoretic. HENT:      Head: Normocephalic and atraumatic. Right Ear: External ear normal.      Left Ear: External ear normal.   Eyes:      General:         Right eye: No discharge. Left eye: No discharge. Conjunctiva/sclera: Conjunctivae normal.      Pupils: Pupils are equal, round, and reactive to light. Neck:      Musculoskeletal: Normal range of motion and neck supple. Cardiovascular:      Rate and Rhythm: Normal rate and regular rhythm. Pulses: Normal pulses. Heart sounds: Normal heart sounds. No murmur. No friction rub. No gallop. Pulmonary:      Effort: No respiratory distress. Breath sounds: Normal breath sounds. No wheezing or rales. Chest:      Chest wall: No tenderness. Abdominal:      General: Abdomen is flat. There is no distension. Palpations: Abdomen is soft. There is no mass. Tenderness: There is no abdominal tenderness. There is no guarding or rebound. Hernia: No hernia is present.    Musculoskeletal: Normal range of motion. Right lower leg: No edema. Left lower leg: No edema. Skin:     General: Skin is warm and dry. Neurological:      Mental Status: She is alert and oriented to person, place, and time. Mental status is at baseline. Coordination: Coordination normal.      Gait: Gait normal.   Psychiatric:         Mood and Affect: Mood normal.         Behavior: Behavior normal.         ASSESSMENT and PLAN    ICD-10-CM ICD-9-CM    1. Medicare annual wellness visit, subsequent  M48.84 V49.4 METABOLIC PANEL, COMPREHENSIVE      HEMOGLOBIN A1C WITH EAG      TSH 3RD GENERATION      LIPID PANEL   2. Type 2 diabetes mellitus with diabetic neuropathy, with long-term current use of insulin (HCC)       A1c has progressively climbed    However as I have seen in the past her blood sugars have actually been quite good for the last 2 weeks    Her diet fluctuates dramatically unfortunately resulting in significant glucose elevations with her poor diet and generally just prior to her office visits with me her blood sugar improves due to improvement of her diet    Due to these lower readings in the last few weeks I will not increase her insulin    Enforced the need to be consistent with her diet    Continue 70/30 insulin mix 22 units in the morning 20 units in the evening    Discussed that if she maintains a lower glucose diet we could decrease the insulin by 2 units each dose if the sugars are dropping    But if she started eating higher carbohydrate foods she would need to increase it by 2 units wrote this down for her she expressed understanding to plan             Z60.21 238.24 METABOLIC PANEL, COMPREHENSIVE    Z79.4 357.2 HEMOGLOBIN A1C WITH EAG     V58.67 TSH 3RD GENERATION      LIPID PANEL   3.  Type 2 diabetes mellitus with nephropathy (HCC)  J99.85 853.32 METABOLIC PANEL, COMPREHENSIVE     583.81 HEMOGLOBIN A1C WITH EAG   See above monitoring renal function   TSH 3RD GENERATION      LIPID PANEL   4. Essential hypertension, benign  T94 394.0 METABOLIC PANEL, COMPREHENSIVE      HEMOGLOBIN A1C WITH EAG   Controlled metoprolol and Norvasc initially elevated repeat improved   TSH 3RD GENERATION      LIPID PANEL   5. Diastolic CHF, chronic ([facility])  Y58.16 010.73 METABOLIC PANEL, COMPREHENSIVE     428.0 HEMOGLOBIN A1C WITH EAG   Medically managed had recent echo will be getting stress test up-to-date with cardiology   TSH 3RD GENERATION      LIPID PANEL   6. PVD (peripheral vascular disease) ([facility])  O69.3 996.6 METABOLIC PANEL, COMPREHENSIVE      HEMOGLOBIN A1C WITH EAG   Stable no active signs or symptoms follows with vascular annually last saw him in July   TSH 3RD GENERATION      LIPID PANEL   7. Recurrent depression ([facility])  F71.1 609.04 METABOLIC PANEL, COMPREHENSIVE      HEMOGLOBIN A1C WITH EAG   Now doing well without medication stop the Lexapro   TSH 3RD GENERATION      LIPID PANEL   8. Coronary artery disease involving native coronary artery of native heart without angina pectoris  B17.19 437.66 METABOLIC PANEL, COMPREHENSIVE      HEMOGLOBIN A1C WITH EAG   Getting a stress test for dyspnea on exertion   TSH 3RD GENERATION      LIPID PANEL   9. Hypothyroidism, unspecified type  I09.7 872.9 METABOLIC PANEL, COMPREHENSIVE      HEMOGLOBIN A1C WITH EAG   Controlled on Synthroid 100 mcg daily   TSH 3RD GENERATION      LIPID PANEL   10. Age-related osteoporosis without current pathological fracture  B72.3 321.22 METABOLIC PANEL, COMPREHENSIVE      HEMOGLOBIN A1C WITH EAG   Previously on Fosamax last bone density was improved osteopenia on vitamin D for treatment   TSH 3RD GENERATION      LIPID PANEL   11. Mixed hyperlipidemia  O61.8 816.0 METABOLIC PANEL, COMPREHENSIVE   Controlled on Lipitor and Zetia   HEMOGLOBIN A1C WITH EAG      TSH 3RD GENERATION      LIPID PANEL   12.  Snoring  D54.22 243.13 METABOLIC PANEL, COMPREHENSIVE      HEMOGLOBIN A1C WITH EAG   Sleep study completed awaiting results   TSH 3RD GENERATION      LIPID PANEL   13. Neuropathy     Controlled with gabapentin in the evening G62.9 355.9    14. Encounter for screening mammogram for malignant neoplasm of breast  Z12.31 V76.12 FELICIANO MAMMO BI SCREENING INCL CAD           Scribed by Emmanuel Chandler of Pati Blanco, as dictated by Dr. Inocente Humphrey. Current diagnosis and concerns discussed with pt at length. Pt understands risks and benefits or current treatment plan and medications, and accepts the treatment and medication with any possible risks. Pt asks appropriate questions, which were answered. Pt was instructed to call with any concerns or problems. I have reviewed the note documented by the scribe. The services provided are my own.   The documentation is accurate

## 2021-02-03 ENCOUNTER — OFFICE VISIT (OUTPATIENT)
Dept: INTERNAL MEDICINE CLINIC | Age: 81
End: 2021-02-03
Payer: MEDICARE

## 2021-02-03 VITALS
BODY MASS INDEX: 34.3 KG/M2 | HEART RATE: 79 BPM | SYSTOLIC BLOOD PRESSURE: 136 MMHG | WEIGHT: 186.4 LBS | OXYGEN SATURATION: 97 % | TEMPERATURE: 97.8 F | RESPIRATION RATE: 16 BRPM | DIASTOLIC BLOOD PRESSURE: 86 MMHG | HEIGHT: 62 IN

## 2021-02-03 DIAGNOSIS — I73.9 PVD (PERIPHERAL VASCULAR DISEASE) (HCC): ICD-10-CM

## 2021-02-03 DIAGNOSIS — M81.0 AGE-RELATED OSTEOPOROSIS WITHOUT CURRENT PATHOLOGICAL FRACTURE: ICD-10-CM

## 2021-02-03 DIAGNOSIS — I10 ESSENTIAL HYPERTENSION, BENIGN: ICD-10-CM

## 2021-02-03 DIAGNOSIS — I50.32 DIASTOLIC CHF, CHRONIC (HCC): ICD-10-CM

## 2021-02-03 DIAGNOSIS — Z12.31 ENCOUNTER FOR SCREENING MAMMOGRAM FOR MALIGNANT NEOPLASM OF BREAST: ICD-10-CM

## 2021-02-03 DIAGNOSIS — E11.21 TYPE 2 DIABETES MELLITUS WITH NEPHROPATHY (HCC): ICD-10-CM

## 2021-02-03 DIAGNOSIS — Z00.00 MEDICARE ANNUAL WELLNESS VISIT, SUBSEQUENT: Primary | ICD-10-CM

## 2021-02-03 DIAGNOSIS — E78.2 MIXED HYPERLIPIDEMIA: ICD-10-CM

## 2021-02-03 DIAGNOSIS — E11.40 TYPE 2 DIABETES MELLITUS WITH DIABETIC NEUROPATHY, WITH LONG-TERM CURRENT USE OF INSULIN (HCC): ICD-10-CM

## 2021-02-03 DIAGNOSIS — G62.9 NEUROPATHY: ICD-10-CM

## 2021-02-03 DIAGNOSIS — R06.83 SNORING: ICD-10-CM

## 2021-02-03 DIAGNOSIS — Z79.4 TYPE 2 DIABETES MELLITUS WITH DIABETIC NEUROPATHY, WITH LONG-TERM CURRENT USE OF INSULIN (HCC): ICD-10-CM

## 2021-02-03 DIAGNOSIS — E03.9 HYPOTHYROIDISM, UNSPECIFIED TYPE: ICD-10-CM

## 2021-02-03 DIAGNOSIS — F33.9 RECURRENT DEPRESSION (HCC): ICD-10-CM

## 2021-02-03 DIAGNOSIS — I25.10 CORONARY ARTERY DISEASE INVOLVING NATIVE CORONARY ARTERY OF NATIVE HEART WITHOUT ANGINA PECTORIS: ICD-10-CM

## 2021-02-03 PROCEDURE — 3052F HG A1C>EQUAL 8.0%<EQUAL 9.0%: CPT | Performed by: INTERNAL MEDICINE

## 2021-02-03 PROCEDURE — G8754 DIAS BP LESS 90: HCPCS | Performed by: INTERNAL MEDICINE

## 2021-02-03 PROCEDURE — G8417 CALC BMI ABV UP PARAM F/U: HCPCS | Performed by: INTERNAL MEDICINE

## 2021-02-03 PROCEDURE — G8752 SYS BP LESS 140: HCPCS | Performed by: INTERNAL MEDICINE

## 2021-02-03 PROCEDURE — G0439 PPPS, SUBSEQ VISIT: HCPCS | Performed by: INTERNAL MEDICINE

## 2021-02-03 PROCEDURE — G8536 NO DOC ELDER MAL SCRN: HCPCS | Performed by: INTERNAL MEDICINE

## 2021-02-03 PROCEDURE — G9717 DOC PT DX DEP/BP F/U NT REQ: HCPCS | Performed by: INTERNAL MEDICINE

## 2021-02-03 PROCEDURE — 1101F PT FALLS ASSESS-DOCD LE1/YR: CPT | Performed by: INTERNAL MEDICINE

## 2021-02-03 PROCEDURE — 1090F PRES/ABSN URINE INCON ASSESS: CPT | Performed by: INTERNAL MEDICINE

## 2021-02-03 PROCEDURE — G8427 DOCREV CUR MEDS BY ELIG CLIN: HCPCS | Performed by: INTERNAL MEDICINE

## 2021-02-03 PROCEDURE — 99214 OFFICE O/P EST MOD 30 MIN: CPT | Performed by: INTERNAL MEDICINE

## 2021-02-03 RX ORDER — ALBUTEROL SULFATE 90 UG/1
1 AEROSOL, METERED RESPIRATORY (INHALATION)
Qty: 1 INHALER | Refills: 0 | Status: SHIPPED | OUTPATIENT
Start: 2021-02-03

## 2021-02-03 NOTE — PROGRESS NOTES
This is the Subsequent Medicare Annual Wellness Exam, performed 12 months or more after the Initial AWV or the last Subsequent AWV    I have reviewed the patient's medical history in detail and updated the computerized patient record. Depression Risk Factor Screening:     3 most recent PHQ Screens 2/3/2021   Little interest or pleasure in doing things Not at all   Feeling down, depressed, irritable, or hopeless Not at all   Total Score PHQ 2 0       Alcohol Risk Screen    Do you average more than 1 drink per night or more than 7 drinks a week:  No    On any one occasion in the past three months have you have had more than 3 drinks containing alcohol:  No        Functional Ability and Level of Safety:    Hearing: Hearing is good. Activities of Daily Living: The home contains: handrails and grab bars  Patient does total self care      Ambulation: with mild difficulty     Fall Risk:  Fall Risk Assessment, last 12 mths 2/3/2021   Able to walk? Yes   Fall in past 12 months? 0   Do you feel unsteady? 0   Are you worried about falling -   Number of falls in past 12 months -   Fall with injury? -      Abuse Screen:  Patient is not abused     Live w daughter  Cognitive Screening    Has your family/caregiver stated any concerns about your memory: no     Cognitive Screening: Normal - Mini Cog Test      No memory concerns   Pt knows the month, day and year   Can recall 3/3 objects   Assessment/Plan   Education and counseling provided:  Are appropriate based on today's review and evaluation  Influenza Vaccine  Screening Mammography  Cardiovascular screening blood test  Screening for glaucoma  Diabetes screening test    Diagnoses and all orders for this visit:    1.  Medicare annual wellness visit, subsequent        Health Maintenance Due     Health Maintenance Due   Topic Date Due    COVID-19 Vaccine (1 of 2) 02/11/1956    Shingrix Vaccine Age 50> (2 of 2) 06/21/2019    GLAUCOMA SCREENING Q2Y  09/12/2020    Eye Exam Retinal or Dilated  09/12/2020       Patient Care Team   Patient Care Team:  Kathrine Mccoy MD as PCP - General (Internal Medicine)  Kathrine Mccoy MD as PCP - [facility]  Sheila Brown O.D. (Optometry)  Shorty Raymond MD as Physician (Cardiology)  Bernie Mendes MD (Gastroenterology)  Delia Metcalf MD (Obstetrics & Gynecology)  Kobi Hanna MD (Vascular Surgery)  Kedar Acuna MD (Hand Surgery)     updated    History     Patient Active Problem List   Diagnosis Code    CAD (coronary artery disease) I25.10    Hypothyroidism E03.9    PVD (peripheral vascular disease) ([address] I73.9    S/P CABG x 3 Z95.1    Essential hypertension, benign I10    Mixed hyperlipidemia E78.2    Postsurgical percutaneous transluminal coronary angioplasty status Z98.61    Osteoporosis M81.0    GIB (gastrointestinal bleeding) K92.2    Diverticulosis of intestine with bleeding K57.91    Anemia A78.2    Diastolic CHF, chronic (HCC) I50.32    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    Recurrent depression ([address] F33.9    Type 2 diabetes mellitus with diabetic neuropathy ([address] E11.40     Past Medical History:   Diagnosis Date    Anxiety     CAD (coronary artery disease)     cabg     Diabetes ([address]     Diverticulosis     GERD (gastroesophageal reflux disease)     Heart disease     Heart failure ([address] 10/18/2011    High cholesterol     Hypertension     Hypothyroidism     Kidney stones     Postsurgical percutaneous transluminal coronary angioplasty status 5/17/2012    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    [facility]    Sleeping difficulty     Teeth decayed     Weakness       Past Surgical History:   Procedure Laterality Date    COLONOSCOPY N/A 10/31/2018    [facility] ENDOSCOPY    HX CORONARY ARTERY BYPASS GRAFT  11/11/11    3 vessel    HX GYN      hysterectomy    HX HEART CATHETERIZATION      HX UROLOGICAL 1970s    kidney stone    [place] CARDIAC SURG PROCEDURE UNLIST      cabg x 3    VASCULAR SURGERY PROCEDURE UNLIST      Stents put in right leg     Current Outpatient Medications   Medication Sig Dispense Refill    FreeStyle Ruben 14 Day Sensor kit USE TO CHECK BLOOD GLUCOSE TWICE DAILY 1 Kit 1    insulin NPH/insulin regular (HumuLIN 70/30 U-100 Insulin) 100 unit/mL (70-30) injection INJECT 24 UNITS SUBCUTANEOUSLY TWICE DAILY 10 mL 0    ezetimibe (ZETIA) 10 mg tablet Take 1 tablet by mouth once daily 30 Tab 0    gabapentin (NEURONTIN) 100 mg capsule Take 2 capsules by mouth twice daily 120 Cap 0    Euthyrox 100 mcg tablet TAKE 1 TABLET BY MOUTH ONCE DAILY BEFORE BREAKFAST 90 Tab 0    metoprolol tartrate (LOPRESSOR) 50 mg tablet Take 1 tablet by mouth twice daily 180 Tab 0    amLODIPine (NORVASC) 5 mg tablet Take 1 tablet  by mouth once daily 90 Tab 1    omeprazole (PRILOSEC) 20 mg capsule Take 1 capsule by mouth once daily 90 Cap 1    atorvastatin (LIPITOR) 80 mg tablet TAKE ONE TABLET BY MOUTH NIGHTLY 90 Tab 1    metFORMIN (GLUCOPHAGE) 1,000 mg tablet TAKE 1 TABLET BY MOUTH TWICE DAILY WITH MEALS 180 Tab 0    LORazepam (ATIVAN) 0.5 mg tablet Take 1 Tab by mouth nightly as needed for Anxiety. Max Daily Amount: 0.5 mg. 30 Tab 2    cholecalciferol (VITAMIN D3) 1,000 unit cap Take 1,000 Units by mouth daily.  aspirin 81 mg chewable tablet Take 1 Tab by mouth daily. 90 Tab 3    cyanocobalamin (VITAMIN B-12) 1,000 mcg tablet Take 1,000 mcg by mouth daily.        Allergies   Allergen Reactions    Ace Inhibitors Swelling    Arb-Angiotensin Receptor Antagonist Swelling    Lisinopril Swelling    Plavix [Clopidogrel] Other (comments)     Excessive bleeding    Potassium Nausea and Vomiting     Potassium containing oral products are not well tolerated by patient       Family History   Problem Relation Age of Onset    Diabetes Mother     Heart Disease Mother     Diabetes Brother     Heart Disease Brother  Mental Retardation Brother     Hypertension Brother     Other Father     Cancer Sister     Diabetes Brother     Heart Disease Brother     Diabetes Brother     Breast Cancer Daughter         52's     Social History     Tobacco Use    Smoking status: Former Smoker     Packs/day: 0.50     Years: 40.00     Pack years: 20.00     Quit date: 9/20/2010     Years since quitting: 10.3    Smokeless tobacco: Never Used   Substance Use Topics    Alcohol use: No    ACP on file. SDMs is  daughter Bill Panchal) and Loretto Najjar Chew      Colonoscopy: Dr. Jorden Mohs 5 years  Pap: 12/14, declines further   Mammogram:  3/20, due 3/21 ordered  DEXA: 3/20, osteopenic, stopped fosamax,  q2 years    Tdap: 1/04/2016  Pneumovax: 01/29/20   [id number]: 9/17  Zostavax: 10/10/2016  Shingrix: completed both  Flu shot: 10/20    Lipids: 7/20 LDL 68 annual   A1c:  1/21 8.3 q3 mon      [facility]: [facility], 4/20? Annual       Medication reconciliation completed by MA and reviewed by me. Medical/surgical/social/family history reviewed and updated by me. Patient provided AVS and preventative screening table. Patient verbalized understanding of all information discussed.

## 2021-02-03 NOTE — PATIENT INSTRUCTIONS
Personalized Recommendations for Holly Raymond    Here is a list of your current Health Maintenance items that are due (your personalized list of preventive services)     Health Maintenance Due   Topic Date Due    COVID-19 Vaccine (1 of 2) 02/11/1956    Shingles Vaccine (2 of 2) 06/21/2019    Glaucoma Screening   09/12/2020    Eye Exam  09/12/2020         Keep a list of your medications (prescribed and over the counter) and bring it to every appointment. Taking your medications as prescribed is important for your health and safety. Use this sample medication list to create your own. Update the list whenever changes are made. Medication Dosage Frequency Indication   Example: Aspirin 81 mg Once daily in the morning Heart Health                                                     How to stay healthy between visits  The best way to live healthy is to have a healthy lifestyle by eating a well-balanced diet, exercising regularly, limiting alcohol and stopping smoking if you are a smoker. Try to exercise at least 30 minutes a day, this is better than any prescription medicine to keep you healthy. Eat at least 5 servings of fruits and vegetables every daily and reduce red meats, processed meat (such as deli cold cuts), white breads and pastas. Limit or eliminate sweets and sugary drinks from your diet. Try to keep your weight healthy. Your body mass index (BMI) should be between 18 and 25. If it is between 22 and 30 you are overweight and if it is 30 or higher, that is called obesity. Being overweight or obese increases risk of high blood pressure, arthritis, sleep apnea, diabetes and many cancers. High blood pressure causes damage to your blood vessels, heart, kidneys and other organs if untreated. Your blood pressure should be under 140/90 with an ideal level of 120/80 or less to prevent heart disease, strokes and kidney disease. Wear your seat belt every time you are in a vehicle.      Use sunscreen or cover your skin when you are outdoors. 1 in 61 people will develop melanoma (skin cancer) which is mostly preventable. Smoking makes all health problems worse. If you smoke, talk to your provider about stop-smoking programs and medicines. See a dentist one or two times a year for checkups and to have your teeth cleaned. Annual eye exams are recommended to screen for glaucoma and cataracts. Immunizations - Additional Information  Everyone should have a yearly flu shot and a Tetanus, Diphtheria & Pertussis (TDaP) vaccine every 10 years. The shingles vaccine called Shingrix is recommended once in a lifetime after age 48. This is given as a 2-dose series and you can get it at your local pharmacy. Shingrix is now recommended instead of the older Zostavax as it is 90% effective compared to 50% for the Zostavax. You can get the Shingrix vaccine even if you had the Zostavax as long as one year has passed. All people over age 72 should have a pneumonia vaccine to prevent pneumonia. This is called Pneumovax-23 and is once in a lifetime vaccines except in special cases. Some people may benefit from a different vaccine called Prevnar-13 in addition to 1 [place]. Screening Tests - Additional Information  Screening for diabetes mellitus with a blood sugar test should be done annually if you have risk factors such as high blood pressure, high cholesterol, are overweight, have a family history of diabetes or you have had high blood sugars in the past, especially during pregnancy (gestational diabetes). Cholesterol tests check for elevated lipids (fatty part of blood) which can lead to heart disease and strokes are recommended every 5 years after age 39. If you have elevated cholesterol, diabetes or have had a heart attack or stroke you should have testing more frequently.     Screening for cervical cancer with a pap smear and pelvic exam is recommended for women every 3-5 years from age 24 until age 72 if previous Pap smears have been normal. If you have had abnormal pap smears or increased risk for cervical cancer then testing may be recommended more frequently. Breast cancer screening with a mammogram is recommended every 2 years for women age 54-69. More frequent screening has not been shown to reduce the risk of dying from breast cancer and increases the chance you might need a biopsy or get treatment for a breast cancer that would not have harmed you. If you are over 75, mammograms may do more harm than good and there have been no studies showing that screening in this age group reduces the chance of death. Screening between age 36 and 48 does reduce the risk for breast cancer death but is not as effective as it is over age 48 and can lead to more biopsies and false positive tests. Colon cancer screening that evaluates for blood or polyps in your colon can prevent colon cancer and should be done yearly as a stool test or every 10 years as a colonoscopy up to age 76. Screening can be done up to age 80 if you are still very healthy but has higher risks after age 76. Colonoscopies may be recommended more frequently if precancerous lesions were found. A bone density test to screen for osteoporosis (thin or brittle bones) should be done at age 72 and periodically after that depending on the results and your history. Medicare will cover this up to every 2 years. Abdominal Aortic Aneurysm (AAA) screening at 72 is recommended if you have a family history of AAA. Lung Cancer Screening with an annual low-dose CT scan is recommended if you are between age 54 and 68, smoked at least 30 pack years (the equivalent of 1 pack per day for 30 years), and are a current smoker or quit less than 15 years ago. Hepatitis C screening is recommended one time for everyone between 18-79. HIV and syphilis screening are recommended if you are risk.               Medicare Wellness Visit, Female     The best way to live healthy is to have a lifestyle where you eat a well-balanced diet, exercise regularly, limit alcohol use, and quit all forms of tobacco/nicotine, if applicable. Regular preventive services are another way to keep healthy. Preventive services (vaccines, screening tests, monitoring & exams) can help personalize your care plan, which helps you manage your own care. Screening tests can find health problems at the earliest stages, when they are easiest to treat. [facility] follows the current, evidence-based guidelines published by the [facility] Zafar Cortes ([facility]) when recommending preventive services for our patients. Because we follow these guidelines, sometimes recommendations change over time as research supports it. (For example, mammograms used to be recommended annually. Even though Medicare will still pay for an annual mammogram, the newer guidelines recommend a mammogram every two years for women of average risk). Of course, you and your doctor may decide to screen more often for some diseases, based on your risk and your co-morbidities (chronic disease you are already diagnosed with). Preventive services for you include:  - Medicare offers their members a free annual wellness visit, which is time for you and your primary care provider to discuss and plan for your preventive service needs. Take advantage of this benefit every year!  -All adults over the age of 72 should receive the recommended pneumonia vaccines. Current USPSTF guidelines recommend a series of two vaccines for the best pneumonia protection.   -All adults should have a flu vaccine yearly and a tetanus vaccine every 10 years.   -All adults age 48 and older should receive the shingles vaccines (series of two vaccines).       -All adults age 38-68 who are overweight should have a diabetes screening test once every three years.   -All adults born between 80 and 1965 should be screened once for Hepatitis C.  -Other screening tests and preventive services for persons with diabetes include: an eye exam to screen for diabetic retinopathy, a kidney function test, a foot exam, and stricter control over your cholesterol.   -Cardiovascular screening for adults with routine risk involves an electrocardiogram (ECG) at intervals determined by your doctor.   -Colorectal cancer screenings should be done for adults age 54-65 with no increased risk factors for colorectal cancer. There are a number of acceptable methods of screening for this type of cancer. Each test has its own benefits and drawbacks. Discuss with your doctor what is most appropriate for you during your annual wellness visit. The different tests include: colonoscopy (considered the best screening method), a fecal occult blood test, a fecal DNA test, and sigmoidoscopy.    -A bone mass density test is recommended when a woman turns 65 to screen for osteoporosis. This test is only recommended one time, as a screening. Some providers will use this same test as a disease monitoring tool if you already have osteoporosis. -Breast cancer screenings are recommended every other year for women of normal risk, age 54-69.  -Cervical cancer screenings for women over age 72 are only recommended with certain risk factors.      Here is a list of your current Health Maintenance items (your personalized list of preventive services) with a due date:  Health Maintenance Due   Topic Date Due    COVID-19 Vaccine (1 of 2) 02/11/1957    Shingles Vaccine (2 of 2) 06/21/2019    Glaucoma Screening   09/12/2020    Eye Exam  09/12/2020           Personalized Recommendations for [place] Poag    Here is a list of your current Health Maintenance items that are due (your personalized list of preventive services)     Health Maintenance Due   Topic Date Due    COVID-19 Vaccine (1 of 2) 02/11/1956    Shingles Vaccine (2 of 2) 06/21/2019    Glaucoma Screening 09/12/2020    Eye Exam  09/12/2020         Keep a list of your medications (prescribed and over the counter) and bring it to every appointment. Taking your medications as prescribed is important for your health and safety. Use this sample medication list to create your own. Update the list whenever changes are made. Medication Dosage Frequency Indication   Example: Aspirin 81 mg Once daily in the morning Heart Health                                                     How to stay healthy between visits  The best way to live healthy is to have a healthy lifestyle by eating a well-balanced diet, exercising regularly, limiting alcohol and stopping smoking if you are a smoker. Try to exercise at least 30 minutes a day, this is better than any prescription medicine to keep you healthy. Eat at least 5 servings of fruits and vegetables every daily and reduce red meats, processed meat (such as deli cold cuts), white breads and pastas. Limit or eliminate sweets and sugary drinks from your diet. Try to keep your weight healthy. Your body mass index (BMI) should be between 18 and 25. If it is between 22 and 30 you are overweight and if it is 30 or higher, that is called obesity. Being overweight or obese increases risk of high blood pressure, arthritis, sleep apnea, diabetes and many cancers. High blood pressure causes damage to your blood vessels, heart, kidneys and other organs if untreated. Your blood pressure should be under 140/90 with an ideal level of 120/80 or less to prevent heart disease, strokes and kidney disease. Wear your seat belt every time you are in a vehicle. Use sunscreen or cover your skin when you are outdoors. 1 in 61 people will develop melanoma (skin cancer) which is mostly preventable. Smoking makes all health problems worse. If you smoke, talk to your provider about stop-smoking programs and medicines.     See a dentist one or two times a year for checkups and to have your teeth cleaned. Annual eye exams are recommended to screen for glaucoma and cataracts. Immunizations - Additional Information  Everyone should have a yearly flu shot and a Tetanus, Diphtheria & Pertussis (TDaP) vaccine every 10 years. The shingles vaccine called Shingrix is recommended once in a lifetime after age 48. This is given as a 2-dose series and you can get it at your local pharmacy. Shingrix is now recommended instead of the older Zostavax as it is 90% effective compared to 50% for the Zostavax. You can get the Shingrix vaccine even if you had the Zostavax as long as one year has passed. All people over age 72 should have a pneumonia vaccine to prevent pneumonia. This is called Pneumovax-23 and is once in a lifetime vaccines except in special cases. Some people may benefit from a different vaccine called Prevnar-13 in addition to 1 [place]. Screening Tests - Additional Information  Screening for diabetes mellitus with a blood sugar test should be done annually if you have risk factors such as high blood pressure, high cholesterol, are overweight, have a family history of diabetes or you have had high blood sugars in the past, especially during pregnancy (gestational diabetes). Cholesterol tests check for elevated lipids (fatty part of blood) which can lead to heart disease and strokes are recommended every 5 years after age 39. If you have elevated cholesterol, diabetes or have had a heart attack or stroke you should have testing more frequently. Screening for cervical cancer with a pap smear and pelvic exam is recommended for women every 3-5 years from age 24 until age 72 if previous Pap smears have been normal. If you have had abnormal pap smears or increased risk for cervical cancer then testing may be recommended more frequently. Breast cancer screening with a mammogram is recommended every 2 years for women age 54-69.  More frequent screening has not been shown to reduce the risk of dying from breast cancer and increases the chance you might need a biopsy or get treatment for a breast cancer that would not have harmed you. If you are over 75, mammograms may do more harm than good and there have been no studies showing that screening in this age group reduces the chance of death. Screening between age 36 and 48 does reduce the risk for breast cancer death but is not as effective as it is over age 48 and can lead to more biopsies and false positive tests. Colon cancer screening that evaluates for blood or polyps in your colon can prevent colon cancer and should be done yearly as a stool test or every 10 years as a colonoscopy up to age 76. Screening can be done up to age 80 if you are still very healthy but has higher risks after age 76. Colonoscopies may be recommended more frequently if precancerous lesions were found. A bone density test to screen for osteoporosis (thin or brittle bones) should be done at age 72 and periodically after that depending on the results and your history. Medicare will cover this up to every 2 years. Abdominal Aortic Aneurysm (AAA) screening at 72 is recommended if you have a family history of AAA. Lung Cancer Screening with an annual low-dose CT scan is recommended if you are between age 54 and 68, smoked at least 30 pack years (the equivalent of 1 pack per day for 30 years), and are a current smoker or quit less than 15 years ago. Hepatitis C screening is recommended one time for everyone between 18-79. HIV and syphilis screening are recommended if you are risk. Medicare Wellness Visit, Female     The best way to live healthy is to have a lifestyle where you eat a well-balanced diet, exercise regularly, limit alcohol use, and quit all forms of tobacco/nicotine, if applicable. Regular preventive services are another way to keep healthy.  Preventive services (vaccines, screening tests, monitoring & exams) can help personalize your care plan, which helps you manage your own care. Screening tests can find health problems at the earliest stages, when they are easiest to treat. [facility] follows the current, evidence-based guidelines published by the [facility] Zafar Cortes ([facility]) when recommending preventive services for our patients. Because we follow these guidelines, sometimes recommendations change over time as research supports it. (For example, mammograms used to be recommended annually. Even though Medicare will still pay for an annual mammogram, the newer guidelines recommend a mammogram every two years for women of average risk). Of course, you and your doctor may decide to screen more often for some diseases, based on your risk and your co-morbidities (chronic disease you are already diagnosed with). Preventive services for you include:  - Medicare offers their members a free annual wellness visit, which is time for you and your primary care provider to discuss and plan for your preventive service needs. Take advantage of this benefit every year!  -All adults over the age of 72 should receive the recommended pneumonia vaccines. Current USPSTF guidelines recommend a series of two vaccines for the best pneumonia protection.   -All adults should have a flu vaccine yearly and a tetanus vaccine every 10 years.   -All adults age 48 and older should receive the shingles vaccines (series of two vaccines).       -All adults age 38-68 who are overweight should have a diabetes screening test once every three years.   -All adults born between 80 and 1965 should be screened once for Hepatitis C.  -Other screening tests and preventive services for persons with diabetes include: an eye exam to screen for diabetic retinopathy, a kidney function test, a foot exam, and stricter control over your cholesterol.   -Cardiovascular screening for adults with routine risk involves an electrocardiogram (ECG) at intervals determined by your doctor.   -Colorectal cancer screenings should be done for adults age 54-65 with no increased risk factors for colorectal cancer. There are a number of acceptable methods of screening for this type of cancer. Each test has its own benefits and drawbacks. Discuss with your doctor what is most appropriate for you during your annual wellness visit. The different tests include: colonoscopy (considered the best screening method), a fecal occult blood test, a fecal DNA test, and sigmoidoscopy.    -A bone mass density test is recommended when a woman turns 65 to screen for osteoporosis. This test is only recommended one time, as a screening. Some providers will use this same test as a disease monitoring tool if you already have osteoporosis. -Breast cancer screenings are recommended every other year for women of normal risk, age 54-69.  -Cervical cancer screenings for women over age 72 are only recommended with certain risk factors.      Here is a list of your current Health Maintenance items (your personalized list of preventive services) with a due date:  Health Maintenance Due   Topic Date Due    COVID-19 Vaccine (1 of 2) 02/11/1957    Shingles Vaccine (2 of 2) 06/21/2019    Glaucoma Screening   09/12/2020    Eye Exam  09/12/2020           IF BLOOD SUGARS ARE LOW DUE TO DIET IMPROVEMENT DECREASE INSUIN BY 2 UNITS      IF POOR DIET FULL OF SUGAR AND READINGS ARE HIGH INCREASE BY 2 UNITS

## 2021-02-04 ENCOUNTER — ANCILLARY PROCEDURE (OUTPATIENT)
Dept: CARDIOLOGY CLINIC | Age: 81
End: 2021-02-04
Payer: MEDICARE

## 2021-02-04 VITALS
BODY MASS INDEX: 34.41 KG/M2 | HEIGHT: 62 IN | DIASTOLIC BLOOD PRESSURE: 92 MMHG | WEIGHT: 187 LBS | SYSTOLIC BLOOD PRESSURE: 158 MMHG

## 2021-02-04 LAB
STRESS BASELINE DIAS BP: 92 MMHG
STRESS BASELINE HR: 78 BPM
STRESS BASELINE SYS BP: 158 MMHG
STRESS PEAK DIAS BP: 92 MMHG
STRESS PEAK SYS BP: 174 MMHG
STRESS PERCENT HR ACHIEVED: 68 %
STRESS POST PEAK HR: 96 BPM
STRESS RATE PRESSURE PRODUCT: NORMAL BPM*MMHG
STRESS ST DEPRESSION: 0 MM
STRESS ST ELEVATION: 0 MM
STRESS TARGET HR: 141 BPM

## 2021-02-04 PROCEDURE — 78452 HT MUSCLE IMAGE SPECT MULT: CPT | Performed by: INTERNAL MEDICINE

## 2021-02-04 PROCEDURE — 93015 CV STRESS TEST SUPVJ I&R: CPT | Performed by: INTERNAL MEDICINE

## 2021-02-04 PROCEDURE — A9502 TC99M TETROFOSMIN: HCPCS | Performed by: INTERNAL MEDICINE

## 2021-02-05 ENCOUNTER — TELEPHONE (OUTPATIENT)
Dept: SLEEP MEDICINE | Age: 81
End: 2021-02-05

## 2021-02-05 ENCOUNTER — TELEPHONE (OUTPATIENT)
Dept: CARDIOLOGY CLINIC | Age: 81
End: 2021-02-05

## 2021-02-05 DIAGNOSIS — Z11.59 SPECIAL SCREENING EXAMINATION FOR UNSPECIFIED VIRAL DISEASE: ICD-10-CM

## 2021-02-05 DIAGNOSIS — G47.33 OBSTRUCTIVE SLEEP APNEA (ADULT) (PEDIATRIC): Primary | ICD-10-CM

## 2021-02-05 NOTE — TELEPHONE ENCOUNTER
Results of sleep study in [facility]  Lead tech to convey results to patient  HSAT results in [facility]. Test positive for signficant sleep apnea. AHI 6.4/hour and lowest oxygen saturation was 74%. We had discussed treatment options at initial consultation. Based on these results, PAP would be beneficial. I recommend that she return to the sleep center for an in lab PAP titration where will carefully find the optimal pressure to control the apnea comfortably. Mask-fitting and PAP education will be done at that time as well. Once settings determined based on the PAP titration, I will order a PAP device for her home use and will see her in follow-up after she has used the PAP for 6 weeks or so.      Order attached  Staff to kindly schedule

## 2021-02-05 NOTE — TELEPHONE ENCOUNTER
----- Message from 0[address] Cliff Newton MD sent at 2/4/2021  9:59 PM EST -----  Stress test shows no new blockages. Similar to 1/19.  F/u if symptoms persist. thx.

## 2021-02-09 ENCOUNTER — DOCUMENTATION ONLY (OUTPATIENT)
Dept: SLEEP MEDICINE | Age: 81
End: 2021-02-09

## 2021-02-09 NOTE — PROGRESS NOTES
This note is being routed to Dr. Frankie Malhotra. Sleep Medicine consult note and sleep study report in patient's chart for review.     Thank you for the referral.

## 2021-02-14 DIAGNOSIS — E11.21 TYPE II DIABETES MELLITUS WITH NEPHROPATHY (HCC): ICD-10-CM

## 2021-02-15 RX ORDER — GABAPENTIN 100 MG/1
CAPSULE ORAL
Qty: 120 CAP | Refills: 0 | Status: SHIPPED | OUTPATIENT
Start: 2021-02-15 | End: 2021-04-15

## 2021-02-17 RX ORDER — EZETIMIBE 10 MG/1
TABLET ORAL
Qty: 30 TAB | Refills: 0 | Status: SHIPPED | OUTPATIENT
Start: 2021-02-17 | End: 2021-03-25

## 2021-02-28 DIAGNOSIS — E11.21 TYPE 2 DIABETES MELLITUS WITH NEPHROPATHY (HCC): ICD-10-CM

## 2021-03-01 RX ORDER — METOPROLOL TARTRATE 50 MG/1
TABLET ORAL
Qty: 180 TAB | Refills: 0 | Status: SHIPPED | OUTPATIENT
Start: 2021-03-01 | End: 2021-05-30

## 2021-03-01 RX ORDER — FLASH GLUCOSE SENSOR
KIT MISCELLANEOUS
Qty: 1 KIT | Refills: 0 | Status: SHIPPED | OUTPATIENT
Start: 2021-03-01 | End: 2021-03-16

## 2021-03-10 ENCOUNTER — OFFICE VISIT (OUTPATIENT)
Dept: SLEEP MEDICINE | Age: 81
End: 2021-03-10

## 2021-03-10 DIAGNOSIS — Z11.59 SPECIAL SCREENING EXAMINATION FOR UNSPECIFIED VIRAL DISEASE: Primary | ICD-10-CM

## 2021-03-10 RX ORDER — LEVOTHYROXINE SODIUM 100 UG/1
TABLET ORAL
Qty: 90 TAB | Refills: 0 | Status: SHIPPED | OUTPATIENT
Start: 2021-03-10 | End: 2021-03-12

## 2021-03-12 LAB — SARS-COV-2, NAA: NOT DETECTED

## 2021-03-12 RX ORDER — LEVOTHYROXINE SODIUM 100 UG/1
TABLET ORAL
Qty: 90 TAB | Refills: 0 | Status: SHIPPED | OUTPATIENT
Start: 2021-03-12 | End: 2021-08-02

## 2021-03-15 ENCOUNTER — HOSPITAL ENCOUNTER (OUTPATIENT)
Dept: SLEEP MEDICINE | Age: 81
Discharge: HOME | End: 2021-03-15
Payer: MEDICARE

## 2021-03-15 VITALS
BODY MASS INDEX: 34.41 KG/M2 | HEIGHT: 62 IN | WEIGHT: 187 LBS | TEMPERATURE: 97.7 F | DIASTOLIC BLOOD PRESSURE: 85 MMHG | SYSTOLIC BLOOD PRESSURE: 155 MMHG | OXYGEN SATURATION: 97 % | HEART RATE: 88 BPM

## 2021-03-15 DIAGNOSIS — G47.33 OBSTRUCTIVE SLEEP APNEA (ADULT) (PEDIATRIC): ICD-10-CM

## 2021-03-15 PROCEDURE — 95811 POLYSOM 6/>YRS CPAP 4/> PARM: CPT | Performed by: INTERNAL MEDICINE

## 2021-03-16 ENCOUNTER — DOCUMENTATION ONLY (OUTPATIENT)
Dept: SLEEP MEDICINE | Age: 81
End: 2021-03-16

## 2021-03-16 DIAGNOSIS — E11.21 TYPE 2 DIABETES MELLITUS WITH NEPHROPATHY (HCC): ICD-10-CM

## 2021-03-16 DIAGNOSIS — E11.21 TYPE II DIABETES MELLITUS WITH NEPHROPATHY (HCC): ICD-10-CM

## 2021-03-16 RX ORDER — FLASH GLUCOSE SENSOR
KIT MISCELLANEOUS
Qty: 1 KIT | Refills: 0 | Status: SHIPPED | OUTPATIENT
Start: 2021-03-16 | End: 2021-03-18 | Stop reason: SDUPTHER

## 2021-03-16 NOTE — PROGRESS NOTES
[address] [place]. [place], [address]  Tel.  [phone]  Fax. [address]  [place], [address] [facility] [address] [facility]  Tel.  [phone]  Fax. [phone] [address] [facility] Reina Patel   Tel.  [phone]  Fax. [phone]     Sleep Study Technical Notes        PRE-Test:  Tyrell Cohen (: 1940) arrived in the lobby via cab. Patient was greeted, temperature checked (97.7 °) and screening questions asked. The patient was taken to the Sleep Center and taken directly to his/her room. BP (155/85) and SaO2 (97) were taken. Scale currently not available. Procedure explained to the patient and questions were answered. The patient expressed understanding of the procedure. Electrodes were applied without incident. The patient was placed in bed and the study was started. Acquisition Notes:   Lights off: 9:40 pm    Respiratory events: Hypopneas     ECG:  PVC's   PAP titration: CPAP & S-BiPAP    Other comments: n/a   Desensitization Mask(s) Used:ResMed AirFit F20 medium    POST Test:   Patient was awakened. Electrodes were removed. The patient was discharged after answering the Post Questionnaire. Patient went home via cab.  Equipment and room cleaned per infection control policy.

## 2021-03-18 NOTE — TELEPHONE ENCOUNTER
Future Appointments:  Future Appointments   Date Time Provider Gen Manc[place]isti   5/12/2021 11:15 AM Grant Walters MD [facility] BS AMB   7/23/2021 10:45 AM Jeison Agarwal MD [facility] BS AMB        Last Appointment With Me:  2/3/2021     Requested Prescriptions     Pending Prescriptions Disp Refills    flash glucose sensor (FreeStyle Ruben 14 Day Sensor) kit 2 Kit 3     Sig: USE   TO CHECK GLUCOSE TWICE DAILY     Signed Prescriptions Disp Refills    insulin NPH/insulin regular (HumuLIN 70/30 U-100 Insulin) 100 unit/mL (70-30) injection 10 mL 0     Sig: INJECT 22 qam, 20units qpm  SUBCUTANEOUSLY     Authorizing Provider: Nalini Irwin FreeStyle Ruben 14 Day Sensor kit 1 Kit 0     Sig: USE   TO CHECK GLUCOSE TWICE DAILY     Authorizing Provider: Eva Salinas

## 2021-03-19 RX ORDER — FLASH GLUCOSE SENSOR
KIT MISCELLANEOUS
Qty: 2 KIT | Refills: 3 | Status: SHIPPED | OUTPATIENT
Start: 2021-03-19 | End: 2021-03-29

## 2021-03-25 RX ORDER — EZETIMIBE 10 MG/1
TABLET ORAL
Qty: 30 TAB | Refills: 0 | Status: SHIPPED | OUTPATIENT
Start: 2021-03-25 | End: 2021-04-20

## 2021-03-26 ENCOUNTER — DOCUMENTATION ONLY (OUTPATIENT)
Dept: SLEEP MEDICINE | Age: 81
End: 2021-03-26

## 2021-03-26 ENCOUNTER — TELEPHONE (OUTPATIENT)
Dept: SLEEP MEDICINE | Age: 81
End: 2021-03-26

## 2021-03-26 DIAGNOSIS — G47.33 OBSTRUCTIVE SLEEP APNEA (ADULT) (PEDIATRIC): Primary | ICD-10-CM

## 2021-03-26 NOTE — TELEPHONE ENCOUNTER
Results of sleep study in [facility]  Lead tech to convey results to patient  I will order a PAP device for her home use. CPAP (one pressure did not control apneas as well so we changed to BI-Level (tow pressures-one inhale pressure and one lower exhale pressure) It will start lower than the best pressure attained in the sleep center for her comfort and will adjust up as needed during sleep. BIPAP would be an effective mode of therapy. BIPAPPAP order attached. she should be seen in the sleep disorder center 4-6 weeks after initiating PAP therapy. The BIPAP will have modem access so she can call the sleep center if she  has questions/concerns regarding the initial PAP settings. Front staff to Order PAP and call patient and let them know which DME company they should be hearing from after results reviewed with lead support technologist.   Schedule for first adherence visit in 6 weeks.

## 2021-03-29 DIAGNOSIS — E11.21 TYPE 2 DIABETES MELLITUS WITH NEPHROPATHY (HCC): ICD-10-CM

## 2021-03-29 RX ORDER — FLASH GLUCOSE SENSOR
KIT MISCELLANEOUS
Qty: 300 KIT | Refills: 0 | Status: SHIPPED | OUTPATIENT
Start: 2021-03-29 | End: 2022-02-08 | Stop reason: SDUPTHER

## 2021-03-30 NOTE — TELEPHONE ENCOUNTER
Reviewed sleep study results with patient. She expressed understanding and is willing to proceed with a trial of BIPAP.

## 2021-03-31 ENCOUNTER — DOCUMENTATION ONLY (OUTPATIENT)
Dept: SLEEP MEDICINE | Age: 81
End: 2021-03-31

## 2021-03-31 NOTE — PROGRESS NOTES
Request for DME device and supplies faxed to [facility] on 03/31/2021, to go to [facility].

## 2021-04-14 DIAGNOSIS — E11.21 TYPE II DIABETES MELLITUS WITH NEPHROPATHY (HCC): ICD-10-CM

## 2021-04-15 RX ORDER — METFORMIN HYDROCHLORIDE 1000 MG/1
TABLET ORAL
Qty: 180 TAB | Refills: 0 | Status: SHIPPED | OUTPATIENT
Start: 2021-04-15 | End: 2021-08-02

## 2021-04-15 RX ORDER — GABAPENTIN 100 MG/1
CAPSULE ORAL
Qty: 120 CAP | Refills: 0 | Status: SHIPPED | OUTPATIENT
Start: 2021-04-15 | End: 2021-05-30

## 2021-04-30 LAB
ALBUMIN SERPL-MCNC: 4.1 G/DL (ref 3.7–4.7)
ALBUMIN/GLOB SERPL: 1.4 {RATIO} (ref 1.2–2.2)
ALP SERPL-CCNC: 83 IU/L (ref 39–117)
ALT SERPL-CCNC: 14 IU/L (ref 0–32)
AST SERPL-CCNC: 19 IU/L (ref 0–40)
BILIRUB SERPL-MCNC: 0.6 MG/DL (ref 0–1.2)
BUN SERPL-MCNC: 14 MG/DL (ref 8–27)
BUN/CREAT SERPL: 15 (ref 12–28)
CALCIUM SERPL-MCNC: 9.8 MG/DL (ref 8.7–10.3)
CHLORIDE SERPL-SCNC: 107 MMOL/L (ref 96–106)
CHOLEST SERPL-MCNC: 140 MG/DL (ref 100–199)
CO2 SERPL-SCNC: 24 MMOL/L (ref 20–29)
CREAT SERPL-MCNC: 0.96 MG/DL (ref 0.57–1)
EST. AVERAGE GLUCOSE BLD GHB EST-MCNC: 180 MG/DL
GLOBULIN SER CALC-MCNC: 3 G/DL (ref 1.5–4.5)
GLUCOSE SERPL-MCNC: 89 MG/DL (ref 65–99)
HBA1C MFR BLD: 7.9 % (ref 4.8–5.6)
HDLC SERPL-MCNC: 48 MG/DL
LDLC SERPL CALC-MCNC: 71 MG/DL (ref 0–99)
POTASSIUM SERPL-SCNC: 3.7 MMOL/L (ref 3.5–5.2)
PROT SERPL-MCNC: 7.1 G/DL (ref 6–8.5)
SODIUM SERPL-SCNC: 146 MMOL/L (ref 134–144)
TRIGL SERPL-MCNC: 120 MG/DL (ref 0–149)
TSH SERPL DL<=0.005 MIU/L-ACNC: 0.53 UIU/ML (ref 0.45–4.5)
VLDLC SERPL CALC-MCNC: 21 MG/DL (ref 5–40)

## 2021-05-07 ENCOUNTER — HOSPITAL ENCOUNTER (OUTPATIENT)
Dept: MAMMOGRAPHY | Age: 81
Discharge: HOME | End: 2021-05-07
Attending: INTERNAL MEDICINE
Payer: MEDICARE

## 2021-05-07 DIAGNOSIS — Z12.31 ENCOUNTER FOR SCREENING MAMMOGRAM FOR MALIGNANT NEOPLASM OF BREAST: ICD-10-CM

## 2021-05-07 PROCEDURE — 77067 SCR MAMMO BI INCL CAD: CPT

## 2021-05-10 NOTE — PROGRESS NOTES
HISTORY OF PRESENT ILLNESS  Gualberto Stafford is a [de-identified] y.o. female. HPI     Last here 2/3/21 Pt is here for routine care.       BP today is 120/85  BP at home 120/75  Continues on metoprolol 50mg BID and norvasc 5mg  Recall had angioedema with lisinopril and benicar-HCT in the past      She is diabetic     153 134 136 127 128 119 148 16 150 129 176 141 138 165 109 158 110 107 101 133 83 120 117 116 118 92 110 120 83 75 101 138 101 143 145 83 117 in am, 138 199 169 148 198 119 174 178 111 195 219 91 197 161 149 131 102 195 133 92 187 58 69 62 108 105 109 218 136 133 in evening   Lows happened when she forgot to eat lunch - discussed bringing snacks when she goes out   Discussed if she continues getting lows in afternoon, will decrease am dosage to 20U  Continues on metformin 1000mg BID  On NPH 70/30 mix 22U and  nighttime dose 20U  she takes 18U in evening dose when she is having earlier dinner that is smaller to prevent lows -- hasn't needed to decrease insulin   She also takes ASA 81mg daily   Recall issues with hypoglycemia in the past, a1c under 7.5 at goal for her       Wt is 182 lbs - down 4 lb 186 lbs lov  Discussed WW--disussed portions and avoiding overweating  Discussed diet and weight loss again     Reviewed labs   Ordered labs     Pt follows with Dr. Precious Kinney (cardio) for cad  Last there 1/22/21   Reviewed stress test 2/4/21: Sinus rhythm, occasional PVCs. 1st degree av blocks  · There was no ST segment deviation noted during stress. · Gated SPECT: Left ventricular function post-stress was normal. Calculated ejection fraction is 54%. There is no evidence of transient ischemic dilation (TID). · Myocardial perfusion imaging defect 1: There is a defect that is large in size with a moderate reduction in uptake present in the basal-mid basal-mid and inferolateral location(s) that is non-reversible. There is abnormal wall motion in the defect area. The defect appears to be infarction.  Perfusion defect was visually and quantitatively present.   Left ventricular perfusion is abnormal.     Pt follows annually with Dr. Esther Garza ([facility]) for her PAD  No claudication stable   Last visit was 7/20   F/u q year she states this is scheduled for July       Pt saw Dr Geeta Spears (ortho) for carpal tunnel   Had surgery for this 8/1/19   Last visit was 8/19/19      She has been seeing Dr. Kristina Forde (ortho) for R shoulder pain   Last there 12/11/20   She is completing PT      Pt is now taking prilosec 20mg daily - discussed trying to take every other day just as needed not having reflux symptoms     Takes OTC vit D daily      Continues synthroid 100mcg daily    She takes this med separately from all meds      Continues on lipitor 80mg max dose for cholesterol   Pt is now taking zetia 10mg once daily as well  Welchol was too expensive now but zetia ok and grey well no issue     No longer on lexapro, doing well off medication not sad or down 5/21  She also has ativan to use prn-- (about 1-2x a month ) helps with stress and sleep      Pt had sleep eval with Dr Mya Boyd)   for sleep apnea  Last there 1/18/21   She had sleep titration 3/15/21  She is compliant with bipap 5/21     Pt continues on gabapentin 100 mg for pain in hand as needed  Overall helps continues on this    Reviewed mammo        ACP on file. SDMs is  daughter Thai Rascon) and Marcial aGuthier        PREVENTIVE:    Colonoscopy: Dr. Wyatt Quiroz 5 years  Pap: 12/14, declines further   Mammogram: 5/7/21 nl  DEXA: 3/20, osteopenic, stopped fosamax  Tdap: 1/04/2016  Pneumovax: 01/29/20   [id number]: 1/51  Zostavax: 10/10/2016  Shingrix: completed both  Flu shot: 10/20  Foot exam:05/12/21  Microalbumin: 11/04/20  A1c:   4/19 8.4, 7/19 7.5, 10/19 7.3  1/20 7.4 7/20 8.2 10/20 8.1 1/21 8.3 4/21 7.9  Eye exam: [facility]. Aleksander Maldonado at [facility], 4/20, scheduled  for April 27, 2021  Lipids: 4/21 LDL 71  Covid: both (pfizer)    Patient Active Problem List    Diagnosis Date Noted    Type 2 diabetes mellitus with diabetic neuropathy ([facility] 01/22/2019    Type 2 diabetes mellitus with nephropathy ([facility] 01/02/2018    Recurrent depression ([facility] 01/02/2018    Diverticulosis of intestine with bleeding 10/11/2015    Anemia 69/60/6160    Diastolic CHF, chronic ([facility] 10/11/2015    GIB (gastrointestinal bleeding) 10/08/2015    Osteoporosis 09/10/2013    Essential hypertension, benign 05/17/2012    Mixed hyperlipidemia 05/17/2012    Postsurgical percutaneous transluminal coronary angioplasty status 05/17/2012    S/P CABG x 3 11/11/2011    CAD (coronary artery disease) 11/18/2009    Hypothyroidism 11/18/2009    PVD (peripheral vascular disease) ([facility] 11/18/2009     Current Outpatient Medications   Medication Sig Dispense Refill    insulin NPH/insulin regular (HumuLIN 70/30 U-100 Insulin) 100 unit/mL (70-30) injection INJECT 22 UNITS SUBCUTANEOUSLY IN THE MORNING AND 20 SUBCUTANEOUSLY IN THE EVENING 10 mL 0    omeprazole (PRILOSEC) 20 mg capsule Take 1 capsule by mouth once daily 90 Cap 0    ezetimibe (ZETIA) 10 mg tablet Take 1 tablet by mouth once daily 90 Tab 0    atorvastatin (LIPITOR) 80 mg tablet Take 1 tablet by mouth nightly 90 Tab 0    gabapentin (NEURONTIN) 100 mg capsule Take 2 capsules by mouth twice daily 120 Cap 0    metFORMIN (GLUCOPHAGE) 1,000 mg tablet TAKE 1 TABLET BY MOUTH TWICE DAILY WITH MEALS 180 Tab 0    FreeStyle Ruben 14 Day Sensor kit USE TO CHECK GLUCOSE TWICE DAILY 300 Kit 0    Euthyrox 100 mcg tablet TAKE 1 TABLET BY MOUTH ONCE DAILY BEFORE BREAKFAST 90 Tab 0    metoprolol tartrate (LOPRESSOR) 50 mg tablet Take 1 tablet by mouth twice daily 180 Tab 0    albuterol (PROVENTIL HFA, VENTOLIN HFA, PROAIR HFA) 90 mcg/actuation inhaler Take 1 Puff by inhalation every six (6) hours as needed for Wheezing.  1 Inhaler 0    amLODIPine (NORVASC) 5 mg tablet Take 1 tablet  by mouth once daily 90 Tab 1    LORazepam (ATIVAN) 0.5 mg tablet Take 1 Tab by mouth nightly as needed for Anxiety. Max Daily Amount: 0.5 mg. 30 Tab 2    cholecalciferol (VITAMIN D3) 1,000 unit cap Take 1,000 Units by mouth daily.  aspirin 81 mg chewable tablet Take 1 Tab by mouth daily. 90 Tab 3    cyanocobalamin (VITAMIN B-12) 1,000 mcg tablet Take 1,000 mcg by mouth daily. Past Surgical History:   Procedure Laterality Date    COLONOSCOPY N/A 10/31/2018    COLONOSCOPY performed by Laury Vázquez [facility] at [facility] ENDOSCOPY    HX CORONARY ARTERY BYPASS GRAFT  11/11/11    3 vessel    HX GYN      hysterectomy    HX HEART CATHETERIZATION      HX HYSTERECTOMY      HX UROLOGICAL  1970s    kidney stone    LA CARDIAC SURG PROCEDURE UNLIST      cabg x 3    VASCULAR SURGERY PROCEDURE UNLIST      Stents put in right leg      Lab Results   Component Value Date/Time    WBC 9.2 07/27/2020 10:03 AM    HGB 13.3 07/27/2020 10:03 AM    Hemoglobin (POC) 12.9 03/02/2011 12:21 PM    HCT 41.5 07/27/2020 10:03 AM    PLATELET 625 00/64/3780 10:03 AM    MCV 84 07/27/2020 10:03 AM     Lab Results   Component Value Date/Time    Cholesterol, total 140 04/29/2021 09:33 AM    HDL Cholesterol 48 04/29/2021 09:33 AM    LDL, calculated 71 04/29/2021 09:33 AM    LDL, calculated 68 07/27/2020 10:03 AM    Triglyceride 120 04/29/2021 09:33 AM    CHOL/HDL Ratio 3.2 09/14/2010 08:42 AM     Lab Results   Component Value Date/Time    GFR est non-AA 56 (L) 04/29/2021 09:33 AM    GFR est AA 65 04/29/2021 09:33 AM    Creatinine 0.96 04/29/2021 09:33 AM    BUN 14 04/29/2021 09:33 AM    Sodium 146 (H) 04/29/2021 09:33 AM    Potassium 3.7 04/29/2021 09:33 AM    Chloride 107 (H) 04/29/2021 09:33 AM    CO2 24 04/29/2021 09:33 AM    Magnesium 1.7 10/16/2015 04:34 AM        Review of Systems   Respiratory: Negative for shortness of breath. Cardiovascular: Negative for chest pain. Physical Exam  Constitutional:       General: She is not in acute distress. Appearance: Normal appearance.  She is not ill-appearing, toxic-appearing or diaphoretic. HENT:      Head: Normocephalic and atraumatic. Right Ear: External ear normal.      Left Ear: External ear normal.   Eyes:      General:         Right eye: No discharge. Left eye: No discharge. Conjunctiva/sclera: Conjunctivae normal.      Pupils: Pupils are equal, round, and reactive to light. Neck:      Musculoskeletal: Normal range of motion and neck supple. Cardiovascular:      Rate and Rhythm: Normal rate and regular rhythm. Pulses: Normal pulses. Heart sounds: Murmur (trace) present. No friction rub. No gallop. Pulmonary:      Effort: No respiratory distress. Breath sounds: Normal breath sounds. No wheezing or rales. Chest:      Chest wall: No tenderness. Musculoskeletal: Normal range of motion. Right lower leg: No edema. Left lower leg: No edema. Skin:     General: Skin is warm and dry. Neurological:      Mental Status: She is alert and oriented to person, place, and time. Mental status is at baseline. Coordination: Coordination normal.      Gait: Gait normal.      Comments: Sensory exam of the foot is normal, tested with the monofilament. Good pulses, no lesions or ulcers, good peripheral pulses. Psychiatric:         Mood and Affect: Mood normal.         Behavior: Behavior normal.         ASSESSMENT and PLAN    ICD-10-CM ICD-9-CM    1.  Type 2 diabetes mellitus with nephropathy ([facility]         Improving control Home readings good given advanced age keeping A1c under 8 is our goal especially given insulin and risk for hypoglycemia    She is currently on NPH 70/30 mix/    Takes 22 units in the morning and 20 units in the evening      Home readings in the morning are good for the most part for the month of April they were little elevated in March that she has made some dietary improvements in the readings are down    She is occasionally getting some lower readings in the afternoon if she skips lunch discussed trying to have regular meals she will keep a snack on her if she knows she will be skipping lunch discussed decreasing her morning insulin to 20 units or if she has recurrent lows in the afternoon she should also decrease it to 20 units    She expressed understanding to plan    Order labs for next time    Renal function stable         L82.27 158.25 METABOLIC PANEL, COMPREHENSIVE     583.81 HEMOGLOBIN A1C WITH EAG      METABOLIC PANEL, COMPREHENSIVE      HEMOGLOBIN A1C WITH EAG       DIABETES FOOT EXAM   2. Type 2 diabetes mellitus with diabetic neuropathy, with long-term current use of insulin (HCC)  E11.40 250.60     Z79.4 357.2    See above has gabapentin to use      V58.67    3. Essential hypertension, benign       Controlled metoprolol and Norvasc     I10 401.1    4. Recurrent depression ([address]       Issue in the past previous on Lexapro doing well without medication     F33.9 296.30    5. Coronary artery disease involving native coronary artery of native heart without angina pectoris       Medically managed up-to-date with cardiology had recent stress test negative     I25.10 414.01    6. Hypothyroidism, unspecified type       On Synthroid 100 mcg daily at goal on recent labs monitor TSH     E03.9 244.9    7. Age-related osteoporosis without current pathological fracture       Previously on Fosamax now just on vitamin D bone density will be due next March     M81.0 733.01    8. Mixed hyperlipidemia       Controlled on max dose Lipitor and Zetia     E78.2 272.2    9. Diastolic CHF, chronic ([facility])  I50.32 428.32        Medically managed euvolemic no swelling up-to-date with cardiology      428.0    10. LORENZO (obstructive sleep apnea)       Compliant with BiPAP     G47.33 327.23            Scribed by Megan Farias [facility], as dictated by Dr. Laure Lott. Current diagnosis and concerns discussed with pt at length.  Pt understands risks and benefits or current treatment plan and medications, and accepts the treatment and medication with any possible risks. Pt asks appropriate questions, which were answered. Pt was instructed to call with any concerns or problems. I have reviewed the note documented by the scribe. The services provided are my own.   The documentation is accurate

## 2021-05-11 DIAGNOSIS — E11.21 TYPE II DIABETES MELLITUS WITH NEPHROPATHY (HCC): ICD-10-CM

## 2021-05-11 RX ORDER — OMEPRAZOLE 20 MG/1
CAPSULE, DELAYED RELEASE ORAL
Qty: 90 CAP | Refills: 0 | Status: SHIPPED | OUTPATIENT
Start: 2021-05-11 | End: 2021-09-03

## 2021-05-12 ENCOUNTER — OFFICE VISIT (OUTPATIENT)
Dept: INTERNAL MEDICINE CLINIC | Age: 81
End: 2021-05-12
Payer: MEDICARE

## 2021-05-12 VITALS
WEIGHT: 182 LBS | SYSTOLIC BLOOD PRESSURE: 120 MMHG | DIASTOLIC BLOOD PRESSURE: 85 MMHG | OXYGEN SATURATION: 98 % | HEART RATE: 67 BPM | BODY MASS INDEX: 33.49 KG/M2 | TEMPERATURE: 95.7 F | HEIGHT: 62 IN

## 2021-05-12 DIAGNOSIS — G47.33 OSA (OBSTRUCTIVE SLEEP APNEA): ICD-10-CM

## 2021-05-12 DIAGNOSIS — I50.32 DIASTOLIC CHF, CHRONIC (HCC): ICD-10-CM

## 2021-05-12 DIAGNOSIS — M81.0 AGE-RELATED OSTEOPOROSIS WITHOUT CURRENT PATHOLOGICAL FRACTURE: ICD-10-CM

## 2021-05-12 DIAGNOSIS — I10 ESSENTIAL HYPERTENSION, BENIGN: ICD-10-CM

## 2021-05-12 DIAGNOSIS — E11.21 TYPE 2 DIABETES MELLITUS WITH NEPHROPATHY (HCC): Primary | ICD-10-CM

## 2021-05-12 DIAGNOSIS — E78.2 MIXED HYPERLIPIDEMIA: ICD-10-CM

## 2021-05-12 DIAGNOSIS — I25.10 CORONARY ARTERY DISEASE INVOLVING NATIVE CORONARY ARTERY OF NATIVE HEART WITHOUT ANGINA PECTORIS: ICD-10-CM

## 2021-05-12 DIAGNOSIS — Z79.4 TYPE 2 DIABETES MELLITUS WITH DIABETIC NEUROPATHY, WITH LONG-TERM CURRENT USE OF INSULIN (HCC): ICD-10-CM

## 2021-05-12 DIAGNOSIS — E11.40 TYPE 2 DIABETES MELLITUS WITH DIABETIC NEUROPATHY, WITH LONG-TERM CURRENT USE OF INSULIN (HCC): ICD-10-CM

## 2021-05-12 DIAGNOSIS — F33.9 RECURRENT DEPRESSION (HCC): ICD-10-CM

## 2021-05-12 DIAGNOSIS — E03.9 HYPOTHYROIDISM, UNSPECIFIED TYPE: ICD-10-CM

## 2021-05-12 PROCEDURE — G8427 DOCREV CUR MEDS BY ELIG CLIN: HCPCS | Performed by: INTERNAL MEDICINE

## 2021-05-12 PROCEDURE — G8536 NO DOC ELDER MAL SCRN: HCPCS | Performed by: INTERNAL MEDICINE

## 2021-05-12 PROCEDURE — G8754 DIAS BP LESS 90: HCPCS | Performed by: INTERNAL MEDICINE

## 2021-05-12 PROCEDURE — G8417 CALC BMI ABV UP PARAM F/U: HCPCS | Performed by: INTERNAL MEDICINE

## 2021-05-12 PROCEDURE — 1090F PRES/ABSN URINE INCON ASSESS: CPT | Performed by: INTERNAL MEDICINE

## 2021-05-12 PROCEDURE — G8752 SYS BP LESS 140: HCPCS | Performed by: INTERNAL MEDICINE

## 2021-05-12 PROCEDURE — G9717 DOC PT DX DEP/BP F/U NT REQ: HCPCS | Performed by: INTERNAL MEDICINE

## 2021-05-12 PROCEDURE — 1101F PT FALLS ASSESS-DOCD LE1/YR: CPT | Performed by: INTERNAL MEDICINE

## 2021-05-12 PROCEDURE — 99214 OFFICE O/P EST MOD 30 MIN: CPT | Performed by: INTERNAL MEDICINE

## 2021-05-12 NOTE — PATIENT INSTRUCTIONS
If you are getting low sugars frequently in the afternoon decrease morning insulin to 20units Labs 1 week prior to follow up

## 2021-05-29 DIAGNOSIS — E11.21 TYPE II DIABETES MELLITUS WITH NEPHROPATHY (HCC): ICD-10-CM

## 2021-05-30 RX ORDER — METOPROLOL TARTRATE 50 MG/1
TABLET ORAL
Qty: 180 TABLET | Refills: 0 | Status: SHIPPED | OUTPATIENT
Start: 2021-05-30 | End: 2021-08-23

## 2021-05-30 RX ORDER — GABAPENTIN 100 MG/1
CAPSULE ORAL
Qty: 120 CAPSULE | Refills: 0 | Status: SHIPPED | OUTPATIENT
Start: 2021-05-30 | End: 2021-07-13

## 2021-06-08 DIAGNOSIS — E11.21 TYPE II DIABETES MELLITUS WITH NEPHROPATHY (HCC): ICD-10-CM

## 2021-07-01 ENCOUNTER — VIRTUAL VISIT (OUTPATIENT)
Dept: SLEEP MEDICINE | Age: 81
End: 2021-07-01
Payer: MEDICARE

## 2021-07-01 DIAGNOSIS — G47.33 OBSTRUCTIVE SLEEP APNEA (ADULT) (PEDIATRIC): Primary | ICD-10-CM

## 2021-07-01 DIAGNOSIS — I10 ESSENTIAL HYPERTENSION, BENIGN: ICD-10-CM

## 2021-07-01 DIAGNOSIS — E11.21 TYPE II DIABETES MELLITUS WITH NEPHROPATHY (HCC): ICD-10-CM

## 2021-07-01 PROCEDURE — 99442 PR PHYS/QHP TELEPHONE EVALUATION 11-20 MIN: CPT | Performed by: INTERNAL MEDICINE

## 2021-07-01 NOTE — PATIENT INSTRUCTIONS
[address] [place], [address]  Tel.  [phone]  Fax. [address] [facility]  [place], [address] [facility]  Tel.  [phone]  Fax. [phone] [address] [facility] [address]  Tel.  [phone]  Fax. [phone]     PROPER SLEEP HYGIENE    What to avoid  · Do not have drinks with caffeine, such as coffee or black tea, for 8 hours before bed. · Do not smoke or use other types of tobacco near bedtime. Nicotine is a stimulant and can keep you awake. · Avoid drinking alcohol late in the evening, because it can cause you to wake in the middle of the night. · Do not eat a big meal close to bedtime. If you are hungry, eat a light snack. · Do not drink a lot of water close to bedtime, because the need to urinate may wake you up during the night. · Do not read or watch TV in bed. Use the bed only for sleeping and sexual activity. What to try  · Go to bed at the same time every night, and wake up at the same time every morning. Do not take naps during the day. · Keep your bedroom quiet, dark, and cool. · Get regular exercise, but not within 3 to 4 hours of your bedtime. .  · Sleep on a comfortable pillow and mattress. · If watching the clock makes you anxious, turn it facing away from you so you cannot see the time. · If you worry when you lie down, start a worry book. Well before bedtime, write down your worries, and then set the book and your concerns aside. · Try meditation or other relaxation techniques before you go to bed. · If you cannot fall asleep, get up and go to another room until you feel sleepy. Do something relaxing. Repeat your bedtime routine before you go to bed again. · Make your house quiet and calm about an hour before bedtime. Turn down the lights, turn off the TV, log off the computer, and turn down the volume on music. This can help you relax after a busy day.     Drowsy Driving  The [address] [place] [address] Road Traffic Safety Administration cites drowsiness as a causing factor in more than 883,856 police reported crashes annually, resulting in 76,000 injuries and 1,500 deaths. Other surveys suggest 55% of people polled have driven while drowsy in the past year, 23% had fallen asleep but not crashed, 3% crashed, and 2% had and accident due to drowsy driving. Who is at risk? Young Drivers: One study of drowsy driving accidents states that 55% of the drivers were under 25 years. Of those, 75% were male. Shift Workers and Travelers: People who work overnight or travel across time zones frequently are at higher risk of experiencing Circadian Rhythm Disorders. They are trying to work and function when their body is programed to sleep. Sleep Deprived: Lack of sleep has a serious impact on your ability to pay attention or focus on a task. Consistently getting less than the average of 8 hours your body needs creates partial or cumulative sleep deprivation. Untreated Sleep Disorders: Sleep Apnea, Narcolepsy, R.L.S., and other sleep disorders (untreated) prevent a person from getting enough restful sleep. This leads to excessive daytime sleepiness and increases the risk for drowsy driving accidents by up to 7 times. Medications / Alcohol: Even over the counter medications can cause drowsiness. Medications that impair a drivers attention should have a warning label. Alcohol naturally makes you sleepy and on its own can cause accidents. Combined with excessive drowsiness its effects are amplified. Signs of Drowsy Driving:   * You don't remember driving the last few miles   * You may drift out of your michelle   * You are unable to focus and your thoughts wander   * You may yawn more often than normal   * You have difficulty keeping your eyes open / nodding off   * Missing traffic signs, speeding, or tailgating  Prevention-   Good sleep hygiene, lifestyle and behavioral choices have the most impact on drowsy driving.  There is no substitute for sleep and the average person requires 8 hours nightly. If you find yourself driving drowsy, stop and sleep. Consider the sleep hygiene tips provided during your visit as well. Medication Refill Policy: Refills for all medications require 1 week advance notice. Please have your pharmacy fax a refill request. We are unable to fax, or call in \"controled substance\" medications and you will need to pick these prescriptions up from our office. [employer] Activation    Thank you for requesting access to [employer]. Please follow the instructions below to securely access and download your online medical record. [employer] allows you to send messages to your doctor, view your test results, renew your prescriptions, schedule appointments, and more. How Do I Sign Up? 1. In your internet browser, go to [email or web address]. 2. Click on the First Time User? Click Here link in the Sign In box. You will see the New Member Sign Up page. 3. Enter your [employer] Access Code exactly as it appears below. You will not need to use this code after youve completed the sign-up process. If you do not sign up before the expiration date, you must request a new code. [employer] Access Code: [email or web address]  Expires: 2021  8:56 AM (This is the date your [employer] access code will )    4. Enter the last four digits of your Social Security Number (xxxx) and Date of Birth (mm/dd/yyyy) as indicated and click Submit. You will be taken to the next sign-up page. 5. Create a [employer] ID. This will be your [employer] login ID and cannot be changed, so think of one that is secure and easy to remember. 6. Create a [employer] password. You can change your password at any time. 7. Enter your Password Reset Question and Answer. This can be used at a later time if you forget your password. 8. Enter your e-mail address. You will receive e-mail notification when new information is available in [address] 9. Click Sign Up.  You can now view and download portions of your medical record. 10. Click the Download Summary menu link to download a portable copy of your medical information. Additional Information    If you have questions, please call [phone]. Remember, [employer] is NOT to be used for urgent needs. For medical emergencies, dial 911.

## 2021-07-01 NOTE — PROGRESS NOTES
Pretty Prieto is a [de-identified] y.o. female, evaluated via audio-only technology on 7/1/2021 for Sleep Problem (1St Adherence visit ) and Other ([phone], Audio only )  . 12  Subjective:     Patient called and identity confirmed with 2 patient identifers    I was in the office while conducting this encounter. S>Ammon Finney is a [de-identified] y.o. female seen at this telephone visit for a positive airway pressure follow-up. She reports no problems using the device. She is 100% compliant  over the past 30 days. The following problems are identified:    Drowsiness no Problems exhaling no   Snoring no Forget to put on no   Mask Comfortable yes  Can't fall asleep no   Dry Mouth no Mask falls off no   Air Leaking no Frequent awakenings no       Download reviewed. She admits that her sleep has improved on PAP therapy using nasal mask and heated tubing. Allergies   Allergen Reactions    Ace Inhibitors Swelling    Arb-Angiotensin Receptor Antagonist Swelling    Lisinopril Swelling    Plavix [Clopidogrel] Other (comments)     Excessive bleeding    Potassium Nausea and Vomiting     Potassium containing oral products are not well tolerated by patient       She has a current medication list which includes the following prescription(s): insulin nph/insulin regular, metoprolol tartrate, gabapentin, omeprazole, ezetimibe, atorvastatin, metformin, freestyle yenifer 14 day sensor, euthyrox, albuterol, amlodipine, lorazepam, cholecalciferol, aspirin, and cyanocobalamin. .      She  has a past medical history of Anxiety, CAD (coronary artery disease), Diabetes ([address] Diverticulosis, GERD (gastroesophageal reflux disease), Heart disease, Heart failure ([facility] (10/18/2011), High cholesterol, Hypertension, Hypothyroidism, Kidney stones, Menopause, Postsurgical percutaneous transluminal coronary angioplasty status (5/17/2012), 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     [place] Sleepiness Score: 4 and     which reflect improved sleep quality over therapy time. A>    ICD-10-CM ICD-9-CM    1. Obstructive sleep apnea (adult) (pediatric)  G47.33 327.23    2. Essential hypertension, benign  I10 401.1    3. Type II diabetes mellitus with nephropathy (HCC)  E11.21 250.40      583.81      AHI = 6 (2-21). On Bi - Level, Resmed :  EPAP min 6 cmH2O. PS5 cm, IPAP max 20klC40    Compliant:      yes    Therapeutic Response:  Positive    P>    she is compliant with PAP therapy and PAP continues to benefit patient and remains necessary for control of her sleep apnea. she will continue on her current pressure settings. * We have recommended a dedicated weight loss through appropriate diet and an exercise regimen as significant weight reduction has been shown to reduce severity of obstructive sleep apnea. I have reviewed medicare requirements regarding PAP usage    * She was asked to contact our office for any problems regarding PAP therapy. * Counseling was provided regarding the importance of regular PAP use and on proper sleep hygiene and safe driving. * Re-enforced proper and regular cleaning for the device. 2. Hypertension - she continues on her current regimen. I have reviewed the relationship between hypertension as it relates to sleep-disordered breathing. 3. Type II diabetes - she continues on her current regimen. I have reviewed the relationship between sleep disordered breathing as it relates to diabetes. All of her questions were addressed. Colleen Gomes, who was evaluated through a patient-initiated, synchronous (real-time) audio only encounter, and/or her healthcare decision maker, is aware that it is a billable service, with coverage as determined by her insurance carrier. She provided verbal consent to proceed: Yes. She has not had a related appointment within my department in the past 7 days or scheduled within the next 24 hours.       Total Time: minutes: 11-20 minutes    Rosalina Arriola MD

## 2021-07-05 DIAGNOSIS — E11.21 TYPE II DIABETES MELLITUS WITH NEPHROPATHY (HCC): ICD-10-CM

## 2021-07-13 DIAGNOSIS — E11.21 TYPE II DIABETES MELLITUS WITH NEPHROPATHY (HCC): ICD-10-CM

## 2021-07-13 RX ORDER — GABAPENTIN 100 MG/1
CAPSULE ORAL
Qty: 120 CAPSULE | Refills: 0 | Status: SHIPPED | OUTPATIENT
Start: 2021-07-13 | End: 2022-03-15 | Stop reason: SDUPTHER

## 2021-07-13 RX ORDER — AMLODIPINE BESYLATE 5 MG/1
TABLET ORAL
Qty: 90 TABLET | Refills: 0 | Status: SHIPPED | OUTPATIENT
Start: 2021-07-13 | End: 2021-10-27

## 2021-07-24 RX ORDER — EZETIMIBE 10 MG/1
TABLET ORAL
Qty: 90 TABLET | Refills: 0 | Status: SHIPPED | OUTPATIENT
Start: 2021-07-24 | End: 2021-10-19

## 2021-07-25 RX ORDER — ATORVASTATIN CALCIUM 80 MG/1
TABLET, FILM COATED ORAL
Qty: 90 TABLET | Refills: 0 | Status: SHIPPED | OUTPATIENT
Start: 2021-07-25 | End: 2021-10-27

## 2021-08-02 DIAGNOSIS — E11.21 TYPE II DIABETES MELLITUS WITH NEPHROPATHY (HCC): ICD-10-CM

## 2021-08-02 RX ORDER — LEVOTHYROXINE SODIUM 100 UG/1
TABLET ORAL
Qty: 90 TABLET | Refills: 0 | Status: ON HOLD | OUTPATIENT
Start: 2021-08-02 | End: 2021-12-04

## 2021-08-02 RX ORDER — METFORMIN HYDROCHLORIDE 1000 MG/1
TABLET ORAL
Qty: 180 TABLET | Refills: 0 | Status: SHIPPED | OUTPATIENT
Start: 2021-08-02 | End: 2021-11-10

## 2021-08-10 NOTE — PROGRESS NOTES
HISTORY OF PRESENT ILLNESS  Gil Leavitt is a [de-identified] y.o. female. HPI     Last here 5/12/21. Pt is here for routine care. She wonders about getting third dose of covid vaccine d/t her medical issues, discussed that this is ok.  She plans on getting this tomorrow 8/17/21.       BP today is 136/77  BP at home 130/94 the 1 time she checked it  Continues on metoprolol 50mg BID and norvasc 5mg  Recall had angioedema with lisinopril and benicar-HCT in the past      She is diabetic    BS AM: 72 120 114 83 77 83 79 98 95 43 55 112 124 114 125 123 122 130 PM: 94 199 78 87 52 121 1131 144 175 112 221 140 191 137 138 103 113 122  Continues on metformin 1000mg BID   On NPH 70/30 mix 22U and  nighttime dose 20U  she takes 18U in evening dose when she is having earlier dinner that is smaller to prevent lows --she has had some lower readings but she is not decreasing insulin we reviewed this again today go ahead and have her take 18 units and if fasting readings start to climb again she will bump it back up to 20  She also takes ASA 81mg daily   Recall issues with hypoglycemia in the past, a1c under 7.5 at goal for her   The cost of her NPH has increased d/t insurance change, will try to find out an alternative med that is covered      Wt is 181 lbs, down 1 lb x lov  Discussed diet and weight loss     Reviewed labs      Pt follows with Dr. Leandro Pichardo (cardio) for cad  Last there 1/22/21      Pt follows annually with Dr. Corrie Waters ([facility]) for her PAD  No claudication stable   Last visit was 7/21  We will get his notes for review       Pt saw Dr Carolee Devlin (ortho) for carpal tunnel   Had surgery for this 8/1/19   Last visit was 8/19/19      She has been seeing Dr. Seth Vo (ortho) for R shoulder pain   Last there 12/11/20   She is completing PT      Pt is now taking prilosec 20mg every other day working well controlling her symptoms     Takes OTC vit D daily      Continues synthroid 100mcg daily    She takes this med separately from all meds      Continues on lipitor 80mg max dose for cholesterol   Pt is now taking zetia 10mg once daily as well however it is affordable enough so she will continue this  Welchol was too expensive now but zetia ok and grey well no issue     No longer on lexapro, doing well off medication not sad or down 8/21  She also has ativan to use prn-- (about 1-2x a month at the most but not recently helps with stress and sleep has not needed to use this recently     Pt had sleep eval with Dr Rafia Guzman)   for sleep apnea  Last there 1/18/21   She had sleep titration 3/15/21  She is compliant with bipap 8/21     Pt continues on gabapentin 100 mg for pain in hand as needed, hasn't needed this   Discussed that it would be ok to stop this if she doesn't need it     ACP on file. SDMs is  daughter Floyd Banerjee) and Elvis Pond Chew        PREVENTIVE:    Colonoscopy: Dr. Anjelica Trevino 5 years  Pap: 12/14, declines further   Mammogram: 5/7/21 nl  DEXA: 3/20, osteopenic, stopped fosamax  Tdap: 1/04/2016  Pneumovax: 01/29/20   [id number]: 9/17  Zostavax: 10/10/2016  Shingrix: completed both  Flu shot: 10/20  Foot exam:05/12/21  Microalbumin: 11/04/20  A1c:   4/19 8.4, 7/19 7.5, 10/19 7.3  1/20 7.4 7/20 8.2 10/20 8.1 1/21 8.3 4/21 7.9  [facility] exam: [facility] Bran Pand[facility] at [facility], 2021  Lipids: 4/21 LDL 71  Covid: both ([place] [employer]), will get third dose    Patient Active Problem List    Diagnosis Date Noted    Type 2 diabetes mellitus with diabetic neuropathy ([facility] 01/22/2019    Type 2 diabetes mellitus with nephropathy ([facility] 01/02/2018    Recurrent depression ([facility] 01/02/2018    Diverticulosis of intestine with bleeding 10/11/2015    Anemia 26/33/8637    Diastolic CHF, chronic ([address] 10/11/2015    GIB (gastrointestinal bleeding) 10/08/2015    Osteoporosis 09/10/2013    Essential hypertension, benign 05/17/2012    Mixed hyperlipidemia 05/17/2012    Postsurgical percutaneous transluminal coronary angioplasty status 05/17/2012    S/P CABG x 3 11/11/2011    CAD (coronary artery disease) 11/18/2009    Hypothyroidism 11/18/2009    PVD (peripheral vascular disease) ([facility] 11/18/2009     Current Outpatient Medications   Medication Sig Dispense Refill    insulin NPH/insulin regular (HumuLIN 70/30 U-100 Insulin) 100 unit/mL (70-30) injection INJECT 22 UNITS SUBCUTANEOUSLY IN THE MORNING AND 20 IN THE EVENING 10 mL 0    Euthyrox 100 mcg tablet TAKE 1 TABLET BY MOUTH ONCE DAILY BEFORE BREAKFAST 90 Tablet 0    metFORMIN (GLUCOPHAGE) 1,000 mg tablet TAKE 1 TABLET BY MOUTH TWICE DAILY WITH MEALS 180 Tablet 0    atorvastatin (LIPITOR) 80 mg tablet Take 1 tablet by mouth nightly 90 Tablet 0    ezetimibe (ZETIA) 10 mg tablet Take 1 tablet by mouth once daily 90 Tablet 0    gabapentin (NEURONTIN) 100 mg capsule Take 2 capsules by mouth twice daily 120 Capsule 0    amLODIPine (NORVASC) 5 mg tablet Take 1 tablet by mouth once daily 90 Tablet 0    metoprolol tartrate (LOPRESSOR) 50 mg tablet Take 1 tablet by mouth twice daily 180 Tablet 0    omeprazole (PRILOSEC) 20 mg capsule Take 1 capsule by mouth once daily 90 Cap 0    FreeStyle Ruben 14 Day Sensor kit USE TO CHECK GLUCOSE TWICE DAILY 300 Kit 0    albuterol (PROVENTIL HFA, VENTOLIN HFA, PROAIR HFA) 90 mcg/actuation inhaler Take 1 Puff by inhalation every six (6) hours as needed for Wheezing. 1 Inhaler 0    LORazepam (ATIVAN) 0.5 mg tablet Take 1 Tab by mouth nightly as needed for Anxiety. Max Daily Amount: 0.5 mg. 30 Tab 2    cholecalciferol (VITAMIN D3) 1,000 unit cap Take 1,000 Units by mouth daily.  aspirin 81 mg chewable tablet Take 1 Tab by mouth daily. 90 Tab 3    cyanocobalamin (VITAMIN B-12) 1,000 mcg tablet Take 1,000 mcg by mouth daily.        Past Surgical History:   Procedure Laterality Date    COLONOSCOPY N/A 10/31/2018    COLONOSCOPY performed [facility] Kaitlin [facility]ter [facility] at [facility] ENDOSCOPY    HX CORONARY ARTERY BYPASS GRAFT  11/11/11    3 vessel    HX GYN      hysterectomy    HX HEART CATHETERIZATION      HX HYSTERECTOMY      HX UROLOGICAL  1970s    kidney stone    IA CARDIAC SURG PROCEDURE UNLIST      cabg x 3    VASCULAR SURGERY PROCEDURE UNLIST      Stents put in right leg      Lab Results   Component Value Date/Time    WBC 9.2 07/27/2020 10:03 AM    HGB 13.3 07/27/2020 10:03 AM    Hemoglobin (POC) 12.9 03/02/2011 12:21 PM    HCT 41.5 07/27/2020 10:03 AM    PLATELET 928 37/60/2733 10:03 AM    MCV 84 07/27/2020 10:03 AM     Lab Results   Component Value Date/Time    Cholesterol, total 140 04/29/2021 09:33 AM    HDL Cholesterol 48 04/29/2021 09:33 AM    LDL, calculated 71 04/29/2021 09:33 AM    LDL, calculated 68 07/27/2020 10:03 AM    Triglyceride 120 04/29/2021 09:33 AM    CHOL/HDL Ratio 3.2 09/14/2010 08:42 AM     Lab Results   Component Value Date/Time    GFR est non-AA 56 (L) 04/29/2021 09:33 AM    GFR est AA 65 04/29/2021 09:33 AM    Creatinine 0.96 04/29/2021 09:33 AM    BUN 14 04/29/2021 09:33 AM    Sodium 146 (H) 04/29/2021 09:33 AM    Potassium 3.7 04/29/2021 09:33 AM    Chloride 107 (H) 04/29/2021 09:33 AM    CO2 24 04/29/2021 09:33 AM    Magnesium 1.7 10/16/2015 04:34 AM        Review of Systems   Respiratory: Negative for shortness of breath. Cardiovascular: Negative for chest pain. Physical Exam  Constitutional:       General: She is not in acute distress. Appearance: Normal appearance. She is not ill-appearing, toxic-appearing or diaphoretic. HENT:      Head: Normocephalic and atraumatic. Right Ear: External ear normal.      Left Ear: External ear normal.   Eyes:      General:         Right eye: No discharge. Left eye: No discharge. Conjunctiva/sclera: Conjunctivae normal.      Pupils: Pupils are equal, round, and reactive to light. Cardiovascular:      Rate and Rhythm: Normal rate and regular rhythm. Heart sounds: No murmur heard. No friction rub. No gallop.     Pulmonary:      Effort: No respiratory distress. Breath sounds: Normal breath sounds. No wheezing or rales. Chest:      Chest wall: No tenderness. Musculoskeletal:         General: Normal range of motion. Cervical back: Normal range of motion and neck supple. Right lower leg: No edema. Left lower leg: No edema. Skin:     General: Skin is warm and dry. Neurological:      Mental Status: She is alert and oriented to person, place, and time. Mental status is at baseline. Coordination: Coordination normal.      Gait: Gait normal.   Psychiatric:         Mood and Affect: Mood normal.         Behavior: Behavior normal.         ASSESSMENT and PLAN    ICD-10-CM ICD-9-CM    1. Type 2 diabetes mellitus with nephropathy ([facility])      E11.21 250.40 HEMOGLOBIN A1C WITH EAG   Last A1c was borderline but okay for advanced age    Currently on 7030    Takes 22 units in the morning and 20 units in the evening    Last few weeks we have had some lower fasting readings this tends to happen if she gets close to her appointments with me she started cutting back on her portions    We will decrease evening to 18 units and continue to focus on diet we will check A1c adjust medication further as needed    Continue Metformin at 1000 mg twice daily      757.29 METABOLIC PANEL, BASIC      MICROALBUMIN, UR, RAND W/ MICROALB/CREAT RATIO   2. Diastolic CHF, chronic ([facility])  I50.32 428.32 HEMOGLOBIN A1C WITH EAG     083.2 METABOLIC PANEL, BASIC   Follows with cardiology up-to-date stable medically managed   MICROALBUMIN, UR, RAND W/ MICROALB/CREAT RATIO   3. Type 2 diabetes mellitus with diabetic neuropathy, with long-term current use of insulin ([facility])  E11.40 250.60 HEMOGLOBIN A1C WITH EAG    Z33.7 520.9 METABOLIC PANEL, BASIC   See above has not been any gabapentin discussed okay to stop it  V58.67 MICROALBUMIN, UR, RAND W/ MICROALB/CREAT RATIO   4.  Hypothyroidism, unspecified type  E03.9 244.9 HEMOGLOBIN A1C WITH EAG      METABOLIC PANEL, BASIC   Controlled on Synthroid monitor TSH   MICROALBUMIN, UR, RAND W/ MICROALB/CREAT RATIO   5. Recurrent depression ([facility]  F33.9 296.30 HEMOGLOBIN A1C WITH EAG      METABOLIC PANEL, BASIC   Doing well without medications has Ativan for occasional rare use for stress   MICROALBUMIN, UR, RAND W/ MICROALB/CREAT RATIO   6. PVD (peripheral vascular disease) ([facility])  I73.9 443.9 HEMOGLOBIN A1C WITH EAG      METABOLIC PANEL, BASIC   Up-to-date with vascular just saw him last month we will get notes for review   MICROALBUMIN, UR, RAND W/ MICROALB/CREAT RATIO   7. Essential hypertension, benign  I10 401.1 HEMOGLOBIN A1C WITH EAG      METABOLIC PANEL, BASIC   Controlled on metoprolol Norvasc continue   MICROALBUMIN, UR, RAND W/ MICROALB/CREAT RATIO   8. Coronary artery disease involving native coronary artery of native heart without angina pectoris  I25.10 414.01 HEMOGLOBIN A1C WITH EAG      METABOLIC PANEL, BASIC   Medically managed   MICROALBUMIN, UR, RAND W/ MICROALB/CREAT RATIO   9. Age-related osteoporosis without current pathological fracture  M81.0 733.01 HEMOGLOBIN A1C WITH EAG      METABOLIC PANEL, BASIC   Repeat bone density next year previously was on Fosamax currently just on vitamin D   MICROALBUMIN, UR, RAND W/ MICROALB/CREAT RATIO   10. Mixed hyperlipidemia  E78.2 272.2 HEMOGLOBIN A1C WITH EAG      METABOLIC PANEL, BASIC   Controlled Lipitor and Zetia continue   MICROALBUMIN, UR, RAND W/ MICROALB/CREAT RATIO   11. Other iron deficiency anemia  D50.8 280.8 HEMOGLOBIN A1C WITH EAG      METABOLIC PANEL, BASIC   Resolved last check   MICROALBUMIN, UR, RAND W/ MICROALB/CREAT RATIO   12. Anxiety     See above F41.9 300.00    13. Gastroesophageal reflux disease without esophagitis       Has been able to titrate down Prilosec to every other day continue K21.9 530.81    14. Neuropathy       Discussed okay to stop gabapentin G62.9 355.9         Scribed by Saima Sullivan, as dictated by Dr. Ale Morrison.      Current diagnosis and concerns discussed with pt at length. Pt understands risks and benefits or current treatment plan and medications, and accepts the treatment and medication with any possible risks. Pt asks appropriate questions, which were answered. Pt was instructed to call with any concerns or problems. I have reviewed the note documented by the scribe. The services provided are my own.   The documentation is accurate

## 2021-08-16 ENCOUNTER — OFFICE VISIT (OUTPATIENT)
Dept: INTERNAL MEDICINE CLINIC | Age: 81
End: 2021-08-16
Payer: MEDICARE

## 2021-08-16 VITALS
WEIGHT: 181.8 LBS | RESPIRATION RATE: 16 BRPM | HEIGHT: 62 IN | SYSTOLIC BLOOD PRESSURE: 136 MMHG | OXYGEN SATURATION: 97 % | DIASTOLIC BLOOD PRESSURE: 77 MMHG | TEMPERATURE: 97.4 F | HEART RATE: 72 BPM | BODY MASS INDEX: 33.45 KG/M2

## 2021-08-16 DIAGNOSIS — I73.9 PVD (PERIPHERAL VASCULAR DISEASE) (HCC): ICD-10-CM

## 2021-08-16 DIAGNOSIS — I10 ESSENTIAL HYPERTENSION, BENIGN: ICD-10-CM

## 2021-08-16 DIAGNOSIS — E78.2 MIXED HYPERLIPIDEMIA: ICD-10-CM

## 2021-08-16 DIAGNOSIS — I50.32 DIASTOLIC CHF, CHRONIC (HCC): ICD-10-CM

## 2021-08-16 DIAGNOSIS — D50.8 OTHER IRON DEFICIENCY ANEMIA: ICD-10-CM

## 2021-08-16 DIAGNOSIS — I25.10 CORONARY ARTERY DISEASE INVOLVING NATIVE CORONARY ARTERY OF NATIVE HEART WITHOUT ANGINA PECTORIS: ICD-10-CM

## 2021-08-16 DIAGNOSIS — E03.9 HYPOTHYROIDISM, UNSPECIFIED TYPE: ICD-10-CM

## 2021-08-16 DIAGNOSIS — F41.9 ANXIETY: ICD-10-CM

## 2021-08-16 DIAGNOSIS — M81.0 AGE-RELATED OSTEOPOROSIS WITHOUT CURRENT PATHOLOGICAL FRACTURE: ICD-10-CM

## 2021-08-16 DIAGNOSIS — E11.40 TYPE 2 DIABETES MELLITUS WITH DIABETIC NEUROPATHY, WITH LONG-TERM CURRENT USE OF INSULIN (HCC): ICD-10-CM

## 2021-08-16 DIAGNOSIS — E11.21 TYPE 2 DIABETES MELLITUS WITH NEPHROPATHY (HCC): Primary | ICD-10-CM

## 2021-08-16 DIAGNOSIS — F33.9 RECURRENT DEPRESSION (HCC): ICD-10-CM

## 2021-08-16 DIAGNOSIS — K21.9 GASTROESOPHAGEAL REFLUX DISEASE WITHOUT ESOPHAGITIS: ICD-10-CM

## 2021-08-16 DIAGNOSIS — G62.9 NEUROPATHY: ICD-10-CM

## 2021-08-16 DIAGNOSIS — Z79.4 TYPE 2 DIABETES MELLITUS WITH DIABETIC NEUROPATHY, WITH LONG-TERM CURRENT USE OF INSULIN (HCC): ICD-10-CM

## 2021-08-16 PROCEDURE — 99214 OFFICE O/P EST MOD 30 MIN: CPT | Performed by: INTERNAL MEDICINE

## 2021-08-16 NOTE — LETTER
8/19/2021 4:19 PM    Ms. [place] [id number]    Dear Care Provider    Please fax us the most recent office note so that we may update the patient's records for continuity of care. Our fax number: [phone].     Patient:   Anju Thao  1940            Sincerely,      Rosa M Bowman MD

## 2021-08-16 NOTE — Clinical Note
Can you help with insulin cost--pt on 70/30 mix and cost increased, is another insulin combo cheaper for her.

## 2021-08-16 NOTE — LETTER
8/19/2021 4:19 PM    [place]. [place] [phone]    Dear Care Provider    Please fax us the most recent eye exam so that we may update the patient's records for continuity of care. Our fax number: [phone].     Patient:   Bushra Villaseñor  1940            Sincerely,      Eric Garcia MD

## 2021-08-19 LAB
ANION GAP SERPL CALC-SCNC: 8 MMOL/L (ref 5–15)
BUN SERPL-MCNC: 21 MG/DL (ref 6–20)
BUN/CREAT SERPL: 21 (ref 12–20)
CALCIUM SERPL-MCNC: 9.9 MG/DL (ref 8.5–10.1)
CHLORIDE SERPL-SCNC: 112 MMOL/L (ref 97–108)
CO2 SERPL-SCNC: 25 MMOL/L (ref 21–32)
CREAT SERPL-MCNC: 1.01 MG/DL (ref 0.55–1.02)
EST. AVERAGE GLUCOSE BLD GHB EST-MCNC: 160 MG/DL
GLUCOSE SERPL-MCNC: 83 MG/DL (ref 65–100)
HBA1C MFR BLD: 7.2 % (ref 4–5.6)
POTASSIUM SERPL-SCNC: 4.2 MMOL/L (ref 3.5–5.1)
SODIUM SERPL-SCNC: 145 MMOL/L (ref 136–145)

## 2021-08-23 RX ORDER — METOPROLOL TARTRATE 50 MG/1
TABLET ORAL
Qty: 180 TABLET | Refills: 0 | Status: SHIPPED | OUTPATIENT
Start: 2021-08-23 | End: 2021-11-19

## 2021-08-25 ENCOUNTER — TELEPHONE (OUTPATIENT)
Dept: INTERNAL MEDICINE CLINIC | Age: 81
End: 2021-08-25

## 2021-08-25 NOTE — TELEPHONE ENCOUNTER
Pharmacy Progress Note - Telephone Encounter    S/O: Ms. An White [de-identified] y.o. female, referred by Dr. Dante Youssef MD, was contacted via an outbound telephone call to discuss her insulin access today. Verified patients identifiers (name & ) per HIPAA policy.     - Recently changed to a different [place] [facility]. No longer has [employer]. Changed plan because she was hoping to have dental coverage.   - 70/30 insulin copay increased from $0 copay to $40/vial    - Currently on 70/30 insulin  --- reports to taking 18 units in the morning and 20 units in the evening  - Reports decrease in overall food portions/frequency. - Freestyle Ruben CGM  - Reports morning readings remains low. Had one reading since last PCP visit that registered LO.   - Fasting today was 74 . - At this time: BG was 65     Hypoglycemia (BG <70) Midnight - 6 AM 6 AM - Noon Noon - 6 PM 6 PM - Midnight Total Lows   7 days 5 3 5 1 14   14 days 9 5 6 3 23      Midnight - 6 AM 6 AM - Noon Noon - 6 PM 6 PM - Midnight Average   7 Day Average 65 107 103 116 93   14 Day Average 62 109 107 109 92   30 Day Average 58 102 109 98 89     Lab Results   Component Value Date/Time    Sodium 145 2021 01:52 PM    Potassium 4.2 2021 01:52 PM    Chloride 112 (H) 2021 01:52 PM    CO2 25 2021 01:52 PM    Anion gap 8 2021 01:52 PM    Glucose 83 2021 01:52 PM    BUN 21 (H) 2021 01:52 PM    Creatinine 1.01 2021 01:52 PM    BUN/Creatinine ratio 21 (H) 2021 01:52 PM    GFR est AA >60 2021 01:52 PM    GFR est non-AA 53 (L) 2021 01:52 PM    Calcium 9.9 2021 01:52 PM     Lab Results   Component Value Date/Time    Hemoglobin A1c 7.2 (H) 2021 01:52 PM    Hemoglobin A1c 7.9 (H) 2021 09:33 AM    Hemoglobin A1c 8.3 (H) 2021 09:55 AM     Estimated Creatinine Clearance: 44.3 mL/min (by C-G formula based on SCr of 1.01 mg/dL).       A/P:  - Relion Novolin 70/30 is $24.88 /vial.  Since she is no longer enrolled within [facility], tier 1 insulin pricing has changed to $40 copay. - Review hypoglycemia and management steps. - Given frequency of hypoglycemia and nocturnal hypoglycemia, recommend decreasing 70/30 insulin to 14 units twice daily.   - Following up with patient in 2 weeks  - Patient endorses understanding to the provided information. All questions answered at this time. There are no discontinued medications. Orders Placed This Encounter    insulin NPH/insulin regular (NOVOLIN 70/30, HUMULIN 70/30) 100 unit/mL (70-30) injection     Sig: Inject 14 units under the skin twice daily. Please dispense Relion brand. Indications: type 2 diabetes mellitus     Dispense:  10 mL     Refill:  2       Thank you,  Megan Estrella, PharmD, BCACP, [address] [place] [address],[address] in place:  Yes   Recommendation Provided To: Patient/Caregiver: 1 via Telephone   Intervention Detail: Dose Adjustment: 1, reason: Therapy De-escalation, New Rx: 1, reason: Cost/Formulary Change and Needs Additional Therapy and Patient Access Assistance/Sample Provided   Time Spent (min): 30

## 2021-09-02 ENCOUNTER — VIRTUAL VISIT (OUTPATIENT)
Dept: INTERNAL MEDICINE CLINIC | Age: 81
End: 2021-09-02

## 2021-09-02 ENCOUNTER — OFFICE VISIT (OUTPATIENT)
Dept: CARDIOLOGY CLINIC | Age: 81
End: 2021-09-02
Payer: COMMERCIAL

## 2021-09-02 VITALS
BODY MASS INDEX: 33.53 KG/M2 | OXYGEN SATURATION: 97 % | WEIGHT: 182.2 LBS | HEIGHT: 62 IN | HEART RATE: 89 BPM | RESPIRATION RATE: 17 BRPM | SYSTOLIC BLOOD PRESSURE: 145 MMHG | DIASTOLIC BLOOD PRESSURE: 80 MMHG

## 2021-09-02 DIAGNOSIS — I25.10 CORONARY ARTERY DISEASE INVOLVING NATIVE CORONARY ARTERY OF NATIVE HEART WITHOUT ANGINA PECTORIS: Primary | ICD-10-CM

## 2021-09-02 DIAGNOSIS — I50.32 DIASTOLIC CHF, CHRONIC (HCC): ICD-10-CM

## 2021-09-02 DIAGNOSIS — E78.2 MIXED HYPERLIPIDEMIA: ICD-10-CM

## 2021-09-02 DIAGNOSIS — E11.21 TYPE 2 DIABETES MELLITUS WITH NEPHROPATHY (HCC): Primary | ICD-10-CM

## 2021-09-02 DIAGNOSIS — I10 ESSENTIAL HYPERTENSION, BENIGN: ICD-10-CM

## 2021-09-02 DIAGNOSIS — Z95.1 S/P CABG X 3: ICD-10-CM

## 2021-09-02 PROCEDURE — 1090F PRES/ABSN URINE INCON ASSESS: CPT | Performed by: INTERNAL MEDICINE

## 2021-09-02 PROCEDURE — 1101F PT FALLS ASSESS-DOCD LE1/YR: CPT | Performed by: INTERNAL MEDICINE

## 2021-09-02 PROCEDURE — G8754 DIAS BP LESS 90: HCPCS | Performed by: INTERNAL MEDICINE

## 2021-09-02 PROCEDURE — G8753 SYS BP > OR = 140: HCPCS | Performed by: INTERNAL MEDICINE

## 2021-09-02 PROCEDURE — 99213 OFFICE O/P EST LOW 20 MIN: CPT | Performed by: INTERNAL MEDICINE

## 2021-09-02 PROCEDURE — G8417 CALC BMI ABV UP PARAM F/U: HCPCS | Performed by: INTERNAL MEDICINE

## 2021-09-02 PROCEDURE — G8427 DOCREV CUR MEDS BY ELIG CLIN: HCPCS | Performed by: INTERNAL MEDICINE

## 2021-09-02 PROCEDURE — G9717 DOC PT DX DEP/BP F/U NT REQ: HCPCS | Performed by: INTERNAL MEDICINE

## 2021-09-02 PROCEDURE — G8536 NO DOC ELDER MAL SCRN: HCPCS | Performed by: INTERNAL MEDICINE

## 2021-09-02 PROCEDURE — 93000 ELECTROCARDIOGRAM COMPLETE: CPT | Performed by: INTERNAL MEDICINE

## 2021-09-02 NOTE — PROGRESS NOTES
Pharmacy Progress Note - Diabetes Management    Assessment / Plan:   Diabetes Management:  - Per ADA guidelines, Pt's A1c is at goal of < 7%.   - CGM trends and variability are more consistent w/ fasting and post prandial goals. Seeing fewer incidences of nocturnal and day time hypoglycemia. Emphasize importance of regular sensor scanning.   - Mindful of patient's age, renal function, Continue with 70/30 insulin --- 14 units twice daily for now. - Continue with metformin 1 gm BID  - Will follow up in 4 weeks        S/O: Ms. Luciana Landers is a [de-identified] y.o. female, referred by Dr. Artemio Quintero MD, with a PMH of T2DM+neuropathy, CAD s/p CABG x 3, HTN, HLD, CHF, Hypothyroidism, Hx GIB, osteoporosis, was seen virtually today for diabetes management follow up. Patient's last A1c was 7.2% (August 2021), 7.9% (April 2021), 8.3% (Jan 2021). PHI verified. Interim update: Insulin access now resolved. Confirmed she does not have [employer]. [employer] PPO does not cover [employer]. Recall Insulin dose adjusted to 14 units BID from 18 units in the AM and 20 units in the PM on 8/25/21 d/t frequency of hypoglycemia.      Current anti-hyperglycemic regimen include(s):    - Relion 70/30 insulin vial - 14 units twice daily  - Metformin 1 gm BID     ASA 81 mg daily, atorvastatin 80 mg daily, zetia 10 mg daily,       ROS:  Today, Pt endorses:  - Symptoms of Hyperglycemia: none  - Symptoms of Hypoglycemia: none    Self Monitoring Blood Glucose (SMBG) or CGM:  Ruben 14 days CGM  Fasting today: 126 ;    140, 132, 205, 111, 128, 125, 202 (after meal), 129    No low blood sugars since the last visit     Midnight - 6 AM 6 AM - Noon Noon - 6 PM 6 PM - Midnight Average % Time in Target Range   7 Day Average 96 139 150 115 124 84%   14 Day Average 82 124 130 116 110 66%     Recall 8/25/21      Hypoglycemia (BG <70) Midnight - 6 AM 6 AM - Noon Noon - 6 PM 6 PM - Midnight Total Lows   7 days 5 3 5 1 14   14 days 9 5 6 3 23        Midnight - 6 AM 6 AM - Noon Noon - 6 PM 6 PM - Midnight Average   7 Day Average 65 107 103 116 93   14 Day Average 62 109 107 109 92   30 Day Average 58 102 109 98 89       Nutrition/Lifestyle Modifications:  Denies any changes       Vitals: Wt Readings from Last 3 Encounters:   08/16/21 181 lb 12.8 oz (82.5 kg)   05/12/21 182 lb (82.6 kg)   03/15/21 187 lb (84.8 kg)     BP Readings from Last 3 Encounters:   08/16/21 136/77   05/12/21 120/85   03/15/21 (!) 155/85     Pulse Readings from Last 3 Encounters:   08/16/21 72   05/12/21 67   03/15/21 88       Past Medical History:   Diagnosis Date    Anxiety     CAD (coronary artery disease)     cabg     Diabetes ([facility]     Diverticulosis     GERD (gastroesophageal reflux disease)     Heart disease     Heart failure ([facility] [address] 10/18/2011    High cholesterol     Hypertension     Hypothyroidism     Kidney stones     Menopause     Postsurgical percutaneous transluminal coronary angioplasty status 5/17/2012    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    Sleeping difficulty     Teeth decayed     Weakness      Allergies   Allergen Reactions    Ace Inhibitors Swelling    Arb-Angiotensin Receptor Antagonist Swelling    Lisinopril Swelling    Plavix [Clopidogrel] Other (comments)     Excessive bleeding    Potassium Nausea and Vomiting     Potassium containing oral products are not well tolerated by patient       Current Outpatient Medications   Medication Sig    insulin NPH/insulin regular (NOVOLIN 70/30, HUMULIN 70/30) 100 unit/mL (70-30) injection Inject 14 units under the skin twice daily. Please dispense Relion brand.   Indications: type 2 diabetes mellitus    metoprolol tartrate (LOPRESSOR) 50 mg tablet Take 1 tablet by mouth twice daily    insulin NPH/insulin regular (HumuLIN 70/30 U-100 Insulin) 100 unit/mL (70-30) injection INJECT 22 UNITS SUBCUTANEOUSLY IN THE MORNING AND 20 IN THE EVENING    Euthyrox 100 mcg tablet TAKE 1 TABLET BY MOUTH ONCE DAILY BEFORE BREAKFAST    metFORMIN (GLUCOPHAGE) 1,000 mg tablet TAKE 1 TABLET BY MOUTH TWICE DAILY WITH MEALS    atorvastatin (LIPITOR) 80 mg tablet Take 1 tablet by mouth nightly    ezetimibe (ZETIA) 10 mg tablet Take 1 tablet by mouth once daily    gabapentin (NEURONTIN) 100 mg capsule Take 2 capsules by mouth twice daily    amLODIPine (NORVASC) 5 mg tablet Take 1 tablet by mouth once daily    omeprazole (PRILOSEC) 20 mg capsule Take 1 capsule by mouth once daily    FreeStyle Ruben 14 Day Sensor kit USE TO CHECK GLUCOSE TWICE DAILY    albuterol (PROVENTIL HFA, VENTOLIN HFA, PROAIR HFA) 90 mcg/actuation inhaler Take 1 Puff by inhalation every six (6) hours as needed for Wheezing.  LORazepam (ATIVAN) 0.5 mg tablet Take 1 Tab by mouth nightly as needed for Anxiety. Max Daily Amount: 0.5 mg.    cholecalciferol (VITAMIN D3) 1,000 unit cap Take 1,000 Units by mouth daily.  aspirin 81 mg chewable tablet Take 1 Tab by mouth daily.  cyanocobalamin (VITAMIN B-12) 1,000 mcg tablet Take 1,000 mcg by mouth daily. No current facility-administered medications for this visit. Lab Results   Component Value Date/Time    Sodium 145 08/16/2021 01:52 PM    Potassium 4.2 08/16/2021 01:52 PM    Chloride 112 (H) 08/16/2021 01:52 PM    CO2 25 08/16/2021 01:52 PM    Anion gap 8 08/16/2021 01:52 PM    Glucose 83 08/16/2021 01:52 PM    BUN 21 (H) 08/16/2021 01:52 PM    Creatinine 1.01 08/16/2021 01:52 PM    BUN/Creatinine ratio 21 (H) 08/16/2021 01:52 PM    GFR est AA >60 08/16/2021 01:52 PM    GFR est non-AA 53 (L) 08/16/2021 01:52 PM    Calcium 9.9 08/16/2021 01:52 PM    Bilirubin, total 0.6 04/29/2021 09:33 AM    Alk.  phosphatase 83 04/29/2021 09:33 AM    Protein, total 7.1 04/29/2021 09:33 AM    Albumin 4.1 04/29/2021 09:33 AM    Globulin 3.4 10/16/2015 04:34 AM    A-G Ratio 1.4 04/29/2021 09:33 AM    ALT (SGPT) 14 04/29/2021 09:33 AM       Lab Results   Component Value Date/Time    Cholesterol, total 140 04/29/2021 09:33 AM    HDL Cholesterol 48 2021 09:33 AM    LDL, calculated 71 2021 09:33 AM    LDL, calculated 68 2020 10:03 AM    VLDL, calculated 21 2021 09:33 AM    VLDL, calculated 19 2020 10:03 AM    Triglyceride 120 2021 09:33 AM    CHOL/HDL Ratio 3.2 2010 08:42 AM       Lab Results   Component Value Date/Time    WBC 9.2 2020 10:03 AM    Hemoglobin (POC) 12.9 2011 12:21 PM    HGB 13.3 2020 10:03 AM    HCT 41.5 2020 10:03 AM    PLATELET 167  10:03 AM    MCV 84 2020 10:03 AM       Lab Results   Component Value Date/Time    Microalb/Creat ratio (ug/mg creat.) 1,810.3 (H) 10/19/2018 02:14 PM       HbA1c:  Lab Results   Component Value Date/Time    Hemoglobin A1c 7.2 (H) 2021 01:52 PM    Hemoglobin A1c (POC) 7.2 (A) 09/10/2013 11:42 AM     No components found for: 2     Last Point of Care HGB A1C  Hemoglobin A1c (POC)   Date Value Ref Range Status   09/10/2013 7.2 (A) 4.8 - 5.6 % Final        CrCl cannot be calculated (Unknown ideal weight. ). Medication reconciliation was completed during the visit. Medications Discontinued During This Encounter   Medication Reason    insulin NPH/insulin regular (HumuLIN 70/30 U-100 Insulin) 100 unit/mL (70-30) injection DOSE ADJUSTMENT     Patient verbalized understanding of the information presented and all of the patients questions were answered. Patient advised to call the office with any additional questions or concerns. Notifications of recommendations will be sent to Dr. Liana Lee MD for review. Patient will return to clinic in 4 week(s) for follow up. Thank you for the consult,  Megan Bowens, PharmD, BCACP, [address] [place] [address] in place:  Yes   Recommendation Provided To: Patient/Caregiver: 1 via Virtual Visit   Intervention Detail: Adherence Monitorin and Discontinued Rx: 1, reason: Duplicate Therapy   Time Spent (min): 30

## 2021-09-02 NOTE — PROGRESS NOTES
Chief Complaint   Patient presents with    CHF     no cardiac issues    Coronary Artery Disease    Follow-up     6 month f/u     1. Have you been to the ER, urgent care clinic since your last visit? Hospitalized since your last visit? No  2. Have you seen or consulted any other health care providers outside of the [address] [place] [address] since your last visit? Include any pap smears or colon screening.  No

## 2021-09-02 NOTE — PROGRESS NOTES
Chano Reyes, [facility]    Subjective/HPI:     Sarbjit Traore is a [de-identified] y.o. female is here for routine f/u. She has a PMHx of CAD s/p CABG x 3, HTN, HLD, HFpEF, DM and LORENZO. Doing well. Still has shortness of breath on exertion, but only at times. This is improved with use of PRN albuterol inhaler. Denies chest pain symptoms, orthopnea, PND or edema. Can still get all her work done around the house. Current Outpatient Medications on File Prior to Visit   Medication Sig Dispense Refill    insulin NPH/insulin regular (NOVOLIN 70/30, HUMULIN 70/30) 100 unit/mL (70-30) injection Inject 14 units under the skin twice daily. Please dispense Relion brand. Indications: type 2 diabetes mellitus 10 mL 2    metoprolol tartrate (LOPRESSOR) 50 mg tablet Take 1 tablet by mouth twice daily 180 Tablet 0    Euthyrox 100 mcg tablet TAKE 1 TABLET BY MOUTH ONCE DAILY BEFORE BREAKFAST 90 Tablet 0    metFORMIN (GLUCOPHAGE) 1,000 mg tablet TAKE 1 TABLET BY MOUTH TWICE DAILY WITH MEALS 180 Tablet 0    atorvastatin (LIPITOR) 80 mg tablet Take 1 tablet by mouth nightly 90 Tablet 0    ezetimibe (ZETIA) 10 mg tablet Take 1 tablet by mouth once daily 90 Tablet 0    gabapentin (NEURONTIN) 100 mg capsule Take 2 capsules by mouth twice daily 120 Capsule 0    amLODIPine (NORVASC) 5 mg tablet Take 1 tablet by mouth once daily 90 Tablet 0    omeprazole (PRILOSEC) 20 mg capsule Take 1 capsule by mouth once daily 90 Cap 0    FreeStyle Ruben 14 Day Sensor kit USE TO CHECK GLUCOSE TWICE DAILY 300 Kit 0    albuterol (PROVENTIL HFA, VENTOLIN HFA, PROAIR HFA) 90 mcg/actuation inhaler Take 1 Puff by inhalation every six (6) hours as needed for Wheezing. 1 Inhaler 0    cholecalciferol (VITAMIN D3) 1,000 unit cap Take 1,000 Units by mouth daily.  aspirin 81 mg chewable tablet Take 1 Tab by mouth daily. 90 Tab 3    cyanocobalamin (VITAMIN B-12) 1,000 mcg tablet Take 1,000 mcg by mouth daily.       [DISCONTINUED] insulin NPH/insulin regular (HumuLIN 70/30 U-100 Insulin) 100 unit/mL (70-30) injection INJECT 22 UNITS SUBCUTANEOUSLY IN THE MORNING AND 20 IN THE EVENING 10 mL 0    LORazepam (ATIVAN) 0.5 mg tablet Take 1 Tab by mouth nightly as needed for Anxiety. Max Daily Amount: 0.5 mg. (Patient not taking: Reported on 9/2/2021) 30 Tab 2     No current facility-administered medications on file prior to visit. Review of Symptoms:    Review of Systems   Constitutional: Negative for chills, fever and weight loss. HENT: Negative for nosebleeds. Eyes: Negative for blurred vision and double vision. Respiratory: Negative for cough, shortness of breath and wheezing. Cardiovascular: Negative for chest pain, palpitations, orthopnea, leg swelling and PND. Skin: Negative for rash. Neurological: Negative for dizziness and loss of consciousness. Physical Exam:      General: Well developed, in no acute distress, cooperative and alert  Heart:  reg rate and rhythm; normal S1/S2; no murmurs, no gallops or rubs. Respiratory: Clear bilaterally x 4, no wheezing or rales  Extremities:  Normal cap refill, no cyanosis, atraumatic. No edema. Vascular: 2+ pulses symmetric in all extremities    Vitals:    09/02/21 1434   BP: (!) 145/80   BP 1 Location: Left arm   BP Patient Position: Sitting   BP Cuff Size: Small adult   Pulse: 89   Resp: 17   Height: 5' 2\" (1.575 m)   Weight: 182 lb 3.2 oz (82.6 kg)   SpO2: 97%       ECG done today shows sinus rhythm; 1st degree AV block     Assessment:       ICD-10-CM ICD-9-CM    1. Coronary artery disease involving native coronary artery of native heart without angina pectoris  I25.10 414.01 AMB POC EKG ROUTINE W/ 12 LEADS, INTER & REP   2. Diastolic CHF, chronic (HCC)  I50.32 428.32      428.0    3. Essential hypertension, benign  I10 401.1    4. Mixed hyperlipidemia  E78.2 272.2    5. S/P CABG x 3  Z95.1 V45.81         Plan:     1.  Coronary artery disease involving native coronary artery of native heart without angina pectoris  Hx of CABG x 3 in 2011  Lexiscan stress test done 2/2021 with fixed defect large in size of the basal-mid, and inferolateral location, unchanged from previous study. Continue ASA, statin, BB.    2. Diastolic CHF, chronic ([address]  Echo done 11/2020 with preserved LVEF 55-60% with mild MR/AI  Euvolemic on exam today  Continue present medical management    3. Essential hypertension, benign  BP controlled. Continue anti-hypertensive therapy and low sodium diet    4. Mixed hyperlipidemia  LDL 71 in 4/2021  Continue statin therapy, Zetia and low fat, low cholesterol diet  Lipids managed by PCP    5. S/P CABG x 3  Hx of LIMA to LAD, VG to OM2 and VG to PDA    Recommend to discuss SOB symptoms with PCP -- might need formal pulmonary eval given hx of tobacco abuse    F/u with Dr. Lucila Quintana in 1 year     Hai Lyons NP       [address] [facility]             Patient seen, examined by me personally. Plan discussed as detailed. Agree with note as outlined by  NP with modifications as noted. My independent physical exam reveals : Physical Exam  Vitals and nursing note reviewed. Cardiovascular:      Rate and Rhythm: Normal rate and regular rhythm. Heart sounds: Normal heart sounds. Pulmonary:      Breath sounds: Normal breath sounds. Musculoskeletal:      Right lower leg: No edema. Left lower leg: No edema. Skin:     General: Skin is warm. Neurological:      Mental Status: She is alert and oriented to person, place, and time. Psychiatric:         Mood and Affect: Mood normal.          No additional findings noted. Agree with plan as outlined above with modifications as noted.      Kerry Ritchie MD

## 2021-09-02 NOTE — LETTER
9/2/2021    Patient: Tony Gay   YOB: 1940   Date of Visit: 9/2/2021     Anshu Javier MD  [address] [facility] [address]  [place]    Dear Anshu Javier MD,      Thank you for referring Ms. Armani Paz to [facility] for evaluation. My notes for this consultation are attached. If you have questions, please do not hesitate to call me. I look forward to following your patient along with you.       Sincerely,    Uche Alcocer MD

## 2021-09-03 RX ORDER — OMEPRAZOLE 20 MG/1
CAPSULE, DELAYED RELEASE ORAL
Qty: 90 CAPSULE | Refills: 0 | Status: SHIPPED | OUTPATIENT
Start: 2021-09-03 | End: 2021-12-23

## 2021-09-09 ENCOUNTER — TELEPHONE (OUTPATIENT)
Dept: INTERNAL MEDICINE CLINIC | Age: 81
End: 2021-09-09

## 2021-09-09 NOTE — TELEPHONE ENCOUNTER
Called, spoke with Pt. Two pt identifiers confirmed. Pt reports blood sugars of 78, 59, and 66. Went over her diabetic medications and pt stated she takes them as prescribed. Pt asked what can make it drop so low in the mornings. Pt informed it could be she's not eating enough and with taking metformin and insulin at night can make it drop. Pt stated she doesn't eat after 5:30-6pm.   Pt informed she may need to eat a small snack before bed to keep her sugar levels from dropping. Pt informed will send message to Dr. Riddhi Truong and call her back. Pt verbalized understanding of information discussed w/ no further questions at this time.

## 2021-09-10 NOTE — TELEPHONE ENCOUNTER
MD Isi Irwin, GELYD 22 hours ago (11:42 AM)   Peyton Nevarez  Would you mind following up with her regarding her hypoglycemia?      She is on the 70/30 mix now decreased to 14 units twice per day    Sounds like she is having morning lows I would decrease the evening dose to 10 units to see if this prevents a.m. hypoglycemia rather than trying to get her to eat more    Message text

## 2021-09-10 NOTE — TELEPHONE ENCOUNTER
Called, spoke with Pt. Two pt identifiers confirmed. Pt stated her BS was still low this morning when she checked. Pt informed per Dr. Ryland Mckeon her evening insulin cut back to 10 units instead of 14 units see how her blood sugars go. Pt stated ok she will do that. Pt verbalized understanding of information discussed w/ no further questions at this time.

## 2021-09-21 ENCOUNTER — TELEPHONE (OUTPATIENT)
Dept: INTERNAL MEDICINE CLINIC | Age: 81
End: 2021-09-21

## 2021-10-19 RX ORDER — EZETIMIBE 10 MG/1
TABLET ORAL
Qty: 90 TABLET | Refills: 0 | Status: SHIPPED | OUTPATIENT
Start: 2021-10-19 | End: 2022-01-15

## 2021-10-27 RX ORDER — AMLODIPINE BESYLATE 5 MG/1
TABLET ORAL
Qty: 90 TABLET | Refills: 0 | Status: SHIPPED | OUTPATIENT
Start: 2021-10-27 | End: 2022-01-25

## 2021-10-27 RX ORDER — ATORVASTATIN CALCIUM 80 MG/1
TABLET, FILM COATED ORAL
Qty: 90 TABLET | Refills: 0 | Status: SHIPPED | OUTPATIENT
Start: 2021-10-27 | End: 2022-01-25

## 2021-11-10 ENCOUNTER — APPOINTMENT (OUTPATIENT)
Dept: INTERNAL MEDICINE CLINIC | Age: 81
End: 2021-11-10

## 2021-11-10 DIAGNOSIS — E11.21 TYPE II DIABETES MELLITUS WITH NEPHROPATHY (HCC): ICD-10-CM

## 2021-11-10 RX ORDER — METFORMIN HYDROCHLORIDE 1000 MG/1
TABLET ORAL
Qty: 180 TABLET | Refills: 0 | Status: SHIPPED | OUTPATIENT
Start: 2021-11-10 | End: 2022-02-02

## 2021-11-10 NOTE — PROGRESS NOTES
HISTORY OF PRESENT ILLNESS  Gladys Wiggins is a [de-identified] y.o. female. HPI     Last here 8/16/21. Pt is here for routine care. She soreness around shoulders/upper arms bilaterally just a little bit in the muscles  Discussed that this is likely a muscular issue    She c/o back pain  Previous imaging showed arthritis  Discussed taking tylenol arthritis  Discussed PT, she will think about this    She c/o SOB when doing household chores, if she is walking up the stairs  Denies wheezing  Will order CT chest   Discussed singulair, will hold off on this for now  Discussed may need to f/u with pulm in the future  She has been using albuterol prn  Will try inhaler sample     BP today is 126/77  Continues on metoprolol 50mg BID and norvasc 5mg  Recall had angioedema with lisinopril and benicar-HCT in the past      She is diabetic    BS AM: 116 110 142 123 86 81 72 138 134 140 112 131 108 9 114 95 145 114 107   PM: 135 122 120 144 121 111 144 159 153 197 102 115 153 158 144 76 93 114 255  Continues on metformin 1000mg BID   On NPH 70/30 mix now decreased to 14U BID  Previously she had been taking 20 and 22 units we had decreased this to 18 units but she had recurrent hypoglycemia so we had decreased it again  She also takes ASA 81mg daily   Recall issues with hypoglycemia in the past, a1c under 7.5 at goal for her   [facility] today is 179 lbs, down 2 lbs x lov   Discussed diet and weight loss     Reviewed labs   Ordered labs     Pt follows with Dr. Carolyn Birmingham (cardio) for cad  Reviewed note 9/02/21 with SLICK Cano :  1. Coronary artery disease involving native coronary artery of native heart without angina pectoris  Hx of CABG x 3 in 2011  Lexiscan stress test done 2/2021 with fixed defect large in size of the basal-mid, and inferolateral location, unchanged from previous study.   Continue ASA, statin, BB.  2. Diastolic CHF, chronic ([address]  Echo done 11/2020 with preserved LVEF 55-60% with mild MR/AI  Euvolemic on exam today  Continue present medical management  3. Essential hypertension, benign  BP controlled. Continue anti-hypertensive therapy and low sodium diet  4. Mixed hyperlipidemia  LDL 71 in 4/2021  Continue statin therapy, Zetia and low fat, low cholesterol diet  Lipids managed by PCP  5.  S/P CABG x 3  Hx of LIMA to LAD, VG to OM2 and VG to PDA  Recommend to discuss SOB symptoms with PCP -- might need formal pulmonary eval given hx of tobacco abuse  F/u with Dr. Elisabeth Carrington in 1 year         Pt follows annually with Dr. Spencer Vázquez ([facility]) for her PAD  No claudication stable   Last visit was 7/21       Pt saw Dr Valeria Lainez (ortho) for carpal tunnel   Had surgery for this 8/1/19   Last visit was 8/19/19      She has been seeing Dr. Lillian Shields (ortho) for R shoulder pain   Last there 12/11/20   She was completing PT      Pt is taking prilosec 20mg every other day working well     Takes OTC vit D daily      Continues synthroid 100mcg daily    She takes this med separately from all meds      Continues on lipitor 80mg max dose for cholesterol   Pt is now taking zetia 10mg once daily as well however it is affordable enough so she will continue this  Welchol was too expensive now but zetia ok and grey well no issue     No longer on lexapro, doing well off medication not sad or down 11/21  She also has ativan to use prn     Pt had sleep eval with Dr Libra Mao sleep apnea  Last there 1/18/21   She had sleep titration 3/15/21  She is compliant with bipap 11/21     Pt continues on gabapentin 100 mg for pain in hand as needed    Former smoker, quit smoking in 2010      ACP on file. SDMs is  daughter (Kyle Ang) and Kay Motts Chew        PREVENTIVE:    Colonoscopy: Dr. Beka Garcia 5 years  Pap: 12/14, declines further   Mammogram: 5/7/21 nl  DEXA: 3/20, osteopenic, stopped fosamax  Tdap: 1/04/2016  Pneumovax: 01/29/20   Lsyymwy78: 9/17  Zostavax: 10/10/2016  Shingrix: completed both  Flu shot: 10/20, will get today 11/15/21  Foot exam:05/12/21  Microalbumin: 11/04/20  A1c: 7/19 7.5, 10/19 7.3  1/20 7.4 7/20 8.2 10/20 8.1 1/21 8.3 4/21 7.9 8/21 7.2  Eye exam: Dr. Lauren Ponce at [facility], 5/27/21  Lipids: 4/21 LDL 71  Covid: both (Zion Benjamin)    Patient Active Problem List    Diagnosis Date Noted    Type 2 diabetes mellitus with diabetic neuropathy ([facility] 01/22/2019    Type 2 diabetes mellitus with nephropathy ([facility] 01/02/2018    Rec[facility] 01/02/2018    Diverticulosis of intestine with bleeding 10/11/2015    Anemia 01/38/6026    Diastolic CHF, chronic ([facility] 10/11/2015    GIB (gastrointestinal bleeding) 10/08/2015    Osteoporosis 09/10/2013    Essential hypertension, benign 05/17/2012    Mixed hyperlipidemia 05/17/2012    Postsurgical percutaneous transluminal coronary angioplasty status 05/17/2012    S/P CABG x 3 11/11/2011    CAD (coronary artery disease) 11/18/2009    Hypothyroidism 11/18/2009    PVD (peripheral vascular disease) ([facility] 11/18/2009     Current Outpatient Medications   Medication Sig Dispense Refill    metFORMIN (GLUCOPHAGE) 1,000 mg tablet TAKE 1 TABLET BY MOUTH TWICE DAILY WITH MEALS 180 Tablet 0    atorvastatin (LIPITOR) 80 mg tablet Take 1 tablet by mouth nightly 90 Tablet 0    amLODIPine (NORVASC) 5 mg tablet Take 1 tablet by mouth once daily 90 Tablet 0    ezetimibe (ZETIA) 10 mg tablet Take 1 tablet by mouth once daily 90 Tablet 0    omeprazole (PRILOSEC) 20 mg capsule Take 1 capsule by mouth once daily 90 Capsule 0    insulin NPH/insulin regular (NOVOLIN 70/30, HUMULIN 70/30) 100 unit/mL (70-30) injection Inject 14 units under the skin twice daily. Please dispense Relion brand.   Indications: type 2 diabetes mellitus 10 mL 2    metoprolol tartrate (LOPRESSOR) 50 mg tablet Take 1 tablet by mouth twice daily 180 Tablet 0    Euthyrox 100 mcg tablet TAKE 1 TABLET BY MOUTH ONCE DAILY BEFORE BREAKFAST 90 Tablet 0    gabapentin (NEURONTIN) 100 mg capsule Take 2 capsules by mouth twice daily 120 Capsule 0    FreeStyle Ruben 14 Day Sensor kit USE TO CHECK GLUCOSE TWICE DAILY 300 Kit 0    albuterol (PROVENTIL HFA, VENTOLIN HFA, PROAIR HFA) 90 mcg/actuation inhaler Take 1 Puff by inhalation every six (6) hours as needed for Wheezing. 1 Inhaler 0    cholecalciferol (VITAMIN D3) 1,000 unit cap Take 1,000 Units by mouth daily.  aspirin 81 mg chewable tablet Take 1 Tab by mouth daily. 90 Tab 3    cyanocobalamin (VITAMIN B-12) 1,000 mcg tablet Take 1,000 mcg by mouth daily.        Past Surgical History:   Procedure Laterality Date    COLONOSCOPY N/A 10/31/2018    COLONOSCOPY performed by Justin Avila MD at [facility] ENDOSCOPY    HX CORONARY ARTERY BYPASS GRAFT  11/11/11    3 vessel    HX GYN      hysterectomy    HX HEART CATHETERIZATION      HX HYSTERECTOMY      HX UROLOGICAL  1970s    kidney stone    IN CARDIAC SURG PROCEDURE UNLIST      cabg x 3    VASCULAR SURGERY PROCEDURE UNLIST      Stents put in right leg      Lab Results   Component Value Date/Time    WBC 9.2 07/27/2020 10:03 AM    HGB 13.3 07/27/2020 10:03 AM    Hemoglobin (POC) 12.9 03/02/2011 12:21 PM    HCT 41.5 07/27/2020 10:03 AM    PLATELET 831 52/55/3069 10:03 AM    MCV 84 07/27/2020 10:03 AM     Lab Results   Component Value Date/Time    Cholesterol, total 140 04/29/2021 09:33 AM    HDL Cholesterol 48 04/29/2021 09:33 AM    LDL, calculated 71 04/29/2021 09:33 AM    LDL, calculated 68 07/27/2020 10:03 AM    Triglyceride 120 04/29/2021 09:33 AM    CHOL/HDL Ratio 3.2 09/14/2010 08:42 AM     Lab Results   Component Value Date/Time    GFR est non-AA 57 (L) 11/10/2021 12:00 AM    GFR est AA 65 11/10/2021 12:00 AM    Creatinine 0.95 11/10/2021 12:00 AM    BUN 15 11/10/2021 12:00 AM    Sodium 143 11/10/2021 12:00 AM    Potassium 4.0 11/10/2021 12:00 AM    Chloride 105 11/10/2021 12:00 AM    CO2 23 11/10/2021 12:00 AM    Magnesium 1.7 10/16/2015 04:34 AM        Review of Systems   Respiratory: Positive for shortness of breath. Negative for wheezing. SOB when walking up stairs   Cardiovascular: Negative for chest pain. Physical Exam  Constitutional:       General: She is not in acute distress. Appearance: Normal appearance. She is not ill-appearing, toxic-appearing or diaphoretic. HENT:      Head: Normocephalic and atraumatic. Right Ear: External ear normal.      Left Ear: External ear normal.   Eyes:      General:         Right eye: No discharge. Left eye: No discharge. Conjunctiva/sclera: Conjunctivae normal.      Pupils: Pupils are equal, round, and reactive to light. Cardiovascular:      Rate and Rhythm: Normal rate and regular rhythm. Heart sounds: Murmur heard. No friction rub. No gallop. Comments: Slight stable murmur  Pulmonary:      Effort: No respiratory distress. Breath sounds: Normal breath sounds. No wheezing or rales. Chest:      Chest wall: No tenderness. Musculoskeletal:         General: Normal range of motion. Cervical back: Normal range of motion and neck supple. Right lower leg: No edema. Left lower leg: No edema. Skin:     General: Skin is warm and dry. Neurological:      Mental Status: She is alert and oriented to person, place, and time. Mental status is at baseline. Coordination: Coordination normal.      Gait: Gait normal.   Psychiatric:         Mood and Affect: Mood normal.         Behavior: Behavior normal.         ASSESSMENT and PLAN    ICD-10-CM ICD-9-CM    1. Type 2 diabetes mellitus with nephropathy (HCC)      E11.21 250.40    A1c has worsened but she was having hypoglycemia on higher doses of insulin    Home readings are actually decent    Continue NPH 70/30 insulin mix at 14 units twice daily    Work on diet    Continue Metformin twice daily  583.81    2.  Coronary artery disease involving native coronary artery of native heart without angina pectoris       Medically managed up-to-date with cardiology no active signs or symptoms of CAD had stress test in the past that was unremarkable     I25.10 414.01    3. Type 2 diabetes mellitus with diabetic neuropathy, with long-term current use of insulin (HCC)  E11.40 250.60     Z79.4 357.2    See above does not take gabapentin very frequently      V58.67    4. Diastolic CHF, chronic (HCC)  I50.32 428.32    Up-to-date with cardiology stable  428.0    5. PVD (peripheral vascular disease) ([facility]       Follows with vascular surgery routinely stable I73.9 443.9    6. Hypothyroidism, unspecified type       Controlled on Synthroid 100 MCG's daily     E03.9 244.9    7. Mixed hyperlipidemia       On Lipitor and Zetia    Controlled     E78.2 272.2    8. Essential hypertension, benign       Controlled with metoprolol Norvasc continue     I10 401.1    9. Recurrent depression ([facility]       Doing well without medication     F33.9 296.30    10. Age-related osteoporosis without current pathological fracture       Vitamin D for treatment previously on Fosamax     M81.0 733.01    11. SOB (shortness of breath)       Has had full cardiology evaluation has persistent dyspnea on exertion not thought to be cardiac in etiology this time    We will give her an Anoro inhaler as a sample    Get a CT of the chest for further evaluation had a chest x-ray a year ago which is unremarkable R06.02 786.05    12. History of tobacco use in home quit smoking about 11 years ago    See above     Z87.891 V15.82         Scribed by Shiloh Howell of [facility]em[facility]p, as dictated by Dr. Joselito Albert. Current diagnosis and concerns discussed with pt at length. Pt understands risks and benefits or current treatment plan and medications, and accepts the treatment and medication with any possible risks. Pt asks appropriate questions, which were answered. Pt was instructed to call with any concerns or problems. I have reviewed the note documented by the scribe. The services provided are my own.   The documentation is accurate

## 2021-11-11 LAB
ALBUMIN SERPL-MCNC: 4.2 G/DL (ref 3.7–4.7)
ALBUMIN/GLOB SERPL: 1.5 {RATIO} (ref 1.2–2.2)
ALP SERPL-CCNC: 79 IU/L (ref 44–121)
ALT SERPL-CCNC: 12 IU/L (ref 0–32)
APPEARANCE UR: CLEAR
AST SERPL-CCNC: 15 IU/L (ref 0–40)
BACTERIA #/AREA URNS HPF: ABNORMAL /[HPF]
BILIRUB SERPL-MCNC: 0.7 MG/DL (ref 0–1.2)
BILIRUB UR QL STRIP: NEGATIVE
BUN SERPL-MCNC: 15 MG/DL (ref 8–27)
BUN/CREAT SERPL: 16 (ref 12–28)
CALCIUM SERPL-MCNC: 9.9 MG/DL (ref 8.7–10.3)
CASTS URNS QL MICRO: ABNORMAL /LPF
CHLORIDE SERPL-SCNC: 105 MMOL/L (ref 96–106)
CO2 SERPL-SCNC: 23 MMOL/L (ref 20–29)
COLOR UR: YELLOW
CREAT SERPL-MCNC: 0.95 MG/DL (ref 0.57–1)
EPI CELLS #/AREA URNS HPF: >10 /HPF (ref 0–10)
EST. AVERAGE GLUCOSE BLD GHB EST-MCNC: 183 MG/DL
GLOBULIN SER CALC-MCNC: 2.8 G/DL (ref 1.5–4.5)
GLUCOSE SERPL-MCNC: 157 MG/DL (ref 65–99)
GLUCOSE UR QL: NEGATIVE
HBA1C MFR BLD: 8 % (ref 4.8–5.6)
HGB UR QL STRIP: NEGATIVE
KETONES UR QL STRIP: NEGATIVE
LEUKOCYTE ESTERASE UR QL STRIP: NEGATIVE
MICRO URNS: ABNORMAL
NITRITE UR QL STRIP: NEGATIVE
PH UR STRIP: 5.5 [PH] (ref 5–7.5)
POTASSIUM SERPL-SCNC: 4 MMOL/L (ref 3.5–5.2)
PROT SERPL-MCNC: 7 G/DL (ref 6–8.5)
PROT UR QL STRIP: ABNORMAL
RBC #/AREA URNS HPF: ABNORMAL /HPF (ref 0–2)
SODIUM SERPL-SCNC: 143 MMOL/L (ref 134–144)
SP GR UR: 1.01 (ref 1–1.03)
SPECIMEN STATUS REPORT, ROLRST: NORMAL
UROBILINOGEN UR STRIP-MCNC: 0.2 MG/DL (ref 0.2–1)
WBC #/AREA URNS HPF: ABNORMAL /HPF (ref 0–5)

## 2021-11-15 ENCOUNTER — OFFICE VISIT (OUTPATIENT)
Dept: INTERNAL MEDICINE CLINIC | Age: 81
End: 2021-11-15
Payer: MEDICARE

## 2021-11-15 VITALS
HEIGHT: 62 IN | TEMPERATURE: 97.9 F | SYSTOLIC BLOOD PRESSURE: 126 MMHG | BODY MASS INDEX: 33.05 KG/M2 | HEART RATE: 70 BPM | OXYGEN SATURATION: 98 % | RESPIRATION RATE: 16 BRPM | DIASTOLIC BLOOD PRESSURE: 77 MMHG | WEIGHT: 179.6 LBS

## 2021-11-15 DIAGNOSIS — I73.9 PVD (PERIPHERAL VASCULAR DISEASE) (HCC): ICD-10-CM

## 2021-11-15 DIAGNOSIS — M81.0 AGE-RELATED OSTEOPOROSIS WITHOUT CURRENT PATHOLOGICAL FRACTURE: ICD-10-CM

## 2021-11-15 DIAGNOSIS — Z79.4 TYPE 2 DIABETES MELLITUS WITH DIABETIC NEUROPATHY, WITH LONG-TERM CURRENT USE OF INSULIN (HCC): ICD-10-CM

## 2021-11-15 DIAGNOSIS — I50.32 DIASTOLIC CHF, CHRONIC (HCC): ICD-10-CM

## 2021-11-15 DIAGNOSIS — E78.2 MIXED HYPERLIPIDEMIA: ICD-10-CM

## 2021-11-15 DIAGNOSIS — E11.40 TYPE 2 DIABETES MELLITUS WITH DIABETIC NEUROPATHY, WITH LONG-TERM CURRENT USE OF INSULIN (HCC): ICD-10-CM

## 2021-11-15 DIAGNOSIS — I25.10 CORONARY ARTERY DISEASE INVOLVING NATIVE CORONARY ARTERY OF NATIVE HEART WITHOUT ANGINA PECTORIS: ICD-10-CM

## 2021-11-15 DIAGNOSIS — F33.9 RECURRENT DEPRESSION (HCC): ICD-10-CM

## 2021-11-15 DIAGNOSIS — Z23 NEEDS FLU SHOT: ICD-10-CM

## 2021-11-15 DIAGNOSIS — E03.9 HYPOTHYROIDISM, UNSPECIFIED TYPE: ICD-10-CM

## 2021-11-15 DIAGNOSIS — E11.21 TYPE 2 DIABETES MELLITUS WITH NEPHROPATHY (HCC): Primary | ICD-10-CM

## 2021-11-15 DIAGNOSIS — I10 ESSENTIAL HYPERTENSION, BENIGN: ICD-10-CM

## 2021-11-15 DIAGNOSIS — Z87.891 HISTORY OF TOBACCO USE IN HOME: ICD-10-CM

## 2021-11-15 DIAGNOSIS — R06.02 SOB (SHORTNESS OF BREATH): ICD-10-CM

## 2021-11-15 PROCEDURE — G9717 DOC PT DX DEP/BP F/U NT REQ: HCPCS | Performed by: INTERNAL MEDICINE

## 2021-11-15 PROCEDURE — 1101F PT FALLS ASSESS-DOCD LE1/YR: CPT | Performed by: INTERNAL MEDICINE

## 2021-11-15 PROCEDURE — 99214 OFFICE O/P EST MOD 30 MIN: CPT | Performed by: INTERNAL MEDICINE

## 2021-11-15 PROCEDURE — G8752 SYS BP LESS 140: HCPCS | Performed by: INTERNAL MEDICINE

## 2021-11-15 PROCEDURE — G0008 ADMIN INFLUENZA VIRUS VAC: HCPCS | Performed by: INTERNAL MEDICINE

## 2021-11-15 PROCEDURE — G8754 DIAS BP LESS 90: HCPCS | Performed by: INTERNAL MEDICINE

## 2021-11-15 PROCEDURE — G8417 CALC BMI ABV UP PARAM F/U: HCPCS | Performed by: INTERNAL MEDICINE

## 2021-11-15 PROCEDURE — 1090F PRES/ABSN URINE INCON ASSESS: CPT | Performed by: INTERNAL MEDICINE

## 2021-11-15 PROCEDURE — G8536 NO DOC ELDER MAL SCRN: HCPCS | Performed by: INTERNAL MEDICINE

## 2021-11-15 PROCEDURE — G8427 DOCREV CUR MEDS BY ELIG CLIN: HCPCS | Performed by: INTERNAL MEDICINE

## 2021-11-15 PROCEDURE — 90694 VACC AIIV4 NO PRSRV 0.5ML IM: CPT | Performed by: INTERNAL MEDICINE

## 2021-11-15 RX ORDER — UMECLIDINIUM BROMIDE AND VILANTEROL TRIFENATATE 62.5; 25 UG/1; UG/1
1 POWDER RESPIRATORY (INHALATION) DAILY
Qty: 1 EACH | Refills: 0 | Status: SHIPPED | COMMUNITY
Start: 2021-11-15 | End: 2022-06-21 | Stop reason: SDUPTHER

## 2021-11-19 RX ORDER — METOPROLOL TARTRATE 50 MG/1
TABLET ORAL
Qty: 180 TABLET | Refills: 0 | Status: SHIPPED | OUTPATIENT
Start: 2021-11-19 | End: 2022-02-22

## 2021-12-01 ENCOUNTER — HOSPITAL ENCOUNTER (OUTPATIENT)
Dept: CT IMAGING | Age: 81
Discharge: HOME | End: 2021-12-01
Attending: INTERNAL MEDICINE
Payer: COMMERCIAL

## 2021-12-01 DIAGNOSIS — R06.02 SOB (SHORTNESS OF BREATH): ICD-10-CM

## 2021-12-01 DIAGNOSIS — Z87.891 HISTORY OF TOBACCO USE IN HOME: ICD-10-CM

## 2021-12-01 PROCEDURE — 71250 CT THORAX DX C-: CPT

## 2021-12-03 LAB
ABO + RH BLD: NORMAL
ALBUMIN SERPL-MCNC: 3.5 G/DL (ref 3.5–5)
ALBUMIN/GLOB SERPL: 0.8 {RATIO} (ref 1.1–2.2)
ALP SERPL-CCNC: 74 U/L (ref 45–117)
ALT SERPL-CCNC: 22 U/L (ref 12–78)
ANION GAP SERPL CALC-SCNC: 4 MMOL/L (ref 5–15)
AST SERPL-CCNC: 19 U/L (ref 15–37)
BASOPHILS # BLD: 0 K/UL (ref 0–0.1)
BASOPHILS NFR BLD: 0 % (ref 0–1)
BILIRUB SERPL-MCNC: 0.6 MG/DL (ref 0.2–1)
BLOOD GROUP ANTIBODIES SERPL: NORMAL
BUN SERPL-MCNC: 22 MG/DL (ref 6–20)
BUN/CREAT SERPL: 19 (ref 12–20)
CALCIUM SERPL-MCNC: 9.8 MG/DL (ref 8.5–10.1)
CHLORIDE SERPL-SCNC: 110 MMOL/L (ref 97–108)
CO2 SERPL-SCNC: 26 MMOL/L (ref 21–32)
CREAT SERPL-MCNC: 1.17 MG/DL (ref 0.55–1.02)
DIFFERENTIAL METHOD BLD: ABNORMAL
EOSINOPHIL # BLD: 0.4 K/UL (ref 0–0.4)
EOSINOPHIL NFR BLD: 3 % (ref 0–7)
ERYTHROCYTE [DISTWIDTH] IN BLOOD BY AUTOMATED COUNT: 17.1 % (ref 11.5–14.5)
GLOBULIN SER CALC-MCNC: 4.2 G/DL (ref 2–4)
GLUCOSE SERPL-MCNC: 160 MG/DL (ref 65–100)
HCT VFR BLD AUTO: 37.7 % (ref 35–47)
HGB BLD-MCNC: 11.8 G/DL (ref 11.5–16)
IMM GRANULOCYTES # BLD AUTO: 0 K/UL (ref 0–0.04)
IMM GRANULOCYTES NFR BLD AUTO: 0 % (ref 0–0.5)
LYMPHOCYTES # BLD: 3.1 K/UL (ref 0.8–3.5)
LYMPHOCYTES NFR BLD: 28 % (ref 12–49)
MCH RBC QN AUTO: 26.2 PG (ref 26–34)
MCHC RBC AUTO-ENTMCNC: 31.3 G/DL (ref 30–36.5)
MCV RBC AUTO: 83.6 FL (ref 80–99)
MONOCYTES # BLD: 1 K/UL (ref 0–1)
MONOCYTES NFR BLD: 9 % (ref 5–13)
NEUTS SEG # BLD: 6.5 K/UL (ref 1.8–8)
NEUTS SEG NFR BLD: 60 % (ref 32–75)
NRBC # BLD: 0 K/UL (ref 0–0.01)
NRBC BLD-RTO: 0 PER 100 WBC
PLATELET # BLD AUTO: 239 K/UL (ref 150–400)
PMV BLD AUTO: 12.8 FL (ref 8.9–12.9)
POTASSIUM SERPL-SCNC: 4.4 MMOL/L (ref 3.5–5.1)
PROT SERPL-MCNC: 7.7 G/DL (ref 6.4–8.2)
RBC # BLD AUTO: 4.51 M/UL (ref 3.8–5.2)
SODIUM SERPL-SCNC: 140 MMOL/L (ref 136–145)
SPECIMEN EXP DATE BLD: NORMAL
WBC # BLD AUTO: 11 K/UL (ref 3.6–11)

## 2021-12-03 PROCEDURE — 85025 COMPLETE CBC W/AUTO DIFF WBC: CPT

## 2021-12-03 PROCEDURE — 99282 EMERGENCY DEPT VISIT SF MDM: CPT

## 2021-12-03 PROCEDURE — 36415 COLL VENOUS BLD VENIPUNCTURE: CPT

## 2021-12-03 PROCEDURE — 80053 COMPREHEN METABOLIC PANEL: CPT

## 2021-12-03 PROCEDURE — 86900 BLOOD TYPING SEROLOGIC ABO: CPT

## 2021-12-04 ENCOUNTER — HOSPITAL ENCOUNTER (OUTPATIENT)
Age: 81
Setting detail: OBSERVATION
Discharge: HOME | End: 2021-12-06
Attending: EMERGENCY MEDICINE | Admitting: HOSPITALIST
Payer: COMMERCIAL

## 2021-12-04 ENCOUNTER — APPOINTMENT (OUTPATIENT)
Dept: CT IMAGING | Age: 81
End: 2021-12-04
Attending: EMERGENCY MEDICINE
Payer: COMMERCIAL

## 2021-12-04 DIAGNOSIS — K57.31 DIVERTICULOSIS OF LARGE INTESTINE WITH HEMORRHAGE: Primary | ICD-10-CM

## 2021-12-04 PROBLEM — K92.2 GI BLEED: Status: ACTIVE | Noted: 2021-12-04

## 2021-12-04 LAB
ALBUMIN SERPL-MCNC: 2.9 G/DL (ref 3.5–5)
ALBUMIN/GLOB SERPL: 0.8 {RATIO} (ref 1.1–2.2)
ALP SERPL-CCNC: 64 U/L (ref 45–117)
ALT SERPL-CCNC: 18 U/L (ref 12–78)
ANION GAP SERPL CALC-SCNC: 6 MMOL/L (ref 5–15)
AST SERPL-CCNC: 15 U/L (ref 15–37)
BASOPHILS # BLD: 0 K/UL (ref 0–0.1)
BASOPHILS NFR BLD: 0 % (ref 0–1)
BILIRUB SERPL-MCNC: 0.6 MG/DL (ref 0.2–1)
BNP SERPL-MCNC: 133 PG/ML
BUN SERPL-MCNC: 20 MG/DL (ref 6–20)
BUN/CREAT SERPL: 21 (ref 12–20)
CALCIUM SERPL-MCNC: 9.3 MG/DL (ref 8.5–10.1)
CHLORIDE SERPL-SCNC: 110 MMOL/L (ref 97–108)
CHOLEST SERPL-MCNC: 121 MG/DL
CO2 SERPL-SCNC: 25 MMOL/L (ref 21–32)
COVID-19 RAPID TEST, COVR: NOT DETECTED
CREAT SERPL-MCNC: 0.96 MG/DL (ref 0.55–1.02)
DIFFERENTIAL METHOD BLD: ABNORMAL
EOSINOPHIL # BLD: 0.3 K/UL (ref 0–0.4)
EOSINOPHIL NFR BLD: 3 % (ref 0–7)
ERYTHROCYTE [DISTWIDTH] IN BLOOD BY AUTOMATED COUNT: 17.2 % (ref 11.5–14.5)
GLOBULIN SER CALC-MCNC: 3.7 G/DL (ref 2–4)
GLUCOSE BLD STRIP.AUTO-MCNC: 159 MG/DL (ref 65–117)
GLUCOSE BLD STRIP.AUTO-MCNC: 176 MG/DL (ref 65–117)
GLUCOSE SERPL-MCNC: 119 MG/DL (ref 65–100)
HCT VFR BLD AUTO: 32.5 % (ref 35–47)
HCT VFR BLD AUTO: 38.1 % (ref 35–47)
HDLC SERPL-MCNC: 50 MG/DL
HDLC SERPL: 2.4 {RATIO} (ref 0–5)
HEMOCCULT STL QL: POSITIVE
HGB BLD-MCNC: 10.3 G/DL (ref 11.5–16)
HGB BLD-MCNC: 11.8 G/DL (ref 11.5–16)
IMM GRANULOCYTES # BLD AUTO: 0 K/UL (ref 0–0.04)
IMM GRANULOCYTES NFR BLD AUTO: 0 % (ref 0–0.5)
INR PPP: 1.1 (ref 0.9–1.1)
LDLC SERPL CALC-MCNC: 50.6 MG/DL (ref 0–100)
LYMPHOCYTES # BLD: 2.7 K/UL (ref 0.8–3.5)
LYMPHOCYTES NFR BLD: 32 % (ref 12–49)
MCH RBC QN AUTO: 26.5 PG (ref 26–34)
MCHC RBC AUTO-ENTMCNC: 31.7 G/DL (ref 30–36.5)
MCV RBC AUTO: 83.5 FL (ref 80–99)
MONOCYTES # BLD: 0.9 K/UL (ref 0–1)
MONOCYTES NFR BLD: 10 % (ref 5–13)
NEUTS SEG # BLD: 4.8 K/UL (ref 1.8–8)
NEUTS SEG NFR BLD: 55 % (ref 32–75)
NRBC # BLD: 0 K/UL (ref 0–0.01)
NRBC BLD-RTO: 0 PER 100 WBC
PLATELET # BLD AUTO: 219 K/UL (ref 150–400)
PMV BLD AUTO: 12.1 FL (ref 8.9–12.9)
POTASSIUM SERPL-SCNC: 3.9 MMOL/L (ref 3.5–5.1)
PROT SERPL-MCNC: 6.6 G/DL (ref 6.4–8.2)
PROTHROMBIN TIME: 11.1 SEC (ref 9–11.1)
RBC # BLD AUTO: 3.89 M/UL (ref 3.8–5.2)
SERVICE CMNT-IMP: ABNORMAL
SERVICE CMNT-IMP: ABNORMAL
SODIUM SERPL-SCNC: 141 MMOL/L (ref 136–145)
SOURCE, COVRS: NORMAL
TRIGL SERPL-MCNC: 102 MG/DL (ref ?–150)
TSH SERPL DL<=0.05 MIU/L-ACNC: 0.65 UIU/ML (ref 0.36–3.74)
VLDLC SERPL CALC-MCNC: 20.4 MG/DL
WBC # BLD AUTO: 8.7 K/UL (ref 3.6–11)

## 2021-12-04 PROCEDURE — 65270000029 HC RM PRIVATE

## 2021-12-04 PROCEDURE — 85610 PROTHROMBIN TIME: CPT

## 2021-12-04 PROCEDURE — 74011636637 HC RX REV CODE- 636/637: Performed by: NURSE PRACTITIONER

## 2021-12-04 PROCEDURE — 94760 N-INVAS EAR/PLS OXIMETRY 1: CPT

## 2021-12-04 PROCEDURE — 74011000636 HC RX REV CODE- 636: Performed by: EMERGENCY MEDICINE

## 2021-12-04 PROCEDURE — 74011250637 HC RX REV CODE- 250/637: Performed by: NURSE PRACTITIONER

## 2021-12-04 PROCEDURE — 36415 COLL VENOUS BLD VENIPUNCTURE: CPT

## 2021-12-04 PROCEDURE — C9113 INJ PANTOPRAZOLE SODIUM, VIA: HCPCS | Performed by: NURSE PRACTITIONER

## 2021-12-04 PROCEDURE — 96374 THER/PROPH/DIAG INJ IV PUSH: CPT

## 2021-12-04 PROCEDURE — 94640 AIRWAY INHALATION TREATMENT: CPT

## 2021-12-04 PROCEDURE — 82962 GLUCOSE BLOOD TEST: CPT

## 2021-12-04 PROCEDURE — 85025 COMPLETE CBC W/AUTO DIFF WBC: CPT

## 2021-12-04 PROCEDURE — G0378 HOSPITAL OBSERVATION PER HR: HCPCS

## 2021-12-04 PROCEDURE — 74178 CT ABD&PLV WO CNTR FLWD CNTR: CPT

## 2021-12-04 PROCEDURE — 74011000250 HC RX REV CODE- 250: Performed by: NURSE PRACTITIONER

## 2021-12-04 PROCEDURE — 80061 LIPID PANEL: CPT

## 2021-12-04 PROCEDURE — 83880 ASSAY OF NATRIURETIC PEPTIDE: CPT

## 2021-12-04 PROCEDURE — 87635 SARS-COV-2 COVID-19 AMP PRB: CPT

## 2021-12-04 PROCEDURE — 82272 OCCULT BLD FECES 1-3 TESTS: CPT

## 2021-12-04 PROCEDURE — 80053 COMPREHEN METABOLIC PANEL: CPT

## 2021-12-04 PROCEDURE — 84443 ASSAY THYROID STIM HORMONE: CPT

## 2021-12-04 PROCEDURE — 74011250636 HC RX REV CODE- 250/636: Performed by: NURSE PRACTITIONER

## 2021-12-04 PROCEDURE — 85018 HEMOGLOBIN: CPT

## 2021-12-04 PROCEDURE — 96376 TX/PRO/DX INJ SAME DRUG ADON: CPT

## 2021-12-04 RX ORDER — MELATONIN
1000 DAILY
Status: DISCONTINUED | OUTPATIENT
Start: 2021-12-04 | End: 2021-12-06 | Stop reason: HOSPADM

## 2021-12-04 RX ORDER — ARFORMOTEROL TARTRATE 15 UG/2ML
15 SOLUTION RESPIRATORY (INHALATION)
Status: DISCONTINUED | OUTPATIENT
Start: 2021-12-04 | End: 2021-12-06

## 2021-12-04 RX ORDER — MAGNESIUM SULFATE 100 %
4 CRYSTALS MISCELLANEOUS AS NEEDED
Status: DISCONTINUED | OUTPATIENT
Start: 2021-12-04 | End: 2021-12-06 | Stop reason: HOSPADM

## 2021-12-04 RX ORDER — AMLODIPINE BESYLATE 5 MG/1
5 TABLET ORAL DAILY
Status: DISCONTINUED | OUTPATIENT
Start: 2021-12-04 | End: 2021-12-06 | Stop reason: HOSPADM

## 2021-12-04 RX ORDER — GABAPENTIN 100 MG/1
200 CAPSULE ORAL 2 TIMES DAILY
Status: DISCONTINUED | OUTPATIENT
Start: 2021-12-04 | End: 2021-12-06 | Stop reason: HOSPADM

## 2021-12-04 RX ORDER — IPRATROPIUM BROMIDE AND ALBUTEROL SULFATE 2.5; .5 MG/3ML; MG/3ML
3 SOLUTION RESPIRATORY (INHALATION)
Status: DISCONTINUED | OUTPATIENT
Start: 2021-12-04 | End: 2021-12-06 | Stop reason: HOSPADM

## 2021-12-04 RX ORDER — LEVOTHYROXINE SODIUM 100 UG/1
TABLET ORAL
Qty: 90 TABLET | Refills: 0 | Status: SHIPPED | OUTPATIENT
Start: 2021-12-04 | End: 2022-02-22

## 2021-12-04 RX ORDER — LANOLIN ALCOHOL/MO/W.PET/CERES
1000 CREAM (GRAM) TOPICAL DAILY
Status: DISCONTINUED | OUTPATIENT
Start: 2021-12-04 | End: 2021-12-06 | Stop reason: HOSPADM

## 2021-12-04 RX ORDER — SODIUM CHLORIDE 9 MG/ML
100 INJECTION, SOLUTION INTRAVENOUS CONTINUOUS
Status: DISCONTINUED | OUTPATIENT
Start: 2021-12-04 | End: 2021-12-06 | Stop reason: HOSPADM

## 2021-12-04 RX ORDER — SODIUM CHLORIDE 0.9 % (FLUSH) 0.9 %
5-40 SYRINGE (ML) INJECTION AS NEEDED
Status: DISCONTINUED | OUTPATIENT
Start: 2021-12-04 | End: 2021-12-06 | Stop reason: HOSPADM

## 2021-12-04 RX ORDER — ATORVASTATIN CALCIUM 40 MG/1
80 TABLET, FILM COATED ORAL
Status: DISCONTINUED | OUTPATIENT
Start: 2021-12-04 | End: 2021-12-06 | Stop reason: HOSPADM

## 2021-12-04 RX ORDER — IPRATROPIUM BROMIDE 0.5 MG/2.5ML
0.5 SOLUTION RESPIRATORY (INHALATION)
Status: DISCONTINUED | OUTPATIENT
Start: 2021-12-04 | End: 2021-12-06

## 2021-12-04 RX ORDER — SODIUM CHLORIDE 0.9 % (FLUSH) 0.9 %
5-40 SYRINGE (ML) INJECTION EVERY 8 HOURS
Status: DISCONTINUED | OUTPATIENT
Start: 2021-12-04 | End: 2021-12-06 | Stop reason: HOSPADM

## 2021-12-04 RX ORDER — INSULIN GLARGINE 100 [IU]/ML
16 INJECTION, SOLUTION SUBCUTANEOUS DAILY
Status: DISCONTINUED | OUTPATIENT
Start: 2021-12-04 | End: 2021-12-06 | Stop reason: HOSPADM

## 2021-12-04 RX ORDER — METOPROLOL TARTRATE 50 MG/1
50 TABLET ORAL 2 TIMES DAILY
Status: DISCONTINUED | OUTPATIENT
Start: 2021-12-04 | End: 2021-12-06 | Stop reason: HOSPADM

## 2021-12-04 RX ORDER — INSULIN LISPRO 100 [IU]/ML
INJECTION, SOLUTION INTRAVENOUS; SUBCUTANEOUS
Status: DISCONTINUED | OUTPATIENT
Start: 2021-12-04 | End: 2021-12-06 | Stop reason: HOSPADM

## 2021-12-04 RX ORDER — LEVOTHYROXINE SODIUM 100 UG/1
100 TABLET ORAL
Status: DISCONTINUED | OUTPATIENT
Start: 2021-12-04 | End: 2021-12-06 | Stop reason: HOSPADM

## 2021-12-04 RX ORDER — DEXTROSE 50 % IN WATER (D50W) INTRAVENOUS SYRINGE
12.5-25 AS NEEDED
Status: DISCONTINUED | OUTPATIENT
Start: 2021-12-04 | End: 2021-12-06 | Stop reason: HOSPADM

## 2021-12-04 RX ORDER — EZETIMIBE 10 MG/1
10 TABLET ORAL DAILY
Status: DISCONTINUED | OUTPATIENT
Start: 2021-12-04 | End: 2021-12-06 | Stop reason: HOSPADM

## 2021-12-04 RX ORDER — INSULIN LISPRO 100 [IU]/ML
2 INJECTION, SOLUTION INTRAVENOUS; SUBCUTANEOUS
Status: DISCONTINUED | OUTPATIENT
Start: 2021-12-04 | End: 2021-12-06 | Stop reason: HOSPADM

## 2021-12-04 RX ORDER — ONDANSETRON 2 MG/ML
4 INJECTION INTRAMUSCULAR; INTRAVENOUS
Status: DISCONTINUED | OUTPATIENT
Start: 2021-12-04 | End: 2021-12-06 | Stop reason: HOSPADM

## 2021-12-04 RX ORDER — POLYETHYLENE GLYCOL 3350, SODIUM SULFATE ANHYDROUS, SODIUM BICARBONATE, SODIUM CHLORIDE, POTASSIUM CHLORIDE 236; 22.74; 6.74; 5.86; 2.97 G/4L; G/4L; G/4L; G/4L; G/4L
4000 POWDER, FOR SOLUTION ORAL ONCE
Status: COMPLETED | OUTPATIENT
Start: 2021-12-05 | End: 2021-12-05

## 2021-12-04 RX ADMIN — AMLODIPINE BESYLATE 5 MG: 5 TABLET ORAL at 11:25

## 2021-12-04 RX ADMIN — ARFORMOTEROL TARTRATE 15 MCG: 15 SOLUTION RESPIRATORY (INHALATION) at 07:39

## 2021-12-04 RX ADMIN — METOPROLOL TARTRATE 50 MG: 50 TABLET, FILM COATED ORAL at 11:25

## 2021-12-04 RX ADMIN — IPRATROPIUM BROMIDE 0.5 MG: 0.5 SOLUTION RESPIRATORY (INHALATION) at 17:51

## 2021-12-04 RX ADMIN — GABAPENTIN 200 MG: 300 CAPSULE ORAL at 11:25

## 2021-12-04 RX ADMIN — IOPAMIDOL 100 ML: 755 INJECTION, SOLUTION INTRAVENOUS at 01:16

## 2021-12-04 RX ADMIN — IPRATROPIUM BROMIDE 0.5 MG: 0.5 SOLUTION RESPIRATORY (INHALATION) at 13:22

## 2021-12-04 RX ADMIN — SODIUM CHLORIDE 100 ML/HR: 9 INJECTION, SOLUTION INTRAVENOUS at 04:00

## 2021-12-04 RX ADMIN — Medication 10 ML: at 05:15

## 2021-12-04 RX ADMIN — Medication 10 ML: at 14:36

## 2021-12-04 RX ADMIN — ARFORMOTEROL TARTRATE 15 MCG: 15 SOLUTION RESPIRATORY (INHALATION) at 17:51

## 2021-12-04 RX ADMIN — SODIUM CHLORIDE 100 ML/HR: 9 INJECTION, SOLUTION INTRAVENOUS at 15:09

## 2021-12-04 RX ADMIN — METOPROLOL TARTRATE 50 MG: 50 TABLET, FILM COATED ORAL at 19:52

## 2021-12-04 RX ADMIN — LEVOTHYROXINE SODIUM 100 MCG: 0.1 TABLET ORAL at 11:25

## 2021-12-04 RX ADMIN — INSULIN LISPRO 2 UNITS: 100 INJECTION, SOLUTION INTRAVENOUS; SUBCUTANEOUS at 17:08

## 2021-12-04 RX ADMIN — GABAPENTIN 200 MG: 300 CAPSULE ORAL at 19:52

## 2021-12-04 RX ADMIN — SODIUM CHLORIDE 40 MG: 9 INJECTION INTRAMUSCULAR; INTRAVENOUS; SUBCUTANEOUS at 20:39

## 2021-12-04 RX ADMIN — Medication 10 ML: at 20:39

## 2021-12-04 RX ADMIN — INSULIN GLARGINE 16 UNITS: 100 INJECTION, SOLUTION SUBCUTANEOUS at 11:29

## 2021-12-04 RX ADMIN — SODIUM CHLORIDE 40 MG: 9 INJECTION INTRAMUSCULAR; INTRAVENOUS; SUBCUTANEOUS at 05:15

## 2021-12-04 RX ADMIN — EZETIMIBE 10 MG: 10 TABLET ORAL at 11:25

## 2021-12-04 RX ADMIN — IPRATROPIUM BROMIDE 0.5 MG: 0.5 SOLUTION RESPIRATORY (INHALATION) at 07:39

## 2021-12-04 RX ADMIN — ATORVASTATIN CALCIUM 80 MG: 40 TABLET, FILM COATED ORAL at 20:38

## 2021-12-04 NOTE — H&P
Hospitalist Admission Note    NAME: Rosalie Duarte   :  1940   MRN:  [id number]     Date/Time:  2021 3:01 AM    Patient PCP: Zeeshan Hurst MD  ______________________________________________________________________  Given the patient's current clinical presentation, I have a high level of concern for decompensation if discharged from the emergency department. Complex decision making was performed, which includes reviewing the patient's available past medical records, laboratory results, and x-ray films. Assessment / Plan:      GI bleed (2021)    Anemia (10/11/2015)  · Admit to telemetry  · GI consult-spoke with Dr. Bassem Ramirez  · Protonix 40 mg IV twice daily  · Serial hemoglobin and hematocrit checks  · Hold home aspirin      Hypothyroidism (2009)  · Chronic  · Continue levothyroxine  · Check TSH level      Essential hypertension, benign (2012)  · Continue home metoprolol and amlodipine        Mixed hyperlipidemia (2012)  · Patient on Zetia and Lipitor at home  · We will continue both  · Check lipid profile   ·           Diastolic CHF, chronic (HCC) (10/11/2015)  · Check NT proBNP  · Continue metoprolol        [facility] (2018)  · Current glucose 160  · Continue home long-acting insulin  · Check before meals and at bedtime blood glucose with sliding scale coverage              Code Status: Full code   Surrogate Decision Maker: Shireen Bergman (daughter) [phone]    DVT Prophylaxis: Not indicated due to GI bleed  GI Prophylaxis: Protonix    Activity at baseline: Independent    Lives with: Daughter lives with her        Subjective:     CHIEF COMPLAINT: rectal bleeding    HISTORY OF PRESENT ILLNESS:     Antoinette Schulz is a [de-identified] y.o.  BLACK/ female with medical history including but not limited to hyperlipidemia, hypertension, diabetes, hypothyroidism, congestive heart failure who presents to emergency room with complaint of rectal bleeding with bowel movements maroon-colored stools. Patient states this started last evening around 5 PM.  She does have history of diverticulitis and diverticular bleed however patient states that was back in 2016 and the bleeding was less than what is occurring now. CT abdomen pelvis done in the emergency department with and without contrast and reports no active GI bleed. Extensive diffuse colonic diverticulosis without diverticulitis. We were asked to admit for work up and evaluation of the above problems.       Past Medical History:   Diagnosis Date    Anxiety     CAD (coronary artery disease)     cabg     Congestive heart failure (HCC)     Diabetes (HCC)     Diverticulosis     GERD (gastroesophageal reflux disease)     Heart disease     Heart failure ([facility] 10/18/2011    High cholesterol     Hypertension     Hypothyroidism     Kidney stones     Menopause     Myocardial infarction ([facility]     Postsurgical percutaneous transluminal coronary angioplasty status 2012    S/P CABG x 3 11    [facility]    Sleeping difficulty     Teeth decayed     Weakness         Past Surgical History:   Procedure Laterality Date    COLONOSCOPY N/A 10/31/2018    COLONOSCOPY performed by [facility]le [facility] at [facility] ENDOSCOPY    HX CORONARY ARTERY BYPASS GRAFT  11    3 vessel    HX GYN      hysterectomy    HX HEART CATHETERIZATION      HX HYSTERECTOMY      HX UROLOGICAL  1970s    kidney stone    OK CARDIAC SURG PROCEDURE UNLIST      cabg x 3    VASCULAR SURGERY PROCEDURE UNLIST      Stents put in right leg       Social History     Tobacco Use    Smoking status: Former Smoker     Packs/day: 0.50     Years: 40.00     Pack years: 20.00     Quit date: 2010     Years since quittin.2    Smokeless tobacco: Never Used   Substance Use Topics    Alcohol use: No        Family History   Problem Relation Age of Onset    Diabetes Mother     Heart Disease Mother    Joanaaron Keven Diabetes Brother     Heart Disease Brother     Mental Retardation Brother     Hypertension Brother     Other Father     Cancer Sister     Diabetes Brother     Heart Disease Brother     Diabetes Brother     Breast Cancer Daughter         52's     Allergies   Allergen Reactions    Ace Inhibitors Swelling    Arb-Angiotensin Receptor Antagonist Swelling    Lisinopril Swelling    Plavix [Clopidogrel] Other (comments)     Excessive bleeding    Potassium Nausea and Vomiting     Potassium containing oral products are not well tolerated by patient        Prior to Admission medications    Medication Sig Start Date End Date Taking? Authorizing Provider   metoprolol tartrate (LOPRESSOR) 50 mg tablet Take 1 tablet by mouth twice daily 11/19/21   Nancy Silver MD   umeclidinium-vilanteroL (Anoro Ellipta) 62.5-25 mcg/actuation inhaler Take 1 Puff by inhalation daily. 11/15/21   Nancy Silver MD   metFORMIN (GLUCOPHAGE) 1,000 mg tablet TAKE 1 TABLET BY MOUTH TWICE DAILY WITH MEALS 11/10/21   Nancy Silver MD   atorvastatin (LIPITOR) 80 mg tablet Take 1 tablet by mouth nightly 10/27/21   Nancy Silver MD   amLODIPine [facility]) 5 mg tablet Take 1 tablet by mouth once daily 10/27/21   Nancy Silver MD   ezetimibe (ZETIA) 10 mg tablet Take 1 tablet by mouth once daily 10/19/21   Nancy Silver MD   omeprazole (PRILOSEC) 20 mg capsule Take 1 capsule by mouth once daily 9/3/21   Nancy Silver MD   insulin NPH/insulin regular (NOVOLIN 70/30, HUMULIN 70/30) 100 unit/mL (70-30) injection Inject 14 units under the skin twice daily. Please dispense Relion brand.   Indications: type 2 diabetes mellitus 8/25/21   Nancy Silver MD   Euthyrox 100 mcg tablet TAKE 1 TABLET BY MOUTH ONCE DAILY BEFORE BREAKFAST 8/2/21   Nancy Silver MD   gabapentin (NEURONTIN) 100 mg capsule Take 2 capsules by mouth twice daily 7/13/21   Nancy Silver MD   FreeStyle Ruben 14 Day Sensor kit USE TO [place] GLUCOSE TWICE DAILY 3/29/21   Adore Womack MD   albuterol (PROVENTIL HFA, VENTOLIN HFA, PROAIR HFA) 90 mcg/actuation inhaler Take 1 Puff by inhalation every six (6) hours as needed for Wheezing. 2/3/21   Adore Womack MD   cholecalciferol (VITAMIN D3) 1,000 unit cap Take 1,000 Units by mouth daily. Provider, Historical   aspirin 81 mg chewable tablet Take 1 Tab by mouth daily. 1/4/16   Adore Womack MD   cyanocobalamin (VITAMIN B-12) 1,000 mcg tablet Take 1,000 mcg by mouth daily. Provider, Historical       REVIEW OF SYSTEMS:         Review of Systems   Constitutional: Negative for activity change, chills, diaphoresis, fatigue, fever and unexpected weight change. Eyes: Negative for pain. Respiratory: Negative for cough, chest tightness, shortness of breath and wheezing. Cardiovascular: Negative for chest pain and palpitations. Gastrointestinal: Positive for anal bleeding and blood in stool. Negative for abdominal pain, constipation, diarrhea, nausea and vomiting. Genitourinary: Negative for difficulty urinating, dysuria, frequency and hematuria. Musculoskeletal: Positive for arthralgias. Neurological: Negative for dizziness, syncope and headaches. Objective:   VITALS:    Visit Vitals  BP (!) 151/90 (BP 1 Location: Left arm, BP Patient Position: At rest)   Pulse 68   Temp 98.2 °F (36.8 °C)   Resp 20   Ht 5' 2\" (1.575 m)   Wt 80.7 kg (177 lb 14.6 oz)   SpO2 99%   BMI 32.54 kg/m²           Physical Exam  HENT:      Head: Normocephalic and atraumatic. Right Ear: External ear normal.      Left Ear: External ear normal.      Nose: Nose normal.      Mouth/Throat:      Mouth: Mucous membranes are moist.   Eyes:      Conjunctiva/sclera: Conjunctivae normal.   Cardiovascular:      Rate and Rhythm: Normal rate and regular rhythm. Pulmonary:      Effort: Pulmonary effort is normal.      Breath sounds: Normal breath sounds.    Abdominal:      General: Bowel sounds are normal.      Palpations: Abdomen is soft. Musculoskeletal:      Right lower leg: No edema. Left lower leg: No edema. Skin:     General: Skin is warm and dry. Capillary Refill: Capillary refill takes less than 2 seconds. Neurological:      Mental Status: She is alert and oriented to person, place, and time. Cranial Nerves: No dysarthria or facial asymmetry. Psychiatric:         Mood and Affect: Mood normal.         Behavior: Behavior normal. Behavior is cooperative. Procedures: see electronic medical records for all procedures/Xrays and details which were not copied into this note but were reviewed prior to creation of Plan. Recent Imaging studies(If Any)    CXR Results  (Last 48 hours)    None           Echo Results  (Last 48 hours)    None           CT Results  (Last 48 hours)               12/04/21 0115  CT ABD PELV W WO CONT Final result    Impression:  No active GI bleed. Extensive diffuse colonic diverticulosis without   diverticulitis. Narrative:  EXAM:  CT abdomen pelvis with without contrast       INDICATION: Rectal bleeding       COMPARISON: CT 10/8/2015. TECHNIQUE: Helical CT of the abdomen  and pelvis without and with intravenous   contrast.  Coronal and sagittal reformats are performed. CT dose reduction was   achieved through use of a standardized protocol tailored for this examination   and automatic exposure control for dose modulation. FINDINGS:   Noncontrast:   There is no abnormal density in the small or large bowel. Post contrast:   There is no active contrast extravasation in the small or large bowel. There is   extensive diffuse colonic diverticulosis without focal adjacent inflammation. There are no dilated bowel loops. The appendix is not visualized. The visualized lung bases demonstrate no mass or consolidation. The heart size   is normal. There is no pericardial or pleural effusion.        The liver, spleen, pancreas, and adrenal glands are normal. The gall bladder is   present  without intra- or extra-hepatic biliary dilatation. There is stable left kidney cortical thinning. The kidneys otherwise enhance   symmetrically. There is no hydronephrosis. There are bilateral kidney cysts   measuring up to 4.4 cm. No follow-up recommended. There are nonobstructing   bilateral renal calculi measuring up to 7 mm. There are no enlarged lymph nodes. There is no free fluid or free air. There is   mild infrarenal aortic aneurysm dilatation measuring 2.4 cm in diameter,   unchanged. There is aortoiliac atherosclerosis. Bilateral common iliac artery   stents are present. The urinary bladder is normal.  There is no pelvic mass. The uterus is   surgically absent. There is no aggressive bony lesion. EKG RESULTS     ** No results found for the last 720 hours. **           11/17/20    ECHO ADULT COMPLETE 11/18/2020 11/18/2020    Interpretation Summary  · LV: Estimated LVEF is 55 - 60%. Visually measured ejection fraction. Normal cavity size and systolic function (ejection fraction normal). Moderate concentric hypertrophy. · LA: Mildly dilated left atrium. · MV: Mild mitral valve regurgitation is present. · AV: Mild aortic valve regurgitation is present. Signed by: Taco Burk MD on 11/18/2020 10:54 AM           Recent Microbiology Data(If Any)  .   All Micro Results     None             LAB DATA REVIEWED:    Recent Results (from the past 24 hour(s))   CBC WITH AUTOMATED DIFF    Collection Time: 12/03/21 10:03 PM   Result Value Ref Range    WBC 11.0 3.6 - 11.0 K/uL    RBC 4.51 3.80 - 5.20 M/uL    HGB 11.8 11.5 - 16.0 g/dL    HCT 37.7 35.0 - 47.0 %    MCV 83.6 80.0 - 99.0 FL    MCH 26.2 26.0 - 34.0 PG    MCHC 31.3 30.0 - 36.5 g/dL    RDW 17.1 (H) 11.5 - 14.5 %    PLATELET 575 037 - 937 K/uL    MPV 12.8 8.9 - 12.9 FL    NRBC 0.0 0  WBC    ABSOLUTE NRBC 0.00 0.00 - 0.01 K/uL NEUTROPHILS 60 32 - 75 %    LYMPHOCYTES 28 12 - 49 %    MONOCYTES 9 5 - 13 %    EOSINOPHILS 3 0 - 7 %    BASOPHILS 0 0 - 1 %    IMMATURE GRANULOCYTES 0 0.0 - 0.5 %    ABS. NEUTROPHILS 6.5 1.8 - 8.0 K/UL    ABS. LYMPHOCYTES 3.1 0.8 - 3.5 K/UL    ABS. MONOCYTES 1.0 0.0 - 1.0 K/UL    ABS. EOSINOPHILS 0.4 0.0 - 0.4 K/UL    ABS. BASOPHILS 0.0 0.0 - 0.1 K/UL    ABS. IMM. GRANS. 0.0 0.00 - 0.04 K/UL    DF AUTOMATED     METABOLIC PANEL, COMPREHENSIVE    Collection Time: 12/03/21 10:03 PM   Result Value Ref Range    Sodium 140 136 - 145 mmol/L    Potassium 4.4 3.5 - 5.1 mmol/L    Chloride 110 (H) 97 - 108 mmol/L    CO2 26 21 - 32 mmol/L    Anion gap 4 (L) 5 - 15 mmol/L    Glucose 160 (H) 65 - 100 mg/dL    BUN 22 (H) 6 - 20 MG/DL    Creatinine 1.17 (H) 0.55 - 1.02 MG/DL    BUN/Creatinine ratio 19 12 - 20      GFR est AA 54 (L) >60 ml/min/1.73m2    GFR est non-AA 45 (L) >60 ml/min/1.73m2    Calcium 9.8 8.5 - 10.1 MG/DL    Bilirubin, total 0.6 0.2 - 1.0 MG/DL    ALT (SGPT) 22 12 - 78 U/L    AST (SGOT) 19 15 - 37 U/L    Alk.  phosphatase 74 45 - 117 U/L    Protein, total 7.7 6.4 - 8.2 g/dL    Albumin 3.5 3.5 - 5.0 g/dL    Globulin 4.2 (H) 2.0 - 4.0 g/dL    A-G Ratio 0.8 (L) 1.1 - 2.2     TYPE & SCREEN    Collection Time: 12/03/21 10:03 PM   Result Value Ref Range    Crossmatch Expiration 12/06/2021,2359     ABO/Rh(D) B NEGATIVE     Antibody screen NEG    OCCULT BLOOD, STOOL    Collection Time: 12/04/21 12:43 AM   Result Value Ref Range    Occult blood, stool Positive (A) NEG                    _______________________________________________________________________  Care Plan discussed with:    Comments   Patient x    Family      RN     Care Manager                    Consultant:      _______________________________________________________________________  Expected  Disposition:   Home with Family x   HH/PT/OT/RN    SNF/LTC    NEVIN    ________________________________________________________________________  TOTAL TIME:  39 Minutes    Critical Care Provided     Minutes non procedure based      Comments    x Reviewed previous records   >50% of visit spent in counseling and coordination of care x Discussion with patient and/or family and questions answered           Patient hemodynamically stable at time of admission    ________________________________________________________________________  Signed: Stephania Bumpers, DNP, [facility]    Please note that this note was dictated using Dragon computer voice recognition software. Quite often unanticipated grammatical, syntax, homophones, and other interpretive errors are inadvertently transcribed by the computer software. Please disregard these errors. Please excuse any errors that have escaped final proofreading.

## 2021-12-04 NOTE — ED NOTES
Pt presents to the ED c/o blood in stool. Pt states she has had dark red loose stools beginning yesterday. Pt denies abdominal pain, n/v, fevers or dysuria.

## 2021-12-04 NOTE — PROGRESS NOTES
Problem: Hypertension  Goal: *Blood pressure within specified parameters  Outcome: Progressing Towards Goal  Goal: *Fluid volume balance  Outcome: Progressing Towards Goal  Goal: *Labs within defined limits  Outcome: Progressing Towards Goal     Problem: Patient Education: Go to Patient Education Activity  Goal: Patient/Family Education  Outcome: Progressing Towards Goal     Problem: General Infection Care Plan (Adult and Pediatric)  Goal: Improvement in signs and symptoms of infection  Outcome: Progressing Towards Goal  Goal: *Optimize nutritional status  Outcome: Progressing Towards Goal     Problem: Patient Education: Go to Patient Education Activity  Goal: Patient/Family Education  Outcome: Progressing Towards Goal     Problem: Falls - Risk of  Goal: *Absence of Falls  Description: Document Rosa Fall Risk and appropriate interventions in the flowsheet.   Outcome: Progressing Towards Goal  Note: Fall Risk Interventions:                                Problem: Patient Education: Go to Patient Education Activity  Goal: Patient/Family Education  Outcome: Progressing Towards Goal

## 2021-12-04 NOTE — ED PROVIDER NOTES
EMERGENCY DEPARTMENT HISTORY AND PHYSICAL EXAM      Date: 12/4/2021  Patient Name: Curtis Alanis  Patient Age and Sex: [de-identified] y.o. female     History of Presenting Illness     Chief Complaint   Patient presents with    Rectal Bleeding     ED visit d/t rectal bleeding, dark stools - onset of sxs, 1700 - hx of diverticulitis - onging sob, recently had CT imaging completed - Denies recent steriods / recent PO Abx / abd pain / dizziness / Genora Inches / cp / fevers / N / V / D;;       History Provided By: Patient    HPI: Curtis Alanis is a [de-identified]year old history diverticulosis presenting with melena. She reports onset of bloody bowel movements at 1700 today. She is taking aspirin and plavix. Patient has had recent dyspnea. She reports at 1700, she noted maroon colored blood. She has had three maroon colored bowel movements since then. She reports there were not large in size. Last BM 30 minutes ago, appeared like clots in the stool. She takes 81 of aspirin of day. No other blood thinner use. No worse dyspnea. No abdominal pain. No nausea or vomiting. No alcohol use, no liver disease. She was admitted in 2019 for hematochezia, then colonoscopy showed diverticulosis. She reports she had a CT scan monday    CT chest WO contrast performed 12/1 was normal.    There are no other complaints, changes, or physical findings at this time. PCP: Zaid Fields MD    No current facility-administered medications on file prior to encounter. Current Outpatient Medications on File Prior to Encounter   Medication Sig Dispense Refill    metoprolol tartrate (LOPRESSOR) 50 mg tablet Take 1 tablet by mouth twice daily 180 Tablet 0    umeclidinium-vilanteroL (Anoro Ellipta) 62.5-25 mcg/actuation inhaler Take 1 Puff by inhalation daily.  1 Each 0    metFORMIN (GLUCOPHAGE) 1,000 mg tablet TAKE 1 TABLET BY MOUTH TWICE DAILY WITH MEALS 180 Tablet 0    atorvastatin (LIPITOR) 80 mg tablet Take 1 tablet by mouth nightly 90 Tablet 0    amLODIPine (NORVASC) 5 mg tablet Take 1 tablet by mouth once daily 90 Tablet 0    ezetimibe (ZETIA) 10 mg tablet Take 1 tablet by mouth once daily 90 Tablet 0    omeprazole (PRILOSEC) 20 mg capsule Take 1 capsule by mouth once daily 90 Capsule 0    insulin NPH/insulin regular (NOVOLIN 70/30, HUMULIN 70/30) 100 unit/mL (70-30) injection Inject 14 units under the skin twice daily. Please dispense Relion brand. Indications: type 2 diabetes mellitus 10 mL 2    Euthyrox 100 mcg tablet TAKE 1 TABLET BY MOUTH ONCE DAILY BEFORE BREAKFAST 90 Tablet 0    gabapentin (NEURONTIN) 100 mg capsule Take 2 capsules by mouth twice daily 120 Capsule 0    FreeStyle Ruben 14 Day Sensor kit USE TO CHECK GLUCOSE TWICE DAILY 300 Kit 0    albuterol (PROVENTIL HFA, VENTOLIN HFA, PROAIR HFA) 90 mcg/actuation inhaler Take 1 Puff by inhalation every six (6) hours as needed for Wheezing. 1 Inhaler 0    cholecalciferol (VITAMIN D3) 1,000 unit cap Take 1,000 Units by mouth daily.  aspirin 81 mg chewable tablet Take 1 Tab by mouth daily. 90 Tab 3    cyanocobalamin (VITAMIN B-12) 1,000 mcg tablet Take 1,000 mcg by mouth daily.          Past History     Past Medical History:  Past Medical History:   Diagnosis Date    Anxiety     CAD (coronary artery disease)     cabg     Congestive heart failure (HCC)     Diabetes ([facility]     Diverticulosis     GERD (gastroesophageal reflux disease)     Heart disease     Heart failure ([facility] 10/18/2011    High cholesterol     Hypertension     Hypothyroidism     Kidney stones     Menopause     Myocardial infarction ([facility]     Postsurgical percutaneous transluminal coronary angioplasty status 5/17/2012    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    [facility]    Sleeping difficulty     Teeth decayed     Weakness      Past Surgical History:  Past Surgical History:   Procedure Laterality Date    COLONOSCOPY N/A 10/31/2018    COLONOSCOPY performed [facility]    HX CORONARY ARTERY BYPASS GRAFT  11    3 vessel    HX GYN      hysterectomy    HX HEART CATHETERIZATION      HX HYSTERECTOMY      HX UROLOGICAL  1970s    kidney stone    VT CARDIAC SURG PROCEDURE UNLIST      cabg x 3    VASCULAR SURGERY PROCEDURE UNLIST      Stents put in right leg     Family History:  Family History   Problem Relation Age of Onset    Diabetes Mother     Heart Disease Mother     Diabetes Brother     Heart Disease Brother     Mental Retardation Brother     Hypertension Brother     Other Father     Cancer Sister     Diabetes Brother     Heart Disease Brother     Diabetes Brother     Breast Cancer Daughter         52's     Social History:  Social History     Tobacco Use    Smoking status: Former Smoker     Packs/day: 0.50     Years: 40.00     Pack years: 20.00     Quit date: 2010     Years since quittin.2    Smokeless tobacco: Never Used   Vaping Use    Vaping Use: Never used   Substance Use Topics    Alcohol use: No    Drug use: No     Allergies: Allergies   Allergen Reactions    Ace Inhibitors Swelling    Arb-Angiotensin Receptor Antagonist Swelling    Lisinopril Swelling    Plavix [Clopidogrel] Other (comments)     Excessive bleeding    Potassium Nausea and Vomiting     Potassium containing oral products are not well tolerated by patient       Review of Systems   Review of Systems   Gastrointestinal: Positive for blood in stool. Negative for abdominal pain, nausea and vomiting. All other systems reviewed and are negative. Physical Exam   Physical Exam  Vitals and nursing note reviewed. Constitutional:       General: She is not in acute distress. Appearance: Normal appearance. She is obese. She is not ill-appearing. HENT:      Head: Normocephalic and atraumatic.       Right Ear: External ear normal.      Left Ear: External ear normal.      Mouth/Throat:      Mouth: Mucous membranes are moist.   Eyes:      Conjunctiva/sclera: Conjunctivae normal.      Comments: No conjunctival pallor   Cardiovascular:      Rate and Rhythm: Normal rate and regular rhythm. Pulses: Normal pulses. Pulmonary:      Effort: Pulmonary effort is normal.   Abdominal:      General: Abdomen is flat. Palpations: Abdomen is soft. Tenderness: There is no abdominal tenderness. Genitourinary:     Comments: External hemorrhoid which is not bleeding, dark red blood in rectum  Musculoskeletal:         General: No deformity. Cervical back: Normal range of motion. Right lower leg: No edema. Left lower leg: No edema. Skin:     General: Skin is warm and dry. Coloration: Skin is not pale. Neurological:      Mental Status: She is alert and oriented to person, place, and time. Mental status is at baseline. Psychiatric:         Behavior: Behavior normal.         Thought Content: Thought content normal.         Diagnostic Study Results     Labs  Recent Results (from the past 12 hour(s))   CBC WITH AUTOMATED DIFF    Collection Time: 12/03/21 10:03 PM   Result Value Ref Range    WBC 11.0 3.6 - 11.0 K/uL    RBC 4.51 3.80 - 5.20 M/uL    HGB 11.8 11.5 - 16.0 g/dL    HCT 37.7 35.0 - 47.0 %    MCV 83.6 80.0 - 99.0 FL    MCH 26.2 26.0 - 34.0 PG    MCHC 31.3 30.0 - 36.5 g/dL    RDW 17.1 (H) 11.5 - 14.5 %    PLATELET 457 035 - 229 K/uL    MPV 12.8 8.9 - 12.9 FL    NRBC 0.0 0  WBC    ABSOLUTE NRBC 0.00 0.00 - 0.01 K/uL    NEUTROPHILS 60 32 - 75 %    LYMPHOCYTES 28 12 - 49 %    MONOCYTES 9 5 - 13 %    EOSINOPHILS 3 0 - 7 %    BASOPHILS 0 0 - 1 %    IMMATURE GRANULOCYTES 0 0.0 - 0.5 %    ABS. NEUTROPHILS 6.5 1.8 - 8.0 K/UL    ABS. LYMPHOCYTES 3.1 0.8 - 3.5 K/UL    ABS. MONOCYTES 1.0 0.0 - 1.0 K/UL    ABS. EOSINOPHILS 0.4 0.0 - 0.4 K/UL    ABS. BASOPHILS 0.0 0.0 - 0.1 K/UL    ABS. IMM.  GRANS. 0.0 0.00 - 0.04 K/UL    DF AUTOMATED     METABOLIC PANEL, COMPREHENSIVE    Collection Time: 12/03/21 10:03 PM   Result Value Ref Range    Sodium 140 136 - 145 mmol/L    Potassium 4.4 3.5 - 5.1 mmol/L    Chloride 110 (H) 97 - 108 mmol/L    CO2 26 21 - 32 mmol/L    Anion gap 4 (L) 5 - 15 mmol/L    Glucose 160 (H) 65 - 100 mg/dL    BUN 22 (H) 6 - 20 MG/DL    Creatinine 1.17 (H) 0.55 - 1.02 MG/DL    BUN/Creatinine ratio 19 12 - 20      GFR est AA 54 (L) >60 ml/min/1.73m2    GFR est non-AA 45 (L) >60 ml/min/1.73m2    Calcium 9.8 8.5 - 10.1 MG/DL    Bilirubin, total 0.6 0.2 - 1.0 MG/DL    ALT (SGPT) 22 12 - 78 U/L    AST (SGOT) 19 15 - 37 U/L    Alk. phosphatase 74 45 - 117 U/L    Protein, total 7.7 6.4 - 8.2 g/dL    Albumin 3.5 3.5 - 5.0 g/dL    Globulin 4.2 (H) 2.0 - 4.0 g/dL    A-G Ratio 0.8 (L) 1.1 - 2.2     TYPE & SCREEN    Collection Time: 12/03/21 10:03 PM   Result Value Ref Range    Crossmatch Expiration 12/06/2021,2359     ABO/Rh(D) B NEGATIVE     Antibody screen NEG    OCCULT BLOOD, STOOL    Collection Time: 12/04/21 12:43 AM   Result Value Ref Range    Occult blood, stool Positive (A) NEG         Radiologic Studies -   CT ABD PELV W WO CONT   Final Result   No active GI bleed. Extensive diffuse colonic diverticulosis without   diverticulitis. CT Results  (Last 48 hours)               12/04/21 0115  CT ABD PELV W WO CONT Final result    Impression:  No active GI bleed. Extensive diffuse colonic diverticulosis without   diverticulitis. Narrative:  EXAM:  CT abdomen pelvis with without contrast       INDICATION: Rectal bleeding       COMPARISON: CT 10/8/2015. TECHNIQUE: Helical CT of the abdomen  and pelvis without and with intravenous   contrast.  Coronal and sagittal reformats are performed. CT dose reduction was   achieved through use of a standardized protocol tailored for this examination   and automatic exposure control for dose modulation. FINDINGS:   Noncontrast:   There is no abnormal density in the small or large bowel. Post contrast:   There is no active contrast extravasation in the small or large bowel.  There is   extensive diffuse colonic diverticulosis without focal adjacent inflammation. There are no dilated bowel loops. The appendix is not visualized. The visualized lung bases demonstrate no mass or consolidation. The heart size   is normal. There is no pericardial or pleural effusion. The liver, spleen, pancreas, and adrenal glands are normal. The gall bladder is   present  without intra- or extra-hepatic biliary dilatation. There is stable left kidney cortical thinning. The kidneys otherwise enhance   symmetrically. There is no hydronephrosis. There are bilateral kidney cysts   measuring up to 4.4 cm. No follow-up recommended. There are nonobstructing   bilateral renal calculi measuring up to 7 mm. There are no enlarged lymph nodes. There is no free fluid or free air. There is   mild infrarenal aortic aneurysm dilatation measuring 2.4 cm in diameter,   unchanged. There is aortoiliac atherosclerosis. Bilateral common iliac artery   stents are present. The urinary bladder is normal.  There is no pelvic mass. The uterus is   surgically absent. There is no aggressive bony lesion. CXR Results  (Last 48 hours)    None            Medical Decision Making   I am the first provider for this patient. I reviewed the vital signs, available nursing notes, past medical history, past surgical history, family history and social history. Vital Signs-Reviewed the patient's vital signs. Patient Vitals for the past 12 hrs:   Temp Pulse Resp BP SpO2   12/04/21 0324 97.5 °F (36.4 °C) 97 18 127/62 98 %   12/03/21 2110 98.2 °F (36.8 °C) 68 20 (!) 151/90 99 %       Records Reviewed: Nursing Notes and Old Medical Records    Provider Notes (Medical Decision Making):   Dark red blood per rectum with history of diverticulosis likely bleeding diverticulosis    Type and screen sent, CBC CMP sent prior to evaluation patient.   We will plan admission for trending of hemoglobin, possible repeat colonoscopy given several episodes of maroon-colored stool. ED Course:   Initial assessment performed. The patients presenting problems have been discussed, and they are in agreement with the care plan formulated and outlined with them. I have encouraged them to ask questions as they arise throughout their visit. ED Course as of 12/04/21 0346   Sat Dec 04, 2021   0031 Hg 11.8 from 13.3, last checked one year ago [WB]   0032 Cr stable at 1.17 [WB]   0032 LFTs normal [WB]   0032 HD stable, /90, HR 68 [WB]   0032 BUN minimally elevated at 22 [WB]   0048 Obtain CTA abdomen per recommendation of hospitalist to confirm bleeding diverticulosis [WB]   0051 Elevated [place] score at 14, [WB]      ED Course User Index  [WB] Jane Desai MD     Disposition:  Admission Note:  Patient is being admitted to the hospital by Dr. Amna Angelo, Service: Hospitalist.  The results of their tests and reasons for their admission have been discussed with them and available family. They convey agreement and understanding for the need to be admitted and for their admission diagnosis. Diagnosis     Clinical Impression:   1. Diverticulosis of large intestine with hemorrhage        Attestations:    Octavia Butler M.D. Please note that this dictation was completed with [employer], the computer voice recognition software. Quite often unanticipated grammatical, syntax, homophones, and other interpretive errors are inadvertently transcribed by the computer software. Please disregard these errors. Please excuse any errors that have escaped final proofreading. Thank you.

## 2021-12-04 NOTE — PROGRESS NOTES
Problem: Diabetes Self-Management  Goal: *Disease process and treatment process  Description: Define diabetes and identify own type of diabetes; list 3 options for treating diabetes. Outcome: Progressing Towards Goal  Goal: *Incorporating nutritional management into lifestyle  Description: Describe effect of type, amount and timing of food on blood glucose; list 3 methods for planning meals. Outcome: Progressing Towards Goal  Goal: *Incorporating physical activity into lifestyle  Description: State effect of exercise on blood glucose levels. Outcome: Progressing Towards Goal  Goal: *Developing strategies to promote health/change behavior  Description: Define the ABC's of diabetes; identify appropriate screenings, schedule and personal plan for screenings. Outcome: Progressing Towards Goal  Goal: *Using medications safely  Description: State effect of diabetes medications on diabetes; name diabetes medication taking, action and side effects. Outcome: Progressing Towards Goal  Goal: *Monitoring blood glucose, interpreting and using results  Description: Identify recommended blood glucose targets  and personal targets. Outcome: Progressing Towards Goal  Goal: *Prevention, detection, treatment of acute complications  Description: List symptoms of hyper- and hypoglycemia; describe how to treat low blood sugar and actions for lowering  high blood glucose level. Outcome: Progressing Towards Goal  Goal: *Prevention, detection and treatment of chronic complications  Description: Define the natural course of diabetes and describe the relationship of blood glucose levels to long term complications of diabetes.   Outcome: Progressing Towards Goal  Goal: *Developing strategies to address psychosocial issues  Description: Describe feelings about living with diabetes; identify support needed and support network  Outcome: Progressing Towards Goal     Problem: Patient Education: Go to Patient Education Activity  Goal: Patient/Family Education  Outcome: Progressing Towards Goal     Problem: Patient Education: Go to Patient Education Activity  Goal: Patient/Family Education  Outcome: Progressing Towards Goal     Problem: Heart Failure: Day 1  Goal: Off Pathway (Use only if patient is Off Pathway)  Outcome: Progressing Towards Goal  Goal: Activity/Safety  Outcome: Progressing Towards Goal  Goal: Consults, if ordered  Outcome: Progressing Towards Goal  Goal: Diagnostic Test/Procedures  Outcome: Progressing Towards Goal  Goal: Nutrition/Diet  Outcome: Progressing Towards Goal  Goal: Discharge Planning  Outcome: Progressing Towards Goal  Goal: Medications  Outcome: Progressing Towards Goal  Goal: Respiratory  Outcome: Progressing Towards Goal  Goal: Treatments/Interventions/Procedures  Outcome: Progressing Towards Goal  Goal: Psychosocial  Outcome: Progressing Towards Goal  Goal: *Oxygen saturation within defined limits  Outcome: Progressing Towards Goal  Goal: *Hemodynamically stable  Outcome: Progressing Towards Goal  Goal: *Optimal pain control at patient's stated goal  Outcome: Progressing Towards Goal  Goal: *Anxiety reduced or absent  Outcome: Progressing Towards Goal     Problem: Hypertension  Goal: *Blood pressure within specified parameters  Outcome: Progressing Towards Goal  Goal: *Fluid volume balance  Outcome: Progressing Towards Goal  Goal: *Labs within defined limits  Outcome: Progressing Towards Goal     Problem: Patient Education: Go to Patient Education Activity  Goal: Patient/Family Education  Outcome: Progressing Towards Goal     Problem: General Infection Care Plan (Adult and Pediatric)  Goal: Improvement in signs and symptoms of infection  Outcome: Progressing Towards Goal  Goal: *Optimize nutritional status  Outcome: Progressing Towards Goal     Problem: Patient Education: Go to Patient Education Activity  Goal: Patient/Family Education  Outcome: Progressing Towards Goal     Problem: Falls - Risk of  Goal: *Absence of Falls  Description: Document Carson Hagan Fall Risk and appropriate interventions in the flowsheet.   Outcome: Progressing Towards Goal  Note: Fall Risk Interventions:            Medication Interventions: Teach patient to arise slowly                   Problem: Patient Education: Go to Patient Education Activity  Goal: Patient/Family Education  Outcome: Progressing Towards Goal

## 2021-12-04 NOTE — ACP (ADVANCE CARE PLANNING)
Advance Care Planning Note      NAME: Janel Gutierrez   :  1940   MRN:  [id number]     Date/Time:  2021 3:31 AM    Active Diagnoses:  Hospital Problems  Date Reviewed: 2021          Codes Class Noted POA    * (Principal) GI bleed ICD-10-CM: K92.2  ICD-9-CM: 578.9  2021 Unknown        Type 2 diabetes mellitus with nephropathy ([facility] ICD-10-CM: E11.21  ICD-9-CM: 250.40, 583.81  2018 Yes        Anemia ICD-10-CM: D64.9  ICD-9-CM: 285.9  10/11/2015 Yes    Overview Signed 10/11/2015 10:01 AM by Leida Lantigua MD     Did not meet criteria for acute blood loss anemia             Diastolic CHF, chronic (HCC) ICD-10-CM: I50.32  ICD-9-CM: 428.32, 428.0  10/11/2015 Yes        Essential hypertension, benign ICD-10-CM: I10  ICD-9-CM: 401.1  2012 Yes        Mixed hyperlipidemia ICD-10-CM: E78.2  ICD-9-CM: 272.2  2012 Yes        Hypothyroidism ICD-10-CM: E03.9  ICD-9-CM: 244.9  2009 Yes              These active diagnoses are of sufficient risk that focused discussion on advance care planning is indicated in order to allow the patient to thoughtfully consider personal goals of care, and if situations arise that prevent the ability to personally give input, to ensure appropriate representation of their personal desires for different levels and aggressiveness of care. Discussion:   Code status addressed and wants to be a Full Code. Patient wants central line and vasopressors if needed. Patient would also want a feeding tube, if needed, for nutritional support. Patient  would like to assign Aurelia Morton (daughter) [phone]  as the surrogate decision maker. Persons present and participating in discussion: Janel GutierrezShant [address], ACNP      Time Spent:   Total time spent face-to-face in education and discussion:   18  minutes.          [address], DNP, ACNP-BC   Hospitalist

## 2021-12-04 NOTE — CONSULTS
GI Consultation Note Charmaine Field for Ray Cost)    NAME: Christie Code : 1940 MRN: [id number]   PRIMARY GI: Derrick Gasca PCP: Ming San MD  Date/Time:  2021 11:40 AM  Subjective:   REASON FOR CONSULT:    Hematochezia    Aditya Landers is a [de-identified] y.o. female who I was asked to see for above. Pt presented to ED with multiple bouts of hematochezia/maroon colored stools that started evening of presentation. Reports h/o GI bleed in 2016. No NSAID use. Last Colonoscopy ([facility]) 2018:  1.  Scope advanced to the cecum. 2.  Moderately severe diverticulosis of the sigmoid and descending colon. 3.  No polyps seen.   4.  Small internal hemorrhoids.     No bleeding since last night      Past Medical History:   Diagnosis Date    Anxiety     CAD (coronary artery disease)     cabg     Congestive heart failure (HCC)     Diabetes ([address]     Diverticulosis     GERD (gastroesophageal reflux disease)     Heart disease     Heart failure ([address] 10/18/2011    High cholesterol     Hypertension     Hypothyroidism     Kidney stones     Menopause     Myocardial infarction ([address]     Postsurgical percutaneous transluminal coronary angioplasty status 2012    S/P CABG x 3 11    [facility]    Sleeping difficulty     Teeth decayed     Weakness       Past Surgical History:   Procedure Laterality Date    COLONOSCOPY N/A 10/31/2018    COLONOSCOPY [facility] Yuridia Mc[facility]ill [facility] ENDOSCOPY    HX CORONARY ARTERY BYPASS GRAFT  11    3 vessel    HX GYN      hysterectomy    HX HEART CATHETERIZATION      HX HYSTERECTOMY      HX UROLOGICAL  1970s    kidney stone    IA CARDIAC SURG PROCEDURE UNLIST      cabg x 3    VASCULAR SURGERY PROCEDURE UNLIST      Stents put in right leg     Social History     Tobacco Use    Smoking status: Former Smoker     Packs/day: 0.50     Years: 40.00     Pack years: 20.00     Quit date: 2010     Years since quittin.2    Smokeless tobacco: Never Used Substance Use Topics    Alcohol use: No      Family History   Problem Relation Age of Onset    Diabetes Mother     Heart Disease Mother     Diabetes Brother     Heart Disease Brother     Mental Retardation Brother     Hypertension Brother     Other Father     Cancer Sister     Diabetes Brother     Heart Disease Brother     Diabetes Brother     Breast Cancer Daughter         52's      Allergies   Allergen Reactions    Ace Inhibitors Swelling    Arb-Angiotensin Receptor Antagonist Swelling    Lisinopril Swelling    Plavix [Clopidogrel] Other (comments)     Excessive bleeding    Potassium Nausea and Vomiting     Potassium containing oral products are not well tolerated by patient      Home Medications:  Prior to Admission Medications   Prescriptions Last Dose Informant Patient Reported? Taking? Euthyrox 100 mcg tablet   No No   Sig: TAKE 1 TABLET BY MOUTH ONCE DAILY BEFORE BREAKFAST   FreeStyle Ruben 14 Day Sensor kit   No No   Sig: USE TO CHECK GLUCOSE TWICE DAILY   albuterol (PROVENTIL HFA, VENTOLIN HFA, PROAIR HFA) 90 mcg/actuation inhaler   No No   Sig: Take 1 Puff by inhalation every six (6) hours as needed for Wheezing. amLODIPine (NORVASC) 5 mg tablet   No No   Sig: Take 1 tablet by mouth once daily   aspirin 81 mg chewable tablet   No No   Sig: Take 1 Tab by mouth daily. atorvastatin (LIPITOR) 80 mg tablet   No No   Sig: Take 1 tablet by mouth nightly   cholecalciferol (VITAMIN D3) 1,000 unit cap   Yes No   Sig: Take 1,000 Units by mouth daily. cyanocobalamin (VITAMIN B-12) 1,000 mcg tablet   Yes No   Sig: Take 1,000 mcg by mouth daily. ezetimibe (ZETIA) 10 mg tablet   No No   Sig: Take 1 tablet by mouth once daily   gabapentin (NEURONTIN) 100 mg capsule   No No   Sig: Take 2 capsules by mouth twice daily   insulin NPH/insulin regular (NOVOLIN 70/30, HUMULIN 70/30) 100 unit/mL (70-30) injection   No No   Sig: Inject 14 units under the skin twice daily. Please dispense Relion brand. Indications: type 2 diabetes mellitus   metFORMIN (GLUCOPHAGE) 1,000 mg tablet   No No   Sig: TAKE 1 TABLET BY MOUTH TWICE DAILY WITH MEALS   metoprolol tartrate (LOPRESSOR) 50 mg tablet   No No   Sig: Take 1 tablet by mouth twice daily   omeprazole (PRILOSEC) 20 mg capsule   No No   Sig: Take 1 capsule by mouth once daily   umeclidinium-vilanteroL (Anoro Ellipta) 62.5-25 mcg/actuation inhaler   No No   Sig: Take 1 Puff by inhalation daily.       Facility-Administered Medications: None     Hospital medications:  Current Facility-Administered Medications   Medication Dose Route Frequency    albuterol-ipratropium (DUO-NEB) 2.5 MG-0.5 MG/3 ML  3 mL Nebulization Q6H PRN    amLODIPine (NORVASC) tablet 5 mg  5 mg Oral DAILY    atorvastatin (LIPITOR) tablet 80 mg  80 mg Oral QHS    cholecalciferol (VITAMIN D3) (1000 Units /25 mcg) tablet 1,000 Units  1,000 Units Oral DAILY    cyanocobalamin (VITAMIN B12) tablet 1,000 mcg  1,000 mcg Oral DAILY    levothyroxine (SYNTHROID) tablet 100 mcg  100 mcg Oral ACB    ezetimibe (ZETIA) tablet 10 mg  10 mg Oral DAILY    gabapentin (NEURONTIN) capsule 200 mg  200 mg Oral BID    metoprolol tartrate (LOPRESSOR) tablet 50 mg  50 mg Oral BID    insulin lispro (HUMALOG) injection   SubCUTAneous AC&HS    glucose chewable tablet 16 g  4 Tablet Oral PRN    dextrose (D50W) injection syrg 12.5-25 g  12.5-25 g IntraVENous PRN    glucagon (GLUCAGEN) injection 1 mg  1 mg IntraMUSCular PRN    sodium chloride (NS) flush 5-40 mL  5-40 mL IntraVENous Q8H    sodium chloride (NS) flush 5-40 mL  5-40 mL IntraVENous PRN    ondansetron (ZOFRAN) injection 4 mg  4 mg IntraVENous Q4H PRN    pantoprazole (PROTONIX) 40 mg in 0.9% sodium chloride 10 mL injection  40 mg IntraVENous Q12H    0.9% sodium chloride infusion  100 mL/hr IntraVENous CONTINUOUS    arformoteroL (BROVANA) neb solution 15 mcg  15 mcg Nebulization BID RT    And    ipratropium (ATROVENT) 0.02 % nebulizer solution 0.5 mg  0.5 mg Nebulization Q6H RT    insulin lispro (HUMALOG) injection 2 Units  2 Units SubCUTAneous TIDAC    insulin glargine (LANTUS) injection 16 Units  16 Units SubCUTAneous DAILY    [START ON 2021] PEG 3350-Electrolytes (GO-LYTELY) SUSPENSION 4,000 mL  4,000 mL Oral ONCE     REVIEW OF SYSTEMS:     []     Unable to obtain  ROS due to  []    mental status change  []    sedated   []    intubated   []    Total of 11 systems reviewed as follows:  Const:  negative fever, negative chills, negative weight loss  Eyes:   negative diplopia or visual changes, negative eye pain  ENT:   negative coryza, negative sore throat  Resp:   negative cough, hemoptysis, dyspnea  Cards:  negative for chest pain, palpitations, lower extremity edema  :  negative for frequency, dysuria and hematuria  Skin:   negative for rash and pruritus  Heme:  negative for easy bruising and gum/nose bleeding  MS:  negative for myalgias, arthralgias, back pain and muscle weakness  Neurolo:  negative for headaches, dizziness, vertigo, memory problems   Psych:  negative for feelings of anxiety, depression     Pertinent Positives include :    Objective:   VITALS:    Visit Vitals  BP (!) 141/69 (BP 1 Location: Right upper arm, BP Patient Position: At rest)   Pulse 66   Temp 97.8 °F (36.6 °C)   Resp 18   Ht 5' 2\" (1.575 m)   Wt 80.7 kg (177 lb 14.6 oz)   SpO2 97%   BMI 32.54 kg/m²     Temp (24hrs), Av.8 °F (36.6 °C), Min:97.5 °F (36.4 °C), Max:98.2 °F (36.8 °C)    PHYSICAL EXAM:   General:    Alert, cooperative, no distress, appears stated age. Head:   Normocephalic, without obvious abnormality, atraumatic. Eyes:   Conjunctivae clear, anicteric sclerae. Pupils are equal  Nose:  Nares normal. No drainage or sinus tenderness. Throat:    Lips, mucosa, and tongue normal.  No Thrush  Neck:  Supple, symmetrical,  no adenopathy, thyroid: non tender  Back:    Symmetric,  No CVA tenderness. Lungs:   CTA bilaterally. No wheezing/rhonchi/rales.   Chest wall:  No tenderness or deformity. No Accessory muscle use. Heart:   Regular rate and rhythm,  no murmur, rub or gallop. Abdomen:   Soft, non-tender. Not distended. Bowel sounds normal. No masses  Extremities: Atraumatic, No cyanosis. No edema. No clubbing  Skin:     Texture, turgor normal. No rashes/lesions/jaundice  Lymph: Cervical, supraclavicular normal.  Psych:  Good insight. Not depressed. Not anxious or agitated. Neurologic: EOMs intact. No facial asymmetry. No aphasia or slurred speech. Normal   strength, A/O X 3. LAB DATA REVIEWED:    Recent Results (from the past 48 hour(s))   CBC WITH AUTOMATED DIFF    Collection Time: 12/03/21 10:03 PM   Result Value Ref Range    WBC 11.0 3.6 - 11.0 K/uL    RBC 4.51 3.80 - 5.20 M/uL    HGB 11.8 11.5 - 16.0 g/dL    HCT 37.7 35.0 - 47.0 %    MCV 83.6 80.0 - 99.0 FL    MCH 26.2 26.0 - 34.0 PG    MCHC 31.3 30.0 - 36.5 g/dL    RDW 17.1 (H) 11.5 - 14.5 %    PLATELET 182 043 - 264 K/uL    MPV 12.8 8.9 - 12.9 FL    NRBC 0.0 0  WBC    ABSOLUTE NRBC 0.00 0.00 - 0.01 K/uL    NEUTROPHILS 60 32 - 75 %    LYMPHOCYTES 28 12 - 49 %    MONOCYTES 9 5 - 13 %    EOSINOPHILS 3 0 - 7 %    BASOPHILS 0 0 - 1 %    IMMATURE GRANULOCYTES 0 0.0 - 0.5 %    ABS. NEUTROPHILS 6.5 1.8 - 8.0 K/UL    ABS. LYMPHOCYTES 3.1 0.8 - 3.5 K/UL    ABS. MONOCYTES 1.0 0.0 - 1.0 K/UL    ABS. EOSINOPHILS 0.4 0.0 - 0.4 K/UL    ABS. BASOPHILS 0.0 0.0 - 0.1 K/UL    ABS. IMM.  GRANS. 0.0 0.00 - 0.04 K/UL    DF AUTOMATED     METABOLIC PANEL, COMPREHENSIVE    Collection Time: 12/03/21 10:03 PM   Result Value Ref Range    Sodium 140 136 - 145 mmol/L    Potassium 4.4 3.5 - 5.1 mmol/L    Chloride 110 (H) 97 - 108 mmol/L    CO2 26 21 - 32 mmol/L    Anion gap 4 (L) 5 - 15 mmol/L    Glucose 160 (H) 65 - 100 mg/dL    BUN 22 (H) 6 - 20 MG/DL    Creatinine 1.17 (H) 0.55 - 1.02 MG/DL    BUN/Creatinine ratio 19 12 - 20      GFR est AA 54 (L) >60 ml/min/1.73m2    GFR est non-AA 45 (L) >60 ml/min/1.73m2    Calcium 9.8 8.5 - 10.1 MG/DL    Bilirubin, total 0.6 0.2 - 1.0 MG/DL    ALT (SGPT) 22 12 - 78 U/L    AST (SGOT) 19 15 - 37 U/L    Alk. phosphatase 74 45 - 117 U/L    Protein, total 7.7 6.4 - 8.2 g/dL    Albumin 3.5 3.5 - 5.0 g/dL    Globulin 4.2 (H) 2.0 - 4.0 g/dL    A-G Ratio 0.8 (L) 1.1 - 2.2     TYPE & SCREEN    Collection Time: 12/03/21 10:03 PM   Result Value Ref Range    Crossmatch Expiration 12/06/2021,2359     ABO/Rh(D) B NEGATIVE     Antibody screen NEG    OCCULT BLOOD, STOOL    Collection Time: 12/04/21 12:43 AM   Result Value Ref Range    Occult blood, stool Positive (A) NEG     METABOLIC PANEL, COMPREHENSIVE    Collection Time: 12/04/21  4:58 AM   Result Value Ref Range    Sodium 141 136 - 145 mmol/L    Potassium 3.9 3.5 - 5.1 mmol/L    Chloride 110 (H) 97 - 108 mmol/L    CO2 25 21 - 32 mmol/L    Anion gap 6 5 - 15 mmol/L    Glucose 119 (H) 65 - 100 mg/dL    BUN 20 6 - 20 MG/DL    Creatinine 0.96 0.55 - 1.02 MG/DL    BUN/Creatinine ratio 21 (H) 12 - 20      GFR est AA >60 >60 ml/min/1.73m2    GFR est non-AA 56 (L) >60 ml/min/1.73m2    Calcium 9.3 8.5 - 10.1 MG/DL    Bilirubin, total 0.6 0.2 - 1.0 MG/DL    ALT (SGPT) 18 12 - 78 U/L    AST (SGOT) 15 15 - 37 U/L    Alk.  phosphatase 64 45 - 117 U/L    Protein, total 6.6 6.4 - 8.2 g/dL    Albumin 2.9 (L) 3.5 - 5.0 g/dL    Globulin 3.7 2.0 - 4.0 g/dL    A-G Ratio 0.8 (L) 1.1 - 2.2     CBC WITH AUTOMATED DIFF    Collection Time: 12/04/21  4:58 AM   Result Value Ref Range    WBC 8.7 3.6 - 11.0 K/uL    RBC 3.89 3.80 - 5.20 M/uL    HGB 10.3 (L) 11.5 - 16.0 g/dL    HCT 32.5 (L) 35.0 - 47.0 %    MCV 83.5 80.0 - 99.0 FL    MCH 26.5 26.0 - 34.0 PG    MCHC 31.7 30.0 - 36.5 g/dL    RDW 17.2 (H) 11.5 - 14.5 %    PLATELET 609 401 - 519 K/uL    MPV 12.1 8.9 - 12.9 FL    NRBC 0.0 0  WBC    ABSOLUTE NRBC 0.00 0.00 - 0.01 K/uL    NEUTROPHILS 55 32 - 75 %    LYMPHOCYTES 32 12 - 49 %    MONOCYTES 10 5 - 13 %    EOSINOPHILS 3 0 - 7 %    BASOPHILS 0 0 - 1 %    IMMATURE GRANULOCYTES 0 0.0 - 0.5 %    ABS. NEUTROPHILS 4.8 1.8 - 8.0 K/UL    ABS. LYMPHOCYTES 2.7 0.8 - 3.5 K/UL    ABS. MONOCYTES 0.9 0.0 - 1.0 K/UL    ABS. EOSINOPHILS 0.3 0.0 - 0.4 K/UL    ABS. BASOPHILS 0.0 0.0 - 0.1 K/UL    ABS. IMM. GRANS. 0.0 0.00 - 0.04 K/UL    DF AUTOMATED     PROTHROMBIN TIME + INR    Collection Time: 12/04/21  4:58 AM   Result Value Ref Range    INR 1.1 0.9 - 1.1      Prothrombin time 11.1 9.0 - 11.1 sec   TSH 3RD GENERATION    Collection Time: 12/04/21  4:58 AM   Result Value Ref Range    TSH 0.65 0.36 - 3.74 uIU/mL   NT-PRO BNP    Collection Time: 12/04/21  4:58 AM   Result Value Ref Range    NT pro- <450 PG/ML     IMAGING RESULTS:   [x]      I have personally reviewed the actual   []    CXR  [x]    CT  []     US    Assessment/Plan:      Principal Problem:    GI bleed (12/4/2021)    Active Problems:    Hypothyroidism (11/18/2009)      Essential hypertension, benign (5/17/2012)      Mixed hyperlipidemia (5/17/2012)      Anemia (10/11/2015)      Overview: Did not meet criteria for acute blood loss anemia      Diastolic CHF, chronic ([facility] [address] (10/11/2015)      Type 2 diabetes mellitus with nephropathy ([facility] (1/2/2018)    1. Hematochezia; resolving/resolved  2. Colonoscopy in 2018 with left-sided diverticulosis  3. CT bleeding protocol negative for active bleed  4. Anemia, post-hemorrhagic (3 gm/dL drop from baseline)  5.  Likely diverticular bleed but can't r/o UGI bleed given 'maroon' stools  ___________________________________________________  RECOMMENDATIONS:    - CLD today  - EGD/Colon on Monday with Dr. Terrance Kingsley   - CLD   - Golytely prep tomorrow PM   - Alicia Delgado ordered   - NPO after Midnight 12/6    GI will see on request tomorrow, otherwise Dr. Terrance Kingsley will assume GI care on 12/6    Discussed Code Status:    []    Full Code      []    DNR    ___________________________________________________  Care Plan discussed with:    [x]    Patient   []    Family   []    Nursing   [x]    Attending ___________________________________________________  GI: Davonion North Adams, MD

## 2021-12-04 NOTE — PROGRESS NOTES
End of Shift Note    Bedside shift change report given to Adriana Ramirez RN (oncoming nurse) by Myra Najera LPN (offgoing nurse). Report included the following information SBAR, Kardex, ED Summary, Intake/Output, MAR and Recent Results    Shift worked:  7p-730a     Shift summary and any significant changes:     Pt came up from ED. Pt NPO for potential colonoscopy. Concerns for physician to address:       Zone phone for oncoming shift:   [phone]       Activity:  Activity Level: Up ad diego  Number times ambulated in hallways past shift: 0  Number of times OOB to chair past shift: 0    Cardiac:   Cardiac Monitoring: No           Access:   Current line(s): PIV     Genitourinary:   Urinary status: voiding    Respiratory:   O2 Device: None (Room air)  Chronic home O2 use?: NO  Incentive spirometer at bedside: NO     GI:  Last Bowel Movement Date: 12/03/21  Current diet:  DIET NPO  Passing flatus: YES  Tolerating current diet: YES       Pain Management:   Patient states pain is manageable on current regimen: YES    Skin:  Sumeet Score: 20  Interventions: increase time out of bed    Patient Safety:  Fall Score:  Total Score: 0  Interventions: gripper socks and pt to call before getting OOB       Length of Stay:  Expected LOS: - - -  Actual LOS: 0      Myra Najera LPN

## 2021-12-04 NOTE — PROGRESS NOTES
ADULT PROTOCOL: JET AEROSOL  REASSESSMENT    Patient  Berta Lance     [de-identified] y.o.   female     12/4/2021  6:22 PM    Breath Sounds Pre Procedure: Right Breath Sounds: Diminished                               Left Breath Sounds: Diminished    Breath Sounds Post Procedure: Right Breath Sounds: Diminished                                 Left Breath Sounds: Diminished    Breathing pattern: Pre procedure Breathing Pattern: Regular          Post procedure Breathing Pattern: Regular    Heart Rate: Pre procedure Pulse: 67           Post procedure Pulse: 67    Resp Rate: Pre procedure Respirations: 18           Post procedure Respirations: 18        Cough: Pre procedure Cough: Non-productive               Post procedure Cough: Non-productive    Oxygen: O2 Device: None (Room air)        Changed:no    SpO2: Pre procedure SpO2: 94 %                 Post procedure SpO2: 94 %      Nebulizer Therapy: Current medications Aerosolized Medications: Ipratropium bromide, Brovana      Changedno    Problem List:   Patient Active Problem List   Diagnosis Code    CAD (coronary artery disease) I25.10    Hypothyroidism E03.9    PVD (peripheral vascular disease) ([facility] [address] I73.9    S/P CABG x 3 Z95.1    Essential hypertension, benign I10    Mixed hyperlipidemia E78.2    Postsurgical percutaneous transluminal coronary angioplasty status Z98.61    Osteoporosis M81.0    GIB (gastrointestinal bleeding) K92.2    Diverticulosis of intestine with bleeding K57.91    Anemia U92.8    Diastolic CHF, chronic (HCC) I50.32    Type 2 diabetes mellitus with nephropathy (HCC) E11.21    Recurrent depression (HCC) F33.9    Type 2 diabetes mellitus with diabetic neuropathy (HCC) E11.40    GI bleed K92.2       Respiratory Therapist: Zee Vidal

## 2021-12-04 NOTE — PROGRESS NOTES
Re: Humalog mix 72/25  ([facility] approved dose change has been made on this patient)      Ordered dose: 14 units sq BID  [facility] autosubstitution:      Lantus 16 units sq Daily + Lispro 2 units sq TIDAC    Thanks,  Killian Soria, [facility]

## 2021-12-05 LAB
GLUCOSE BLD STRIP.AUTO-MCNC: 164 MG/DL (ref 65–117)
GLUCOSE BLD STRIP.AUTO-MCNC: 182 MG/DL (ref 65–117)
GLUCOSE BLD STRIP.AUTO-MCNC: 203 MG/DL (ref 65–117)
HCT VFR BLD AUTO: 28.3 % (ref 35–47)
HCT VFR BLD AUTO: 34.3 % (ref 35–47)
HGB BLD-MCNC: 10.8 G/DL (ref 11.5–16)
HGB BLD-MCNC: 9 G/DL (ref 11.5–16)
SERVICE CMNT-IMP: ABNORMAL

## 2021-12-05 PROCEDURE — 74011000250 HC RX REV CODE- 250: Performed by: INTERNAL MEDICINE

## 2021-12-05 PROCEDURE — 74011250637 HC RX REV CODE- 250/637: Performed by: NURSE PRACTITIONER

## 2021-12-05 PROCEDURE — 94760 N-INVAS EAR/PLS OXIMETRY 1: CPT

## 2021-12-05 PROCEDURE — C9113 INJ PANTOPRAZOLE SODIUM, VIA: HCPCS | Performed by: NURSE PRACTITIONER

## 2021-12-05 PROCEDURE — G0378 HOSPITAL OBSERVATION PER HR: HCPCS

## 2021-12-05 PROCEDURE — 96376 TX/PRO/DX INJ SAME DRUG ADON: CPT

## 2021-12-05 PROCEDURE — 65270000029 HC RM PRIVATE

## 2021-12-05 PROCEDURE — 74011250636 HC RX REV CODE- 250/636: Performed by: NURSE PRACTITIONER

## 2021-12-05 PROCEDURE — 74011000250 HC RX REV CODE- 250: Performed by: NURSE PRACTITIONER

## 2021-12-05 PROCEDURE — 94761 N-INVAS EAR/PLS OXIMETRY MLT: CPT

## 2021-12-05 PROCEDURE — 94640 AIRWAY INHALATION TREATMENT: CPT

## 2021-12-05 PROCEDURE — 82962 GLUCOSE BLOOD TEST: CPT

## 2021-12-05 PROCEDURE — 74011636637 HC RX REV CODE- 636/637: Performed by: NURSE PRACTITIONER

## 2021-12-05 PROCEDURE — 36415 COLL VENOUS BLD VENIPUNCTURE: CPT

## 2021-12-05 PROCEDURE — 85018 HEMOGLOBIN: CPT

## 2021-12-05 RX ADMIN — METOPROLOL TARTRATE 50 MG: 50 TABLET, FILM COATED ORAL at 08:52

## 2021-12-05 RX ADMIN — Medication 1000 UNITS: at 08:51

## 2021-12-05 RX ADMIN — GABAPENTIN 200 MG: 300 CAPSULE ORAL at 08:52

## 2021-12-05 RX ADMIN — INSULIN LISPRO 2 UNITS: 100 INJECTION, SOLUTION INTRAVENOUS; SUBCUTANEOUS at 17:55

## 2021-12-05 RX ADMIN — CYANOCOBALAMIN TAB 500 MCG 1000 MCG: 500 TAB at 08:52

## 2021-12-05 RX ADMIN — ARFORMOTEROL TARTRATE 15 MCG: 15 SOLUTION RESPIRATORY (INHALATION) at 08:01

## 2021-12-05 RX ADMIN — INSULIN LISPRO 2 UNITS: 100 INJECTION, SOLUTION INTRAVENOUS; SUBCUTANEOUS at 08:52

## 2021-12-05 RX ADMIN — LEVOTHYROXINE SODIUM 100 MCG: 0.1 TABLET ORAL at 08:51

## 2021-12-05 RX ADMIN — IPRATROPIUM BROMIDE 0.5 MG: 0.5 SOLUTION RESPIRATORY (INHALATION) at 08:01

## 2021-12-05 RX ADMIN — IPRATROPIUM BROMIDE 0.5 MG: 0.5 SOLUTION RESPIRATORY (INHALATION) at 13:33

## 2021-12-05 RX ADMIN — IPRATROPIUM BROMIDE 0.5 MG: 0.5 SOLUTION RESPIRATORY (INHALATION) at 20:10

## 2021-12-05 RX ADMIN — INSULIN LISPRO 2 UNITS: 100 INJECTION, SOLUTION INTRAVENOUS; SUBCUTANEOUS at 08:55

## 2021-12-05 RX ADMIN — SODIUM CHLORIDE 100 ML/HR: 9 INJECTION, SOLUTION INTRAVENOUS at 02:00

## 2021-12-05 RX ADMIN — SODIUM CHLORIDE 100 ML/HR: 9 INJECTION, SOLUTION INTRAVENOUS at 17:53

## 2021-12-05 RX ADMIN — Medication 10 ML: at 06:00

## 2021-12-05 RX ADMIN — SODIUM CHLORIDE 40 MG: 9 INJECTION INTRAMUSCULAR; INTRAVENOUS; SUBCUTANEOUS at 08:51

## 2021-12-05 RX ADMIN — INSULIN LISPRO 2 UNITS: 100 INJECTION, SOLUTION INTRAVENOUS; SUBCUTANEOUS at 11:59

## 2021-12-05 RX ADMIN — POLYETHYLENE GLYCOL-3350 AND ELECTROLYTES 4000 ML: 236; 6.74; 5.86; 2.97; 22.74 POWDER, FOR SOLUTION ORAL at 17:53

## 2021-12-05 RX ADMIN — METOPROLOL TARTRATE 50 MG: 50 TABLET, FILM COATED ORAL at 17:53

## 2021-12-05 RX ADMIN — ARFORMOTEROL TARTRATE 15 MCG: 15 SOLUTION RESPIRATORY (INHALATION) at 20:10

## 2021-12-05 RX ADMIN — EZETIMIBE 10 MG: 10 TABLET ORAL at 08:51

## 2021-12-05 RX ADMIN — GABAPENTIN 200 MG: 300 CAPSULE ORAL at 17:53

## 2021-12-05 RX ADMIN — Medication 10 ML: at 17:55

## 2021-12-05 RX ADMIN — INSULIN GLARGINE 16 UNITS: 100 INJECTION, SOLUTION SUBCUTANEOUS at 08:52

## 2021-12-05 RX ADMIN — AMLODIPINE BESYLATE 5 MG: 5 TABLET ORAL at 08:51

## 2021-12-05 NOTE — PROGRESS NOTES
Hospitalist Progress Note    NAME: Carolina Jacobsen   :  1940   MRN:  [id number]       Assessment / Plan:  Hematochezia  Monitor Hgb - currently stable at 10.8   GI evals appreciated - CLD, Golytely prep for tonight, NPO MN. EGD/colon planned for tomorrow. Denies any CP or SOB  Continue Protonix    30.0 - 39.9 Obese / Body mass index is 32.54 kg/m². Estimated discharge date:   Barriers:    Code status: Full  Prophylaxis: SCD's  Recommended Disposition: Home w/Family     Subjective:     Chief Complaint / Reason for Physician Visit  Noticed some blood when she wiped. Discussed with RN events overnight. Review of Systems:  Symptom Y/N Comments  Symptom Y/N Comments   Fever/Chills    Chest Pain     Poor Appetite    Edema     Cough    Abdominal Pain     Sputum    Joint Pain     SOB/STOUT    Pruritis/Rash     Nausea/vomit    Tolerating PT/OT     Diarrhea    Tolerating Diet     Constipation    Other       Could NOT obtain due to:      Objective:     VITALS:   Last 24hrs VS reviewed since prior progress note. Most recent are:  Patient Vitals for the past 24 hrs:   Temp Pulse Resp BP SpO2   21 0803     97 %   21 0802 97.7 °F (36.5 °C) 79 18 (!) 146/75 100 %   21 0419 97.9 °F (36.6 °C) 73 18 (!) 143/63 94 %   21 2003 97.5 °F (36.4 °C) 65 18 (!) 148/65 94 %   21 1751     94 %   21 1520 97.5 °F (36.4 °C) 61 18 129/62 96 %   21 1322     95 %       Intake/Output Summary (Last 24 hours) at 2021 1220  Last data filed at 2021 2334  Gross per 24 hour   Intake 1630 ml   Output    Net 1630 ml        I had a face to face encounter and independently examined this patient on 2021, as outlined below:  PHYSICAL EXAM:  General: WD, WN. Alert, cooperative, no acute distress    EENT:  EOMI. Anicteric sclerae. MMM  Resp:  CTA bilaterally, no wheezing or rales.   No accessory muscle use  CV:  Regular  rhythm,  No edema  GI:  Soft, Non distended, Non tender. +Bowel sounds  Neurologic:  Alert and oriented X 3, normal speech,   Psych:   Good insight. Not anxious nor agitated  Skin:  No rashes. No jaundice    Reviewed most current lab test results and cultures  YES  Reviewed most current radiology test results   YES  Review and summation of old records today    NO  Reviewed patient's current orders and MAR    YES  PMH/SH reviewed - no change compared to H&P  ________________________________________________________________________  Care Plan discussed with:    Comments   Patient x    Family      RN x    Care Manager     Consultant                        Multidiciplinary team rounds were held today with , nursing, pharmacist and clinical coordinator. Patient's plan of care was discussed; medications were reviewed and discharge planning was addressed. ________________________________________________________________________  Total NON critical care TIME:  35   Minutes    Total CRITICAL CARE TIME Spent:   Minutes non procedure based      Comments   >50% of visit spent in counseling and coordination of care     ________________________________________________________________________  Randy Fothergill, MD     Procedures: see electronic medical records for all procedures/Xrays and details which were not copied into this note but were reviewed prior to creation of Plan. LABS:  I reviewed today's most current labs and imaging studies. Pertinent labs include:  Recent Labs     12/05/21  0328 12/04/21  1458 12/04/21  0458 12/03/21  2203 12/03/21  2203   WBC  --   --  8.7  --  11.0   HGB 10.8* 11.8 10.3*   < > 11.8   HCT 34.3* 38.1 32.5*   < > 37.7   PLT  --   --  219  --  239    < > = values in this interval not displayed.      Recent Labs     12/04/21  0458 12/03/21  2203    140   K 3.9 4.4   * 110*   CO2 25 26   * 160*   BUN 20 22*   CREA 0.96 1.17*   CA 9.3 9.8   ALB 2.9* 3.5   TBILI 0.6 0.6   ALT 18 22   INR 1.1  --        Signed: Nate Rowan MD

## 2021-12-05 NOTE — PROGRESS NOTES
Problem: Diabetes Self-Management  Goal: *Disease process and treatment process  Description: Define diabetes and identify own type of diabetes; list 3 options for treating diabetes. Outcome: Progressing Towards Goal  Goal: *Incorporating nutritional management into lifestyle  Description: Describe effect of type, amount and timing of food on blood glucose; list 3 methods for planning meals. Outcome: Progressing Towards Goal  Goal: *Incorporating physical activity into lifestyle  Description: State effect of exercise on blood glucose levels. Outcome: Progressing Towards Goal  Goal: *Developing strategies to promote health/change behavior  Description: Define the ABC's of diabetes; identify appropriate screenings, schedule and personal plan for screenings. Outcome: Progressing Towards Goal  Goal: *Using medications safely  Description: State effect of diabetes medications on diabetes; name diabetes medication taking, action and side effects. Outcome: Progressing Towards Goal  Goal: *Monitoring blood glucose, interpreting and using results  Description: Identify recommended blood glucose targets  and personal targets. Outcome: Progressing Towards Goal  Goal: *Prevention, detection, treatment of acute complications  Description: List symptoms of hyper- and hypoglycemia; describe how to treat low blood sugar and actions for lowering  high blood glucose level. Outcome: Progressing Towards Goal  Goal: *Prevention, detection and treatment of chronic complications  Description: Define the natural course of diabetes and describe the relationship of blood glucose levels to long term complications of diabetes.   Outcome: Progressing Towards Goal  Goal: *Developing strategies to address psychosocial issues  Description: Describe feelings about living with diabetes; identify support needed and support network  Outcome: Progressing Towards Goal     Problem: Patient Education: Go to Patient Education Activity  Goal: Patient/Family Education  Outcome: Progressing Towards Goal     Problem: Patient Education: Go to Patient Education Activity  Goal: Patient/Family Education  Outcome: Progressing Towards Goal     Problem: Heart Failure: Day 1  Goal: Off Pathway (Use only if patient is Off Pathway)  Outcome: Progressing Towards Goal  Goal: Activity/Safety  Outcome: Progressing Towards Goal  Goal: Consults, if ordered  Outcome: Progressing Towards Goal  Goal: Diagnostic Test/Procedures  Outcome: Progressing Towards Goal  Goal: Nutrition/Diet  Outcome: Progressing Towards Goal  Goal: Discharge Planning  Outcome: Progressing Towards Goal  Goal: Medications  Outcome: Progressing Towards Goal  Goal: Respiratory  Outcome: Progressing Towards Goal  Goal: Treatments/Interventions/Procedures  Outcome: Progressing Towards Goal  Goal: Psychosocial  Outcome: Progressing Towards Goal  Goal: *Oxygen saturation within defined limits  Outcome: Progressing Towards Goal  Goal: *Hemodynamically stable  Outcome: Progressing Towards Goal  Goal: *Optimal pain control at patient's stated goal  Outcome: Progressing Towards Goal  Goal: *Anxiety reduced or absent  Outcome: Progressing Towards Goal     Problem: Heart Failure: Day 2  Goal: Off Pathway (Use only if patient is Off Pathway)  Outcome: Progressing Towards Goal  Goal: Activity/Safety  Outcome: Progressing Towards Goal  Goal: Consults, if ordered  Outcome: Progressing Towards Goal  Goal: Diagnostic Test/Procedures  Outcome: Progressing Towards Goal  Goal: Nutrition/Diet  Outcome: Progressing Towards Goal  Goal: Discharge Planning  Outcome: Progressing Towards Goal  Goal: Medications  Outcome: Progressing Towards Goal  Goal: Respiratory  Outcome: Progressing Towards Goal  Goal: Treatments/Interventions/Procedures  Outcome: Progressing Towards Goal  Goal: Psychosocial  Outcome: Progressing Towards Goal  Goal: *Oxygen saturation within defined limits  Outcome: Progressing Towards Goal  Goal: *Hemodynamically stable  Outcome: Progressing Towards Goal  Goal: *Optimal pain control at patient's stated goal  Outcome: Progressing Towards Goal  Goal: *Anxiety reduced or absent  Outcome: Progressing Towards Goal  Goal: *Demonstrates progressive activity  Outcome: Progressing Towards Goal     Problem: Heart Failure: Day 3  Goal: Off Pathway (Use only if patient is Off Pathway)  Outcome: Progressing Towards Goal  Goal: Activity/Safety  Outcome: Progressing Towards Goal  Goal: Diagnostic Test/Procedures  Outcome: Progressing Towards Goal  Goal: Nutrition/Diet  Outcome: Progressing Towards Goal  Goal: Discharge Planning  Outcome: Progressing Towards Goal  Goal: Medications  Outcome: Progressing Towards Goal  Goal: Respiratory  Outcome: Progressing Towards Goal  Goal: Treatments/Interventions/Procedures  Outcome: Progressing Towards Goal  Goal: Psychosocial  Outcome: Progressing Towards Goal  Goal: *Oxygen saturation within defined limits  Outcome: Progressing Towards Goal  Goal: *Hemodynamically stable  Outcome: Progressing Towards Goal  Goal: *Optimal pain control at patient's stated goal  Outcome: Progressing Towards Goal  Goal: *Anxiety reduced or absent  Outcome: Progressing Towards Goal  Goal: *Demonstrates progressive activity  Outcome: Progressing Towards Goal     Problem: Heart Failure: Day 4  Goal: Off Pathway (Use only if patient is Off Pathway)  Outcome: Progressing Towards Goal  Goal: Activity/Safety  Outcome: Progressing Towards Goal  Goal: Diagnostic Test/Procedures  Outcome: Progressing Towards Goal  Goal: Nutrition/Diet  Outcome: Progressing Towards Goal  Goal: Discharge Planning  Outcome: Progressing Towards Goal  Goal: Medications  Outcome: Progressing Towards Goal  Goal: Respiratory  Outcome: Progressing Towards Goal  Goal: Treatments/Interventions/Procedures  Outcome: Progressing Towards Goal  Goal: Psychosocial  Outcome: Progressing Towards Goal  Goal: *Oxygen saturation within defined limits  Outcome: Progressing Towards Goal  Goal: *Hemodynamically stable  Outcome: Progressing Towards Goal  Goal: *Optimal pain control at patient's stated goal  Outcome: Progressing Towards Goal  Goal: *Anxiety reduced or absent  Outcome: Progressing Towards Goal  Goal: *Demonstrates progressive activity  Outcome: Progressing Towards Goal     Problem: Heart Failure: Day 5  Goal: Off Pathway (Use only if patient is Off Pathway)  Outcome: Progressing Towards Goal  Goal: Activity/Safety  Outcome: Progressing Towards Goal  Goal: Diagnostic Test/Procedures  Outcome: Progressing Towards Goal  Goal: Nutrition/Diet  Outcome: Progressing Towards Goal  Goal: Discharge Planning  Outcome: Progressing Towards Goal  Goal: Medications  Outcome: Progressing Towards Goal  Goal: Respiratory  Outcome: Progressing Towards Goal  Goal: Treatments/Interventions/Procedures  Outcome: Progressing Towards Goal  Goal: Psychosocial  Outcome: Progressing Towards Goal     Problem: Heart Failure: Discharge Outcomes  Goal: *Demonstrates ability to perform prescribed activity without shortness of breath or discomfort  Outcome: Progressing Towards Goal  Goal: *Left ventricular function assessment completed prior to or during stay, or planned for post-discharge  Outcome: Progressing Towards Goal  Goal: *ACEI prescribed if LVEF less than 40% and no contraindications or ARB prescribed  Outcome: Progressing Towards Goal  Goal: *Verbalizes understanding and describes prescribed diet  Outcome: Progressing Towards Goal  Goal: *Verbalizes understanding/describes prescribed medications  Outcome: Progressing Towards Goal  Goal: *Describes available resources and support systems  Description: (eg: Home Health, Palliative Care, Advanced Medical Directive)  Outcome: Progressing Towards Goal  Goal: *Describes smoking cessation resources  Outcome: Progressing Towards Goal  Goal: *Understands and describes signs and symptoms to report to providers(Stroke Metric)  Outcome: Progressing Towards Goal  Goal: *Describes/verbalizes understanding of follow-up/return appt  Description: (eg: to physicians, diabetes treatment coordinator, and other resources  Outcome: Progressing Towards Goal  Goal: *Describes importance of continuing daily weights and changes to report to physician  Outcome: Progressing Towards Goal

## 2021-12-05 NOTE — PROGRESS NOTES
Transition of Care Plan:    RUR: 7%  Disposition: Home with daughter  Follow up appointments: PCP, Specialists  DME needed: none identified  Transportation at Discharge: daughter  Rachelle Hernandes or means to access home:      Daughter will provide  IM Medicare Letter: needed at d/c  Is patient a BCPI-A Bundle:         n/a   If yes, was Bundle Letter given?:    Is patient a [place] and connected with the [place]? n/a  If yes, was [employer] transfer form completed and [facility] notified? Caregiver Contact:Linda Bess [phone]  Discharge Caregiver contacted prior to discharge? CM will contact prior to d/c if pt desires. Reason for Admission:  GI Bleed                     RUR Score:          7%           Plan for utilizing home health:  As needed        PCP: First and Last name:  Christy Joel MD     Name of Practice:    Are you a current patient: Yes/No: yes   Approximate date of last visit: every 3 months   Can you participate in a virtual visit with your PCP: in office                    Current Advanced Directive/Advance Care Plan: Full Code    Debra 13 (ACP) Conversation      Date of Conversation: 12/5/21  Conducted with: Patient with Decision Making Capacity    Healthcare Decision Maker:     Primary Decision Maker: Julianne Leavitt Daughter - [phone]  Click here to complete [address] including selection of the Healthcare Decision Maker Relationship (ie \"Primary\")        Content/Action Overview:    Has ACP document(s) on file - reflects the patient's care preferences  Reviewed DNR/DNI and patient elects Full Code (Attempt Resuscitation)  Length of Voluntary ACP Conversation in minutes:  <16 minutes (Non-Billable)    Amor Malhotra                        Primary Decision Maker: Julianne Armenta - [phone]                  Transition of Care Plan:  Home with daughter    CM met with pt at bedside to introduce self/role, verify demographics, insurance and PCP.  CM also discussed d/c plan. Pt is a [de-identified] yo, ,  female who was [facility] to [facility] on [facility]/4/21 with a dx of GI Bleed. Pt sees her PCP every 3 months. Pt uses the [employer]. Pt lives with her daughter in a one fl home with 3 CL[address]IS[address]. Pt does not use any DME. Pt does drive. Pt can complete her ADLs/IADLs independently at baseline. Pt denied a hx of HH, SNF or IPR. Pt's daughter will transport at d/c. CM will continue to assess for d/c needs. Care Management Interventions  PCP Verified by CM: Yes (Pt sees Dr. Lorrine Lesch.)  Mode of Transport at Discharge:  Other (see comment) (daughter)  Transition of Care Consult (CM Consult): Discharge Planning  Discharge Durable Medical Equipment: No  Physical Therapy Consult: No  Occupational Therapy Consult: No  Speech Therapy Consult: No  Support Systems: Child(mann)  Confirm Follow Up Transport: Self  The Patient and/or Patient Representative was Provided with a Choice of Provider and Agrees with the Discharge Plan?: Yes  Name of the Patient Representative Who was Provided with a Choice of Provider and Agrees with the Discharge Plan: [address] [facility] List was Provided with Basic Dialogue that Supports the Patient's Individualized Plan of Care/Goals, Treatment Preferences and Shares the Quality Data Associated with the Providers?: Yes  Discharge Location  Discharge Placement: Home with family assistance    ROBERT Saravia  Care Management, [place] [address]

## 2021-12-05 NOTE — PROGRESS NOTES
Bedside shift change report given to [address] [place] [address] (oncoming nurse) by Bernardino Bell RN (offgoing nurse). Report included the following information SBAR, Kardex, Intake/Output, MAR and Recent Results.

## 2021-12-05 NOTE — PROGRESS NOTES
Bedside and Verbal shift change report given to [address] [place] [address] (oncoming nurse) by Jaime Costa (offgoing nurse). Report included the following information SBAR, Kardex, MAR and Recent Results.

## 2021-12-05 NOTE — PROGRESS NOTES
ADULT PROTOCOL: JET AEROSOL  REASSESSMENT    Patient  Ina Jackman     [de-identified] y.o.   female     12/5/2021  9:28 AM    Breath Sounds Pre Procedure: Right Breath Sounds: Diminished                               Left Breath Sounds: Diminished    Breath Sounds Post Procedure: Right Breath Sounds: Diminished                                 Left Breath Sounds: Diminished    Breathing pattern: Pre procedure Breathing Pattern: Regular          Post procedure Breathing Pattern: Regular    Heart Rate: Pre procedure Pulse: 77           Post procedure Pulse: 77    Resp Rate: Pre procedure Respirations: 20           Post procedure Respirations: 20    Cough: Pre procedure Cough: Non-productive               Post procedure Cough: Non-productive    Oxygen: O2 Device: None (Room air)        Changed: no    SpO2: Pre procedure SpO2: 97 %                 Post procedure SpO2: 97 %      Nebulizer Therapy: Current medications Aerosolized Medications: Ipratropium bromide, Brovana      Changed: no    Problem List:   Patient Active Problem List   Diagnosis Code    CAD (coronary artery disease) I25.10    Hypothyroidism E03.9    PVD (peripheral vascular disease) ([facility] [place] [address] I73.9    S/P CABG x 3 Z95.1    Essential hypertension, benign I10    Mixed hyperlipidemia E78.2    Postsurgical percutaneous transluminal coronary angioplasty status Z98.61    Osteoporosis M81.0    GIB (gastrointestinal bleeding) K92.2    Diverticulosis of intestine with bleeding K57.91    Anemia Z77.5    Diastolic CHF, chronic (HCC) I50.32    Type 2 diabetes mellitus with nephropathy (HCC) E11.21    Recurrent depression (HCC) F33.9    Type 2 diabetes mellitus with diabetic neuropathy (HCC) E11.40    GI bleed K92.2       Respiratory Therapist: Ana Leal

## 2021-12-06 ENCOUNTER — TELEPHONE (OUTPATIENT)
Dept: INTERNAL MEDICINE CLINIC | Age: 81
End: 2021-12-06

## 2021-12-06 ENCOUNTER — ANESTHESIA EVENT (OUTPATIENT)
Dept: ENDOSCOPY | Age: 81
End: 2021-12-06
Payer: COMMERCIAL

## 2021-12-06 ENCOUNTER — ANESTHESIA (OUTPATIENT)
Dept: ENDOSCOPY | Age: 81
End: 2021-12-06
Payer: COMMERCIAL

## 2021-12-06 VITALS
RESPIRATION RATE: 16 BRPM | SYSTOLIC BLOOD PRESSURE: 172 MMHG | TEMPERATURE: 97.4 F | HEIGHT: 62 IN | WEIGHT: 177.91 LBS | BODY MASS INDEX: 32.74 KG/M2 | DIASTOLIC BLOOD PRESSURE: 75 MMHG | OXYGEN SATURATION: 96 % | HEART RATE: 65 BPM

## 2021-12-06 LAB
GLUCOSE BLD STRIP.AUTO-MCNC: 104 MG/DL (ref 65–117)
GLUCOSE BLD STRIP.AUTO-MCNC: 114 MG/DL (ref 65–117)
GLUCOSE BLD STRIP.AUTO-MCNC: 148 MG/DL (ref 65–117)
HCT VFR BLD AUTO: 35.9 % (ref 35–47)
HGB BLD-MCNC: 11.3 G/DL (ref 11.5–16)
SERVICE CMNT-IMP: ABNORMAL
SERVICE CMNT-IMP: NORMAL
SERVICE CMNT-IMP: NORMAL

## 2021-12-06 PROCEDURE — 36415 COLL VENOUS BLD VENIPUNCTURE: CPT

## 2021-12-06 PROCEDURE — G0378 HOSPITAL OBSERVATION PER HR: HCPCS

## 2021-12-06 PROCEDURE — 74011000250 HC RX REV CODE- 250: Performed by: NURSE PRACTITIONER

## 2021-12-06 PROCEDURE — 76040000019: Performed by: SPECIALIST

## 2021-12-06 PROCEDURE — 85018 HEMOGLOBIN: CPT

## 2021-12-06 PROCEDURE — C9113 INJ PANTOPRAZOLE SODIUM, VIA: HCPCS | Performed by: NURSE PRACTITIONER

## 2021-12-06 PROCEDURE — 76060000031 HC ANESTHESIA FIRST 0.5 HR: Performed by: SPECIALIST

## 2021-12-06 PROCEDURE — 96376 TX/PRO/DX INJ SAME DRUG ADON: CPT

## 2021-12-06 PROCEDURE — 74011250636 HC RX REV CODE- 250/636: Performed by: NURSE ANESTHETIST, CERTIFIED REGISTERED

## 2021-12-06 PROCEDURE — 74011636637 HC RX REV CODE- 636/637: Performed by: NURSE PRACTITIONER

## 2021-12-06 PROCEDURE — 94760 N-INVAS EAR/PLS OXIMETRY 1: CPT

## 2021-12-06 PROCEDURE — 74011250636 HC RX REV CODE- 250/636: Performed by: NURSE PRACTITIONER

## 2021-12-06 PROCEDURE — 82962 GLUCOSE BLOOD TEST: CPT

## 2021-12-06 PROCEDURE — 74011000250 HC RX REV CODE- 250: Performed by: NURSE ANESTHETIST, CERTIFIED REGISTERED

## 2021-12-06 PROCEDURE — 94640 AIRWAY INHALATION TREATMENT: CPT

## 2021-12-06 PROCEDURE — 2709999900 HC NON-CHARGEABLE SUPPLY: Performed by: SPECIALIST

## 2021-12-06 PROCEDURE — 74011250637 HC RX REV CODE- 250/637: Performed by: NURSE PRACTITIONER

## 2021-12-06 RX ORDER — PROPOFOL 10 MG/ML
INJECTION, EMULSION INTRAVENOUS AS NEEDED
Status: DISCONTINUED | OUTPATIENT
Start: 2021-12-06 | End: 2021-12-06 | Stop reason: HOSPADM

## 2021-12-06 RX ORDER — GLYCOPYRROLATE 0.2 MG/ML
INJECTION INTRAMUSCULAR; INTRAVENOUS AS NEEDED
Status: DISCONTINUED | OUTPATIENT
Start: 2021-12-06 | End: 2021-12-06 | Stop reason: HOSPADM

## 2021-12-06 RX ORDER — IPRATROPIUM BROMIDE 0.5 MG/2.5ML
0.5 SOLUTION RESPIRATORY (INHALATION)
Status: DISCONTINUED | OUTPATIENT
Start: 2021-12-06 | End: 2021-12-06 | Stop reason: HOSPADM

## 2021-12-06 RX ORDER — ARFORMOTEROL TARTRATE 15 UG/2ML
15 SOLUTION RESPIRATORY (INHALATION)
Status: DISCONTINUED | OUTPATIENT
Start: 2021-12-06 | End: 2021-12-06 | Stop reason: HOSPADM

## 2021-12-06 RX ORDER — LIDOCAINE HYDROCHLORIDE 20 MG/ML
INJECTION, SOLUTION EPIDURAL; INFILTRATION; INTRACAUDAL; PERINEURAL AS NEEDED
Status: DISCONTINUED | OUTPATIENT
Start: 2021-12-06 | End: 2021-12-06 | Stop reason: HOSPADM

## 2021-12-06 RX ADMIN — INSULIN GLARGINE 16 UNITS: 100 INJECTION, SOLUTION SUBCUTANEOUS at 09:33

## 2021-12-06 RX ADMIN — SODIUM CHLORIDE 100 ML/HR: 9 INJECTION, SOLUTION INTRAVENOUS at 00:22

## 2021-12-06 RX ADMIN — CYANOCOBALAMIN TAB 500 MCG 1000 MCG: 500 TAB at 09:31

## 2021-12-06 RX ADMIN — ATORVASTATIN CALCIUM 80 MG: 40 TABLET, FILM COATED ORAL at 00:10

## 2021-12-06 RX ADMIN — PROPOFOL 30 MG: 10 INJECTION, EMULSION INTRAVENOUS at 13:49

## 2021-12-06 RX ADMIN — PROPOFOL 30 MG: 10 INJECTION, EMULSION INTRAVENOUS at 13:48

## 2021-12-06 RX ADMIN — METOPROLOL TARTRATE 50 MG: 50 TABLET, FILM COATED ORAL at 09:31

## 2021-12-06 RX ADMIN — Medication 10 ML: at 00:05

## 2021-12-06 RX ADMIN — INSULIN LISPRO 2 UNITS: 100 INJECTION, SOLUTION INTRAVENOUS; SUBCUTANEOUS at 09:34

## 2021-12-06 RX ADMIN — PROPOFOL 30 MG: 10 INJECTION, EMULSION INTRAVENOUS at 13:55

## 2021-12-06 RX ADMIN — PROPOFOL 20 MG: 10 INJECTION, EMULSION INTRAVENOUS at 14:01

## 2021-12-06 RX ADMIN — SODIUM CHLORIDE 40 MG: 9 INJECTION INTRAMUSCULAR; INTRAVENOUS; SUBCUTANEOUS at 09:32

## 2021-12-06 RX ADMIN — ARFORMOTEROL TARTRATE 15 MCG: 15 SOLUTION RESPIRATORY (INHALATION) at 07:56

## 2021-12-06 RX ADMIN — PROPOFOL 30 MG: 10 INJECTION, EMULSION INTRAVENOUS at 13:52

## 2021-12-06 RX ADMIN — LEVOTHYROXINE SODIUM 100 MCG: 0.1 TABLET ORAL at 07:44

## 2021-12-06 RX ADMIN — LIDOCAINE HYDROCHLORIDE 60 MG: 20 INJECTION, SOLUTION EPIDURAL; INFILTRATION; INTRACAUDAL; PERINEURAL at 13:47

## 2021-12-06 RX ADMIN — IPRATROPIUM BROMIDE 0.5 MG: 0.5 SOLUTION RESPIRATORY (INHALATION) at 07:56

## 2021-12-06 RX ADMIN — Medication 1000 UNITS: at 09:32

## 2021-12-06 RX ADMIN — PROPOFOL 50 MG: 10 INJECTION, EMULSION INTRAVENOUS at 13:47

## 2021-12-06 RX ADMIN — PROPOFOL 30 MG: 10 INJECTION, EMULSION INTRAVENOUS at 13:58

## 2021-12-06 RX ADMIN — PROPOFOL 20 MG: 10 INJECTION, EMULSION INTRAVENOUS at 14:03

## 2021-12-06 RX ADMIN — SODIUM CHLORIDE 40 MG: 9 INJECTION INTRAMUSCULAR; INTRAVENOUS; SUBCUTANEOUS at 00:09

## 2021-12-06 RX ADMIN — GLYCOPYRROLATE 0.2 MG: 0.2 INJECTION, SOLUTION INTRAMUSCULAR; INTRAVENOUS at 13:47

## 2021-12-06 NOTE — ANESTHESIA POSTPROCEDURE EVALUATION
Procedure(s):  ESOPHAGOGASTRODUODENOSCOPY (EGD)  COLONOSCOPY.    total IV anesthesia    Anesthesia Post Evaluation        Patient location during evaluation: PACU  Note status: Adequate. Level of consciousness: responsive to verbal stimuli and sleepy but conscious  Pain management: satisfactory to patient  Airway patency: patent  Anesthetic complications: no  Cardiovascular status: acceptable  Respiratory status: acceptable  Hydration status: acceptable  Comments: +Post-Anesthesia Evaluation and Assessment    Patient: Stephanie Bonner MRN: [id number]  SSN: [id number]   YOB: 1940  Age: [de-identified] y.o. Sex: female      Cardiovascular Function/Vital Signs    BP (!) 121/41   Pulse 65   Temp 36.2 °C (97.1 °F)   Resp 16   Ht 5' 2\" (1.575 m)   Wt 80.7 kg (177 lb 14.6 oz)   SpO2 100%   Breastfeeding No   BMI 32.54 kg/m²     Patient is status post Procedure(s):  ESOPHAGOGASTRODUODENOSCOPY (EGD)  COLONOSCOPY. Nausea/Vomiting: Controlled. Postoperative hydration reviewed and adequate. Pain:  Pain Scale 1: Visual (12/06/21 1410)  Pain Intensity 1: 0 (12/06/21 1410)   Managed. Neurological Status: At baseline. Mental Status and Level of Consciousness: Arousable. Pulmonary Status:   O2 Device: None (Room air) (12/06/21 1410)   Adequate oxygenation and airway patent. Complications related to anesthesia: None    Post-anesthesia assessment completed. No concerns.     Signed By: Bob Councilman, MD    12/6/2021  Post anesthesia nausea and vomiting:  controlled      INITIAL Post-op Vital signs:   Vitals Value Taken Time   /41 12/06/21 1419   Temp     Pulse     Resp     SpO2

## 2021-12-06 NOTE — ROUTINE PROCESS
[facility]  1940  [id number]    Situation:  Verbal report received from: THELMA Strauss RN  Procedure: Procedure(s):  ESOPHAGOGASTRODUODENOSCOPY (EGD)  COLONOSCOPY    Background:    Preoperative diagnosis: Gi Bleed  Postoperative diagnosis: EGD - normal  Colon - Diverticulosis    :  Dr. Eugene Bennett  Assistant(s): Endoscopy RN-1: Nataly Horvath RN  Endoscopy RN-2: Muriel Patel RN    Specimens: * No specimens in log *  H. Pylori  no    Assessment:  Intra-procedure medications   Anesthesia gave intra-procedure sedation and medications, see anesthesia flow sheet yes    Intravenous fluids: NS@ Casey Cabot     Vital signs stable   yes    Abdominal assessment: round and soft   yes    Recommendation:    Return to floor  Yes, inpatient, room 0  Family or Narda Iba, daughter  Permission to share finding with family or friend yes

## 2021-12-06 NOTE — PROGRESS NOTES
Problem: Diabetes Self-Management  Goal: *Disease process and treatment process  Description: Define diabetes and identify own type of diabetes; list 3 options for treating diabetes. Outcome: Progressing Towards Goal  Goal: *Using medications safely  Description: State effect of diabetes medications on diabetes; name diabetes medication taking, action and side effects. Outcome: Progressing Towards Goal  Goal: *Monitoring blood glucose, interpreting and using results  Description: Identify recommended blood glucose targets  and personal targets.   Outcome: Progressing Towards Goal     Problem: Patient Education: Go to Patient Education Activity  Goal: Patient/Family Education  Outcome: Progressing Towards Goal     Problem: Heart Failure: Day 2  Goal: Activity/Safety  Outcome: Progressing Towards Goal  Goal: Nutrition/Diet  Outcome: Progressing Towards Goal  Goal: *Oxygen saturation within defined limits  Outcome: Progressing Towards Goal  Goal: *Hemodynamically stable  Outcome: Progressing Towards Goal  Goal: *Anxiety reduced or absent  Outcome: Progressing Towards Goal  Goal: *Demonstrates progressive activity  Outcome: Progressing Towards Goal

## 2021-12-06 NOTE — PROCEDURES
Esophagogastroduodenoscopy Procedure Note      Jensen Simon  1940  [id number]    Indication:  acute gi bleeding     Endoscopist: Rachele Zambrano MD    Referring Provider:  Amanda Leon MD    Sedation:  MAC anesthesia Propofol    Procedure Details:  After infomed consent was obtained for the procedure, with all risks and benefits of procedure explained the patient was taken to the endoscopy suite and placed in the left lateral decubitus position. Following sequential administration of sedation as per above, the endoscope was inserted into the mouth and advanced under direct vision to second portion of the duodenum. A careful inspection was made as the gastroscope was withdrawn, including a retroflexed view of the proximal stomach; findings and interventions are described below. Findings:     Esophagus:   - Normal mucosa throughout the esophagus. Z line normal at 39 cm from the incisors. Stomach:   - Normal mucosa throughout the stomach. Pylorus was patent. Duodenum:   - The bulb and post bulbar mucosa is normal in appearance to the second portion. The duodenal folds appeared normal.      Therapies:  none    Specimen: none           Complications:   None were encountered during the procedure. EBL:  < 10 ml.           Recommendations:   -Proceed with colonoscopy    Rachele Zambrano MD  12/6/2021  2:09 PM

## 2021-12-06 NOTE — PROGRESS NOTES
TRANSFER - OUT REPORT:    Verbal report given to Priya RN (name) on [employer]  being transferred to [facility] (unit) for routine progression of care       Report consisted of patients Situation, Background, Assessment and   Recommendations(SBAR). Information from the following report(s) SBAR, Kardex, OR Summary, Intake/Output, MAR, Accordion, Recent Results, Med Rec Status and Quality Measures was reviewed with the receiving nurse. Lines:   Peripheral IV 12/04/21 Left; Anterior Wrist (Active)   Site Assessment Clean, dry, & intact 12/04/21 2334   Phlebitis Assessment 0 12/04/21 2334   Infiltration Assessment 0 12/04/21 2334   Dressing Status Clean, dry, & intact 12/04/21 2334   Dressing Type Transparent; Tape 12/04/21 2334   Hub Color/Line Status Pink; Capped 12/04/21 2334   Action Taken Open ports on tubing capped 12/04/21 0716   Alcohol Cap Used Yes 12/04/21 2334        Opportunity for questions and clarification was provided.       Patient transported with:   [employer]

## 2021-12-06 NOTE — PROGRESS NOTES
TRANSFER - IN REPORT:    Verbal report received from [address] [place] [address] RN(name) on [employer]  being received from ED(unit) for routine progression of care      Report consisted of patients Situation, Background, Assessment and   Recommendations(SBAR). Information from the following report(s) SBAR, Kardex, ED Summary, Intake/Output, MAR and Recent Results was reviewed with the receiving nurse. Opportunity for questions and clarification was provided. Assessment completed upon patients arrival to unit and care assumed.

## 2021-12-06 NOTE — TELEPHONE ENCOUNTER
Spoke to patient's daughter and offered to schedule patient for a hosp f/up with Dr. Homero Cochran either on 12/7/21 or 12/8/21.  Patient's daughter declined to schedule at this time due to patient still being in the hospital.

## 2021-12-06 NOTE — PROGRESS NOTES
Transition of Care Plan:    RUR:7%   Disposition: Home with family  Follow up appointments: Follow up with PCP and/or Specialist   DME needed: N/A  Transportation at Discharge: Family to assist with transport   [address] or means to access home:  Family to provide      IM Medicare Letter: 2nd IM Medicare Letter to be given   Is patient a BCPI-A Bundle:      CM to provide if required      If yes, was Bundle Letter given?:    Is patient a [place] and connected with the [address] ? N/A  If yes, was [place] transfer form completed and [facility] notified? Caregiver Contact: Levi Seymour (daughter) [phone]  Discharge Caregiver contacted prior to discharge? Follow up with family        CM: Magaly Max is currently working with the pt in the [place] [facility]. CM made aware that pt will be d/c on today after testing, with no additional needs. CM contact pt's daughter: Leona Guevara, via telephone to inform pt's family of the following. Pt's daughter reported that pt's brother will assist with transport home on today, and will arrive to [address] [place] [address] today between 4:40p-5P. Susana Domingo CM will notify nurse. CM attempt to schedule appointment and was notified that pt's PCP currently doesn't have available appointments at this time. CM informed that physicians office will contact pt within 24hrs to arrange appointment. CM completed the need of the pt at this time.     Magaly Max, ROBERT, [address] [place] [address]

## 2021-12-06 NOTE — TELEPHONE ENCOUNTER
----- Message from Hortencia Juan Alberto sent at 12/6/2021  3:06 PM EST -----  Subject: Appointment Request    Reason for Call: Routine Hospital Follow Up    QUESTIONS  Type of Appointment? Established Patient  Reason for appointment request? Available appointments did not meet   patient need  Additional Information for Provider? pt needs to be seen within the next   7-14 days from 12/6/21 which is her release date of the hospital. please   don't call pt until 12/7/21 to set up appt.  ---------------------------------------------------------------------------  --------------  CALL BACK INFO  What is the best way for the office to contact you? OK to leave message on   voicemail  Preferred Call Back Phone Number? [phone]  ---------------------------------------------------------------------------  --------------  SCRIPT ANSWERS  Relationship to Patient? Third Party  Representative Name? Cristhian Siddiqi  (Patient needs follow up visit after hospital discharge) Book first   available appointment within 7 days OF DISCHARGE, if no appt, proceed to   book the next available time slot within 14 days OF DISCHARGE AND Send   Message to Provider. [facility] Follow Up appointment cannot be booked   beyond 14 Days and should result in a Message to Provider. ? Yes   Have you been diagnosed with, awaiting test results for, or told that you   are suspected of having COVID-19 (Coronavirus)? (If patient has tested   negative or was tested as a requirement for work, school, or travel and   not based on symptoms, answer no)? No  Within the past two weeks have you developed any of the following symptoms   (answer no if symptoms have been present longer than 2 weeks or began   more than 2 weeks ago)?  Fever or Chills, Cough, Shortness of breath or   difficulty breathing, Loss of taste or smell, Sore throat, Nasal   congestion, Sneezing or runny nose, Fatigue or generalized body aches   (answer no if pain is specific to a body part e.g. back pain), Diarrhea,   Headache? No  Have you had close contact with someone with COVID-19 in the last 14 days? No  (Service Expert  click yes below to proceed with [employer] As Usual   Scheduling)?  Yes

## 2021-12-06 NOTE — PROGRESS NOTES
Endoscope was pre-cleaned at the bedside immediately following procedure by Steven Royal Rn.      Medications     glycopyrrolate 0.2 mg/mL (mg)     Date/Time Rate/Dose/Volume Action Route Admin User Audit       12/06/21  1347 0.2 mg Given IntraVENous Syed Granmatti, CRNA            lidocaine (PF) 2% (mg)     Date/Time Rate/Dose/Volume Action Route Admin User Audit       12/06/21  1347 60 mg Given IntraVENous Syed Grange, CRNA            propofol 10 mg/mL (mg)     Date/Time Rate/Dose/Volume Action Route Admin User Audit       12/06/21  1347 50 mg Given IntraVENous Syed Grange, CRNA       1348 30 mg Given IntraVENous Syed Grange, CRNA       1349 30 mg Given IntraVENous Syed Grange, CRNA       1352 30 mg Given IntraVENous Syed Grange, CRNA       1355 30 mg Given IntraVENous Syed Grange, CRNA       1358 30 mg Given IntraVENous Syed Grange, CRNA       1401 20 mg Given IntraVENous Syed Grange, CRNA       1403 20 mg Given IntraVENous Syed Grange, CRNA            0.9% sodium chloride infusion (mL) Dosing weight:  80.7    Date/Time Rate/Dose/Volume Action Route Admin User Audit       12/06/21  1341 100 mL/hr Rate Verify IntraVENous Syed Grange, CRNA       [address], Jose Reinoso, STU      Comment: Switch to gravity       1351  Restarted IntraVENous Syed Grange, CRNA       1405 200 mL Anesthesia Volume Adjustment IntraVENous Syed Granmatti, CRNA                Glasses returned to patient

## 2021-12-06 NOTE — PROGRESS NOTES
Spiritual Care Partner Volunteer visited patient at [address] in [facility] ENDOSCOPY on 12/6/2021   Documented by: Dora Keene.    Paging Service: [phone])

## 2021-12-06 NOTE — PROGRESS NOTES
TRANSFER - OUT REPORT:    Verbal report given to Gi Newton on [employer]  being transferred to [phone] for routine progression of care       Report consisted of patients Situation, Background, Assessment and   Recommendations(SBAR). Information from the following report(s) Procedure Summary was reviewed with the receiving nurse. Opportunity for questions and clarification was provided.

## 2021-12-06 NOTE — PROGRESS NOTES
This RN resumed care of this patient from [address], until patient was transferred from Surgical Telemetry to Oncology. DEX Alexander gave report of patient to Oncology nurse prior to patient's transfer.

## 2021-12-06 NOTE — PROCEDURES
Colonoscopy Procedure Note    Indications:   Acute Lower GI hemorrhage    Referring Physician: Alison Childress MD  Anesthesia/Sedation: MAC anesthesia Propofol  Endoscopist:  Dr. Suarez Records    Procedure in Detail:  Informed consent was obtained for the procedure, including sedation. Risks of perforation, hemorrhage, adverse drug reaction, and aspiration were discussed. The patient was placed in the left lateral decubitus position. Based on the pre-procedure assessment, including review of the patient's medical history, medications, allergies, and review of systems, she had been deemed to be an appropriate candidate for moderate sedation; she was therefore sedated with the medications listed above. The patient was monitored continuously with ECG tracing, pulse oximetry, blood pressure monitoring, and direct observations. A rectal examination was performed. The [id number] was inserted into the rectum and advanced under direct vision to the cecum, which was identified by the ileocecal valve and appendiceal orifice. The quality of the colonic preparation was adequate. A careful inspection was made as the colonoscope was withdrawn, including a retroflexed view of the rectum; findings and interventions are described below. Appropriate photodocumentation was obtained. Findings:   1. No blood seen in the colon. Preparation was adequate. Scope advanced to cecum. 2.  ++ Severe clustering of diverticulosis in the sigmoid colon and then more scattered throughout the descending and transverse colon. No signs of bleeding. 3.  Small internal hemorrhoids. Therapies:  none    Specimen:  none     Complications: None were encountered during the procedure. EBL: < 10 ml.     Recommendations:   -no active hemorrhage detected, suspect diverticulosis resolved    Signed By: Maty Echols MD                        December 6, 2021

## 2021-12-06 NOTE — DISCHARGE SUMMARY
Hospitalist Discharge Summary     Patient ID:  Heike Vee  [id number]  4 y.o.  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 BLACK/ female with medical history including but not limited to hyperlipidemia, hypertension, diabetes, hypothyroidism, congestive heart failure who presents to emergency room with complaint of rectal bleeding with bowel movements maroon-colored stools. Patient states this started last evening around 5 PM.  She does have history of diverticulitis and diverticular bleed however patient states that was back in 2016 and the bleeding was less than what is occurring now.      ______________________________________________________________________  DISCHARGE SUMMARY/HOSPITAL COURSE:  for full details see H&P, daily progress notes, labs, consult notes.    Patient is 43-year-old who was admitted to the emergency room with GI bleed possible diverticular bleed initially kept n.p.o. seen by GI patient had a colonoscopy endoscopy which came back negative appeared to be likely secondary to diverticular bleed which resolved patient H&H was checked which remained fairly stable no evidence of active bleeding patient tolerating oral intake we will continue outpatient regimen and follow-up with primary care provider. Patient has been doing fairly well at baseline. No pain no nausea no vomiting no abdominal pain. Based on patient clinical stability patient being discharged home. I did confer with GI who is okay with discharge planning.          _______________________________________________________________________  Patient seen and examined by me on discharge day. Pertinent Findings:  Gen:    Not in distress  Chest: Clear lungs  CVS:   Regular rhythm. No edema  Abd:  Soft, not distended, not tender  Neuro:  Alert,   _______________________________________________________________________  DISCHARGE MEDICATIONS:   Current Discharge Medication List      CONTINUE these medications which have NOT CHANGED    Details   Euthyrox 100 mcg tablet TAKE 1 TABLET BY MOUTH ONCE DAILY BEFORE BREAKFAST  Qty: 90 Tablet, Refills: 0  Start date: 12/4/2021      metoprolol tartrate (LOPRESSOR) 50 mg tablet Take 1 tablet by mouth twice daily  Qty: 180 Tablet, Refills: 0      metFORMIN (GLUCOPHAGE) 1,000 mg tablet TAKE 1 TABLET BY MOUTH TWICE DAILY WITH MEALS  Qty: 180 Tablet, Refills: 0    Associated Diagnoses: Type II diabetes mellitus with nephropathy (HCC)      atorvastatin (LIPITOR) 80 mg tablet Take 1 tablet by mouth nightly  Qty: 90 Tablet, Refills: 0      amLODIPine (NORVASC) 5 mg tablet Take 1 tablet by mouth once daily  Qty: 90 Tablet, Refills: 0      ezetimibe (ZETIA) 10 mg tablet Take 1 tablet by mouth once daily  Qty: 90 Tablet, Refills: 0      omeprazole (PRILOSEC) 20 mg capsule Take 1 capsule by mouth once daily  Qty: 90 Capsule, Refills: 0      insulin NPH/insulin regular (NOVOLIN 70/30, HUMULIN 70/30) 100 unit/mL (70-30) injection Inject 14 units under the skin twice daily. Please dispense Relion brand.   Indications: type 2 diabetes mellitus  Qty: 10 mL, Refills: 2      gabapentin (NEURONTIN) 100 mg capsule Take 2 capsules by mouth twice daily  Qty: 120 Capsule, Refills: 0    Associated Diagnoses: Type II diabetes mellitus with nephropathy (HCC)      albuterol (PROVENTIL HFA, VENTOLIN HFA, PROAIR HFA) 90 mcg/actuation inhaler Take 1 Puff by inhalation every six (6) hours as needed for Wheezing. Qty: 1 Inhaler, Refills: 0      cholecalciferol (VITAMIN D3) 1,000 unit cap Take 1,000 Units by mouth daily. aspirin 81 mg chewable tablet Take 1 Tab by mouth daily. Qty: 90 Tab, Refills: 3      cyanocobalamin (VITAMIN B-12) 1,000 mcg tablet Take 1,000 mcg by mouth daily. umeclidinium-vilanteroL (Anoro Ellipta) 62.5-25 mcg/actuation inhaler Take 1 Puff by inhalation daily. Qty: 1 Each, Refills: 0    Associated Diagnoses: SOB (shortness of breath)      FreeStyle Ruben 14 Day Sensor kit USE TO CHECK GLUCOSE TWICE DAILY  Qty: 300 Kit, Refills: 0    Associated Diagnoses: Type 2 diabetes mellitus with nephropathy ([address]               Patient Follow Up Instructions:    Activity:as tolerated   Diet: diabetic  Wound Care    Follow-up with PCP       Follow-up Information     Follow up With Specialties Details Why Contact Info    Jaqueline Chase MD Internal Medicine   [address] [facility]  [phone]          ________________________________________________________________    Risk of deterioration: low    Condition at Discharge:  Stable  __________________________________________________________________    Disposition  home    ____________________________________________________________________    Code Status: full   ___________________________________________________________________      Total time in minutes spent coordinating this discharge (includes going over instructions, follow-up, prescriptions, and preparing report for sign off to her PCP) :  38  minutes    Signed:  Shelby Segura MD

## 2021-12-06 NOTE — ANESTHESIA PREPROCEDURE EVALUATION
Anesthetic History   No history of anesthetic complications            Review of Systems / Medical History  Patient summary reviewed, nursing notes reviewed and pertinent labs reviewed    Pulmonary          Smoker (EX, 20 pk yr, quit 2010)      Comments: Former smoker 20 pack yrs   Neuro/Psych         Psychiatric history    Comments: Recurrent depression  Anxiety  Cardiovascular    Hypertension      CHF    Past MI, CAD, PAD (stents rt leg), CABG (3 vessel in 2011) and hyperlipidemia    Exercise tolerance: <4 METS  Comments: ECHO from 2016 showed a 55% EF with trivial MR and TR  S/P CABG x 3 (2011)   GI/Hepatic/Renal     GERD    Renal disease: stones      Comments: GI bleed  Hx of diverticulosis with bleeding Endo/Other    Diabetes: poorly controlled, type 2, using insulin  Hypothyroidism: well controlled  Obesity and anemia     Other Findings   Comments: Bilateral Carpal Tunnel Syndrome    Osteoporosis  Diverticulosis           Physical Exam    Airway  Mallampati: II  TM Distance: 4 - 6 cm  Neck ROM: normal range of motion   Mouth opening: Normal     Cardiovascular  Regular rate and rhythm,  S1 and S2 normal,  no murmur, click, rub, or gallop             Dental    Dentition: Lower partial plate and Poor dentition  Comments: Missing multiple teeth, none loose.   Missing crown   Pulmonary  Breath sounds clear to auscultation              Comments: Shallow inspirations Abdominal  GI exam deferred       Other Findings            Anesthetic Plan    ASA: 3  Anesthesia type: total IV anesthesia          Induction: Intravenous  Anesthetic plan and risks discussed with: Patient      Previous grade 1 view for CABG

## 2021-12-06 NOTE — H&P
Gastroenterology Preprocedural History and Physical    Patient: Carolina Delmar    Physician: Bjorn Pascual MD    Vital Signs: Blood pressure (!) 160/80, pulse 66, temperature 97.1 °F (36.2 °C), resp. rate 20, height 5' 2\" (1.575 m), weight 80.7 kg (177 lb 14.6 oz), SpO2 97 %, not currently breastfeeding. Allergies:    Allergies   Allergen Reactions    Ace Inhibitors Swelling    Arb-Angiotensin Receptor Antagonist Swelling    Lisinopril Swelling    Plavix [Clopidogrel] Other (comments)     Excessive bleeding    Potassium Nausea and Vomiting     Potassium containing oral products are not well tolerated by patient       Chief Complaint: Acute GI hemorrhage    History of Present Illness: above    Justification for Procedure: above    History:  Past Medical History:   Diagnosis Date    Anxiety     CAD (coronary artery disease)     cabg     Congestive heart failure (HCC)     Diabetes ([facility]     Diverticulosis     GERD (gastroesophageal reflux disease)     Heart disease     Heart failure ([facility] 10/18/2011    High cholesterol     Hypertension     Hypothyroidism     Kidney stones     Menopause     Myocardial infarction ([facility]     Postsurgical percutaneous transluminal coronary angioplasty status 5/17/2012    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    [facility]    Sleeping difficulty     Teeth decayed     Weakness       Past Surgical History:   Procedure Laterality Date    COLONOSCOPY N/A 10/31/2018    COL[facility] performed [facility]    HX CORONARY ARTERY BYPASS GRAFT  11/11/11    3 vessel    HX GYN      hysterectomy    HX HEART CATHETERIZATION      HX HYSTERECTOMY      HX UROLOGICAL  1970s    kidney stone    NE CARDIAC SURG PROCEDURE UNLIST      cabg x 3    VASCULAR SURGERY PROCEDURE UNLIST      Stents put in right leg      Social History     Socioeconomic History    Marital status:    Tobacco Use    Smoking status: Former Smoker     Packs/day: 0.50     Years: 40.00 Pack years: 20.00     Quit date: 2010     Years since quittin.2    Smokeless tobacco: Never Used   Vaping Use    Vaping Use: Never used   Substance and Sexual Activity    Alcohol use: No    Drug use: No    Sexual activity: Never      Family History   Problem Relation Age of Onset    Diabetes Mother     Heart Disease Mother     Diabetes Brother     Heart Disease Brother     Mental Retardation Brother     Hypertension Brother     Other Father     Cancer Sister     Diabetes Brother     Heart Disease Brother     Diabetes Brother     Breast Cancer Daughter         50's       Medications:   Prior to Admission medications    Medication Sig Start Date End Date Taking? Authorizing Provider   Euthyrox 100 mcg tablet TAKE 1 TABLET BY MOUTH ONCE DAILY BEFORE BREAKFAST 21  Yes Jaron Hendricks MD   metoprolol tartrate (LOPRESSOR) 50 mg tablet Take 1 tablet by mouth twice daily 21  Yes Jaron Hendricks MD   metFORMIN (GLUCOPHAGE) 1,000 mg tablet TAKE 1 TABLET BY MOUTH TWICE DAILY WITH MEALS 11/10/21  Yes Jaron Hendricks MD   atorvastatin (LIPITOR) 80 mg tablet Take 1 tablet by mouth nightly 10/27/21  Yes Jaron Hendricks MD   amLODIPine (NORVASC) 5 mg tablet Take 1 tablet by mouth once daily 10/27/21  Yes Jaron Hendricks MD   ezetimibe (ZETIA) 10 mg tablet Take 1 tablet by mouth once daily 10/19/21  Yes Jaron Hendricks MD   omeprazole (PRILOSEC) 20 mg capsule Take 1 capsule by mouth once daily 9/3/21  Yes Jaron Hendricks MD   insulin NPH/insulin regular (NOVOLIN 70/30, HUMULIN 70/30) 100 unit/mL (70-30) injection Inject 14 units under the skin twice daily. Please dispense Relion brand.   Indications: type 2 diabetes mellitus 21  Yes Jaron Hendricks MD   gabapentin (NEURONTIN) 100 mg capsule Take 2 capsules by mouth twice daily 21  Yes Jaron Hendricks MD   albuterol (PROVENTIL HFA, VENTOLIN HFA, PROAIR HFA) 90 mcg/actuation inhaler Take 1 Puff by inhalation every six (6) hours as needed for Wheezing. 2/3/21  Yes Marin Tsang MD   cholecalciferol (VITAMIN D3) 1,000 unit cap Take 1,000 Units by mouth daily. Yes Provider, Historical   aspirin 81 mg chewable tablet Take 1 Tab by mouth daily. 1/4/16  Yes Marin Tsang MD   cyanocobalamin (VITAMIN B-12) 1,000 mcg tablet Take 1,000 mcg by mouth daily. Yes Provider, Historical   umeclidinium-vilanteroL (Anoro Ellipta) 62.5-25 mcg/actuation inhaler Take 1 Puff by inhalation daily. 11/15/21   Marin Tsang MD   FreeStyle Ruben 14 Day Sensor kit USE TO CHECK GLUCOSE TWICE DAILY 3/29/21   Marin sTang MD       Physical Exam:   General: alert, no distress   HEENT: Head: Normocephalic, no lesions, without obvious abnormality.    Heart: regular rate and rhythm, S1, S2 normal, no murmur, click, rub or gallop   Lungs: chest clear, no wheezing, rales, normal symmetric air entry   Abdominal: soft, NT/N BS present   Neurological: Grossly normal   Extremities: extremities normal, atraumatic, no cyanosis or edema     Findings/Diagnosis: Acute GI bleeding    Plan of Care/Planned Procedure: EGD/Colonoscopy

## 2021-12-06 NOTE — PROGRESS NOTES
ADULT PROTOCOL: JET AEROSOL ASSESSMENT    Patient  Semaj Blanco     [de-identified] y.o.   female     12/6/2021  1:48 AM    Breath Sounds Pre Procedure: Right Breath Sounds: Diminished, Coarse                               Left Breath Sounds: Diminished, Coarse    Breath Sounds Post Procedure: Right Breath Sounds: Diminished, Clear                                 Left Breath Sounds: Diminished, Clear    Breathing pattern: Pre procedure Breathing Pattern: Regular          Post procedure Breathing Pattern: Regular    Heart Rate: Pre procedure Pulse: 64           Post procedure Pulse: 80    Resp Rate: Pre procedure Respirations: 20           Post procedure Respirations: 20           Oxygen: O2 Device: None (Room air)        Changed: NO    SpO2: Pre procedure SpO2: 97 %   without oxygen              Post procedure SpO2: 98 %  without oxygen    Nebulizer Therapy: Current medications Aerosolized Medications: Brovana, Ipratropium bromide      Changed: YES BID    Smoking History:  Former Smoker    Problem List:   Patient Active Problem List   Diagnosis Code    CAD (coronary artery disease) I25.10    Hypothyroidism E03.9    PVD (peripheral vascular disease) ([facility] I73.9    S/P CABG x 3 Z95.1    Essential hypertension, benign I10    Mixed hyperlipidemia E78.2    Postsurgical percutaneous transluminal coronary angioplasty status Z98.61    Osteoporosis M81.0    GIB (gastrointestinal bleeding) K92.2    Diverticulosis of intestine with bleeding K57.91    Anemia I08.5    Diastolic CHF, chronic (HCC) I50.32    Type 2 diabetes mellitus with nephropathy (HCC) E11.21    Recurrent depression ([facility] F33.9    Type 2 diabetes mellitus with diabetic neuropathy ([facility] E11.40    GI bleed K92.2       Respiratory Therapist: Bianca Lew, RT

## 2021-12-06 NOTE — PROGRESS NOTES
End of Shift Note    Bedside shift change report given to Gi Pitts  (oncoming nurse) by Joaquin Rendon RN (offgoing nurse). Report included the following information SBAR, Kardex, ED Summary, Intake/Output, MAR and Recent Results    Shift worked:  4081-0246     Shift summary and any significant changes:    Patient remains NPO. Patient completed Golytely with good result. No visible blood to loose watery BM. Patient no c/o of pain. Patient voiding QS. Vitals WNL. Concerns for physician to address:  see above     Zone phone for oncoming shift:  [phone]     Activity:  Activity Level: Up ad diego  Number times ambulated in hallways past shift: 0  Number of times OOB to chair past shift: 4x ambulate to BR    Cardiac:   Cardiac Monitoring: No           Access:   Current line(s): PIV     Genitourinary:   Urinary status: voiding    Respiratory:   O2 Device: None (Room air)  Chronic home O2 use?: NO  Incentive spirometer at bedside: NO     GI:  Last Bowel Movement Date: 12/06/21  Current diet:  DIET NPO  Passing flatus: YES  Tolerating current diet: YES  NPO       Pain Management:   Patient states pain is manageable on current regimen: No c/o Pain. Skin:  Sumeet Score: 21  Interventions: increase time out of bed    Patient Safety:  Fall Score:  Total Score: 1  Interventions: assistive device (walker, cane, etc), gripper socks and pt to call before getting OOB       Length of Stay:  Expected LOS: - - -  Actual LOS: 2      Priya Veloz RN

## 2021-12-06 NOTE — PROGRESS NOTES
The risks and benefits of the bite block have been explained to patient. Patient verbalizes understanding. Patient states dentures are at home, one missing cap to left side of upper mouth.

## 2021-12-07 ENCOUNTER — VIRTUAL VISIT (OUTPATIENT)
Dept: INTERNAL MEDICINE CLINIC | Age: 81
End: 2021-12-07
Payer: MEDICARE

## 2021-12-07 DIAGNOSIS — I50.32 DIASTOLIC CHF, CHRONIC (HCC): ICD-10-CM

## 2021-12-07 DIAGNOSIS — I71.40 ABDOMINAL AORTIC ANEURYSM (AAA) WITHOUT RUPTURE: Primary | ICD-10-CM

## 2021-12-07 DIAGNOSIS — K57.91 GASTROINTESTINAL HEMORRHAGE ASSOCIATED WITH INTESTINAL DIVERTICULOSIS: ICD-10-CM

## 2021-12-07 DIAGNOSIS — F33.9 RECURRENT DEPRESSION (HCC): ICD-10-CM

## 2021-12-07 DIAGNOSIS — E03.9 HYPOTHYROIDISM, UNSPECIFIED TYPE: ICD-10-CM

## 2021-12-07 DIAGNOSIS — I73.9 PVD (PERIPHERAL VASCULAR DISEASE) (HCC): ICD-10-CM

## 2021-12-07 DIAGNOSIS — Z79.4 TYPE 2 DIABETES MELLITUS WITH DIABETIC NEUROPATHY, WITH LONG-TERM CURRENT USE OF INSULIN (HCC): ICD-10-CM

## 2021-12-07 DIAGNOSIS — D50.8 OTHER IRON DEFICIENCY ANEMIA: ICD-10-CM

## 2021-12-07 DIAGNOSIS — E11.21 TYPE 2 DIABETES MELLITUS WITH NEPHROPATHY (HCC): ICD-10-CM

## 2021-12-07 DIAGNOSIS — K57.31 DIVERTICULOSIS OF LARGE INTESTINE WITH HEMORRHAGE: ICD-10-CM

## 2021-12-07 DIAGNOSIS — I10 ESSENTIAL HYPERTENSION, BENIGN: ICD-10-CM

## 2021-12-07 DIAGNOSIS — I25.10 CORONARY ARTERY DISEASE INVOLVING NATIVE CORONARY ARTERY OF NATIVE HEART WITHOUT ANGINA PECTORIS: ICD-10-CM

## 2021-12-07 DIAGNOSIS — E11.40 TYPE 2 DIABETES MELLITUS WITH DIABETIC NEUROPATHY, WITH LONG-TERM CURRENT USE OF INSULIN (HCC): ICD-10-CM

## 2021-12-07 DIAGNOSIS — E78.2 MIXED HYPERLIPIDEMIA: ICD-10-CM

## 2021-12-07 PROCEDURE — G8427 DOCREV CUR MEDS BY ELIG CLIN: HCPCS | Performed by: INTERNAL MEDICINE

## 2021-12-07 PROCEDURE — 99215 OFFICE O/P EST HI 40 MIN: CPT | Performed by: INTERNAL MEDICINE

## 2021-12-07 NOTE — PROGRESS NOTES
HISTORY OF PRESENT ILLNESS  Miranda Stone is a [de-identified] y.o. female. HPI     Last here 11/15/21. Pt is here for RABIA. This is an established visit completed with telemedicine was completed with video assist  the patient acknowledges and agrees to this method of visitation rosalia    She was in the hospital from 12/04/21-12/06/21 for GI bleed    Ms. Zeke Rojas is a [de-identified]y.o. year old female, she is seen today for Transition of Care services following a hospital discharge for GI bleed on 12/06/21. Our office Nurse Navigator performed an outreach to [employer] will reach her later I saw her within 2 days (within 2 business days of discharge) to complete medication reconciliation and a telephonic assessment of her condition. I saw her within 2 days of discharge    Reviewed discharge note:  Patient is 52-year-old who was admitted to the emergency room with GI bleed possible diverticular bleed initially kept n.p.o. seen by GI patient had a colonoscopy endoscopy which came back negative appeared to be likely secondary to diverticular bleed which resolved patient H&H was checked which remained fairly stable no evidence of active bleeding patient tolerating oral intake we will continue outpatient regimen and follow-up with primary care provider. Patient has been doing fairly well at baseline. No pain no nausea no vomiting no abdominal pain. Based on patient clinical stability patient being discharged home. I did confer with GI who is okay with discharge planning. Reviewed consult with Dr. Jessica Cast (GI):  Principal Problem:    GI bleed (12/4/2021)  Active Problems:    Hypothyroidism (11/18/2009)    Essential hypertension, benign (5/17/2012)    Mixed hyperlipidemia (5/17/2012)    Anemia (10/11/2015)      Overview: Did not meet criteria for acute blood loss anemia    Diastolic CHF, chronic ([facility] [address] (10/11/2015)    Type 2 diabetes mellitus with nephropathy ([facility] (1/2/2018)  1. Hematochezia; resolving/resolved  2.  Colonoscopy in 2018 with left-sided diverticulosis  3. CT bleeding protocol negative for active bleed  4. Anemia, post-hemorrhagic (3 gm/dL drop from baseline)  5. Likely diverticular bleed but can't r/o UGI bleed given 'maroon' stools    Reviewed CT abd pelv 12/04/21:  No active GI bleed. Extensive diffuse colonic diverticulosis without  Diverticulitis. Note--aortic aneurysm found incidentally    Reviewed colo 12/06/21:  1. No blood seen in the colon. Preparation was adequate. Scope advanced to cecum. 2.  ++ Severe clustering of diverticulosis in the sigmoid colon and then more scattered throughout the descending and transverse colon. No signs of bleeding. 3.  Small internal hemorrhoids. Reviewed EGD 12/06/21:  Esophagus:   -           Normal mucosa throughout the esophagus. Z line normal at 39 cm from the incisors. Stomach:   -           Normal mucosa throughout the stomach. Pylorus was patent. Duodenum:   -           The bulb and post bulbar mucosa is normal in appearance to the second portion. The duodenal folds appeared normal.      Reviewed hospital labs    No medication changes    Started GI bleed Friday night 12/03/21, bleeding had stopped by Saturday evening 12/04/21  Per pt had diverticulitis  She does not currently have blood in stool         Lov she c/o SOB when doing household chores, if she is walking up the stairs  Reviewed CT chest 12/01/21:  No Significant abnormalities demonstrated.   Symptoms improved     No home BP readings, discussed keeping eye on BP since was high in hospital previously well controlled medications were held while hospitalized due to GI bleeding  Continues on metoprolol 50mg BID and norvasc 5mg  Recall had angioedema with lisinopril and benicar-HCT in the past      She is diabetic     this morning only has one readings which has been helpful in the hospital has not been having hypoglycemia  Continues on metformin 1000mg BID   On NPH 70/30 mix now decreased to 14U BID  Previously she had been taking 20 and 22 units we had decreased this to 18 units but she had recurrent hypoglycemia so we had decreased it again  She also takes ASA 81mg daily   Recall issues with hypoglycemia in the past, a1c under 7.5 at goal for her       Wt was 179 lbs lov   Discussed diet and weight loss     Reviewed labs      Pt follows with Dr. Daria Tipton (cardio) for cad  Last visit 9/02/21 with NP Sonali Oconnell (vasc) for her PAD  No claudication stable   Last visit was 7/21  Discussed setting up abd US to check on abd aneurysm that was incidentally found during recent hospital visit       Pt saw Dr. Monty Wilkes (ortho) for carpal tunnel   Had surgery for this 8/1/19   Last visit was 8/19/19      She has been seeing Dr. Robert Brantley (ortho) for R shoulder pain   Last there 12/11/20   She was completing PT      Pt is taking prilosec 20mg every other day working well     Takes OTC vit D daily      Continues synthroid 100mcg daily    She takes this med separately from all meds      Continues on lipitor 80mg max dose for cholesterol   Pt is now taking zetia 10mg once daily as well however it is affordable enough so she will continue this  Welchol was too expensive now but zetia ok and grey well no issue     No longer on lexapro, doing well off medication not sad or down 12/21  She also has ativan to use prn     Pt had sleep eval with Dr Yosi Truong sleep apnea  Last there 1/18/21   She had sleep titration 3/15/21  She is compliant with bipap 12/21-was not on bipap while in hospital     Pt continues on gabapentin 100 mg for pain in hand as needed     Former smoker, quit smoking in 2010      ACP on file. SDMs is  daughter Ira Hurt) and Hansa Solares Chew        PREVENTIVE:    Colonoscopy: Dr. Sher De Jesus 5 years, 12/6/21 manetes  Pap: 12/14, declines further   Mammogram: 5/7/21 nl  DEXA: 3/20, osteopenic, stopped fosamax  Tdap: 1/04/2016  Pneumovax: 01/29/20   [id number]: 9/17  Zostavax: 10/10/2016  Shingrix: completed both  Flu shot: 11/15/21  Foot exam:05/12/21  Microalbumin: 11/04/20  A1c: 7/19 7.5, 10/19 7.3  1/20 7.4 7/20 8.2 10/20 8.1 1/21 8.3 4/21 7.9 8/21 7.2 11/21 8.0  Eye exam: Dr. Nat Daley at [facility], 5/27/21  Lipids: 12/21 LDL 50  Covid: both [employer]) due for booster      Patient Active Problem List    Diagnosis Date Noted    GI bleed 12/04/2021    Type 2 diabetes mellitus with diabetic neuropathy ([facility] 01/22/2019    Type 2 diabetes mellitus with nephropathy ([facility] 01/02/2018    Recurrent depression ([facility] 01/02/2018    Diverticulosis of intestine with bleeding 10/11/2015    Anemia 92/31/3443    Diastolic CHF, chronic ([address] 10/11/2015    GIB (gastrointestinal bleeding) 10/08/2015    Osteoporosis 09/10/2013    Essential hypertension, benign 05/17/2012    Mixed hyperlipidemia 05/17/2012    Postsurgical percutaneous transluminal coronary angioplasty status 05/17/2012    S/P CABG x 3 11/11/2011    CAD (coronary artery disease) 11/18/2009    Hypothyroidism 11/18/2009    PVD (peripheral vascular disease) ([facility] 11/18/2009     Current Outpatient Medications   Medication Sig Dispense Refill    Euthyrox 100 mcg tablet TAKE 1 TABLET BY MOUTH ONCE DAILY BEFORE BREAKFAST 90 Tablet 0    metoprolol tartrate (LOPRESSOR) 50 mg tablet Take 1 tablet by mouth twice daily 180 Tablet 0    umeclidinium-vilanteroL (Anoro Ellipta) 62.5-25 mcg/actuation inhaler Take 1 Puff by inhalation daily.  1 Each 0    metFORMIN (GLUCOPHAGE) 1,000 mg tablet TAKE 1 TABLET BY MOUTH TWICE DAILY WITH MEALS 180 Tablet 0    atorvastatin (LIPITOR) 80 mg tablet Take 1 tablet by mouth nightly 90 Tablet 0    amLODIPine (NORVASC) 5 mg tablet Take 1 tablet by mouth once daily 90 Tablet 0    ezetimibe (ZETIA) 10 mg tablet Take 1 tablet by mouth once daily 90 Tablet 0    omeprazole (PRILOSEC) 20 mg capsule Take 1 capsule by mouth once daily 90 Capsule 0    insulin NPH/insulin regular (NOVOLIN 70/30, HUMULIN 70/30) 100 unit/mL (70-30) injection Inject 14 units under the skin twice daily. Please dispense Relion brand. Indications: type 2 diabetes mellitus 10 mL 2    gabapentin (NEURONTIN) 100 mg capsule Take 2 capsules by mouth twice daily 120 Capsule 0    FreeStyle Ruben 14 Day Sensor kit USE TO CHECK GLUCOSE TWICE DAILY 300 Kit 0    albuterol (PROVENTIL HFA, VENTOLIN HFA, PROAIR HFA) 90 mcg/actuation inhaler Take 1 Puff by inhalation every six (6) hours as needed for Wheezing. 1 Inhaler 0    cholecalciferol (VITAMIN D3) 1,000 unit cap Take 1,000 Units by mouth daily.  aspirin 81 mg chewable tablet Take 1 Tab by mouth daily. 90 Tab 3    cyanocobalamin (VITAMIN B-12) 1,000 mcg tablet Take 1,000 mcg by mouth daily.        Past Surgical History:   Procedure Laterality Date    COLONOSCOPY N/A 10/31/2018    COLONOSCOPY performed by Jaquan Doyle MD at hospitals ENDOSCOPY    COLONOSCOPY N/A 12/6/2021    COLONOSCOPY performed by Jaquan Doyle MD at hospitals ENDOSCOPY    HX CORONARY ARTERY BYPASS GRAFT  11/11/11    3 vessel    HX GYN      hysterectomy    HX HEART CATHETERIZATION      HX HYSTERECTOMY      HX UROLOGICAL  1970s    kidney stone    FL CARDIAC SURG PROCEDURE UNLIST      cabg x 3    VASCULAR SURGERY PROCEDURE UNLIST      Stents put in right leg      Lab Results   Component Value Date/Time    WBC 8.7 12/04/2021 04:58 AM    HGB 11.3 (L) 12/06/2021 06:23 AM    Hemoglobin (POC) 12.9 03/02/2011 12:21 PM    HCT 35.9 12/06/2021 06:23 AM    PLATELET 943 76/32/0176 04:58 AM    MCV 83.5 12/04/2021 04:58 AM     Lab Results   Component Value Date/Time    Cholesterol, total 121 12/04/2021 04:58 AM    HDL Cholesterol 50 12/04/2021 04:58 AM    LDL, calculated 50.6 12/04/2021 04:58 AM    Triglyceride 102 12/04/2021 04:58 AM    CHOL/HDL Ratio 2.4 12/04/2021 04:58 AM     Lab Results   Component Value Date/Time    GFR est non-AA 56 (L) 12/04/2021 04:58 AM    GFR est AA >60 12/04/2021 04:58 AM    Creatinine 0.96 12/04/2021 04:58 AM    BUN 20 12/04/2021 04:58 AM    Sodium 141 12/04/2021 04:58 AM    Potassium 3.9 12/04/2021 04:58 AM    Chloride 110 (H) 12/04/2021 04:58 AM    CO2 25 12/04/2021 04:58 AM    Magnesium 1.7 10/16/2015 04:34 AM        Review of Systems   Respiratory: Negative for shortness of breath. Cardiovascular: Negative for chest pain. Physical Exam  Constitutional:       General: She is not in acute distress. Appearance: Normal appearance. She is not ill-appearing, toxic-appearing or diaphoretic. HENT:      Head: Normocephalic and atraumatic. Eyes:      General:         Right eye: No discharge. Left eye: No discharge. Conjunctiva/sclera: Conjunctivae normal.   Pulmonary:      Effort: No respiratory distress. Neurological:      Mental Status: She is alert and oriented to person, place, and time. Mental status is at baseline. Gait: Gait normal.   Psychiatric:         Mood and Affect: Mood normal.         Behavior: Behavior normal.         ASSESSMENT and PLAN    ICD-10-CM ICD-9-CM    1. Abdominal aortic aneurysm (AAA) without rupture (HCC)    We'll get ultrasound for further evaluation does see a vascular surgeon routinely will follow up with him regarding this     I71.4 441.4 US EXAM SCREENING AAA   2. Other iron deficiency anemia       Mild stable anemia while she was hospitalized from diverticular GI bleeding    Ultimately her levels improved by discharge    We'll repeat CBC prior to follow-up    Bleeding has resolved    Had EGD and colonoscopy     D50.8 280.8    3. Type 2 diabetes mellitus with diabetic neuropathy, with long-term current use of insulin (HCC)  E11.40 250.60     Z79.4 357.2    Has been well controlled no hypoglycemia continues on 70/30 insulin mix 14 units twice daily and Metformin twice daily      V58.67    4.  Gastrointestinal hemorrhage associated with intestinal diverticulosis       Resolved continues on Prilosec    Had IV Protonix while hospitalized    Had EGD and colonoscopy    We'll follow up with GI as needed K57.91 562.12    5. Type 2 diabetes mellitus with nephropathy (HCC)  E11.21 250.40          See above renal function stable      583.81    6. Coronary artery disease involving native coronary artery of native heart without angina pectoris       Medically managed on active signs or symptoms of CAD     I25.10 414.01    7. Hypothyroidism, unspecified type         Controlled on Synthroid     E03.9 244.9    8. Recurrent depression ([facility]       No medications needed     F33.9 296.30    9. Essential hypertension, benign         Generally well controlled was elevated while hospitalized continue metoprolol and Norvasc no change    Monitor blood pressure at home     I10 401.1    10. PVD (peripheral vascular disease) ([facility]           Follows with vascular see above     I73.9 443.9    11. Diastolic CHF, chronic (HCC)  I50.32 428.32        Medically managed      428.0    12. Mixed hyperlipidemia       Controlled Lipitor and Zetia continue     E78.2 272.2    13. Diverticulosis of large intestine with hemorrhage     Bleeding resolved colonoscopy today K57.31 562.12         Scribed by Emerald Hunter, as dictated by Dr. Frankie Malhotra. Current diagnosis and concerns discussed with pt at length. Pt understands risks and benefits or current treatment plan and medications, and accepts the treatment and medication with any possible risks. Pt asks appropriate questions, which were answered. Pt was instructed to call with any concerns or problems. I have reviewed the note documented by the scribe. The services provided are my own. The documentation is accurate       [employer], was evaluated through a synchronous (real-time) audio-video encounter. The patient (or guardian if applicable) is aware that this is a billable service.  Verbal consent to proceed has been obtained within the past 12 months. The visit was conducted pursuant to the emergency declaration under the [address] [place] [address], [address] [place] [address] and the [employer] General Act. Patient identification was verified, and a caregiver was present when appropriate. The patient was located in a state where the provider was credentialed to provide care.

## 2021-12-07 NOTE — PROGRESS NOTES
Identified pt with two pt identifiers(name and ). Reviewed record in preparation for visit and have obtained necessary documentation. Chief Complaint   Patient presents with   [facility] Follow Up     Pt was released from [facility] on 21 for GI Bleed. There were no vitals taken for this visit. Health Maintenance Due   Topic    Low dose CT lung screening     MICROALBUMIN Q1        Med Reconciliation: Completed    Coordination of Care Questionnaire:  :   1) Have you been to an emergency room, urgent care, or hospitalized since your last visit? If yes, where when, and reason for visit? No       2. Have seen or consulted any other health care provider since your last visit? If yes, where when, and reason for visit? No       3) Do you have an Advanced Directive/ Living Will in place? No  If yes, do we have a copy on file   If no, would you like information       This is an established visit conducted via telemedicine. The patient has been instructed that this meets HIPAA criteria and acknowledges and agrees to this method of visitation.     Eusebio Garcia  21  10:37 AM

## 2021-12-09 NOTE — ADT AUTH CERT NOTES
[address]     FACILITY NPI :[id number]  FACILITY TAX ID : [id number]     [address]  MR[facility] 1 CLINICAL OBS  [address] [facility] [address] [phone]            Patient Name :Lori Rojas   : 1940 (81 yrs)  MRN : [id number]     Patient Mailing Address [address] [facility] [47] , [address]                                                               .         Insurance Plan Payor: BLUE CROSS MEDICARE / Plan: [place] / Product Type: Managed Care Medicare /      Primary Coverage Subscriber ID :      Secondary Coverage:  N/A     Secondary Subscriber ID :        Current Patient Class : INPATIENT  Admit Date : 2021     REQUESTED LEVEL OF CARE: INPATIENT [101]                                                           Diagnosis : GI bleed                          ICD10 Code : GI bleed [K92.2]     Current Room and Bed 1142/01     Admitting and Attending Info:  Admitting Provider : Chance Landa MD   NPI: [id number]  Admitting Provider Phone.  [phone]  Admitting Provider Address: [facility]     Attending Provider No att. providers found   NPI  Attending Provider Address:  [address] [place] [address] Road                                                   [id number]     Attending Provider Phone: Attending phys phone: N/A

## 2021-12-09 NOTE — ADT AUTH CERT NOTES
[address]     FACILITY NPI :  FACILITY TAX ID :      [address]  [facility] 1 CLINICAL OBS  [address]  [facility] Line [address] [phone]            Patient Name :Fernanda Jiménez   : 1940 (81 yrs)  MRN : [id number]     Patient Mailing Address [facility] [] , [address]                                                               .         Insurance Plan Payor: BLUE CROSS MEDICARE / [place]: [place]PO / Product Type: Managed Care Medicare /      Primary Coverage Subscriber ID :      Secondary Coverage:  N/A     Secondary Subscriber ID :        Current Patient Class : INPATIENT  Admit Date : 2021     REQUESTED LEVEL OF CARE: INPATIENT [101]                                                           Diagnosis : GI bleed                          ICD10 Code : GI bleed [K92.2]     Current Room and Bed 1142/01     Admitting and Attending Info:  Admitting Provider : Floresita Stuart MD   NPI: [id number]  Admitting Provider Phone.  [phone]  Admitting Provider Address: [address] [place] [address]     Attending Provider No att. providers found   NPI  Attending Provider Address:  [address] [place] [address]                                                   [address]                                                   [id number]     Attending Provider Phone: Attending phys phone: N/A

## 2021-12-23 RX ORDER — OMEPRAZOLE 20 MG/1
CAPSULE, DELAYED RELEASE ORAL
Qty: 90 CAPSULE | Refills: 0 | Status: SHIPPED | OUTPATIENT
Start: 2021-12-23 | End: 2022-04-15 | Stop reason: SDUPTHER

## 2022-01-15 RX ORDER — EZETIMIBE 10 MG/1
TABLET ORAL
Qty: 90 TABLET | Refills: 0 | Status: SHIPPED | OUTPATIENT
Start: 2022-01-15 | End: 2022-01-17

## 2022-01-17 RX ORDER — EZETIMIBE 10 MG/1
TABLET ORAL
Qty: 90 TABLET | Refills: 0 | Status: SHIPPED | OUTPATIENT
Start: 2022-01-17 | End: 2022-03-01 | Stop reason: SDUPTHER

## 2022-01-25 RX ORDER — AMLODIPINE BESYLATE 5 MG/1
TABLET ORAL
Qty: 90 TABLET | Refills: 0 | Status: SHIPPED | OUTPATIENT
Start: 2022-01-25 | End: 2022-03-01 | Stop reason: SDUPTHER

## 2022-01-25 RX ORDER — ATORVASTATIN CALCIUM 80 MG/1
TABLET, FILM COATED ORAL
Qty: 90 TABLET | Refills: 0 | Status: SHIPPED | OUTPATIENT
Start: 2022-01-25 | End: 2022-03-01 | Stop reason: SDUPTHER

## 2022-02-02 DIAGNOSIS — E11.21 TYPE II DIABETES MELLITUS WITH NEPHROPATHY (HCC): ICD-10-CM

## 2022-02-02 RX ORDER — METFORMIN HYDROCHLORIDE 1000 MG/1
TABLET ORAL
Qty: 180 TABLET | Refills: 0 | Status: SHIPPED | OUTPATIENT
Start: 2022-02-02 | End: 2022-03-01 | Stop reason: SDUPTHER

## 2022-02-08 DIAGNOSIS — E11.21 TYPE 2 DIABETES MELLITUS WITH NEPHROPATHY (HCC): ICD-10-CM

## 2022-02-08 RX ORDER — FLASH GLUCOSE SENSOR
KIT MISCELLANEOUS
Qty: 300 KIT | Refills: 0 | Status: SHIPPED | OUTPATIENT
Start: 2022-02-08

## 2022-02-08 NOTE — TELEPHONE ENCOUNTER
Pt has not had sensor since yesterday, 2-7-22    Pt states that pharm has tried to get from us. Pt also states that her insulin may not be covered the next time she fills. This would be the Novolin 70/30 U-100    Please call pt to let her know what to do about the insulin. Pt does have about two weeks left of this, but will need to have Dr. Leatha Taveras prescribe something new soon.

## 2022-02-16 DIAGNOSIS — Z13.6 SCREENING FOR AAA (ABDOMINAL AORTIC ANEURYSM): Primary | ICD-10-CM

## 2022-02-22 ENCOUNTER — HOSPITAL ENCOUNTER (OUTPATIENT)
Dept: ULTRASOUND IMAGING | Age: 82
Discharge: HOME | End: 2022-02-22
Attending: INTERNAL MEDICINE
Payer: MEDICARE

## 2022-02-22 DIAGNOSIS — Z13.6 SCREENING FOR AAA (ABDOMINAL AORTIC ANEURYSM): ICD-10-CM

## 2022-02-22 PROCEDURE — 76706 US ABDL AORTA SCREEN AAA: CPT

## 2022-02-22 RX ORDER — LEVOTHYROXINE SODIUM 100 UG/1
TABLET ORAL
Qty: 90 TABLET | Refills: 0 | Status: SHIPPED | OUTPATIENT
Start: 2022-02-22 | End: 2022-04-15

## 2022-02-22 RX ORDER — METOPROLOL TARTRATE 50 MG/1
TABLET ORAL
Qty: 180 TABLET | Refills: 0 | Status: SHIPPED | OUTPATIENT
Start: 2022-02-22 | End: 2022-03-01 | Stop reason: SDUPTHER

## 2022-02-22 NOTE — TELEPHONE ENCOUNTER
Pt states that [facility] is telling her they never received the script for sensor. Pt has been without medication for three weeks. Pt states [facility] has kept faxing and we haven't gotten. Pt also states that [facility] will no longer cover her Novolin 70/30. Pt will be out in a week of insulin. Please have Dr. Aleshia Pandya call in another brand of insulin for her with the rest of these medications. Please call pt to let her know when she can expect to arturo her sensor.

## 2022-02-22 NOTE — TELEPHONE ENCOUNTER
Future Appointments:  Future Appointments   Date Time Provider Gen Paige   3/15/2022 11:45 AM Mirza Chow MD [facility] BS AMB   7/5/2022  2:00 PM Mason Mcleod MD [facility] BS AMB   9/8/2022  1:15 PM Lei Whitmore MD [facility] BS AMB        Last Appointment With Me:  12/7/2021     Requested Prescriptions     Pending Prescriptions Disp Refills    metoprolol tartrate (LOPRESSOR) 50 mg tablet [Pharmacy Med Name: Metoprolol Tartrate 50 MG Oral Tablet] 180 Tablet 0     Sig: Take 1 tablet by mouth twice daily    Euthyrox 100 mcg tablet [Pharmacy Med Name: Euthyrox 100 MCG Oral Tablet] 90 Tablet 0     Sig: TAKE 1 TABLET BY MOUTH ONCE DAILY BEFORE BREAKFAST

## 2022-03-01 DIAGNOSIS — E11.21 TYPE II DIABETES MELLITUS WITH NEPHROPATHY (HCC): ICD-10-CM

## 2022-03-01 RX ORDER — LEVOTHYROXINE SODIUM 100 UG/1
100 TABLET ORAL
Qty: 90 TABLET | Refills: 1 | Status: SHIPPED | OUTPATIENT
Start: 2022-03-01 | End: 2022-04-15

## 2022-03-01 RX ORDER — AMLODIPINE BESYLATE 5 MG/1
5 TABLET ORAL DAILY
Qty: 90 TABLET | Refills: 0 | Status: SHIPPED | OUTPATIENT
Start: 2022-03-01 | End: 2022-04-15 | Stop reason: SDUPTHER

## 2022-03-01 RX ORDER — METFORMIN HYDROCHLORIDE 1000 MG/1
1000 TABLET ORAL 2 TIMES DAILY WITH MEALS
Qty: 180 TABLET | Refills: 0 | Status: SHIPPED | OUTPATIENT
Start: 2022-03-01 | End: 2022-04-15 | Stop reason: SDUPTHER

## 2022-03-01 RX ORDER — METOPROLOL TARTRATE 50 MG/1
50 TABLET ORAL 2 TIMES DAILY
Qty: 180 TABLET | Refills: 0 | Status: SHIPPED | OUTPATIENT
Start: 2022-03-01 | End: 2022-04-15 | Stop reason: SDUPTHER

## 2022-03-01 RX ORDER — EZETIMIBE 10 MG/1
10 TABLET ORAL DAILY
Qty: 90 TABLET | Refills: 0 | Status: SHIPPED | OUTPATIENT
Start: 2022-03-01 | End: 2022-04-15 | Stop reason: SDUPTHER

## 2022-03-01 RX ORDER — ATORVASTATIN CALCIUM 80 MG/1
80 TABLET, FILM COATED ORAL
Qty: 90 TABLET | Refills: 0 | Status: SHIPPED | OUTPATIENT
Start: 2022-03-01 | End: 2022-04-15 | Stop reason: SDUPTHER

## 2022-03-01 NOTE — TELEPHONE ENCOUNTER
----- Message from Yany Grey sent at 2/28/2022  5:10 PM EST -----  Subject: Refill Request    QUESTIONS  Name of Medication? ezetimibe (ZETIA) 10 mg tablet  Patient-reported dosage and instructions? Take 1 tablet by mouth once   daily  How many days do you have left? 0  Preferred Pharmacy? [facility] phone number (if available)? [phone]  ---------------------------------------------------------------------------  --------------,  Name of Medication? Other - Insulin Syringe  Patient-reported dosage and instructions? Unknown  How many days do you have left? 0  Preferred Pharmacy? [facility] phone number (if available)? [phone]  ---------------------------------------------------------------------------  --------------,  Name of Medication? insulin NPH/insulin regular (NovoLIN 70/30 U-100   Insulin) 100 unit/mL (70-30) injection  Patient-reported dosage and instructions? INJECT 14 UNITS SUBCUTANEOUSLY   TWICE DAILY  How many days do you have left? 0  Preferred Pharmacy? [facility] phone number (if available)? [phone]  ---------------------------------------------------------------------------  --------------,  Name of Medication? Other - Levothyroxine 100 mcg  Patient-reported dosage and instructions? Unknown  How many days do you have left? 0  Preferred Pharmacy? [facility] phone number (if available)? [phone]  ---------------------------------------------------------------------------  --------------,  Name of Medication? atorvastatin (LIPITOR) 80 mg tablet  Patient-reported dosage and instructions? Take 1 tablet by mouth nightly  How many days do you have left? 0  Preferred Pharmacy? [facility] phone number (if available)? [phone]  ---------------------------------------------------------------------------  --------------,  Name of Medication? metFORMIN (GLUCOPHAGE) 1,000 mg tablet  Patient-reported dosage and instructions? TAKE 1 TABLET BY MOUTH TWICE   DAILY WITH MEALS  How many days do you have left? 0  Preferred Pharmacy? [facility] phone number (if available)? [phone]  ---------------------------------------------------------------------------  --------------,  Name of Medication? metoprolol tartrate (LOPRESSOR) 50 mg tablet  Patient-reported dosage and instructions? Take 1 tablet by mouth twice   daily  How many days do you have left? 0  Preferred Pharmacy? [facility] phone number (if available)? [phone]  ---------------------------------------------------------------------------  --------------,  Name of Medication? amLODIPine (NORVASC) 5 mg tablet  Patient-reported dosage and instructions? Take 1 tablet by mouth once   daily  How many days do you have left? 0  Preferred Pharmacy? [facility] phone number (if available)? [phone]  ---------------------------------------------------------------------------  --------------  CALL BACK INFO  What is the best way for the office to contact you? Do not leave any   message, patient will call back for answer  Preferred Call Back Phone Number?  [phone]

## 2022-03-01 NOTE — TELEPHONE ENCOUNTER
Future Appointments:  Future Appointments   Date Time Provider Gen Paige   3/15/2022 11:45 AM Rodrigo Fischer MD [facility] BS AMB   7/5/2022  2:00 PM Emily Mann MD [place] [address] BS AMB   9/8/2022  1:15 PM Tamanna Willis MD [facility] BS AMB        Last Appointment With Me:  12/7/2021     Requested Prescriptions     Pending Prescriptions Disp Refills    ezetimibe (ZETIA) 10 mg tablet 90 Tablet 0     Sig: Take 1 Tablet by mouth daily.  insulin NPH/insulin regular (NovoLIN 70/30 U-100 Insulin) 100 unit/mL (70-30) injection 10 mL 0     Sig: INJECT 14 UNITS SUBCUTANEOUSLY TWICE DAILY    atorvastatin (LIPITOR) 80 mg tablet 90 Tablet 0     Sig: Take 1 Tablet by mouth nightly.  levothyroxine (SYNTHROID) 100 mcg tablet 90 Tablet 1     Sig: Take 1 Tablet by mouth Daily (before breakfast).  metFORMIN (GLUCOPHAGE) 1,000 mg tablet 180 Tablet 0     Sig: Take 1 Tablet by mouth two (2) times daily (with meals).  metoprolol tartrate (LOPRESSOR) 50 mg tablet 180 Tablet 0     Sig: Take 1 Tablet by mouth two (2) times a day.  amLODIPine (NORVASC) 5 mg tablet 90 Tablet 0     Sig: Take 1 Tablet by mouth daily.

## 2022-03-08 ENCOUNTER — APPOINTMENT (OUTPATIENT)
Dept: INTERNAL MEDICINE CLINIC | Age: 82
End: 2022-03-08

## 2022-03-09 LAB
ALBUMIN SERPL-MCNC: 4.3 G/DL (ref 3.6–4.6)
ALBUMIN/GLOB SERPL: 1.4 {RATIO} (ref 1.2–2.2)
ALP SERPL-CCNC: 83 IU/L (ref 44–121)
ALT SERPL-CCNC: 12 IU/L (ref 0–32)
AST SERPL-CCNC: 12 IU/L (ref 0–40)
BILIRUB SERPL-MCNC: 0.6 MG/DL (ref 0–1.2)
BUN SERPL-MCNC: 18 MG/DL (ref 8–27)
BUN/CREAT SERPL: 15 (ref 12–28)
CALCIUM SERPL-MCNC: 10.5 MG/DL (ref 8.7–10.3)
CHLORIDE SERPL-SCNC: 107 MMOL/L (ref 96–106)
CO2 SERPL-SCNC: 21 MMOL/L (ref 20–29)
CREAT SERPL-MCNC: 1.17 MG/DL (ref 0.57–1)
EGFR: 47 ML/MIN/1.73
ERYTHROCYTE [DISTWIDTH] IN BLOOD BY AUTOMATED COUNT: 15.3 % (ref 11.7–15.4)
EST. AVERAGE GLUCOSE BLD GHB EST-MCNC: 180 MG/DL
GLOBULIN SER CALC-MCNC: 3 G/DL (ref 1.5–4.5)
GLUCOSE SERPL-MCNC: 141 MG/DL (ref 65–99)
HBA1C MFR BLD: 7.9 % (ref 4.8–5.6)
HCT VFR BLD AUTO: 39.7 % (ref 34–46.6)
HGB BLD-MCNC: 11.8 G/DL (ref 11.1–15.9)
MCH RBC QN AUTO: 25.3 PG (ref 26.6–33)
MCHC RBC AUTO-ENTMCNC: 29.7 G/DL (ref 31.5–35.7)
MCV RBC AUTO: 85 FL (ref 79–97)
PLATELET # BLD AUTO: 237 X10E3/UL (ref 150–450)
POTASSIUM SERPL-SCNC: 4.2 MMOL/L (ref 3.5–5.2)
PROT SERPL-MCNC: 7.3 G/DL (ref 6–8.5)
RBC # BLD AUTO: 4.66 X10E6/UL (ref 3.77–5.28)
SODIUM SERPL-SCNC: 146 MMOL/L (ref 134–144)
WBC # BLD AUTO: 8.8 X10E3/UL (ref 3.4–10.8)

## 2022-03-14 NOTE — PROGRESS NOTES
HISTORY OF PRESENT ILLNESS  Bennett Buerger is a 81 y.o. female. HPI    Last here vv 12/07/21. Pt is here for routine care.     She explains that she called the officeto get yenifer sensor and was unable to get in touch with anyone  She is unhappy and frustrated with this  Discussed that we will find out what happened with this, and will make sure he gets sent in today and to be sure to contact my nurse in the future    Her daughter recently passed away from breast cancer discussed this discussed positive depression screen related to grief from death of her daughter       She was in the hospital from 12/04/21-12/06/21 for GI bleed  This was a diverticular bleed her symptoms have resolved will follow up with GI as needed continues on Prilosec every other day     Has a history of hypertension  BP today is 141/80 repeat improved  BP at home 120/83 123/76 181/64 131/76   Continues on metoprolol 50mg BID and norvasc 5mg  Recall had angioedema with lisinopril and benicar-HCT in the past      She is diabetic   BS AM 96 110 104  136 144 130 141 109 135 117 127 139 112 115 108 142  PM 84 79 84 119 125 157 115 119 93 65  Continues on metformin 1000mg BID   On NPH 70/30 mix now 14U BID  Previously she had been taking 20 and 22 units we had decreased this to 18 units but she had recurrent hypoglycemia so we had decreased it again  She also takes ASA 81mg daily   Recall issues with hypoglycemia in the past, a1c under 7.5 at goal for her   [facility] today is 174 lbs, down 5 lbs x lov   Discussed diet and weight loss     Reviewed labs   Ordered labs     Pt follows with Dr. Denver Ree (cardio) for cad  Last visit 9/02/21 with NP Edith Riojas (vasc) for her PAD  No claudication stable   Last visit was 7/21  Completed ultrasound aneurysm screen February 2022 which is negative       Pt saw Dr. Brennen Yadav (ortho) for carpal tunnel   Had surgery for this 8/1/19   Last visit was 8/19/19      She has been seeing Dr. Amanda Bonds (ortho) for R shoulder pain   Last there 12/11/20        Pt is taking prilosec 20mg every other day working well     Takes OTC vit D daily      Continues synthroid 100mcg daily    She takes this med separately from all meds      Continues on lipitor 80mg max dose for cholesterol   Pt is  taking zetia 10mg once daily as well however it is affordable enough so she will continue this  Welchol was too expensive now but zetia ok and grey well no issue     No longer on lexapro, doing well off medication not sad or down depression screen is from grief  She also has ativan to use prn     Pt had sleep eval with Dr Aida De Jesus sleep apnea  Last there 1/18/21   She had sleep titration 3/15/21  She is compliant with bipap      Pt was on gabapentin 100 mg for pain in hand as needed, she stopped this would like to restart because she is having more tingling and pain in her hands and feet     Former smoker, quit smoking in 2010    Pt lives by herself    Pt is functionally independent       No memory concerns   Knows the month, date, year, location   Can recall 3/3 objects         ACP on file. SDMs is daughter Linda Gauthier        PREVENTIVE:    Colonoscopy: Dr. Liz Garsia 5 years, 12/6/21 manetes  Pap: 12/14, declines further   Mammogram: 5/7/21 nl  DEXA: 3/20, osteopenic, stopped fosamax--due  Tdap: 1/04/2016  Pneumovax: 01/29/20   Durwyzx08: 9/17  Zostavax: 10/10/2016  Shingrix: completed both  Flu shot: 11/15/21  Foot exam:05/12/21  Microalbumin: 11/04/20 this is to order to complete with next labs  A1c: 7/19 7.5, 10/19 7.3  1/20 7.4 7/20 8.2 10/20 8.1 1/21 8.3 4/21 7.9 8/21 7.2 11/21 8.0, 3/22 7.9  Eye exam: Dr. Tarik Murphy at . [facility] 122 LDL 50  Covid: both [employer]) and booster        Patient Active Problem List    Diagnosis Date Noted    Type 2 diabetes mellitus with diabetic neuropathy ([facility] 01/22/2019    Type 2 diabetes mellitus with nephropathy ([facility] 01/02/2018    Recurrent depression ([facility] 01/02/2018    Diverticulosis of intestine with bleeding 10/11/2015    Anemia 40/46/2905    Diastolic CHF, chronic ([facility] 10/11/2015    Osteoporosis 09/10/2013    Essential hypertension, benign 05/17/2012    Mixed hyperlipidemia 05/17/2012    S/P CABG x 3 11/11/2011    CAD (coronary artery disease) 11/18/2009    Hypothyroidism 11/18/2009    PVD (peripheral vascular disease) ([facility] 11/18/2009     Current Outpatient Medications   Medication Sig Dispense Refill    ezetimibe (ZETIA) 10 mg tablet Take 1 Tablet by mouth daily. 90 Tablet 0    insulin NPH/insulin regular (NovoLIN 70/30 U-100 Insulin) 100 unit/mL (70-30) injection INJECT 14 UNITS SUBCUTANEOUSLY TWICE DAILY 10 mL 0    atorvastatin (LIPITOR) 80 mg tablet Take 1 Tablet by mouth nightly. 90 Tablet 0    metFORMIN (GLUCOPHAGE) 1,000 mg tablet Take 1 Tablet by mouth two (2) times daily (with meals). 180 Tablet 0    metoprolol tartrate (LOPRESSOR) 50 mg tablet Take 1 Tablet by mouth two (2) times a day. 180 Tablet 0    amLODIPine (NORVASC) 5 mg tablet Take 1 Tablet by mouth daily. 90 Tablet 0    Euthyrox 100 mcg tablet TAKE 1 TABLET BY MOUTH ONCE DAILY BEFORE BREAKFAST 90 Tablet 0    flash glucose sensor (FreeStyle Ruben 14 Day Sensor) kit daily 300 Kit 0    omeprazole (PRILOSEC) 20 mg capsule Take 1 capsule by mouth once daily 90 Capsule 0    umeclidinium-vilanteroL (Anoro Ellipta) 62.5-25 mcg/actuation inhaler Take 1 Puff by inhalation daily. 1 Each 0    albuterol (PROVENTIL HFA, VENTOLIN HFA, PROAIR HFA) 90 mcg/actuation inhaler Take 1 Puff by inhalation every six (6) hours as needed for Wheezing. 1 Inhaler 0    cholecalciferol (VITAMIN D3) 1,000 unit cap Take 1,000 Units by mouth daily.  aspirin 81 mg chewable tablet Take 1 Tab by mouth daily. 90 Tab 3    cyanocobalamin (VITAMIN B-12) 1,000 mcg tablet Take 1,000 mcg by mouth daily.       levothyroxine (SYNTHROID) 100 mcg tablet Take 1 Tablet by mouth Daily (before breakfast). (Patient not taking: Reported on 3/15/2022) 90 Tablet 1    gabapentin (NEURONTIN) 100 mg capsule Take 2 capsules by mouth twice daily (Patient not taking: Reported on 3/15/2022) 120 Capsule 0     Past Surgical History:   Procedure Laterality Date    COLONOSCOPY N/A 10/31/2018    COL[facility] at [facility] ENDOSCOPY    COLONOSCOPY N/A 12/6/2021    COLONOSCOPY performed by Salvador [facility] MD at [facility] ENDOSCOPY    HX CORONARY ARTERY BYPASS GRAFT  11/11/11    3 vessel    HX GYN      hysterectomy    HX HEART CATHETERIZATION      HX HYSTERECTOMY      HX UROLOGICAL  1970s    kidney stone    RI CARDIAC SURG PROCEDURE UNLIST      cabg x 3    VASCULAR SURGERY PROCEDURE UNLIST      Stents put in right leg      Lab Results   Component Value Date/Time    WBC 8.8 03/08/2022 12:00 AM    HGB 11.8 03/08/2022 12:00 AM    Hemoglobin (POC) 12.9 03/02/2011 12:21 PM    HCT 39.7 03/08/2022 12:00 AM    PLATELET 332 75/28/2320 12:00 AM    MCV 85 03/08/2022 12:00 AM     Lab Results   Component Value Date/Time    Cholesterol, total 121 12/04/2021 04:58 AM    HDL Cholesterol 50 12/04/2021 04:58 AM    LDL, calculated 50.6 12/04/2021 04:58 AM    Triglyceride 102 12/04/2021 04:58 AM    CHOL/HDL Ratio 2.4 12/04/2021 04:58 AM     Lab Results   Component Value Date/Time    GFR est non-AA 56 (L) 12/04/2021 04:58 AM    GFR est AA >60 12/04/2021 04:58 AM    Creatinine 1.17 (H) 03/08/2022 12:00 AM    BUN 18 03/08/2022 12:00 AM    Sodium 146 (H) 03/08/2022 12:00 AM    Potassium 4.2 03/08/2022 12:00 AM    Chloride 107 (H) 03/08/2022 12:00 AM    CO2 21 03/08/2022 12:00 AM    Magnesium 1.7 10/16/2015 04:34 AM        Review of Systems   Respiratory: Negative for shortness of breath. Cardiovascular: Negative for chest pain. Physical Exam  Constitutional:       General: She is not in acute distress. Appearance: Normal appearance.  She is not ill-appearing, toxic-appearing or diaphoretic. HENT:      Head: Normocephalic and atraumatic. Right Ear: External ear normal.      Left Ear: External ear normal.   Eyes:      General:         Right eye: No discharge. Left eye: No discharge. Conjunctiva/sclera: Conjunctivae normal.      Pupils: Pupils are equal, round, and reactive to light. Cardiovascular:      Rate and Rhythm: Normal rate and regular rhythm. Heart sounds: Murmur heard. No friction rub. No gallop. Pulmonary:      Effort: No respiratory distress. Breath sounds: Normal breath sounds. No wheezing or rales. Chest:      Chest wall: No tenderness. Musculoskeletal:         General: Normal range of motion. Cervical back: Normal range of motion and neck supple. Right lower leg: No edema. Left lower leg: No edema. Skin:     General: Skin is warm and dry. Neurological:      Mental Status: She is alert and oriented to person, place, and time. Mental status is at baseline. Coordination: Coordination normal.      Gait: Gait normal.   Psychiatric:         Mood and Affect: Mood normal.         Behavior: Behavior normal.         ASSESSMENT and PLAN    ICD-10-CM ICD-9-CM    1. Essential hypertension, benign       Controlled on current regimen of metoprolol and Norvasc continue   I10 401.1    2. Medicare annual wellness visit, subsequent  Z00.00 V70.0    3. Diastolic CHF, chronic ([facility])  I50.32 428.32        Medically managed up-to-date with cardiology      428.0    4. Recurrent depression ([facility]       Overall adequately controlled on Lexapro    Positive depression screen related to grief F33.9 296.30    5.  Type 2 diabetes mellitus with diabetic neuropathy, with long-term current use of insulin ([facility])  E11.40 250.60     Z79.4 357.2    Discussed glucose goals with her    Goal A1c definitely under 8 ideally closer to 7.5    However we may not be able to get there because we need to avoid hypoglycemia    Continues on 70/30 insulin    Takes 14 units twice daily    Also takes metformin twice daily    Continue this    No recent hypoglycemia    Reordered sensors for her so she has been    Discussed speaking with my nurse in the future if there is an issue    We can avoid her not having her sensors for some time    Ordered labs for next time      V58.67    6. PVD (peripheral vascular disease) ([facility]         Follows with vascular annually in July stable no claudication symptoms I73.9 443.9    7. Type 2 diabetes mellitus with nephropathy (HCC)  E11.21 250.40    See above monitor renal function stable due for microalbumin  583.81    8. Mixed hyperlipidemia         Controlled on Lipitor and Zetia continue     E78.2 272.2    9. Coronary artery disease involving native coronary artery of native heart without angina pectoris        Medically managed       I25.10 414.01    10. Hypothyroidism, unspecified type         On Synthroid monitor TSH     E03.9 244.9    11. Type II diabetes mellitus with nephropathy (HCC)  E11.21 250.40    See above      583.81    12. Neuropathy       Restart gabapentin take 1 or 2/day as needed G62.9 355.9         Depression screen reviewed and positive (4)  Her daughter recently passed away, grieving    Scribed by Radha Gautam of [facility], as dictated by Dr. Shauna Jeans. Current diagnosis and concerns discussed with pt at length. Pt understands risks and benefits or current treatment plan and medications, and accepts the treatment and medication with any possible risks. Pt asks appropriate questions, which were answered. Pt was instructed to call with any concerns or problems. I have reviewed the note documented by the scribe. The services provided are my own.   The documentation is accurate

## 2022-03-15 ENCOUNTER — OFFICE VISIT (OUTPATIENT)
Dept: INTERNAL MEDICINE CLINIC | Age: 82
End: 2022-03-15
Payer: MEDICARE

## 2022-03-15 VITALS
TEMPERATURE: 96.6 F | BODY MASS INDEX: 32.02 KG/M2 | WEIGHT: 174 LBS | HEIGHT: 62 IN | RESPIRATION RATE: 16 BRPM | DIASTOLIC BLOOD PRESSURE: 77 MMHG | HEART RATE: 60 BPM | OXYGEN SATURATION: 97 % | SYSTOLIC BLOOD PRESSURE: 131 MMHG

## 2022-03-15 DIAGNOSIS — Z78.0 POST-MENOPAUSE: ICD-10-CM

## 2022-03-15 DIAGNOSIS — Z79.4 TYPE 2 DIABETES MELLITUS WITH DIABETIC NEUROPATHY, WITH LONG-TERM CURRENT USE OF INSULIN (HCC): ICD-10-CM

## 2022-03-15 DIAGNOSIS — I10 ESSENTIAL HYPERTENSION, BENIGN: Primary | ICD-10-CM

## 2022-03-15 DIAGNOSIS — E11.21 TYPE II DIABETES MELLITUS WITH NEPHROPATHY (HCC): ICD-10-CM

## 2022-03-15 DIAGNOSIS — E11.21 TYPE 2 DIABETES MELLITUS WITH NEPHROPATHY (HCC): ICD-10-CM

## 2022-03-15 DIAGNOSIS — E11.40 TYPE 2 DIABETES MELLITUS WITH DIABETIC NEUROPATHY, WITH LONG-TERM CURRENT USE OF INSULIN (HCC): ICD-10-CM

## 2022-03-15 DIAGNOSIS — Z00.00 MEDICARE ANNUAL WELLNESS VISIT, SUBSEQUENT: ICD-10-CM

## 2022-03-15 DIAGNOSIS — I73.9 PVD (PERIPHERAL VASCULAR DISEASE) (HCC): ICD-10-CM

## 2022-03-15 DIAGNOSIS — E78.2 MIXED HYPERLIPIDEMIA: ICD-10-CM

## 2022-03-15 DIAGNOSIS — I25.10 CORONARY ARTERY DISEASE INVOLVING NATIVE CORONARY ARTERY OF NATIVE HEART WITHOUT ANGINA PECTORIS: ICD-10-CM

## 2022-03-15 DIAGNOSIS — I50.32 DIASTOLIC CHF, CHRONIC (HCC): ICD-10-CM

## 2022-03-15 DIAGNOSIS — G62.9 NEUROPATHY: ICD-10-CM

## 2022-03-15 DIAGNOSIS — E03.9 HYPOTHYROIDISM, UNSPECIFIED TYPE: ICD-10-CM

## 2022-03-15 DIAGNOSIS — F33.9 RECURRENT DEPRESSION (HCC): ICD-10-CM

## 2022-03-15 PROBLEM — K92.2 GI BLEED: Status: RESOLVED | Noted: 2021-12-04 | Resolved: 2022-03-15

## 2022-03-15 PROCEDURE — G9717 DOC PT DX DEP/BP F/U NT REQ: HCPCS | Performed by: INTERNAL MEDICINE

## 2022-03-15 PROCEDURE — G0439 PPPS, SUBSEQ VISIT: HCPCS | Performed by: INTERNAL MEDICINE

## 2022-03-15 PROCEDURE — G8427 DOCREV CUR MEDS BY ELIG CLIN: HCPCS | Performed by: INTERNAL MEDICINE

## 2022-03-15 PROCEDURE — 1101F PT FALLS ASSESS-DOCD LE1/YR: CPT | Performed by: INTERNAL MEDICINE

## 2022-03-15 PROCEDURE — G8536 NO DOC ELDER MAL SCRN: HCPCS | Performed by: INTERNAL MEDICINE

## 2022-03-15 PROCEDURE — G8754 DIAS BP LESS 90: HCPCS | Performed by: INTERNAL MEDICINE

## 2022-03-15 PROCEDURE — 1090F PRES/ABSN URINE INCON ASSESS: CPT | Performed by: INTERNAL MEDICINE

## 2022-03-15 PROCEDURE — G8752 SYS BP LESS 140: HCPCS | Performed by: INTERNAL MEDICINE

## 2022-03-15 PROCEDURE — 99214 OFFICE O/P EST MOD 30 MIN: CPT | Performed by: INTERNAL MEDICINE

## 2022-03-15 PROCEDURE — G8417 CALC BMI ABV UP PARAM F/U: HCPCS | Performed by: INTERNAL MEDICINE

## 2022-03-15 RX ORDER — GABAPENTIN 100 MG/1
CAPSULE ORAL
Qty: 180 CAPSULE | Refills: 1 | Status: SHIPPED | OUTPATIENT
Start: 2022-03-15 | End: 2022-03-15 | Stop reason: SDUPTHER

## 2022-03-15 RX ORDER — FLASH GLUCOSE SENSOR
KIT MISCELLANEOUS
Qty: 3 KIT | Refills: 0 | Status: SHIPPED | OUTPATIENT
Start: 2022-03-15

## 2022-03-15 RX ORDER — FLASH GLUCOSE SENSOR
KIT MISCELLANEOUS
Qty: 3 KIT | Refills: 0 | Status: SHIPPED | OUTPATIENT
Start: 2022-03-15 | End: 2022-03-15 | Stop reason: SDUPTHER

## 2022-03-15 RX ORDER — GABAPENTIN 100 MG/1
CAPSULE ORAL
Qty: 180 CAPSULE | Refills: 1 | Status: SHIPPED | OUTPATIENT
Start: 2022-03-15 | End: 2022-04-15 | Stop reason: SDUPTHER

## 2022-03-15 NOTE — PATIENT INSTRUCTIONS

## 2022-03-15 NOTE — PROGRESS NOTES
This is the Subsequent Medicare Annual Wellness Exam, performed 12 months or more after the Initial AWV or the last Subsequent AWV    I have reviewed the patient's medical history in detail and updated the computerized patient record. Assessment/Plan   Education and counseling provided:  Are appropriate based on today's review and evaluation    1. Medicare annual wellness visit, subsequent       Depression Risk Factor Screening     3 most recent PHQ Screens 3/15/2022   Little interest or pleasure in doing things More than half the days   Feeling down, depressed, irritable, or hopeless More than half the days   Total Score PHQ 2 4     Daughter  declines medication   Alcohol & Drug Abuse Risk Screen    Do you average more than 1 drink per night or more than 7 drinks a week:  No    On any one occasion in the past three months have you have had more than 3 drinks containing alcohol:  No          Functional Ability and Level of Safety    Hearing: Hearing is good. Activities of Daily Living: The home contains: handrails and grab bars  Patient does total self care      Ambulation: with mild difficulty     Fall Risk:  Fall Risk Assessment, last 12 mths 3/15/2022   Able to walk? Yes   Fall in past 12 months? 0   Do you feel unsteady? -   Are you worried about falling -   Number of falls in past 12 months -   Fall with injury?  -      Abuse Screen:  Patient is not abused     Lives alone  Cognitive Screening    Has your family/caregiver stated any concerns about your memory: no     Cognitive Screening: Normal - Mini Cog Test       No memory concerns   Pt knows the month, day and year   Can recall 3/3 objects     Health Maintenance Due     Health Maintenance Due   Topic Date Due    MICROALBUMIN Q1  2021    Medicare Yearly Exam  2022       Patient Care Team   Patient Care Team:  Zeeshan Hurst MD as PCP - General (Internal Medicine)  Zeeshan Hurst MD as PCP - [facility] Provider  Kalyani Metzger O.D. (Optometry)  Jennifer Martinez MD as Physician (Cardiology)  Donna Vasquez MD (Gastroenterology)  Rossi Cueva MD (Obstetrics & Gynecology)  Shazia Reddy MD (Vascular Surgery)  Mauricio Teague MD (Hand Surgery)    History     Patient Active Problem List   Diagnosis Code    CAD (coronary artery disease) I25.10    Hypothyroidism E03.9    PVD (peripheral vascular disease) ([address] I73.9    S/P CABG x 3 Z95.1    Essential hypertension, benign I10    Mixed hyperlipidemia E78.2    Osteoporosis M81.0    Diverticulosis of intestine with bleeding K57.91    Anemia O13.0    Diastolic CHF, chronic (HCC) I50.32    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    Recurrent depression ([address] F33.9    Type 2 diabetes mellitus with diabetic neuropathy ([address] E11.40     Past Medical History:   Diagnosis Date    Anxiety     CAD (coronary artery disease)     cabg     Congestive heart failure ([address]     Diabetes ([address]     Diverticulosis     GERD (gastroesophageal reflux disease)     Heart disease     Heart failure ([address] 10/18/2011    High cholesterol     Hypertension     Hypothyroidism     Kidney stones     Menopause     Myocardial infarction ([address]     Postsurgical percutaneous transluminal coronary angioplasty status 5/17/2012    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    [facility]    Sleeping difficulty     Teeth decayed     Weakness       Past Surgical History:   Procedure Laterality Date    COLONOSCOPY N/A 10/31/2018    [facility] ENDOSCOPY    COLONOSCOPY N/A 12/6/2021    COLONOSCOPY performed by [facility] MD at [facility] ENDOSCOPY    HX CORONARY ARTERY BYPASS GRAFT  11/11/11    3 vessel    HX GYN      hysterectomy    HX HEART CATHETERIZATION      HX HYSTERECTOMY      HX UROLOGICAL  1970s    kidney stone    UT CARDIAC SURG PROCEDURE UNLIST      cabg x 3    VASCULAR SURGERY PROCEDURE UNLIST Stents put in right leg     Current Outpatient Medications   Medication Sig Dispense Refill    ezetimibe (ZETIA) 10 mg tablet Take 1 Tablet by mouth daily. 90 Tablet 0    insulin NPH/insulin regular (NovoLIN 70/30 U-100 Insulin) 100 unit/mL (70-30) injection INJECT 14 UNITS SUBCUTANEOUSLY TWICE DAILY 10 mL 0    atorvastatin (LIPITOR) 80 mg tablet Take 1 Tablet by mouth nightly. 90 Tablet 0    levothyroxine (SYNTHROID) 100 mcg tablet Take 1 Tablet by mouth Daily (before breakfast). 90 Tablet 1    metFORMIN (GLUCOPHAGE) 1,000 mg tablet Take 1 Tablet by mouth two (2) times daily (with meals). 180 Tablet 0    metoprolol tartrate (LOPRESSOR) 50 mg tablet Take 1 Tablet by mouth two (2) times a day. 180 Tablet 0    amLODIPine (NORVASC) 5 mg tablet Take 1 Tablet by mouth daily. 90 Tablet 0    Euthyrox 100 mcg tablet TAKE 1 TABLET BY MOUTH ONCE DAILY BEFORE BREAKFAST 90 Tablet 0    flash glucose sensor (FreeStyle Ruben 14 Day Sensor) kit daily 300 Kit 0    omeprazole (PRILOSEC) 20 mg capsule Take 1 capsule by mouth once daily 90 Capsule 0    umeclidinium-vilanteroL (Anoro Ellipta) 62.5-25 mcg/actuation inhaler Take 1 Puff by inhalation daily. 1 Each 0    gabapentin (NEURONTIN) 100 mg capsule Take 2 capsules by mouth twice daily 120 Capsule 0    albuterol (PROVENTIL HFA, VENTOLIN HFA, PROAIR HFA) 90 mcg/actuation inhaler Take 1 Puff by inhalation every six (6) hours as needed for Wheezing. 1 Inhaler 0    cholecalciferol (VITAMIN D3) 1,000 unit cap Take 1,000 Units by mouth daily.  aspirin 81 mg chewable tablet Take 1 Tab by mouth daily. 90 Tab 3    cyanocobalamin (VITAMIN B-12) 1,000 mcg tablet Take 1,000 mcg by mouth daily.        Allergies   Allergen Reactions    Ace Inhibitors Swelling    Arb-Angiotensin Receptor Antagonist Swelling    Lisinopril Swelling    Plavix [Clopidogrel] Other (comments)     Excessive bleeding    Potassium Nausea and Vomiting     Potassium containing oral products are not well tolerated by patient       Family History   Problem Relation Age of Onset    Diabetes Mother     Heart Disease Mother     Diabetes Brother     Heart Disease Brother     Mental Retardation Brother     Hypertension Brother     Other Father     Cancer Sister     Diabetes Brother     Heart Disease Brother     Diabetes Brother     Breast Cancer Daughter         52's     Social History     Tobacco Use    Smoking status: Former Smoker     Packs/day: 0.50     Years: 40.00     Pack years: 20.00     Quit date: 2010     Years since quittin.4    Smokeless tobacco: Never Used   Substance Use Topics    Alcohol use: No         ACP on file. SDMs is  daughter Rogelio Peguero) and Julia Disha      Colonoscopy: Dr. Ronald Arango   Pap: , declines further   Mammogram: 21 nl annual   DEXA: 3/20, osteopenic, stopped fosamax---due       Tdap: 2016  Pneumovax: 20   Ilrocgu89:   Zostavax: 10/10/2016  Shingrix: completed both  Flu shot: 11/15/21        A1c:  3/22 7.9 every 3 months    Eye exam: Dr[facility] Suri Kolb at [facility], 21--eye scheduled      Lipids:  LDL 50 annual       Covid: both [employer]) and booster      Medication reconciliation completed by MA and reviewed by me. Medical/surgical/social/family history reviewed and updated by me. Patient provided AVS and preventative screening table. Patient verbalized understanding of all information discussed.           Caridad Campbell MD

## 2022-03-15 NOTE — PROGRESS NOTES
1. \"Have you been to the ER, urgent care clinic since your last visit? Hospitalized since your last visit? \" No    2. \"Have you seen or consulted any other health care providers outside of the [address] [place] [address] since your last visit? \" No     3. For patients aged 39-70: Has the patient had a colonoscopy / FIT/ Cologuard? NA - based on age      If the patient is female:    4. For patients aged 41-77: Has the patient had a mammogram within the past 2 years? NA - based on age or sex      11. For patients aged 21-65: Has the patient had a pap smear?  NA - based on age or sex

## 2022-03-16 ENCOUNTER — TELEPHONE (OUTPATIENT)
Dept: INTERNAL MEDICINE CLINIC | Age: 82
End: 2022-03-16

## 2022-03-16 NOTE — TELEPHONE ENCOUNTER
----- Message from Julio Smith MD sent at 3/15/2022  6:26 PM EDT -----  Please call her mail order and make sure they have received and are sending her [employer] supplies and then let sofy know it has been confirmed thanks

## 2022-03-16 NOTE — TELEPHONE ENCOUNTER
Called, spoke with [employer]. Two pt identifiers confirmed. Confirmed with pharmacy tech Rx's for Gibson and Gabapentin was received. Called patient, no answer, left msg on v/m [employer] received her Rx's and will be sending it to her.

## 2022-03-18 PROBLEM — E11.21 TYPE 2 DIABETES MELLITUS WITH NEPHROPATHY (HCC): Status: ACTIVE | Noted: 2018-01-02

## 2022-03-18 PROBLEM — F33.9 RECURRENT DEPRESSION (HCC): Status: ACTIVE | Noted: 2018-01-02

## 2022-03-19 PROBLEM — K92.2 GI BLEED: Status: RESOLVED | Noted: 2021-12-04 | Resolved: 2022-03-15

## 2022-03-20 PROBLEM — E11.40 TYPE 2 DIABETES MELLITUS WITH DIABETIC NEUROPATHY (HCC): Status: ACTIVE | Noted: 2019-01-22

## 2022-03-24 ENCOUNTER — TELEPHONE (OUTPATIENT)
Dept: INTERNAL MEDICINE CLINIC | Age: 82
End: 2022-03-24

## 2022-03-24 NOTE — TELEPHONE ENCOUNTER
Called, spoke to pt  Received two pt identifiers  Pt informed [employer] needed form on file from [facility] which was just received today 03/24/22. Pt informed forms waiting signature in Dr. Ren Del Toro office. Pt verbalizes understanding of the instructions and has no further questions at this time.

## 2022-03-24 NOTE — TELEPHONE ENCOUNTER
[phone]    Pt states she wants to make sure nurse gets the message, states last rachel she called multiple times and the messages never went thru    Pt states she wants nurse to call her , regarding Krista Arenas.

## 2022-04-01 ENCOUNTER — TELEPHONE (OUTPATIENT)
Dept: INTERNAL MEDICINE CLINIC | Age: 82
End: 2022-04-01

## 2022-04-01 NOTE — TELEPHONE ENCOUNTER
Called, spoke with Pt. Two pt identifiers confirmed. Pt stated a 80 for the Daun Adri is $96 with [employer]. Pt wanted to know if there was something I could do. Pt informed to call her insurance and see if they will cover the dexcom g6. Pt informed I will also reach out to pharmacist onsite and see if there is a way to bring down the price. Pt stated ok she appreciates the help as she has been using the Daun Adri for awhile and do not want to go back to pricking her fingers as it makes her fingers sore. Pt informed I understand and will see what I can do to help. Pt verbalized understanding of information discussed w/ no further questions at this time.

## 2022-04-04 NOTE — TELEPHONE ENCOUNTER
Called, spoke with Pt. Two pt identifiers confirmed. Pt informed spoke w/ pharmacist and there is nothing that can be done about the price. Pt stated ok thank you trying. Pt verbalized understanding of information discussed w/ no further questions at this time.

## 2022-04-13 ENCOUNTER — TRANSCRIBE ORDER (OUTPATIENT)
Dept: SCHEDULING | Age: 82
End: 2022-04-13

## 2022-04-13 DIAGNOSIS — Z12.31 OTHER SCREENING MAMMOGRAM: Primary | ICD-10-CM

## 2022-04-15 ENCOUNTER — TELEPHONE (OUTPATIENT)
Dept: INTERNAL MEDICINE CLINIC | Age: 82
End: 2022-04-15

## 2022-04-15 DIAGNOSIS — E11.21 TYPE II DIABETES MELLITUS WITH NEPHROPATHY (HCC): ICD-10-CM

## 2022-04-15 DIAGNOSIS — K21.9 GASTROESOPHAGEAL REFLUX DISEASE WITHOUT ESOPHAGITIS: ICD-10-CM

## 2022-04-15 DIAGNOSIS — E03.9 HYPOTHYROIDISM, UNSPECIFIED TYPE: Primary | ICD-10-CM

## 2022-04-15 DIAGNOSIS — E78.2 MIXED HYPERLIPIDEMIA: ICD-10-CM

## 2022-04-15 DIAGNOSIS — I10 ESSENTIAL HYPERTENSION, BENIGN: ICD-10-CM

## 2022-04-15 DIAGNOSIS — E03.9 ACQUIRED HYPOTHYROIDISM: ICD-10-CM

## 2022-04-15 RX ORDER — OMEPRAZOLE 20 MG/1
20 CAPSULE, DELAYED RELEASE ORAL DAILY
Qty: 30 CAPSULE | Refills: 0 | Status: SHIPPED | OUTPATIENT
Start: 2022-04-15 | End: 2022-04-16

## 2022-04-15 RX ORDER — GABAPENTIN 100 MG/1
CAPSULE ORAL
Qty: 120 CAPSULE | Refills: 0 | Status: SHIPPED | OUTPATIENT
Start: 2022-04-15 | End: 2022-06-21 | Stop reason: SDUPTHER

## 2022-04-15 RX ORDER — EZETIMIBE 10 MG/1
10 TABLET ORAL DAILY
Qty: 30 TABLET | Refills: 0 | Status: SHIPPED | OUTPATIENT
Start: 2022-04-15 | End: 2022-04-18 | Stop reason: SDUPTHER

## 2022-04-15 RX ORDER — METFORMIN HYDROCHLORIDE 1000 MG/1
1000 TABLET ORAL 2 TIMES DAILY WITH MEALS
Qty: 30 TABLET | Refills: 0 | Status: SHIPPED | OUTPATIENT
Start: 2022-04-15 | End: 2022-04-22 | Stop reason: SDUPTHER

## 2022-04-15 RX ORDER — METOPROLOL TARTRATE 50 MG/1
50 TABLET ORAL 2 TIMES DAILY
Qty: 60 TABLET | Refills: 0 | Status: SHIPPED | OUTPATIENT
Start: 2022-04-15 | End: 2022-05-20

## 2022-04-15 RX ORDER — LEVOTHYROXINE SODIUM 100 UG/1
100 TABLET ORAL
Qty: 30 TABLET | Refills: 0 | Status: SHIPPED | OUTPATIENT
Start: 2022-04-15 | End: 2022-04-22 | Stop reason: SDUPTHER

## 2022-04-15 RX ORDER — ATORVASTATIN CALCIUM 80 MG/1
80 TABLET, FILM COATED ORAL
Qty: 30 TABLET | Refills: 0 | Status: SHIPPED | OUTPATIENT
Start: 2022-04-15 | End: 2022-04-22 | Stop reason: SDUPTHER

## 2022-04-15 RX ORDER — AMLODIPINE BESYLATE 5 MG/1
5 TABLET ORAL DAILY
Qty: 30 TABLET | Refills: 0 | Status: SHIPPED | OUTPATIENT
Start: 2022-04-15 | End: 2022-04-16

## 2022-04-15 NOTE — TELEPHONE ENCOUNTER
PCP: Red Sanchez MD    Last appt: 3/15/2022  Future Appointments   Date Time Provider Gen Paige   5/10/2022  1:40 PM ED [facility] FELICIANO 3 [facility]   5/10/2022  2:00 PM ED [facility] DEXA 1 [facility]   6/21/2022 11:30 AM Red Sanchez MD [facility] BS AMB   7/5/2022  2:00 PM Victor Manuel Chin MD [facility] BS AMB   9/8/2022  1:15 PM Lei Whitmore MD [facility] BS AMB       Requested Prescriptions     Pending Prescriptions Disp Refills    ezetimibe (ZETIA) 10 mg tablet 30 Tablet 0     Sig: Take 1 Tablet by mouth daily.  amLODIPine (NORVASC) 5 mg tablet 30 Tablet 0     Sig: Take 1 Tablet by mouth daily.  atorvastatin (LIPITOR) 80 mg tablet 30 Tablet 0     Sig: Take 1 Tablet by mouth nightly.  metoprolol tartrate (LOPRESSOR) 50 mg tablet 60 Tablet 0     Sig: Take 1 Tablet by mouth two (2) times a day.  levothyroxine (Euthyrox) 100 mcg tablet 30 Tablet 0     Sig: Take 1 Tablet by mouth Daily (before breakfast).  omeprazole (PRILOSEC) 20 mg capsule 30 Capsule 0     Sig: Take 1 Capsule by mouth daily.  metFORMIN (GLUCOPHAGE) 1,000 mg tablet 30 Tablet 0     Sig: Take 1 Tablet by mouth two (2) times daily (with meals).     gabapentin (NEURONTIN) 100 mg capsule 120 Capsule 0     Sig: Take 2 capsules by mouth twice daily

## 2022-04-15 NOTE — TELEPHONE ENCOUNTER
Called, spoke with Pt. Two pt identifiers confirmed. Pt stated need Rx's 30 day sent to [facility] and then a 90-day [place] [facility]. Pt informed will get that done now.

## 2022-04-16 DIAGNOSIS — K21.9 GASTROESOPHAGEAL REFLUX DISEASE WITHOUT ESOPHAGITIS: ICD-10-CM

## 2022-04-16 DIAGNOSIS — I10 ESSENTIAL HYPERTENSION, BENIGN: ICD-10-CM

## 2022-04-16 RX ORDER — AMLODIPINE BESYLATE 5 MG/1
TABLET ORAL
Qty: 90 TABLET | Refills: 0 | Status: SHIPPED | OUTPATIENT
Start: 2022-04-16 | End: 2022-04-22 | Stop reason: SDUPTHER

## 2022-04-16 RX ORDER — OMEPRAZOLE 20 MG/1
CAPSULE, DELAYED RELEASE ORAL
Qty: 90 CAPSULE | Refills: 0 | Status: SHIPPED | OUTPATIENT
Start: 2022-04-16 | End: 2022-08-16 | Stop reason: SDUPTHER

## 2022-04-18 DIAGNOSIS — E78.2 MIXED HYPERLIPIDEMIA: ICD-10-CM

## 2022-04-18 RX ORDER — LANCETS 33 GAUGE
EACH MISCELLANEOUS
Qty: 100 EACH | Refills: 5 | Status: SHIPPED | OUTPATIENT
Start: 2022-04-18 | End: 2022-04-22 | Stop reason: SDUPTHER

## 2022-04-18 RX ORDER — CALCIUM CITRATE/VITAMIN D3 200MG-6.25
TABLET ORAL
Qty: 100 STRIP | Refills: 5 | Status: SHIPPED | OUTPATIENT
Start: 2022-04-18 | End: 2022-04-22 | Stop reason: SDUPTHER

## 2022-04-18 RX ORDER — EZETIMIBE 10 MG/1
10 TABLET ORAL DAILY
Qty: 30 TABLET | Refills: 0 | Status: SHIPPED | OUTPATIENT
Start: 2022-04-18 | End: 2022-04-22 | Stop reason: SDUPTHER

## 2022-04-18 RX ORDER — BLOOD-GLUCOSE METER
EACH MISCELLANEOUS
Qty: 100 EACH | Refills: 2 | Status: SHIPPED | OUTPATIENT
Start: 2022-04-18 | End: 2022-04-22 | Stop reason: SDUPTHER

## 2022-04-18 RX ORDER — ISOPROPYL ALCOHOL 70 ML/100ML
SWAB TOPICAL
Qty: 100 PAD | Refills: 5 | Status: SHIPPED | OUTPATIENT
Start: 2022-04-18 | End: 2022-04-22 | Stop reason: SDUPTHER

## 2022-04-22 DIAGNOSIS — E03.9 ACQUIRED HYPOTHYROIDISM: ICD-10-CM

## 2022-04-22 DIAGNOSIS — I10 ESSENTIAL HYPERTENSION, BENIGN: ICD-10-CM

## 2022-04-22 DIAGNOSIS — E11.21 TYPE II DIABETES MELLITUS WITH NEPHROPATHY (HCC): ICD-10-CM

## 2022-04-22 DIAGNOSIS — E78.2 MIXED HYPERLIPIDEMIA: ICD-10-CM

## 2022-04-22 RX ORDER — BLOOD-GLUCOSE METER
EACH MISCELLANEOUS
Qty: 100 EACH | Refills: 2 | Status: SHIPPED | OUTPATIENT
Start: 2022-04-22 | End: 2022-04-26 | Stop reason: SDUPTHER

## 2022-04-22 RX ORDER — EZETIMIBE 10 MG/1
10 TABLET ORAL DAILY
Qty: 90 TABLET | Refills: 2 | Status: SHIPPED | OUTPATIENT
Start: 2022-04-22 | End: 2022-04-26 | Stop reason: SDUPTHER

## 2022-04-22 RX ORDER — CALCIUM CITRATE/VITAMIN D3 200MG-6.25
TABLET ORAL
Qty: 100 STRIP | Refills: 5 | Status: SHIPPED | OUTPATIENT
Start: 2022-04-22 | End: 2022-04-26 | Stop reason: SDUPTHER

## 2022-04-22 RX ORDER — AMLODIPINE BESYLATE 5 MG/1
5 TABLET ORAL DAILY
Qty: 90 TABLET | Refills: 0 | Status: SHIPPED | OUTPATIENT
Start: 2022-04-22 | End: 2022-04-26 | Stop reason: SDUPTHER

## 2022-04-22 RX ORDER — ATORVASTATIN CALCIUM 80 MG/1
80 TABLET, FILM COATED ORAL
Qty: 90 TABLET | Refills: 2 | Status: SHIPPED | OUTPATIENT
Start: 2022-04-22 | End: 2022-04-26 | Stop reason: SDUPTHER

## 2022-04-22 RX ORDER — METFORMIN HYDROCHLORIDE 1000 MG/1
1000 TABLET ORAL 2 TIMES DAILY WITH MEALS
Qty: 180 TABLET | Refills: 0 | Status: SHIPPED | OUTPATIENT
Start: 2022-04-22 | End: 2022-04-26 | Stop reason: SDUPTHER

## 2022-04-22 RX ORDER — LANCETS 33 GAUGE
EACH MISCELLANEOUS
Qty: 100 EACH | Refills: 5 | Status: SHIPPED | OUTPATIENT
Start: 2022-04-22 | End: 2022-04-26 | Stop reason: SDUPTHER

## 2022-04-22 RX ORDER — LEVOTHYROXINE SODIUM 100 UG/1
100 TABLET ORAL
Qty: 90 TABLET | Refills: 0 | Status: SHIPPED | OUTPATIENT
Start: 2022-04-22 | End: 2022-04-26 | Stop reason: SDUPTHER

## 2022-04-22 RX ORDER — ISOPROPYL ALCOHOL 70 ML/100ML
SWAB TOPICAL
Qty: 100 PAD | Refills: 5 | Status: SHIPPED | OUTPATIENT
Start: 2022-04-22

## 2022-04-22 NOTE — TELEPHONE ENCOUNTER
Future Appointments:  Future Appointments   Date Time Provider Gen Paige   5/10/2022  1:40 PM [id number] FELICIANO 3 [facility] REG   5/10/2022  2:00 PM [facility] DEXA 1 [facility] REG   6/21/2022 11:30 AM Yen He MD [facility] BS AMB   7/5/2022  2:00 PM Constanza Newman MD [facility] BS AMB   9/8/2022  1:15 PM Pao Whitmore MD [facility] BS AMB        Last Appointment With Me:  3/15/2022     Requested Prescriptions     Pending Prescriptions Disp Refills    amLODIPine (NORVASC) 5 mg tablet 90 Tablet 0     Sig: Take 1 Tablet by mouth daily.  atorvastatin (LIPITOR) 80 mg tablet 30 Tablet 0     Sig: Take 1 Tablet by mouth nightly.  ezetimibe (ZETIA) 10 mg tablet 30 Tablet 0     Sig: Take 1 Tablet by mouth daily.  levothyroxine (Euthyrox) 100 mcg tablet 30 Tablet 0     Sig: Take 1 Tablet by mouth Daily (before breakfast).  Blood Glucose Control, Low (True Metrix Level 1) soln 100 Each 2     Sig: Daily    metFORMIN (GLUCOPHAGE) 1,000 mg tablet 30 Tablet 0     Sig: Take 1 Tablet by mouth two (2) times daily (with meals).     alcohol swabs (BD Single Use Swabs Regular) padm 100 Pad 5     Sig: dadily    lancets (TRUEplus Lancets) 33 gauge misc 100 Each 5     Sig: tid    glucose blood VI test strips (True Metrix Glucose Test Strip) strip 100 Strip 5     Sig: tid

## 2022-04-22 NOTE — TELEPHONE ENCOUNTER
----- Message from Juan Glynn sent at 4/21/2022  6:37 PM EDT -----  Subject: Refill Request    QUESTIONS  Name of Medication? amLODIPine (NORVASC) 5 mg tablet  Patient-reported dosage and instructions? instructions not known   How many days do you have left? 0  Preferred Pharmacy? [facility] phone number (if available)? [phone]  ---------------------------------------------------------------------------  --------------,  Name of Medication? atorvastatin (LIPITOR) 80 mg tablet  Patient-reported dosage and instructions? not known   How many days do you have left? 0  Preferred Pharmacy? [facility] phone number (if available)? [phone]  ---------------------------------------------------------------------------  --------------,  Name of Medication? levothyroxine (Euthyrox) 100 mcg tablet  Patient-reported dosage and instructions? not known   How many days do you have left? 0  Preferred Pharmacy? [facility] phone number (if available)? [phone]  ---------------------------------------------------------------------------  --------------,  Name of Medication? ezetimibe (ZETIA) 10 mg tablet  Patient-reported dosage and instructions? not known   How many days do you have left? 0  Preferred Pharmacy? [facility] phone number (if available)? [phone]  ---------------------------------------------------------------------------  --------------,  Name of Medication? metFORMIN (GLUCOPHAGE) 1,000 mg tablet  Patient-reported dosage and instructions? not known   How many days do you have left? 0  Preferred Pharmacy? [facility] phone number (if available)? [phone]  ---------------------------------------------------------------------------  --------------,  Name of Medication?  Blood Glucose Control, Low (True Metrix Level 1) soln  Patient-reported dosage and instructions? not known   How many days do you have left? 0  Preferred Pharmacy? [facility] phone number (if available)? [phone]  ---------------------------------------------------------------------------  --------------,  Name of Medication? glucose blood VI test strips (True Metrix Glucose Test   Strip) strip  Patient-reported dosage and instructions? not known   How many days do you have left? 0  Preferred Pharmacy? [facility] phone number (if available)? [phone]  ---------------------------------------------------------------------------  --------------,  Name of Medication? lancets (TRUEplus Lancets) 33 gauge misc  Patient-reported dosage and instructions? not known   How many days do you have left? 0  Preferred Pharmacy? [facility] phone number (if available)? [phone]  ---------------------------------------------------------------------------  --------------,  Name of Medication? alcohol swabs (BD Single Use Swabs Regular) padm  Patient-reported dosage and instructions? not known   How many days do you have left? 0  Preferred Pharmacy? [facility] phone number (if available)? [phone]  ---------------------------------------------------------------------------  --------------,  Name of Medication? Blood Glucose Control, Low (True Metrix Level 1) soln  Patient-reported dosage and instructions? not known   How many days do you have left? 0  Preferred Pharmacy? [facility] phone number (if available)? [phone]  Additional Information for Provider? shannan adam Armstrong called in   refills, they were originally called in on 4/14/2022 pt is still waiting   she can be reached at [phone]  ---------------------------------------------------------------------------  --------------  [address]  What is the best way for the office to contact you?  Do not leave any   message, patient will call back for answer  Preferred Call Back Phone Number? [phone]  ---------------------------------------------------------------------------  --------------  SCRIPT ANSWERS  Relationship to Patient? Third Party  Third Party Type? Pharmacy?    Representative Name? Adrian Carver

## 2022-04-26 DIAGNOSIS — E78.2 MIXED HYPERLIPIDEMIA: ICD-10-CM

## 2022-04-26 DIAGNOSIS — E03.9 ACQUIRED HYPOTHYROIDISM: ICD-10-CM

## 2022-04-26 DIAGNOSIS — E11.21 TYPE II DIABETES MELLITUS WITH NEPHROPATHY (HCC): ICD-10-CM

## 2022-04-26 DIAGNOSIS — I10 ESSENTIAL HYPERTENSION, BENIGN: ICD-10-CM

## 2022-04-26 RX ORDER — LANCETS 33 GAUGE
EACH MISCELLANEOUS
Qty: 100 EACH | Refills: 5 | Status: SHIPPED | OUTPATIENT
Start: 2022-04-26

## 2022-04-26 RX ORDER — CALCIUM CITRATE/VITAMIN D3 200MG-6.25
TABLET ORAL
Qty: 100 STRIP | Refills: 5 | Status: SHIPPED | OUTPATIENT
Start: 2022-04-26 | End: 2022-05-03 | Stop reason: SDUPTHER

## 2022-04-26 RX ORDER — METFORMIN HYDROCHLORIDE 1000 MG/1
1000 TABLET ORAL 2 TIMES DAILY WITH MEALS
Qty: 180 TABLET | Refills: 0 | Status: SHIPPED | OUTPATIENT
Start: 2022-04-26 | End: 2022-09-10

## 2022-04-26 RX ORDER — AMLODIPINE BESYLATE 5 MG/1
5 TABLET ORAL DAILY
Qty: 90 TABLET | Refills: 0 | Status: SHIPPED | OUTPATIENT
Start: 2022-04-26 | End: 2022-09-10

## 2022-04-26 RX ORDER — EZETIMIBE 10 MG/1
10 TABLET ORAL DAILY
Qty: 90 TABLET | Refills: 2 | Status: SHIPPED | OUTPATIENT
Start: 2022-04-26

## 2022-04-26 RX ORDER — ATORVASTATIN CALCIUM 80 MG/1
80 TABLET, FILM COATED ORAL
Qty: 90 TABLET | Refills: 2 | Status: SHIPPED | OUTPATIENT
Start: 2022-04-26

## 2022-04-26 RX ORDER — BLOOD-GLUCOSE METER
EACH MISCELLANEOUS
Qty: 100 EACH | Refills: 2 | Status: SHIPPED | OUTPATIENT
Start: 2022-04-26

## 2022-04-26 RX ORDER — LEVOTHYROXINE SODIUM 100 UG/1
100 TABLET ORAL
Qty: 90 TABLET | Refills: 0 | Status: SHIPPED | OUTPATIENT
Start: 2022-04-26 | End: 2022-07-14

## 2022-04-26 NOTE — TELEPHONE ENCOUNTER
Future Appointments:  Future Appointments   Date Time Provider Gen Silverioi   5/10/2022  1:40 PM [facility] FELICIANO 3 [facility] REG   5/10/2022  2:00 PM [facility] DEXA 1 [facility] REG   6/21/2022 11:30 AM Basilio Kaur MD [facility] BS AMB   7/5/2022  2:00 PM Hodan Capellan MD [facility] BS AMB   9/8/2022  1:15 PM Lila Whitmore MD [facility] BS AMB        Last Appointment With Me:  3/15/2022     Requested Prescriptions     Pending Prescriptions Disp Refills    amLODIPine (NORVASC) 5 mg tablet 90 Tablet 0     Sig: Take 1 Tablet by mouth daily.  atorvastatin (LIPITOR) 80 mg tablet 90 Tablet 2     Sig: Take 1 Tablet by mouth nightly.  levothyroxine (Euthyrox) 100 mcg tablet 90 Tablet 0     Sig: Take 1 Tablet by mouth Daily (before breakfast).  Blood Glucose Control, Low (True Metrix Level 1) soln 100 Each 2     Sig: Daily    ezetimibe (ZETIA) 10 mg tablet 90 Tablet 2     Sig: Take 1 Tablet by mouth daily.  metFORMIN (GLUCOPHAGE) 1,000 mg tablet 180 Tablet 0     Sig: Take 1 Tablet by mouth two (2) times daily (with meals).     glucose blood VI test strips (True Metrix Glucose Test Strip) strip 100 Strip 5     Sig: tid    lancets (TRUEplus Lancets) 33 gauge misc 100 Each 5     Sig: tid

## 2022-05-03 ENCOUNTER — TELEPHONE (OUTPATIENT)
Dept: INTERNAL MEDICINE CLINIC | Age: 82
End: 2022-05-03

## 2022-05-03 DIAGNOSIS — E11.21 TYPE 2 DIABETES MELLITUS WITH NEPHROPATHY (HCC): Primary | ICD-10-CM

## 2022-05-03 RX ORDER — CALCIUM CITRATE/VITAMIN D3 200MG-6.25
TABLET ORAL
Qty: 300 STRIP | Refills: 0 | Status: SHIPPED | OUTPATIENT
Start: 2022-05-03

## 2022-05-03 NOTE — TELEPHONE ENCOUNTER
PCP: Tarah Trotter MD    Last appt: 3/15/2022  Future Appointments   Date Time Provider Gen Paige   5/10/2022  1:40 PM ED [facility] FELICIANO 3 [facility] REG   5/10/2022  2:00 PM ED [facility] DEXA 1 [facility] REG   6/21/2022 11:30 AM [facility] BS AMB   7/5/2022  2:00 PM Janie Collins MD [facility] BS AMB   9/8/2022  1:15 PM Lei Whitmore MD [facility] BS AMB       Requested Prescriptions     Pending Prescriptions Disp Refills    glucose blood VI test strips (True Metrix Glucose Test Strip) strip 300 Strip 0     Sig: tid

## 2022-05-03 NOTE — TELEPHONE ENCOUNTER
Patient states she needs a call back to discuss update from Specialist. Please call to discuss & advise.  Thank you

## 2022-05-04 NOTE — TELEPHONE ENCOUNTER
Called, spoke to pt  Received two pt identifiers   Pt informed per Dr. Che Chairez to call Dr. Marcia Moncada office and discuss results with them to see if sooner appt is needed. Pt verbalizes understanding of the instructions and has no further questions at this time.

## 2022-05-04 NOTE — TELEPHONE ENCOUNTER
Pt states [facility] nurse went and did their assessment. Pt states they did a test on her legs and was told had 85% peripheral artery disease in left leg and 105% in right. Pt states has appt with Dr. Torrey Chauhan in July. Pt would like to know if she should go sooner or what she should do. Pt states has tingling in legs as well. Advised will send to Dr. Dinorah Lin for future guidance. Pt verbalizes understanding of the instructions and has no further questions at this time.

## 2022-05-20 DIAGNOSIS — I10 ESSENTIAL HYPERTENSION, BENIGN: ICD-10-CM

## 2022-05-20 RX ORDER — METOPROLOL TARTRATE 50 MG/1
TABLET ORAL
Qty: 180 TABLET | Refills: 0 | Status: SHIPPED | OUTPATIENT
Start: 2022-05-20 | End: 2022-08-16 | Stop reason: SDUPTHER

## 2022-06-15 ENCOUNTER — TELEPHONE (OUTPATIENT)
Dept: INTERNAL MEDICINE CLINIC | Age: 82
End: 2022-06-15

## 2022-06-15 DIAGNOSIS — E03.9 HYPOTHYROIDISM, UNSPECIFIED TYPE: Primary | ICD-10-CM

## 2022-06-15 DIAGNOSIS — D50.8 OTHER IRON DEFICIENCY ANEMIA: ICD-10-CM

## 2022-06-15 DIAGNOSIS — Z79.4 TYPE 2 DIABETES MELLITUS WITH DIABETIC NEUROPATHY, WITH LONG-TERM CURRENT USE OF INSULIN (HCC): ICD-10-CM

## 2022-06-15 DIAGNOSIS — E11.40 TYPE 2 DIABETES MELLITUS WITH DIABETIC NEUROPATHY, WITH LONG-TERM CURRENT USE OF INSULIN (HCC): ICD-10-CM

## 2022-06-15 NOTE — TELEPHONE ENCOUNTER
Pt is having a hard time walking and would like to just to go [employer] in [address] at [facility].     Please change the orders to [employer] (they are in as [facility]) and fax those today to [facility] [place] #[phone]   thanks

## 2022-06-17 LAB
ALBUMIN SERPL-MCNC: 4 G/DL (ref 3.6–4.6)
ALBUMIN/CREAT UR: 2990 MG/G CREAT (ref 0–29)
ALBUMIN/GLOB SERPL: 1.4 {RATIO} (ref 1.2–2.2)
ALP SERPL-CCNC: 76 IU/L (ref 44–121)
ALT SERPL-CCNC: 14 IU/L (ref 0–32)
AST SERPL-CCNC: 15 IU/L (ref 0–40)
BILIRUB SERPL-MCNC: 0.5 MG/DL (ref 0–1.2)
BUN SERPL-MCNC: 21 MG/DL (ref 8–27)
BUN/CREAT SERPL: 21 (ref 12–28)
CALCIUM SERPL-MCNC: 9.8 MG/DL (ref 8.7–10.3)
CHLORIDE SERPL-SCNC: 110 MMOL/L (ref 96–106)
CO2 SERPL-SCNC: 22 MMOL/L (ref 20–29)
CREAT SERPL-MCNC: 1 MG/DL (ref 0.57–1)
CREAT UR-MCNC: 94.5 MG/DL
EGFR: 57 ML/MIN/1.73
ERYTHROCYTE [DISTWIDTH] IN BLOOD BY AUTOMATED COUNT: 16.5 % (ref 11.7–15.4)
EST. AVERAGE GLUCOSE BLD GHB EST-MCNC: 171 MG/DL
GLOBULIN SER CALC-MCNC: 2.9 G/DL (ref 1.5–4.5)
GLUCOSE SERPL-MCNC: 134 MG/DL (ref 65–99)
HBA1C MFR BLD: 7.6 % (ref 4.8–5.6)
HCT VFR BLD AUTO: 36.7 % (ref 34–46.6)
HGB BLD-MCNC: 11 G/DL (ref 11.1–15.9)
MCH RBC QN AUTO: 24.6 PG (ref 26.6–33)
MCHC RBC AUTO-ENTMCNC: 30 G/DL (ref 31.5–35.7)
MCV RBC AUTO: 82 FL (ref 79–97)
MICROALBUMIN UR-MCNC: 2825.4 UG/ML
PLATELET # BLD AUTO: 211 X10E3/UL (ref 150–450)
POTASSIUM SERPL-SCNC: 4.2 MMOL/L (ref 3.5–5.2)
PROT SERPL-MCNC: 6.9 G/DL (ref 6–8.5)
RBC # BLD AUTO: 4.47 X10E6/UL (ref 3.77–5.28)
SODIUM SERPL-SCNC: 146 MMOL/L (ref 134–144)
TSH SERPL DL<=0.005 MIU/L-ACNC: 0.52 UIU/ML (ref 0.45–4.5)
WBC # BLD AUTO: 7.9 X10E3/UL (ref 3.4–10.8)

## 2022-06-17 NOTE — PROGRESS NOTES
HISTORY OF PRESENT ILLNESS  Daphne Maldonado is a 81 y.o. female. HPI     Last here 3/15/22. Pt is here for routine care.     Pt c/o discoloration of breast  On exam: loss of pigmentation    She c/o feeling tired in general, has some sob when walking over the last 6 months or so this is not new denies any chest pain or chest pressure  Taking anoro inhaler to help with this which helps somewhat she does feel that her symptoms have become progressive over the last month or 2 we will evaluate further at this time  Will order echo, cxr and reviewed labs to begin with  We will have her double check on her cardiology appointment we may need to move this up have also provided her information for pulmonary she does seem to respond to her Anoro inhaler and she may benefit from PFTs she has a past history of smoking she quit smoking about 15 years ago she may have COPD that is causing her symptoms no wheezing on exam     She was in the hospital from 12/04/21-12/06/21 for GI bleed  This was a diverticular bleed her symptoms have resolved will follow up with GI as needed continues on Prilosec every other day     Has a history of hypertension  BP today is 147/86 repeat improved  Continues on metoprolol 50mg BID and norvasc 5mg  Recall had angioedema with lisinopril and benicar-HCT in the past      She is diabetic   BS 55 150-160   Fasting 97 93 120 130   Postprandial 200 225   Uses freestyle yenifer, readings with this seem to be be off recently running much lower --she is comparing them to a finger prick which is normal were nl in past, discussed switching to new patch  Continues on metformin 1000mg BID   On NPH 70/30 mix now 14U BID  Previously she had been taking 20 and 22 units we had decreased this to 18 units but she had recurrent hypoglycemia so we had decreased it again  She also takes ASA 81mg daily   Recall issues with hypoglycemia in the past, a1c under 7.5 at goal for her       Wt today is 172 lbs today, down 2 lbs x lov  Discussed diet and weight loss     Reviewed labs      Pt follows with Dr. Edith Mehta (cardio) for cad  Last visit 9/02/21 with SLICK Cano she has follow-up scheduled but she is not sure when she will contact their office     Pt follows annually with Dr. Jayashree Sanchez (vas) for her PAD  No claudication stable   Last visit was 7/21 we will see him again this summer  Completed ultrasound aneurysm screen February 2022 which is negative       Pt saw Dr. Malone (ortho) for carpal tunnel   Had surgery for this 8/1/19   Last visit was 8/19/19      She has been seeing Dr. Micaela Sweeney (ortho) for R shoulder pain   Last there 12/11/20      Pt is taking prilosec 20mg every other day working well     Takes OTC vit D daily      Continues synthroid 100mcg daily    She takes this med separately from all meds      Continues on lipitor 80mg max dose for cholesterol   Pt is  taking zetia 10mg once daily as well however it is affordable enough so she will continue this  Welchol was too expensive now but zetia ok and grey well no issue     No longer on lexapro, doing well off medication not sad or down depression screen is from grief  She also has ativan to use prn     Pt had sleep eval with Dr Corinne Herrlich sleep apnea  Last there 1/18/21   She had sleep titration 3/15/21  She is compliant with bipap 6/22     Pt was on gabapentin 100 mg for pain in hand as needed     Former smoker, quit smoking in 2010      ACP on file. SDMs is daughter Lesa Gauthier        PREVENTIVE:    Colonoscopy: Dr. Ozzie Perez 5 years, 12/6/21 manetes  Pap: 12/14, declines further   Mammogram: 5/7/21 nl scheduled 6/22/22  DEXA: 3/20, osteopenic, stopped fosamax scheduled 6/22/22  Tdap: 1/04/2016  Pneumovax: 01/29/20   Preockj40: 9/17  Zostavax: 10/10/2016  Shingrix: completed both  Flu shot: 11/15/21  Foot exam: 06/21/22  Microalbumin: 6/22  A1c:  1/21 8.3 4/21 7.9 8/21 7.2 11/21 8.0, 3/22 7.9 6/22 7.6  [facility] exam: [facility]. Jose Manuel Briones at [facility], 5/27/21  Lipids: 12/21 LDL 50   Covid: both (Pfizer) and booster    Patient Active Problem List    Diagnosis Date Noted    Type 2 diabetes mellitus with diabetic neuropathy ([facility] 01/22/2019    Type 2 diabetes mellitus with nephropathy ([facility] 01/02/2018    Recurrent depression ([facility] 01/02/2018    Diverticulosis of intestine with bleeding 10/11/2015    Anemia 44/52/1158    Diastolic CHF, chronic ([facility] 10/11/2015    Osteoporosis 09/10/2013    Essential hypertension, benign 05/17/2012    Mixed hyperlipidemia 05/17/2012    S/P CABG x 3 11/11/2011    CAD (coronary artery disease) 11/18/2009    Hypothyroidism 11/18/2009    PVD (peripheral vascular disease) ([facility] 11/18/2009     Current Outpatient Medications   Medication Sig Dispense Refill    glucose blood VI test strips (True Metrix Glucose Test Strip) strip tid 300 Strip 0    metoprolol tartrate (LOPRESSOR) 50 mg tablet Take 1 tablet by mouth twice daily 180 Tablet 0    amLODIPine (NORVASC) 5 mg tablet Take 1 Tablet by mouth daily. 90 Tablet 0    atorvastatin (LIPITOR) 80 mg tablet Take 1 Tablet by mouth nightly. 90 Tablet 2    levothyroxine (Euthyrox) 100 mcg tablet Take 1 Tablet by mouth Daily (before breakfast). 90 Tablet 0    Blood Glucose Control, Low (True Metrix Level 1) soln Daily 100 Each 2    ezetimibe (ZETIA) 10 mg tablet Take 1 Tablet by mouth daily. 90 Tablet 2    metFORMIN (GLUCOPHAGE) 1,000 mg tablet Take 1 Tablet by mouth two (2) times daily (with meals). 180 Tablet 0    lancets (TRUEplus Lancets) 33 gauge misc tid 100 Each 5    alcohol swabs (BD Single Use Swabs Regular) padm dadily 100 Pad 5    omeprazole (PRILOSEC) 20 mg capsule Take 1 capsule by mouth once daily 90 Capsule 0    gabapentin (NEURONTIN) 100 mg capsule Take 2 capsules by mouth twice daily 120 Capsule 0    flash glucose sensor (FreeStyle Ruben 2 Sensor) kit USE TO CHECK BLOOD SUGARS 4X DAILY.  3 Kit 0    insulin NPH/insulin regular (NovoLIN 70/30 U-100 Insulin) 100 unit/mL (70-30) injection INJECT 14 UNITS SUBCUTANEOUSLY TWICE DAILY 10 mL 0    flash glucose sensor (FreeStyle Ruben 14 Day Sensor) kit daily 300 Kit 0    umeclidinium-vilanteroL (Anoro Ellipta) 62.5-25 mcg/actuation inhaler Take 1 Puff by inhalation daily. 1 Each 0    albuterol (PROVENTIL HFA, VENTOLIN HFA, PROAIR HFA) 90 mcg/actuation inhaler Take 1 Puff by inhalation every six (6) hours as needed for Wheezing. 1 Inhaler 0    cholecalciferol (VITAMIN D3) 1,000 unit cap Take 1,000 Units by mouth daily.  aspirin 81 mg chewable tablet Take 1 Tab by mouth daily. 90 Tab 3    cyanocobalamin (VITAMIN B-12) 1,000 mcg tablet Take 1,000 mcg by mouth daily.        Past Surgical History:   Procedure Laterality Date    COLONOSCOPY N/A 10/31/2018    COLONOSCOPY performed by Cyril Preciado MD at hospitals ENDOSCOPY    COLONOSCOPY N/A 12/6/2021    COLONOSCOPY performed by Cyril Preciado MD at hospitals ENDOSCOPY    HX CORONARY ARTERY BYPASS GRAFT  11/11/11    3 vessel    HX GYN      hysterectomy    HX HEART CATHETERIZATION      HX HYSTERECTOMY      HX UROLOGICAL  1970s    kidney stone    [facility] CARDIAC SUR[facility] PROCEDURE UNLIST      cabg x 3    VASCULAR SURGERY PROCEDURE UNLIST      Stents put in right leg      Lab Results   Component Value Date/Time    WBC 7.9 06/16/2022 09:21 AM    HGB 11.0 (L) 06/16/2022 09:21 AM    Hemoglobin (POC) 12.9 03/02/2011 12:21 PM    HCT 36.7 06/16/2022 09:21 AM    PLATELET 107 40/21/1251 09:21 AM    MCV 82 06/16/2022 09:21 AM     Lab Results   Component Value Date/Time    Cholesterol, total 121 12/04/2021 04:58 AM    HDL Cholesterol 50 12/04/2021 04:58 AM    LDL, calculated 50.6 12/04/2021 04:58 AM    Triglyceride 102 12/04/2021 04:58 AM    CHOL/HDL Ratio 2.4 12/04/2021 04:58 AM     Lab Results   Component Value Date/Time    GFR est non-AA 56 (L) 12/04/2021 04:58 AM    GFR est AA >60 12/04/2021 04:58 AM    Creatinine 1.00 06/16/2022 09:21 AM    BUN 21 06/16/2022 09:21 AM Sodium 146 (H) 06/16/2022 09:21 AM    Potassium 4.2 06/16/2022 09:21 AM    Chloride 110 (H) 06/16/2022 09:21 AM    CO2 22 06/16/2022 09:21 AM    Magnesium 1.7 10/16/2015 04:34 AM        Review of Systems   Respiratory: Positive for shortness of breath. Cardiovascular: Negative for chest pain. Skin: Positive for rash. Physical Exam  Constitutional:       General: She is not in acute distress. Appearance: Normal appearance. She is not ill-appearing, toxic-appearing or diaphoretic. HENT:      Head: Normocephalic and atraumatic. Right Ear: External ear normal.      Left Ear: External ear normal.   Eyes:      General:         Right eye: No discharge. Left eye: No discharge. Conjunctiva/sclera: Conjunctivae normal.      Pupils: Pupils are equal, round, and reactive to light. Cardiovascular:      Rate and Rhythm: Normal rate and regular rhythm. Heart sounds: No murmur heard. No friction rub. No gallop. Pulmonary:      Effort: No respiratory distress. Breath sounds: Normal breath sounds. No wheezing or rales. Chest:      Chest wall: No tenderness. Musculoskeletal:         General: Normal range of motion. Cervical back: Normal range of motion and neck supple. Skin:     General: Skin is warm and dry. Comments: Lack of pigment breast   Neurological:      Mental Status: She is alert and oriented to person, place, and time. Mental status is at baseline. Coordination: Coordination normal.      Gait: Gait normal.      Comments: Sensory exam of the foot is normal, tested with the monofilament. Good pulses, no lesions or ulcers, good peripheral pulses. Psychiatric:         Mood and Affect: Mood normal.         Behavior: Behavior normal.         ASSESSMENT and PLAN    ICD-10-CM ICD-9-CM    1.  Type 2 diabetes mellitus with nephropathy (HCC)  Q80.19 734.34 METABOLIC PANEL, COMPREHENSIVE     583.81 HEMOGLOBIN A1C WITH EAG   A1c at goal improved from in the past continue 70/30 insulin mix 14 units twice daily    Metformin twice daily    She is not having any hypoglycemia she had a couple low readings on her freestyle yenifer and double checked it with a fingerstick which was normal    She will change her freestyle yenifer patch which seems to be dysfunctional    Monitor kidney function   CBC W/O DIFF      FERRITIN   2. Type II diabetes mellitus with nephropathy (HCC)  E11.21 250.40 gabapentin (NEURONTIN) 100 mg capsule     583.81 HM DIABETES FOOT EXAM   See above   METABOLIC PANEL, COMPREHENSIVE      HEMOGLOBIN A1C WITH EAG      CBC W/O DIFF      FERRITIN   3. SOB (shortness of breath)  R06.02 786.05 umeclidinium-vilanteroL (Anoro Ellipta) 62.5-25 mcg/actuation inhaler      METABOLIC PANEL, COMPREHENSIVE   Patient reports some general fatigue and shortness of breath    Just had blood work done which overall looked quite good blood counts showed minimal anemia which we will monitor kidney function is stable blood sugars are improved no cause for fatigue on blood work    Will check echo last echo was almost 2 years ago since she is having dyspnea on exertion    Will check chest x-ray    Continue the Anoro inhaler provided information for pulmonary given her symptoms    Discussed moving up cardiology appointment   HEMOGLOBIN A1C WITH EAG      CBC W/O DIFF      FERRITIN      ECHO ADULT COMPLETE      XR CHEST PA LAT      REFERRAL TO PULMONARY DISEASE   4. Pulmonary emphysema, unspecified emphysema type ([facility]  I34.0 448.1 METABOLIC PANEL, COMPREHENSIVE      HEMOGLOBIN A1C WITH EAG   Provide information for pulmonary continue Anoro inhaler will be getting chest x-ray   CBC W/O DIFF      FERRITIN   5.  Type 2 diabetes mellitus with diabetic neuropathy, with long-term current use of insulin ([facility])  X86.09 996.76 METABOLIC PANEL, COMPREHENSIVE    Z79.4 357.2 HEMOGLOBIN A1C WITH EAG   Improved with gabapentin  V58.67 CBC W/O DIFF      FERRITIN   6. Diastolic CHF, chronic ([facility])  I50.32 254.16 METABOLIC PANEL, COMPREHENSIVE   We repeating echocardiogram generally this is been controlled we will check and see when her next appointment is with her cardiologist  428.0 HEMOGLOBIN A1C WITH EAG      CBC W/O DIFF      FERRITIN   7. PVD (peripheral vascular disease) ([facility])  P77.4 408.2 METABOLIC PANEL, COMPREHENSIVE      HEMOGLOBIN A1C WITH EAG   Follows with vascular annually due for appointment next month   CBC W/O DIFF      FERRITIN   8. Essential hypertension, benign  V94 244.1 METABOLIC PANEL, COMPREHENSIVE   Initially elevated repeat improved continue metoprolol and Norvasc   HEMOGLOBIN A1C WITH EAG      CBC W/O DIFF      FERRITIN   9. Recurrent depression ([facility])  E72.5 249.15 METABOLIC PANEL, COMPREHENSIVE      HEMOGLOBIN A1C WITH EAG   Controlled without medication   CBC W/O DIFF      FERRITIN   10. Coronary artery disease involving native coronary artery of native heart without angina pectoris  Z65.96 489.66 METABOLIC PANEL, COMPREHENSIVE      HEMOGLOBIN A1C WITH EAG   Medically managed for the most part having some shortness of breath but denies chest discomfort will be starting evaluation to include echo she will follow-up with her cardiologist and see if we need to update a stress test her last stress test was approximately a year ago   CBC W/O DIFF      FERRITIN   11. Hypothyroidism, unspecified type  D33.7 912.4 METABOLIC PANEL, COMPREHENSIVE   Controlled on Synthroid   HEMOGLOBIN A1C WITH EAG      CBC W/O DIFF      FERRITIN   12. Age-related osteoporosis without current pathological fracture  N06.2 625.85 METABOLIC PANEL, COMPREHENSIVE      HEMOGLOBIN A1C WITH EAG   On vitamin D previously on Fosamax bone density scheduled for tomorrow   CBC W/O DIFF      FERRITIN   13. Mixed hyperlipidemia  I29.7 297.9 METABOLIC PANEL, COMPREHENSIVE      HEMOGLOBIN A1C WITH EAG   Controlled on max dose statin Lipitor   CBC W/O DIFF      FERRITIN   14.  Anemia, unspecified type  D64.9 285.9         Depression screen reviewed and positive (2) recurrent issue previously on Lexapro not interested in medication  Scribed by Jatinder Soriano of [facility], as dictated by Dr. Beryle Lana. Current diagnosis and concerns discussed with pt at length. Pt understands risks and benefits or current treatment plan and medications, and accepts the treatment and medication with any possible risks. Pt asks appropriate questions, which were answered. Pt was instructed to call with any concerns or problems. I have reviewed the note documented by the scribe. The services provided are my own.   The documentation is accurate

## 2022-06-21 ENCOUNTER — OFFICE VISIT (OUTPATIENT)
Dept: INTERNAL MEDICINE CLINIC | Age: 82
End: 2022-06-21
Payer: MEDICARE

## 2022-06-21 ENCOUNTER — HOSPITAL ENCOUNTER (OUTPATIENT)
Dept: GENERAL RADIOLOGY | Age: 82
Discharge: HOME | End: 2022-06-21
Payer: MEDICARE

## 2022-06-21 VITALS
BODY MASS INDEX: 31.65 KG/M2 | TEMPERATURE: 97.3 F | SYSTOLIC BLOOD PRESSURE: 126 MMHG | HEIGHT: 62 IN | RESPIRATION RATE: 20 BRPM | OXYGEN SATURATION: 98 % | HEART RATE: 73 BPM | WEIGHT: 172 LBS | DIASTOLIC BLOOD PRESSURE: 84 MMHG

## 2022-06-21 DIAGNOSIS — D64.9 ANEMIA, UNSPECIFIED TYPE: ICD-10-CM

## 2022-06-21 DIAGNOSIS — E11.21 TYPE II DIABETES MELLITUS WITH NEPHROPATHY (HCC): ICD-10-CM

## 2022-06-21 DIAGNOSIS — E11.40 TYPE 2 DIABETES MELLITUS WITH DIABETIC NEUROPATHY, WITH LONG-TERM CURRENT USE OF INSULIN (HCC): ICD-10-CM

## 2022-06-21 DIAGNOSIS — I10 ESSENTIAL HYPERTENSION, BENIGN: ICD-10-CM

## 2022-06-21 DIAGNOSIS — J43.9 PULMONARY EMPHYSEMA, UNSPECIFIED EMPHYSEMA TYPE (HCC): ICD-10-CM

## 2022-06-21 DIAGNOSIS — M81.0 AGE-RELATED OSTEOPOROSIS WITHOUT CURRENT PATHOLOGICAL FRACTURE: ICD-10-CM

## 2022-06-21 DIAGNOSIS — I73.9 PVD (PERIPHERAL VASCULAR DISEASE) (HCC): ICD-10-CM

## 2022-06-21 DIAGNOSIS — E78.2 MIXED HYPERLIPIDEMIA: ICD-10-CM

## 2022-06-21 DIAGNOSIS — I25.10 CORONARY ARTERY DISEASE INVOLVING NATIVE CORONARY ARTERY OF NATIVE HEART WITHOUT ANGINA PECTORIS: ICD-10-CM

## 2022-06-21 DIAGNOSIS — Z79.4 TYPE 2 DIABETES MELLITUS WITH DIABETIC NEUROPATHY, WITH LONG-TERM CURRENT USE OF INSULIN (HCC): ICD-10-CM

## 2022-06-21 DIAGNOSIS — R06.02 SOB (SHORTNESS OF BREATH): ICD-10-CM

## 2022-06-21 DIAGNOSIS — E11.21 TYPE 2 DIABETES MELLITUS WITH NEPHROPATHY (HCC): Primary | ICD-10-CM

## 2022-06-21 DIAGNOSIS — F33.9 RECURRENT DEPRESSION (HCC): ICD-10-CM

## 2022-06-21 DIAGNOSIS — I50.32 DIASTOLIC CHF, CHRONIC (HCC): ICD-10-CM

## 2022-06-21 DIAGNOSIS — E03.9 HYPOTHYROIDISM, UNSPECIFIED TYPE: ICD-10-CM

## 2022-06-21 PROBLEM — J44.9 CHRONIC OBSTRUCTIVE PULMONARY DISEASE, UNSPECIFIED (HCC): Status: ACTIVE | Noted: 2022-06-21

## 2022-06-21 PROCEDURE — 99214 OFFICE O/P EST MOD 30 MIN: CPT | Performed by: INTERNAL MEDICINE

## 2022-06-21 PROCEDURE — G8427 DOCREV CUR MEDS BY ELIG CLIN: HCPCS | Performed by: INTERNAL MEDICINE

## 2022-06-21 PROCEDURE — G8754 DIAS BP LESS 90: HCPCS | Performed by: INTERNAL MEDICINE

## 2022-06-21 PROCEDURE — G9717 DOC PT DX DEP/BP F/U NT REQ: HCPCS | Performed by: INTERNAL MEDICINE

## 2022-06-21 PROCEDURE — 1101F PT FALLS ASSESS-DOCD LE1/YR: CPT | Performed by: INTERNAL MEDICINE

## 2022-06-21 PROCEDURE — G8536 NO DOC ELDER MAL SCRN: HCPCS | Performed by: INTERNAL MEDICINE

## 2022-06-21 PROCEDURE — 1090F PRES/ABSN URINE INCON ASSESS: CPT | Performed by: INTERNAL MEDICINE

## 2022-06-21 PROCEDURE — G8752 SYS BP LESS 140: HCPCS | Performed by: INTERNAL MEDICINE

## 2022-06-21 PROCEDURE — 71046 X-RAY EXAM CHEST 2 VIEWS: CPT

## 2022-06-21 PROCEDURE — G8417 CALC BMI ABV UP PARAM F/U: HCPCS | Performed by: INTERNAL MEDICINE

## 2022-06-21 RX ORDER — UMECLIDINIUM BROMIDE AND VILANTEROL TRIFENATATE 62.5; 25 UG/1; UG/1
1 POWDER RESPIRATORY (INHALATION) DAILY
Qty: 1 EACH | Refills: 0 | Status: SHIPPED | OUTPATIENT
Start: 2022-06-21 | End: 2022-09-21 | Stop reason: CLARIF

## 2022-06-21 RX ORDER — GABAPENTIN 100 MG/1
CAPSULE ORAL
Qty: 120 CAPSULE | Refills: 2 | Status: SHIPPED | OUTPATIENT
Start: 2022-06-21 | End: 2022-09-21 | Stop reason: SDUPTHER

## 2022-06-21 NOTE — PROGRESS NOTES
1. \"Have you been to the ER, urgent care clinic since your last visit? Hospitalized since your last visit? \" No    2. \"Have you seen or consulted any other health care providers outside of the [address] [place] [address] since your last visit? \" No     3. For patients aged 39-70: Has the patient had a colonoscopy / FIT/ Cologuard? NA - based on age      If the patient is female:    4. For patients aged 41-77: Has the patient had a mammogram within the past 2 years? NA - based on age or sex      11. For patients aged 21-65: Has the patient had a pap smear?  NA - based on age or sex

## 2022-06-21 NOTE — LETTER
6/24/2022 5:57 PM    Ms. Francisco Javier Escamilla [phone]      Dear Care Provider    Please fax us the most recent eye exam so that we may update the patient's records for continuity of care. Our fax number: [phone].     Patient:   Chai Ha  1940          Sincerely,      Jeniffer Ivey MD

## 2022-06-22 ENCOUNTER — HOSPITAL ENCOUNTER (OUTPATIENT)
Dept: MAMMOGRAPHY | Age: 82
Discharge: HOME | End: 2022-06-22
Attending: INTERNAL MEDICINE
Payer: MEDICARE

## 2022-06-22 DIAGNOSIS — Z78.0 POST-MENOPAUSE: ICD-10-CM

## 2022-06-22 DIAGNOSIS — Z12.31 OTHER SCREENING MAMMOGRAM: ICD-10-CM

## 2022-06-22 PROCEDURE — 77080 DXA BONE DENSITY AXIAL: CPT

## 2022-06-22 PROCEDURE — 77067 SCR MAMMO BI INCL CAD: CPT

## 2022-07-05 ENCOUNTER — DOCUMENTATION ONLY (OUTPATIENT)
Dept: SLEEP MEDICINE | Age: 82
End: 2022-07-05

## 2022-07-05 ENCOUNTER — OFFICE VISIT (OUTPATIENT)
Dept: SLEEP MEDICINE | Age: 82
End: 2022-07-05
Payer: MEDICARE

## 2022-07-05 VITALS
RESPIRATION RATE: 20 BRPM | HEIGHT: 62 IN | OXYGEN SATURATION: 97 % | DIASTOLIC BLOOD PRESSURE: 73 MMHG | HEART RATE: 61 BPM | SYSTOLIC BLOOD PRESSURE: 137 MMHG | WEIGHT: 173.6 LBS | TEMPERATURE: 97.3 F | BODY MASS INDEX: 31.94 KG/M2

## 2022-07-05 DIAGNOSIS — E11.21 TYPE II DIABETES MELLITUS WITH NEPHROPATHY (HCC): ICD-10-CM

## 2022-07-05 DIAGNOSIS — G47.33 OBSTRUCTIVE SLEEP APNEA (ADULT) (PEDIATRIC): Primary | ICD-10-CM

## 2022-07-05 DIAGNOSIS — I10 ESSENTIAL HYPERTENSION, BENIGN: ICD-10-CM

## 2022-07-05 PROCEDURE — 99213 OFFICE O/P EST LOW 20 MIN: CPT | Performed by: INTERNAL MEDICINE

## 2022-07-05 PROCEDURE — G8427 DOCREV CUR MEDS BY ELIG CLIN: HCPCS | Performed by: INTERNAL MEDICINE

## 2022-07-05 PROCEDURE — G8752 SYS BP LESS 140: HCPCS | Performed by: INTERNAL MEDICINE

## 2022-07-05 PROCEDURE — G9717 DOC PT DX DEP/BP F/U NT REQ: HCPCS | Performed by: INTERNAL MEDICINE

## 2022-07-05 PROCEDURE — 3051F HG A1C>EQUAL 7.0%<8.0%: CPT | Performed by: INTERNAL MEDICINE

## 2022-07-05 PROCEDURE — G8754 DIAS BP LESS 90: HCPCS | Performed by: INTERNAL MEDICINE

## 2022-07-05 PROCEDURE — 1101F PT FALLS ASSESS-DOCD LE1/YR: CPT | Performed by: INTERNAL MEDICINE

## 2022-07-05 PROCEDURE — G8417 CALC BMI ABV UP PARAM F/U: HCPCS | Performed by: INTERNAL MEDICINE

## 2022-07-05 PROCEDURE — 1123F ACP DISCUSS/DSCN MKR DOCD: CPT | Performed by: INTERNAL MEDICINE

## 2022-07-05 PROCEDURE — 1090F PRES/ABSN URINE INCON ASSESS: CPT | Performed by: INTERNAL MEDICINE

## 2022-07-05 PROCEDURE — G8536 NO DOC ELDER MAL SCRN: HCPCS | Performed by: INTERNAL MEDICINE

## 2022-07-05 NOTE — PATIENT INSTRUCTIONS
[address] [facility] [place]. [place], [address]  Tel.  [phone]  Fax. [address] [facility] [address]  [place], [address]  Tel.  [phone]  Fax. [phone] [address]  Tel.  [phone]  Fax. [phone]     PROPER SLEEP HYGIENE    What to avoid  · Do not have drinks with caffeine, such as coffee or black tea, for 8 hours before bed. · Do not smoke or use other types of tobacco near bedtime. Nicotine is a stimulant and can keep you awake. · Avoid drinking alcohol late in the evening, because it can cause you to wake in the middle of the night. · Do not eat a big meal close to bedtime. If you are hungry, eat a light snack. · Do not drink a lot of water close to bedtime, because the need to urinate may wake you up during the night. · Do not read or watch TV in bed. Use the bed only for sleeping and sexual activity. What to try  · Go to bed at the same time every night, and wake up at the same time every morning. Do not take naps during the day. · Keep your bedroom quiet, dark, and cool. · Get regular exercise, but not within 3 to 4 hours of your bedtime. .  · Sleep on a comfortable pillow and mattress. · If watching the clock makes you anxious, turn it facing away from you so you cannot see the time. · If you worry when you lie down, start a worry book. Well before bedtime, write down your worries, and then set the book and your concerns aside. · Try meditation or other relaxation techniques before you go to bed. · If you cannot fall asleep, get up and go to another room until you feel sleepy. Do something relaxing. Repeat your bedtime routine before you go to bed again. · Make your house quiet and calm about an hour before bedtime. Turn down the lights, turn off the TV, log off the computer, and turn down the volume on music. This can help you relax after a busy day.     Drowsy Driving  The [address] [place] [address] Road Traffic Safety Administration cites drowsiness as a causing factor in more than 651,392 police reported crashes annually, resulting in 76,000 injuries and 1,500 deaths. Other surveys suggest 55% of people polled have driven while drowsy in the past year, 23% had fallen asleep but not crashed, 3% crashed, and 2% had and accident due to drowsy driving. Who is at risk? Young Drivers: One study of drowsy driving accidents states that 55% of the drivers were under 25 years. Of those, 75% were male. Shift Workers and Travelers: People who work overnight or travel across time zones frequently are at higher risk of experiencing Circadian Rhythm Disorders. They are trying to work and function when their body is programed to sleep. Sleep Deprived: Lack of sleep has a serious impact on your ability to pay attention or focus on a task. Consistently getting less than the average of 8 hours your body needs creates partial or cumulative sleep deprivation. Untreated Sleep Disorders: Sleep Apnea, Narcolepsy, R.L.S., and other sleep disorders (untreated) prevent a person from getting enough restful sleep. This leads to excessive daytime sleepiness and increases the risk for drowsy driving accidents by up to 7 times. Medications / Alcohol: Even over the counter medications can cause drowsiness. Medications that impair a drivers attention should have a warning label. Alcohol naturally makes you sleepy and on its own can cause accidents. Combined with excessive drowsiness its effects are amplified. Signs of Drowsy Driving:   * You don't remember driving the last few miles   * You may drift out of your michelle   * You are unable to focus and your thoughts wander   * You may yawn more often than normal   * You have difficulty keeping your eyes open / nodding off   * Missing traffic signs, speeding, or tailgating  Prevention-   Good sleep hygiene, lifestyle and behavioral choices have the most impact on drowsy driving.  There is no substitute for sleep and the average person requires 8 hours nightly. If you find yourself driving drowsy, stop and sleep. Consider the sleep hygiene tips provided during your visit as well. Medication Refill Policy: Refills for all medications require 1 week advance notice. Please have your pharmacy fax a refill request. We are unable to fax, or call in \"controled substance\" medications and you will need to pick these prescriptions up from our office. [employer] Activation    Thank you for requesting access to [employer]. Please follow the instructions below to securely access and download your online medical record. [employer] allows you to send messages to your doctor, view your test results, renew your prescriptions, schedule appointments, and more. How Do I Sign Up? 1. In your internet browser, go to [email or web address]. 2. Click on the First Time User? Click Here link in the Sign In box. You will see the New Member Sign Up page. 3. Enter your [employer] Access Code exactly as it appears below. You will not need to use this code after youve completed the sign-up process. If you do not sign up before the expiration date, you must request a new code. [employer] Access Code: [email or web address]  Expires: 2022 10:56 AM (This is the date your [employer] access code will )    4. Enter the last four digits of your Social Security Number (xxxx) and Date of Birth (mm/dd/yyyy) as indicated and click Submit. You will be taken to the next sign-up page. 5. Create a [employer] ID. This will be your [employer] login ID and cannot be changed, so think of one that is secure and easy to remember. 6. Create a [employer] password. You can change your password at any time. 7. Enter your Password Reset Question and Answer. This can be used at a later time if you forget your password. 8. Enter your e-mail address. You will receive e-mail notification when new information is available in [address] 9. Click Sign Up.  You can now view and download portions of your medical record. 10. Click the Download Summary menu link to download a portable copy of your medical information. Additional Information    If you have questions, please call [phone]. Remember, [employer] is NOT to be used for urgent needs. For medical emergencies, dial 911.

## 2022-07-05 NOTE — PROGRESS NOTES
[address] [facility] [place]. [place], [address]  Tel.  [phone]  Fax. 100 [facility] 60  [place], [address]  Tel.  [phone]  Fax. [phone] [address] [facility] Reina Patel   Tel.  [phone]  Fax. [phone]     S>Ammon Molina is a 81 y.o. female seen for a positive airway pressure follow-up. She reports no problems using the device. The following problems are identified:    Drowsiness No, feeling tired. Having some shortness of breath with exertion. Will see her cardiologist and pulmonary doctor soon Problems exhaling no   Snoring no Forget to put on no   Mask Comfortable yes Can't fall asleep No (unless she is having a bad night). Her daughter just passed away    Dry Mouth no Mask falls off no   Air Leaking no Frequent awakenings no     Download reviewed. She admits that her sleep has improved. Therapy Apnea Index averaged over PAP use: 0.4 /hr which reflects significantly improved sleep breathing condition. Allergies   Allergen Reactions    Ace Inhibitors Swelling    Arb-Angiotensin Receptor Antagonist Swelling    Lisinopril Swelling    Plavix [Clopidogrel] Other (comments)     Excessive bleeding    Potassium Nausea and Vomiting     Potassium containing oral products are not well tolerated by patient       She has a current medication list which includes the following prescription(s): gabapentin, anoro ellipta, metoprolol tartrate, true metrix glucose test strip, amlodipine, atorvastatin, levothyroxine, true metrix level 1, ezetimibe, metformin, lancets, alcohol swabs, omeprazole, freestyle yenifer 2 sensor, insulin nph/insulin regular, freestyle yenifer 14 day sensor, albuterol, cholecalciferol, aspirin, and cyanocobalamin. .      She  has a past medical history of Anxiety, CAD (coronary artery disease), Chronic obstructive pulmonary disease, unspecified (6/21/2022), Congestive heart failure ([facility] Diabetes ([facility] Diverticulosis, GERD (gastroesophageal reflux disease), Heart disease, Heart failure ([facility] (10/18/2011), High cholesterol, Hypertension, Hypothyroidism, Kidney stones, Menopause, Myocardial infarction [facility]), Postsurgical percutaneous transluminal coronary angioplasty status (5/17/2012), 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 She has no past medical history of Asthma, Atrial fibrillation ([facility] Cancer ([facility] Carotid artery disease ([facility] Chronic kidney disease, Clotting disorder ([facility] Glaucoma, ICD (implantable cardioverter-defibrillator) in place, Long term current use of anticoagulant therapy, Murmur, Pacemaker, Pulmonary embolism ([facility] Rheumatic fever, Stroke [facility]), Syncope, or Valvular heart disease. [place] Sleepiness Score: 11   and Modified F.O.S.Q. Score Total / 2: 16        O>    Visit Vitals  /73 (BP 1 Location: Right arm, BP Patient Position: Sitting, BP Cuff Size: Large adult)   Pulse 61   Temp 97.3 °F (36.3 °C) (Temporal)   Resp 20   Ht 5' 2\" (1.575 m)   Wt 173 lb 9.6 oz (78.7 kg)   SpO2 97%   BMI 31.75 kg/m²           General:   Alert, oriented, not in distress   Neck:   No JVD    Chest/Lungs:  symetrical lung expansion , no accessory muscle use    Extremities:  no obvious rashes , negative edema    Neuro:  No focal deficits ; No obvious tremor    Psych:  Normal affect ,  Normal countenance ;         A>    ICD-10-CM ICD-9-CM    1. Obstructive sleep apnea (adult) (pediatric)  G47.33 327.23 AMB SUPPLY ORDER   2. Type II diabetes mellitus with nephropathy (HCC)  E11.21 250.40      583.81    3. Essential hypertension, benign  I10 401.1      AHI = 6(2021). On Bi - Level, Resmed :  EPAP min 6 cmH2O. PS 5 cm, IPAP max 13    Compliant:      yes    Therapeutic Response:  Positive    P>      *   Follow-up and Dispositions    · Return in about 1 year (around 7/5/2023).        she is compliant with PAP therapy and PAP continues to benefit patient and remains necessary for control of her sleep apnea. she will continue on her current pressure settings. I have reviewed medicare requirements regarding PAP usage  I have ordered replacement supplies    * She was asked to contact our office for any problems regarding PAP therapy. * Counseling was provided regarding the importance of regular PAP use and on proper sleep hygiene and safe driving. * Re-enforced proper and regular cleaning for the device. 2. Type II diabetes - she continues on her current regimen. I have reviewed the relationship between sleep disordered breathing as it relates to diabetes. 3. Type II diabetes - she continues on her current regimen. I have reviewed the relationship between sleep disordered breathing as it relates to diabetes.     Electronically signed by    Chelle Shaikh MD  Diplomate in Sleep Medicine  [facility]    7/5/2022

## 2022-07-05 NOTE — PROGRESS NOTES
PAP ORDER FAXED TO [facility] - [address] [place] [address] - MARIN VELÁSQUEZ on 7/5/22

## 2022-07-14 DIAGNOSIS — E03.9 ACQUIRED HYPOTHYROIDISM: ICD-10-CM

## 2022-07-14 RX ORDER — LEVOTHYROXINE SODIUM 100 UG/1
100 TABLET ORAL
Qty: 90 TABLET | Refills: 0 | Status: SHIPPED | OUTPATIENT
Start: 2022-07-14

## 2022-08-15 ENCOUNTER — HOSPITAL ENCOUNTER (OUTPATIENT)
Dept: NON INVASIVE DIAGNOSTICS | Age: 82
Discharge: HOME | End: 2022-08-15
Attending: INTERNAL MEDICINE
Payer: MEDICARE

## 2022-08-15 VITALS
DIASTOLIC BLOOD PRESSURE: 73 MMHG | WEIGHT: 173 LBS | SYSTOLIC BLOOD PRESSURE: 137 MMHG | HEIGHT: 62 IN | BODY MASS INDEX: 31.83 KG/M2

## 2022-08-15 DIAGNOSIS — R06.02 SOB (SHORTNESS OF BREATH): ICD-10-CM

## 2022-08-15 LAB
ECHO AO ROOT DIAM: 2.5 CM
ECHO AO ROOT INDEX: 1.39 CM/M2
ECHO AV AREA PEAK VELOCITY: 1.6 CM2
ECHO AV AREA VTI: 2.2 CM2
ECHO AV AREA/BSA PEAK VELOCITY: 0.9 CM2/M2
ECHO AV AREA/BSA VTI: 1.2 CM2/M2
ECHO AV MEAN GRADIENT: 6 MMHG
ECHO AV MEAN VELOCITY: 1.2 M/S
ECHO AV PEAK GRADIENT: 14 MMHG
ECHO AV PEAK VELOCITY: 1.9 M/S
ECHO AV VELOCITY RATIO: 0.47
ECHO AV VTI: 35.1 CM
ECHO LA DIAMETER INDEX: 1.94 CM/M2
ECHO LA DIAMETER: 3.5 CM
ECHO LA TO AORTIC ROOT RATIO: 1.4
ECHO LA VOL 4C: 59 ML (ref 22–52)
ECHO LA VOLUME INDEX A4C: 33 ML/M2 (ref 16–34)
ECHO LV E' LATERAL VELOCITY: 6 CM/S
ECHO LV E' SEPTAL VELOCITY: 6 CM/S
ECHO LV EDV A4C: 120 ML
ECHO LV EDV INDEX A4C: 67 ML/M2
ECHO LV EJECTION FRACTION A4C: 56 %
ECHO LV ESV A4C: 53 ML
ECHO LV ESV INDEX A4C: 29 ML/M2
ECHO LV FRACTIONAL SHORTENING: 43 % (ref 28–44)
ECHO LV INTERNAL DIMENSION DIASTOLE INDEX: 2.22 CM/M2
ECHO LV INTERNAL DIMENSION DIASTOLIC: 4 CM (ref 3.9–5.3)
ECHO LV INTERNAL DIMENSION SYSTOLIC INDEX: 1.28 CM/M2
ECHO LV INTERNAL DIMENSION SYSTOLIC: 2.3 CM
ECHO LV IVSD: 1.1 CM (ref 0.6–0.9)
ECHO LV MASS 2D: 155.4 G (ref 67–162)
ECHO LV MASS INDEX 2D: 86.3 G/M2 (ref 43–95)
ECHO LV POSTERIOR WALL DIASTOLIC: 1.2 CM (ref 0.6–0.9)
ECHO LV RELATIVE WALL THICKNESS RATIO: 0.6
ECHO LVOT AREA: 3.5 CM2
ECHO LVOT AV VTI INDEX: 0.65
ECHO LVOT DIAM: 2.1 CM
ECHO LVOT MEAN GRADIENT: 2 MMHG
ECHO LVOT PEAK GRADIENT: 3 MMHG
ECHO LVOT PEAK VELOCITY: 0.9 M/S
ECHO LVOT STROKE VOLUME INDEX: 43.7 ML/M2
ECHO LVOT SV: 78.6 ML
ECHO LVOT VTI: 22.7 CM
ECHO MV A VELOCITY: 1.06 M/S
ECHO MV AREA VTI: 2.7 CM2
ECHO MV E DECELERATION TIME (DT): 210 MS
ECHO MV E VELOCITY: 0.66 M/S
ECHO MV E/A RATIO: 0.62
ECHO MV E/E' LATERAL: 11
ECHO MV E/E' RATIO (AVERAGED): 11
ECHO MV E/E' SEPTAL: 11
ECHO MV LVOT VTI INDEX: 1.27
ECHO MV MAX VELOCITY: 1.3 M/S
ECHO MV MEAN GRADIENT: 2 MMHG
ECHO MV MEAN VELOCITY: 0.6 M/S
ECHO MV PEAK GRADIENT: 7 MMHG
ECHO MV VTI: 28.8 CM
ECHO PV MAX VELOCITY: 1 M/S
ECHO PV PEAK GRADIENT: 4 MMHG
ECHO RV INTERNAL DIMENSION: 3.1 CM

## 2022-08-15 PROCEDURE — 93306 TTE W/DOPPLER COMPLETE: CPT | Performed by: INTERNAL MEDICINE

## 2022-08-15 PROCEDURE — 93306 TTE W/DOPPLER COMPLETE: CPT

## 2022-08-15 NOTE — PROGRESS NOTES
Please call patient back about results  Let her know echo shows normal heart function.  Valves look good

## 2022-08-16 DIAGNOSIS — K21.9 GASTROESOPHAGEAL REFLUX DISEASE WITHOUT ESOPHAGITIS: ICD-10-CM

## 2022-08-16 DIAGNOSIS — I10 ESSENTIAL HYPERTENSION, BENIGN: ICD-10-CM

## 2022-08-16 NOTE — PROGRESS NOTES
Called, spoke to pt  Received two pt identifiers   Pt informed per Dr. Donte Starks echo shows normal heart function Valves look good   Pt verbalizes understanding of the instructions and has no further questions at this time.

## 2022-08-16 NOTE — TELEPHONE ENCOUNTER
Patient  states she needs refills done for 90 day supplies with refills thru [address] [facility] [employer]. Please call if any questions.  Thank you

## 2022-08-16 NOTE — TELEPHONE ENCOUNTER
Future Appointments:  Future Appointments   Date Time Provider Gen Paige   9/21/2022 11:00 AM Edgardo Miller MD [facility] BS AMB   7/11/2023  1:50 PM SLICK Nicolas  BS AMB        Last Appointment With Me:  6/21/2022     Requested Prescriptions     Pending Prescriptions Disp Refills    metoprolol tartrate (LOPRESSOR) 50 mg tablet 180 Tablet 1     Sig: Take 1 Tablet by mouth two (2) times a day. omeprazole (PRILOSEC) 20 mg capsule 90 Capsule 1     Sig: Take 1 Capsule by mouth in the morning.

## 2022-08-17 RX ORDER — METOPROLOL TARTRATE 50 MG/1
50 TABLET ORAL 2 TIMES DAILY
Qty: 180 TABLET | Refills: 1 | Status: SHIPPED | OUTPATIENT
Start: 2022-08-17 | End: 2022-08-24 | Stop reason: SDUPTHER

## 2022-08-17 RX ORDER — OMEPRAZOLE 20 MG/1
20 CAPSULE, DELAYED RELEASE ORAL DAILY
Qty: 90 CAPSULE | Refills: 1 | Status: SHIPPED | OUTPATIENT
Start: 2022-08-17 | End: 2022-08-24 | Stop reason: SDUPTHER

## 2022-08-24 DIAGNOSIS — I10 ESSENTIAL HYPERTENSION, BENIGN: ICD-10-CM

## 2022-08-24 DIAGNOSIS — K21.9 GASTROESOPHAGEAL REFLUX DISEASE WITHOUT ESOPHAGITIS: ICD-10-CM

## 2022-08-24 RX ORDER — METOPROLOL TARTRATE 50 MG/1
50 TABLET ORAL 2 TIMES DAILY
Qty: 180 TABLET | Refills: 0 | Status: SHIPPED | OUTPATIENT
Start: 2022-08-24

## 2022-08-24 RX ORDER — OMEPRAZOLE 20 MG/1
20 CAPSULE, DELAYED RELEASE ORAL DAILY
Qty: 90 CAPSULE | Refills: 1 | Status: SHIPPED | OUTPATIENT
Start: 2022-08-24

## 2022-08-26 ENCOUNTER — TELEPHONE (OUTPATIENT)
Dept: INTERNAL MEDICINE CLINIC | Age: 82
End: 2022-08-26

## 2022-08-26 NOTE — TELEPHONE ENCOUNTER
Patient stats she needs a call back in reference to getting something prescribed or discuss [address] for Symptoms x 3 weeks for Sneezing & Runny Nose. Patient states [facility] 2 weeks ago & was not COVID at the time. Please call to discuss & advise.  Thank you

## 2022-08-26 NOTE — TELEPHONE ENCOUNTER
Called, spoke to pt  Received two pt identifiers  Pt states right after taking off cpap in the morning will have a runny nose all day. Advised pt to try otc flonase or nasal sprays. Advised pt if sx worsen of the weekend to go to   Pt verbalizes understanding of the instructions and has no further questions at this time.

## 2022-09-10 DIAGNOSIS — E11.21 TYPE II DIABETES MELLITUS WITH NEPHROPATHY (HCC): ICD-10-CM

## 2022-09-10 DIAGNOSIS — I10 ESSENTIAL HYPERTENSION, BENIGN: ICD-10-CM

## 2022-09-10 RX ORDER — METFORMIN HYDROCHLORIDE 1000 MG/1
1000 TABLET ORAL 2 TIMES DAILY WITH MEALS
Qty: 180 TABLET | Refills: 0 | Status: SHIPPED | OUTPATIENT
Start: 2022-09-10

## 2022-09-10 RX ORDER — AMLODIPINE BESYLATE 5 MG/1
TABLET ORAL
Qty: 90 TABLET | Refills: 0 | Status: SHIPPED | OUTPATIENT
Start: 2022-09-10

## 2022-09-14 ENCOUNTER — APPOINTMENT (OUTPATIENT)
Dept: INTERNAL MEDICINE CLINIC | Age: 82
End: 2022-09-14

## 2022-09-14 DIAGNOSIS — Z79.4 TYPE 2 DIABETES MELLITUS WITH DIABETIC NEUROPATHY, WITH LONG-TERM CURRENT USE OF INSULIN (HCC): ICD-10-CM

## 2022-09-14 DIAGNOSIS — R06.02 SOB (SHORTNESS OF BREATH): ICD-10-CM

## 2022-09-14 DIAGNOSIS — I50.32 DIASTOLIC CHF, CHRONIC (HCC): ICD-10-CM

## 2022-09-14 DIAGNOSIS — E03.9 HYPOTHYROIDISM, UNSPECIFIED TYPE: ICD-10-CM

## 2022-09-14 DIAGNOSIS — M81.0 AGE-RELATED OSTEOPOROSIS WITHOUT CURRENT PATHOLOGICAL FRACTURE: ICD-10-CM

## 2022-09-14 DIAGNOSIS — E11.21 TYPE II DIABETES MELLITUS WITH NEPHROPATHY (HCC): ICD-10-CM

## 2022-09-14 DIAGNOSIS — I73.9 PVD (PERIPHERAL VASCULAR DISEASE) (HCC): ICD-10-CM

## 2022-09-14 DIAGNOSIS — E11.21 TYPE 2 DIABETES MELLITUS WITH NEPHROPATHY (HCC): ICD-10-CM

## 2022-09-14 DIAGNOSIS — I10 ESSENTIAL HYPERTENSION, BENIGN: ICD-10-CM

## 2022-09-14 DIAGNOSIS — E11.40 TYPE 2 DIABETES MELLITUS WITH DIABETIC NEUROPATHY, WITH LONG-TERM CURRENT USE OF INSULIN (HCC): ICD-10-CM

## 2022-09-14 DIAGNOSIS — I25.10 CORONARY ARTERY DISEASE INVOLVING NATIVE CORONARY ARTERY OF NATIVE HEART WITHOUT ANGINA PECTORIS: ICD-10-CM

## 2022-09-14 DIAGNOSIS — E78.2 MIXED HYPERLIPIDEMIA: ICD-10-CM

## 2022-09-14 DIAGNOSIS — F33.9 RECURRENT DEPRESSION (HCC): ICD-10-CM

## 2022-09-14 DIAGNOSIS — J43.9 PULMONARY EMPHYSEMA, UNSPECIFIED EMPHYSEMA TYPE (HCC): ICD-10-CM

## 2022-09-14 LAB
ALBUMIN SERPL-MCNC: 3.5 G/DL (ref 3.5–5)
ALBUMIN/GLOB SERPL: 0.9 {RATIO} (ref 1.1–2.2)
ALP SERPL-CCNC: 79 U/L (ref 45–117)
ALT SERPL-CCNC: 18 U/L (ref 12–78)
ANION GAP SERPL CALC-SCNC: 4 MMOL/L (ref 5–15)
AST SERPL-CCNC: 13 U/L (ref 15–37)
BILIRUB SERPL-MCNC: 0.7 MG/DL (ref 0.2–1)
BUN SERPL-MCNC: 25 MG/DL (ref 6–20)
BUN/CREAT SERPL: 22 (ref 12–20)
CALCIUM SERPL-MCNC: 9.8 MG/DL (ref 8.5–10.1)
CHLORIDE SERPL-SCNC: 111 MMOL/L (ref 97–108)
CO2 SERPL-SCNC: 27 MMOL/L (ref 21–32)
CREAT SERPL-MCNC: 1.15 MG/DL (ref 0.55–1.02)
ERYTHROCYTE [DISTWIDTH] IN BLOOD BY AUTOMATED COUNT: 18.2 % (ref 11.5–14.5)
EST. AVERAGE GLUCOSE BLD GHB EST-MCNC: 174 MG/DL
FERRITIN SERPL-MCNC: 7 NG/ML (ref 8–252)
GLOBULIN SER CALC-MCNC: 3.8 G/DL (ref 2–4)
GLUCOSE SERPL-MCNC: 108 MG/DL (ref 65–100)
HBA1C MFR BLD: 7.7 % (ref 4–5.6)
HCT VFR BLD AUTO: 37.3 % (ref 35–47)
HGB BLD-MCNC: 11.3 G/DL (ref 11.5–16)
MCH RBC QN AUTO: 24.9 PG (ref 26–34)
MCHC RBC AUTO-ENTMCNC: 30.3 G/DL (ref 30–36.5)
MCV RBC AUTO: 82.2 FL (ref 80–99)
NRBC # BLD: 0 K/UL (ref 0–0.01)
NRBC BLD-RTO: 0 PER 100 WBC
PLATELET # BLD AUTO: 237 K/UL (ref 150–400)
POTASSIUM SERPL-SCNC: 4.3 MMOL/L (ref 3.5–5.1)
PROT SERPL-MCNC: 7.3 G/DL (ref 6.4–8.2)
RBC # BLD AUTO: 4.54 M/UL (ref 3.8–5.2)
SODIUM SERPL-SCNC: 142 MMOL/L (ref 136–145)
WBC # BLD AUTO: 8.8 K/UL (ref 3.6–11)

## 2022-09-19 NOTE — PROGRESS NOTES
HISTORY OF PRESENT ILLNESS  Fritz Cloud is a 81 y.o. female. HPI  Last here 6/21/22. Pt is here for routine care. Pt c/o repeated sneezing, phlegm in the morning upon waking up x 1 month. States she has never had this before. Rec'd flonase. Has a history of hypertension  BP today 147/82  No home BP readings for review  Continues on metoprolol 50mg BID and norvasc 5mg  Recall had angioedema with lisinopril and benicar-HCT in the past  She took BP meds this morning    She is diabetic   BS  105 102 96 120 127 143 115 117 110 87 119 130 116 107 129 114 97 125 158   PM 82 160 141 148 142 149 132 143 120   Continues on metformin 1000mg BID   On NPH 70/30 mix now 14U BID  She also takes ASA 81mg daily   Recall issues with hypoglycemia in the past, a1c under 7.5 at goal for her   Uses freestyle yenifer, lov readings with this seemed to be running much lower, discussed switching to new patch      Wt today is 174 lbs, up 2 lbs since lov   Discussed diet and weight loss     Reviewed labs   Discussed mildly low ferratin counts on last labs  Advised pt to take OTC iron supplement    Lov c/o feeling tired in general, has some sob when walking over the last 6 months or so this is not new denies any chest pain or chest pressure  Reviewed CXR 6/21/22:  Impression:     No acute findings no change. Reviewed echo 8/15/22:    Left Ventricle: Low normal left ventricular systolic function with a visually estimated EF of 50 - 55%. Left ventricle size is normal. Normal wall thickness. Normal wall motion. Normal diastolic function.   She has since seen cardiology and pulmonary    She saw Dr. Kirk Ireland (pulm) for shortness of breath  They did PFT's, will get notes for review  She had difficulty getting an inhaler because they were not covered  Will order another anoro inhaler which helped in the past    Pt follows with Dr. Karen Worrell (cardio) for cad  Last visit 9/9/22: ordered repeat stress test, no med changes     Pt follows annually with Dr. Waqas Mcnamara ([facility]) for her PAD  No claudication stable   Last visit was 7/22 , no med changes  Completed ultrasound aneurysm screen February 2022 which is negative    Pt had sleep eval with Dr Mica Cruz (sleep) for sleep apnea  She had sleep titration 3/15/21  She is compliant with bipap 9/22  Last here 7/5/22  Reviewed note:  AHI = 6(2021). On Bi - Level, Resmed :  EPAP min 6 cmH2O. PS 5 cm, IPAP max 13  Compliant:                                           yes                    Therapeutic Response:                      Positive  P>   she is compliant with PAP therapy and PAP continues to benefit patient and remains necessary for control of her sleep apnea. she will continue on her current pressure settings. I have reviewed medicare requirements regarding PAP usage  I have ordered replacement supplies         Pt saw Dr. Arben Cunningham (ortho) for carpal tunnel   Had surgery for this 8/1/19   Last visit was 8/19/19      She has been seeing Dr. Andrew Morillo (ortho) for R shoulder pain   Last there 12/11/20      She was in the hospital from 12/04/21-12/06/21 for GI bleed  Pt is taking prilosec 20mg every other day working well     Takes OTC vit D daily      Continues synthroid 100mcg daily    She takes this med separately from all meds      Continues on lipitor 80mg max dose for cholesterol   Pt is taking zetia 10mg once daily as well   Welchol was too expensive     No longer on lexapro, doing well off medication not sad or down   She also has ativan to use prn     Pt was on gabapentin 100 mg for pain in hand as needed needs a refill today does not need it frequently     Former smoker, quit smoking in 2010      ACP on file.  SDMs is daughter Sorin Burk      Reviewed mammo 6/22/22: neg  Reviewed DEXA 6/22/22: osteopenia      PREVENTIVE:    AAA: 2/22 negative  Colonoscopy: Dr. Austin Noguera, 10/31/18, repeat in 5 years, 12/6/21 manetes  Pap: 12/14, declines further   Mammogram: 6/22/22 neg  DEXA: 6/22/22 ostepenia  Tdap: 1/04/2016  Pneumovax: 01/29/20   Vpwzaev95: 9/17  Zostavax: 10/10/2016  Shingrix: completed both  Flu shot: 11/15/21, will get today 9/21/22  Foot exam: 06/21/22  Microalbumin: 6/22  A1c: , 3/22 7.9 6/22 7.6 9/22 7.7  Eye exam: Dr. Carline Lundy at [facility], 7/22  Lipids: 12/21 LDL 50   Covid: both [employer]) and booster      Patient Active Problem List    Diagnosis Date Noted    Chronic obstructive pulmonary disease, unspecified 06/21/2022    Type 2 diabetes mellitus with diabetic neuropathy ([facility] 01/22/2019    Type 2 diabetes mellitus with nephropathy ([facility] 01/02/2018    Recurrent depression ([facility] 01/02/2018    Diverticulosis of intestine with bleeding 10/11/2015    Anemia 08/54/1543    Diastolic CHF, chronic ([facility] 10/11/2015    Osteoporosis 09/10/2013    Essential hypertension, benign 05/17/2012    Mixed hyperlipidemia 05/17/2012    S/P CABG x 3 11/11/2011    CAD (coronary artery disease) 11/18/2009    Hypothyroidism 11/18/2009    PVD (peripheral vascular disease) ([facility] 11/18/2009     Current Outpatient Medications   Medication Sig Dispense Refill    amLODIPine (NORVASC) 5 mg tablet TAKE 1 TABLET EVERY DAY 90 Tablet 0    metFORMIN (GLUCOPHAGE) 1,000 mg tablet TAKE 1 TABLET BY MOUTH TWO (2) TIMES DAILY (WITH MEALS). 180 Tablet 0    metoprolol tartrate (LOPRESSOR) 50 mg tablet Take 1 Tablet by mouth two (2) times a day. 180 Tablet 0    omeprazole (PRILOSEC) 20 mg capsule Take 1 Capsule by mouth daily. 90 Capsule 1    levothyroxine (SYNTHROID) 100 mcg tablet TAKE 1 TABLET BY MOUTH DAILY (BEFORE BREAKFAST). 90 Tablet 0    gabapentin (NEURONTIN) 100 mg capsule Take 2 capsules by mouth twice daily 120 Capsule 2    umeclidinium-vilanteroL (Anoro Ellipta) 62.5-25 mcg/actuation inhaler Take 1 Puff by inhalation daily. 1 Each 0    glucose blood VI test strips (True Metrix Glucose Test Strip) strip tid 300 Strip 0    atorvastatin (LIPITOR) 80 mg tablet Take 1 Tablet by mouth nightly.  90 Tablet 2    Blood Glucose Control, Low (True Metrix Level 1) soln Daily 100 Each 2    ezetimibe (ZETIA) 10 mg tablet Take 1 Tablet by mouth daily. 90 Tablet 2    lancets (TRUEplus Lancets) 33 gauge misc tid 100 Each 5    alcohol swabs (BD Single Use Swabs Regular) padm dadily 100 Pad 5    flash glucose sensor (FreeStyle Ruben 2 Sensor) kit USE TO CHECK BLOOD SUGARS 4X DAILY. 3 Kit 0    insulin NPH/insulin regular (NovoLIN 70/30 U-100 Insulin) 100 unit/mL (70-30) injection INJECT 14 UNITS SUBCUTANEOUSLY TWICE DAILY 10 mL 0    flash glucose sensor (FreeStyle Ruben 14 Day Sensor) kit daily 300 Kit 0    albuterol (PROVENTIL HFA, VENTOLIN HFA, PROAIR HFA) 90 mcg/actuation inhaler Take 1 Puff by inhalation every six (6) hours as needed for Wheezing. 1 Inhaler 0    cholecalciferol (VITAMIN D3) 1,000 unit cap Take 1,000 Units by mouth daily. aspirin 81 mg chewable tablet Take 1 Tab by mouth daily. 90 Tab 3    cyanocobalamin (VITAMIN B-12) 1,000 mcg tablet Take 1,000 mcg by mouth daily.        Past Surgical History:   Procedure Laterality Date    COLONOSCOPY N/A 10/31/2018    COLONOSCOPY performed by Saarh Rose MD at [facility] ENDOSCOPY    COLONOSCOPY N/A 12/6/2021    COLONOSCOPY performed by Sarah Rose MD at [facility] ENDOSCOPY    HX CORONARY ARTERY BYPASS GRAFT  11/11/11    3 vessel    HX GYN      hysterectomy    HX HEART CATHETERIZATION      HX HYSTERECTOMY      HX UROLOGICAL  1970s    kidney stone    DE CARDIAC SURG PROCEDURE UNLIST      cabg x 3    VASCULAR SURGERY PROCEDURE UNLIST      Stents put in right leg      Lab Results   Component Value Date/Time    WBC 8.8 09/14/2022 09:55 AM    HGB 11.3 (L) 09/14/2022 09:55 AM    Hemoglobin (POC) 12.9 03/02/2011 12:21 PM    HCT 37.3 09/14/2022 09:55 AM    PLATELET 433 17/76/0365 09:55 AM    MCV 82.2 09/14/2022 09:55 AM     Lab Results   Component Value Date/Time    Cholesterol, total 121 12/04/2021 04:58 AM    HDL Cholesterol 50 12/04/2021 04:58 AM    LDL, calculated 50.6 12/04/2021 04:58 AM Triglyceride 102 12/04/2021 04:58 AM    CHOL/HDL Ratio 2.4 12/04/2021 04:58 AM     Lab Results   Component Value Date/Time    GFR est non-AA 45 (L) 09/14/2022 09:55 AM    GFR est AA 55 (L) 09/14/2022 09:55 AM    Creatinine 1.15 (H) 09/14/2022 09:55 AM    BUN 25 (H) 09/14/2022 09:55 AM    Sodium 142 09/14/2022 09:55 AM    Potassium 4.3 09/14/2022 09:55 AM    Chloride 111 (H) 09/14/2022 09:55 AM    CO2 27 09/14/2022 09:55 AM    Magnesium 1.7 10/16/2015 04:34 AM        Review of Systems   Respiratory:  Negative for shortness of breath. Cardiovascular:  Negative for chest pain. Physical Exam  Constitutional:       General: She is not in acute distress. Appearance: She is not ill-appearing, toxic-appearing or diaphoretic. HENT:      Head: Normocephalic and atraumatic. Right Ear: External ear normal.      Left Ear: External ear normal.   Eyes:      General:         Right eye: No discharge. Left eye: No discharge. Conjunctiva/sclera: Conjunctivae normal.      Pupils: Pupils are equal, round, and reactive to light. Cardiovascular:      Rate and Rhythm: Normal rate and regular rhythm. Heart sounds: No murmur heard. No friction rub. No gallop. Pulmonary:      Effort: No respiratory distress. Breath sounds: Normal breath sounds. No wheezing or rales. Chest:      Chest wall: No tenderness. Musculoskeletal:         General: Normal range of motion. Cervical back: Normal.      Right lower leg: No edema. Left lower leg: No edema. Skin:     General: Skin is warm and dry. Neurological:      Mental Status: She is oriented to person, place, and time. Mental status is at baseline. Coordination: Coordination normal.      Gait: Gait normal.   Psychiatric:         Mood and Affect: Mood normal.         Behavior: Behavior normal.       ASSESSMENT and PLAN    ICD-10-CM ICD-9-CM    1.  Type 2 diabetes mellitus with diabetic neuropathy, with long-term current use of insulin ([facility])  E11.40 250.60 HEMOGLOBIN A1C WITH EAG    Z79.4 357.2 TSH 3RD GENERATION     V58.67 CBC W/O DIFF   Diabetes adequately controlled given advanced age and being on insulin to avoid hypoglycemia    Continue 70/30 insulin mix 14 units twice daily    Repeat A1c prior to follow-up    Goal A1c under 8 is close to 7.5 is possible to avoid hypoglycemia       METABOLIC PANEL, COMPREHENSIVE      FERRITIN      2. PVD (peripheral vascular disease) ([facility])  I73.9 443.9 HEMOGLOBIN A1C WITH EAG      TSH 3RD GENERATION      CBC W/O DIFF   Follows with vascular routinely in the summer was just there in August we will get notes for review reports always stable medically managed no signs of claudication   METABOLIC PANEL, COMPREHENSIVE      FERRITIN      3. Diastolic CHF, chronic ([facility])  I50.32 428.32 HEMOGLOBIN A1C WITH EAG     428.0 TSH 3RD GENERATION      CBC W/O DIFF   Medically managed had recent echo showing normal EF    Recently saw cardiology earlier this month and has a stress test scheduled for next week medically managed on current regimen   METABOLIC PANEL, COMPREHENSIVE      FERRITIN      4. Type 2 diabetes mellitus with nephropathy ([facility])  E11.21 250.40 HEMOGLOBIN A1C WITH EAG     583.81 TSH 3RD GENERATION      CBC W/O DIFF   See above monitor kidney function   METABOLIC PANEL, COMPREHENSIVE      FERRITIN      5.  Chronic bronchitis, unspecified chronic bronchitis type ([facility])  J42 491.9 HEMOGLOBIN A1C WITH EAG      TSH 3RD GENERATION      CBC W/O DIFF   Patient had complained of shortness of breath last office visit no progressive symptoms today    Chest x-ray was unremarkable echo was unremarkable    She is seen cardiology and will be getting a stress test    She now is also seeing pulmonary    Recently had PFTs we will get notes for review    She was given an inhaler but was unable to pick it up due to cost I will give her on Anoro inhaler today which worked well in the past   [address] [facility] FERRITIN      6. Recurrent depression ([facility]  F33.9 296.30 HEMOGLOBIN A1C WITH EAG      TSH 3RD GENERATION      CBC W/O DIFF   Controlled without medication   METABOLIC PANEL, COMPREHENSIVE      FERRITIN      7. Coronary artery disease involving native coronary artery of native heart without angina pectoris  I25.10 414.01 HEMOGLOBIN A1C WITH EAG      TSH 3RD GENERATION      CBC W/O DIFF   See above medically manage stress test pending   METABOLIC PANEL, COMPREHENSIVE      FERRITIN      8. Essential hypertension, benign  I10 401.1 HEMOGLOBIN A1C WITH EAG      TSH 3RD GENERATION   Initial blood pressure elevated repeat at goal continue metoprolol and Norvasc no change to dose   CBC W/O DIFF      METABOLIC PANEL, COMPREHENSIVE      FERRITIN      9. Age-related osteoporosis without current pathological fracture  M81.0 733.01 HEMOGLOBIN A1C WITH EAG      TSH 3RD GENERATION      CBC W/O DIFF   Vitamin D for treatment previously on Fosamax bone density up-to-date   METABOLIC PANEL, COMPREHENSIVE      FERRITIN      10. Hypothyroidism, unspecified type  E03.9 244.9 HEMOGLOBIN A1C WITH EAG      TSH 3RD GENERATION      CBC W/O DIFF   Controlled on Synthroid   METABOLIC PANEL, COMPREHENSIVE      FERRITIN      11. Mixed hyperlipidemia  E78.2 272.2 HEMOGLOBIN A1C WITH EAG      TSH 3RD GENERATION      CBC W/O DIFF      METABOLIC PANEL, COMPREHENSIVE      FERRITIN      12.  Low ferritin  R79.0 790.6 HEMOGLOBIN A1C WITH EAG      TSH 3RD GENERATION      CBC W/O DIFF   Mild low iron on recent labs we will have her take iron every other day    Please recall she had GI bleeding this past December she is currently on Prilosec which she is taking every other day no active signs of bleeding CBC overall improved and stable we will monitor levels   METABOLIC PANEL, COMPREHENSIVE      FERRITIN      Sleep apnea compliant with CPAP  Reflux controlled on Prilosec  Hyperlipidemia controlled on Lipitor  Hand pain controlled with gabapentin/carpal tunnel  Depression screen reviewed and negative. Scribed by Kourtney Cadet, as dictated by Dr. Samm Shaikh. Current diagnosis and concerns discussed with pt at length. Pt understands risks and benefits or current treatment plan and medications, and accepts the treatment and medication with any possible risks. Pt asks appropriate questions, which were answered. Pt was instructed to call with any concerns or problems. I have reviewed the note documented by the scribe. The services provided are my own. The documentation is accurate.

## 2022-09-21 ENCOUNTER — OFFICE VISIT (OUTPATIENT)
Dept: INTERNAL MEDICINE CLINIC | Age: 82
End: 2022-09-21
Payer: MEDICARE

## 2022-09-21 VITALS
HEIGHT: 62 IN | OXYGEN SATURATION: 99 % | BODY MASS INDEX: 32.02 KG/M2 | RESPIRATION RATE: 18 BRPM | DIASTOLIC BLOOD PRESSURE: 84 MMHG | TEMPERATURE: 97.5 F | WEIGHT: 174 LBS | HEART RATE: 66 BPM | SYSTOLIC BLOOD PRESSURE: 128 MMHG

## 2022-09-21 DIAGNOSIS — I25.10 CORONARY ARTERY DISEASE INVOLVING NATIVE CORONARY ARTERY OF NATIVE HEART WITHOUT ANGINA PECTORIS: ICD-10-CM

## 2022-09-21 DIAGNOSIS — E78.2 MIXED HYPERLIPIDEMIA: ICD-10-CM

## 2022-09-21 DIAGNOSIS — E03.9 HYPOTHYROIDISM, UNSPECIFIED TYPE: ICD-10-CM

## 2022-09-21 DIAGNOSIS — Z23 NEEDS FLU SHOT: ICD-10-CM

## 2022-09-21 DIAGNOSIS — I73.9 PVD (PERIPHERAL VASCULAR DISEASE) (HCC): ICD-10-CM

## 2022-09-21 DIAGNOSIS — J42 CHRONIC BRONCHITIS, UNSPECIFIED CHRONIC BRONCHITIS TYPE (HCC): ICD-10-CM

## 2022-09-21 DIAGNOSIS — R79.0 LOW FERRITIN: ICD-10-CM

## 2022-09-21 DIAGNOSIS — I50.32 DIASTOLIC CHF, CHRONIC (HCC): ICD-10-CM

## 2022-09-21 DIAGNOSIS — Z79.4 TYPE 2 DIABETES MELLITUS WITH DIABETIC NEUROPATHY, WITH LONG-TERM CURRENT USE OF INSULIN (HCC): Primary | ICD-10-CM

## 2022-09-21 DIAGNOSIS — F33.9 RECURRENT DEPRESSION (HCC): ICD-10-CM

## 2022-09-21 DIAGNOSIS — I10 ESSENTIAL HYPERTENSION, BENIGN: ICD-10-CM

## 2022-09-21 DIAGNOSIS — E11.21 TYPE II DIABETES MELLITUS WITH NEPHROPATHY (HCC): ICD-10-CM

## 2022-09-21 DIAGNOSIS — M81.0 AGE-RELATED OSTEOPOROSIS WITHOUT CURRENT PATHOLOGICAL FRACTURE: ICD-10-CM

## 2022-09-21 DIAGNOSIS — E11.21 TYPE 2 DIABETES MELLITUS WITH NEPHROPATHY (HCC): ICD-10-CM

## 2022-09-21 DIAGNOSIS — E11.40 TYPE 2 DIABETES MELLITUS WITH DIABETIC NEUROPATHY, WITH LONG-TERM CURRENT USE OF INSULIN (HCC): Primary | ICD-10-CM

## 2022-09-21 PROCEDURE — G8536 NO DOC ELDER MAL SCRN: HCPCS | Performed by: INTERNAL MEDICINE

## 2022-09-21 PROCEDURE — G8427 DOCREV CUR MEDS BY ELIG CLIN: HCPCS | Performed by: INTERNAL MEDICINE

## 2022-09-21 PROCEDURE — G9717 DOC PT DX DEP/BP F/U NT REQ: HCPCS | Performed by: INTERNAL MEDICINE

## 2022-09-21 PROCEDURE — G0008 ADMIN INFLUENZA VIRUS VAC: HCPCS | Performed by: INTERNAL MEDICINE

## 2022-09-21 PROCEDURE — G8417 CALC BMI ABV UP PARAM F/U: HCPCS | Performed by: INTERNAL MEDICINE

## 2022-09-21 PROCEDURE — G8752 SYS BP LESS 140: HCPCS | Performed by: INTERNAL MEDICINE

## 2022-09-21 PROCEDURE — 99214 OFFICE O/P EST MOD 30 MIN: CPT | Performed by: INTERNAL MEDICINE

## 2022-09-21 PROCEDURE — G8754 DIAS BP LESS 90: HCPCS | Performed by: INTERNAL MEDICINE

## 2022-09-21 PROCEDURE — 90694 VACC AIIV4 NO PRSRV 0.5ML IM: CPT | Performed by: INTERNAL MEDICINE

## 2022-09-21 PROCEDURE — 1101F PT FALLS ASSESS-DOCD LE1/YR: CPT | Performed by: INTERNAL MEDICINE

## 2022-09-21 PROCEDURE — 1090F PRES/ABSN URINE INCON ASSESS: CPT | Performed by: INTERNAL MEDICINE

## 2022-09-21 RX ORDER — BLOOD-GLUCOSE METER
EACH MISCELLANEOUS
Qty: 1 EACH | Refills: 0 | Status: SHIPPED | OUTPATIENT
Start: 2022-09-21

## 2022-09-21 RX ORDER — UMECLIDINIUM BROMIDE AND VILANTEROL TRIFENATATE 62.5; 25 UG/1; UG/1
1 POWDER RESPIRATORY (INHALATION) DAILY
Qty: 1 EACH | Refills: 0 | Status: SHIPPED | OUTPATIENT
Start: 2022-09-21 | End: 2022-09-21 | Stop reason: CLARIF

## 2022-09-21 RX ORDER — GABAPENTIN 100 MG/1
CAPSULE ORAL
Qty: 120 CAPSULE | Refills: 2 | Status: SHIPPED | OUTPATIENT
Start: 2022-09-21

## 2022-09-21 RX ORDER — UMECLIDINIUM BROMIDE AND VILANTEROL TRIFENATATE 62.5; 25 UG/1; UG/1
1 POWDER RESPIRATORY (INHALATION) DAILY
Qty: 1 EACH | Refills: 0 | Status: SHIPPED | COMMUNITY
Start: 2022-09-21

## 2022-09-21 NOTE — LETTER
9/22/2022 10:15 AM    Ms. Mary Saenz [address] [address]    Dear Care Provider    Please fax us the most recent office notes so that we may update the patient's records for continuity of care. Our fax number: [phone].     Patient:   Maurice Haynes  1940          Sincerely,      Miguel Hope MD

## 2022-09-21 NOTE — PATIENT INSTRUCTIONS
Flonase for post nasal drip    Start daily iron 325mg  take daily or every other day       Labs 1 week prior to follow up

## 2022-12-05 NOTE — PROGRESS NOTES
HISTORY OF PRESENT ILLNESS  Mady Belle is a 81 y.o. female. HPI  Last here 9/21/22. Pt is here for routine care. Pt notes more frequent, loose BM's. She will sometimes have up to 4 BM's in a day, but this does not happen every day, these episodes come and go. Denies hematochezia, melena. Recommended a probiotic or fiber supplement.  Advised pt to f/U w/ GI if sx's persist.     Has a history of hypertension  BP today is 136/79  BP at home: 124/73 121/73 120/85 131/84 134/80   Continues on metoprolol 50mg BID and norvasc 5mg  Recall had angioedema with lisinopril and benicar-HCT in the past  She took BP meds this morning     She is diabetic   BS  144 138 164 153 130 133 126 110 136 149 131 98 99 110 153 152   States higher readings were the week of Thanksgiving    123 170 120 139 187 89 207 159 110 111 152  Continues on metformin 1000mg BID   On NPH 70/30 mix now 14U BID  She also takes ASA 81mg daily   Recall issues with hypoglycemia in the past, a1c under 7.5 at goal for her   Uses [facility] today is 174 lbs, stable since lov   Discussed diet and weight loss     Reviewed labs   Ordered labs     Previously she c/o feeling tired in general, has some sob when walking over the last 6 months or so this is not new denies any chest pain or chest pressure  She has since seen cardiology and pulmonary     She saw Dr. Kortney Hassan (pulm) for shortness of breath in 8/22 or 9/22   They did PFT's, will get notes for review  She tried anoro  inhaler, which was helpful   Notes she only feels SOB when going out to the store  Would like a refill of proair, which had also been helpful     Pt follows with Dr. China Kline (cardio) for cad  Last visit 10/27/22, no med changes  Completed a stress test and EKG, this was ultimately okay per pt   Will get report      Pt follows annually with Dr. Rosalva Steinberg (vasc) for her PAD  No claudication stable   Last visit was 7/22, no med changes  Completed ultrasound aneurysm screen February 2022 which is negative     Pt had sleep eval with Dr Ramon Fuentes (sleep) for sleep apnea  She had sleep titration 3/15/21  She is compliant with bipap   Last here 7/5/22        Pt saw Dr. Cecile Oreilly (ortho) for carpal tunnel   Had surgery for this 8/1/19   Last visit was 8/19/19      She has been seeing Dr. Mahsa Ríos (ortho) for R shoulder pain   Last there 12/11/20      She was in the hospital from 12/04/21-12/06/21 for GI bleed  Pt is taking prilosec 20mg every other day working well     Takes OTC vit D daily      Continues synthroid 100mcg daily    She takes this med separately from all meds  Most recent TSH was 2.4       Continues on lipitor 80mg max dose for cholesterol   Pt is taking zetia 10mg once daily as well   Recall welchol was too expensive     No longer on lexapro  She also has ativan to use prn  Reviewed + depression screen (2) today. She notes she has been feeling more down since she lost her daughter and has been living alone. She would like to hold off for now, she will let me know if anything changes. Pt was on gabapentin 100 mg for pain in hand--takes this twice daily    Now taking Fe supplement, 65 mg once daily      Former smoker, quit smoking in 2010      ACP on file. SDMs is daughter Cristo Moon. She will be updating this as her daughter recently passed.        PREVENTIVE:    AAA: 2/22 negative  Colonoscopy: Dr. Sabino Maya, 12/6/21, severe diverticulosis   EGD: Dr. Sabino Maya 12/6/21   Pap: 12/14, declines further   Mammogram: 6/22/22 neg  DEXA: 6/22/22 ostepenia  Tdap: 1/04/2016  Pneumovax: 01/29/20   Qmhnqea18: 9/17  Zostavax: 10/10/2016  Shingrix: completed both  Flu shot: 9/21/22  Foot exam: 06/21/22  Microalbumin: 6/22  A1c: 3/22 7.9 6/22 7.6 9/22 7.7 12/22 7.1  [facility] exam: [facility]. Xavier Soto at [facility], 7/22  Lipids: 12/21 LDL 50   Covid: 4 doses (Pfizer)    Patient Active Problem List    Diagnosis Date Noted    Chronic obstructive pulmonary disease, unspecified 06/21/2022 Type 2 diabetes mellitus with diabetic neuropathy ([facility] 01/22/2019    Type 2 diabetes mellitus with nephropathy ([facility] 01/02/2018    Recurrent depression ([facility] 01/02/2018    Diverticulosis of intestine with bleeding 10/11/2015    Anemia 75/86/4467    Diastolic CHF, chronic ([facility] 10/11/2015    Osteoporosis 09/10/2013    Essential hypertension, benign 05/17/2012    Mixed hyperlipidemia 05/17/2012    S/P CABG x 3 11/11/2011    CAD (coronary artery disease) 11/18/2009    Hypothyroidism 11/18/2009    PVD (peripheral vascular disease) ([place] [address] 11/18/2009     Current Outpatient Medications   Medication Sig Dispense Refill    umeclidinium-vilanteroL (Anoro Ellipta) 62.5-25 mcg/actuation inhaler Take 1 Puff by inhalation daily. 1 Each 0    gabapentin (NEURONTIN) 100 mg capsule Take 2 capsules by mouth twice daily 120 Capsule 2    Blood-Glucose Meter (True Metrix Glucose Meter) misc Use to check blood sugars daily DX E11.21 1 Each 0    amLODIPine (NORVASC) 5 mg tablet TAKE 1 TABLET EVERY DAY 90 Tablet 0    metFORMIN (GLUCOPHAGE) 1,000 mg tablet TAKE 1 TABLET BY MOUTH TWO (2) TIMES DAILY (WITH MEALS). 180 Tablet 0    metoprolol tartrate (LOPRESSOR) 50 mg tablet Take 1 Tablet by mouth two (2) times a day. 180 Tablet 0    omeprazole (PRILOSEC) 20 mg capsule Take 1 Capsule by mouth daily. 90 Capsule 1    levothyroxine (SYNTHROID) 100 mcg tablet TAKE 1 TABLET BY MOUTH DAILY (BEFORE BREAKFAST). 90 Tablet 0    glucose blood VI test strips (True Metrix Glucose Test Strip) strip tid 300 Strip 0    atorvastatin (LIPITOR) 80 mg tablet Take 1 Tablet by mouth nightly. 90 Tablet 2    Blood Glucose Control, Low (True Metrix Level 1) soln Daily 100 Each 2    ezetimibe (ZETIA) 10 mg tablet Take 1 Tablet by mouth daily.  90 Tablet 2    lancets (TRUEplus Lancets) 33 gauge misc tid 100 Each 5    alcohol swabs (BD Single Use Swabs Regular) padm dadily 100 Pad 5    flash glucose sensor (FreeStyle Ruben 2 Sensor) kit USE TO CHECK BLOOD SUGARS 4X DAILY. 3 Kit 0    insulin NPH/insulin regular (NovoLIN 70/30 U-100 Insulin) 100 unit/mL (70-30) injection INJECT 14 UNITS SUBCUTANEOUSLY TWICE DAILY 10 mL 0    flash glucose sensor (FreeStyle Ruben 14 Day Sensor) kit daily 300 Kit 0    albuterol (PROVENTIL HFA, VENTOLIN HFA, PROAIR HFA) 90 mcg/actuation inhaler Take 1 Puff by inhalation every six (6) hours as needed for Wheezing. 1 Inhaler 0    cholecalciferol (VITAMIN D3) 25 mcg (1,000 unit) cap Take 1,000 Units by mouth daily. aspirin 81 mg chewable tablet Take 1 Tab by mouth daily. 90 Tab 3    cyanocobalamin 1,000 mcg tablet Take 1,000 mcg by mouth daily.        Past Surgical History:   Procedure Laterality Date    COLONOSCOPY N/A 10/31/2018    COLONOSCOPY performed by Jhoan Lozano MD at [facility] ENDOSCOPY    COLONOSCOPY N/A 12/6/2021    COLONOSCOPY performed by Jhoan Lozano MD at [facility] ENDOSCOPY    HX CORONARY ARTERY BYPASS GRAFT  11/11/11    3 vessel    HX GYN      hysterectomy    HX HEART CATHETERIZATION      HX HYSTERECTOMY      HX UROLOGICAL  1970s    kidney stone    [place] CARDIAC SURG PROCEDURE UNLIST      cabg x 3    VASCULAR SURGERY PROCEDURE UNLIST      Stents put in right leg      Lab Results   Component Value Date/Time    WBC 8.8 12/02/2022 09:53 AM    HGB 12.5 12/02/2022 09:53 AM    Hemoglobin (POC) 12.9 03/02/2011 12:21 PM    HCT 43.0 12/02/2022 09:53 AM    PLATELET 830 63/62/8422 09:53 AM    MCV 87.2 12/02/2022 09:53 AM     Lab Results   Component Value Date/Time    Cholesterol, total 121 12/04/2021 04:58 AM    HDL Cholesterol 50 12/04/2021 04:58 AM    LDL, calculated 50.6 12/04/2021 04:58 AM    Triglyceride 102 12/04/2021 04:58 AM    CHOL/HDL Ratio 2.4 12/04/2021 04:58 AM     Lab Results   Component Value Date/Time    GFR est non-AA 45 (L) 09/14/2022 09:55 AM    GFR est AA 55 (L) 09/14/2022 09:55 AM    Creatinine 1.23 (H) 12/02/2022 09:53 AM    BUN 22 (H) 12/02/2022 09:53 AM    Sodium 143 12/02/2022 09:53 AM    Potassium 4.2 12/02/2022 09:53 AM    Chloride 109 (H) 12/02/2022 09:53 AM    CO2 30 12/02/2022 09:53 AM    Magnesium 1.7 10/16/2015 04:34 AM        Review of Systems   Respiratory:  Negative for shortness of breath. Cardiovascular:  Negative for chest pain. Physical Exam  Constitutional:       General: She is not in acute distress. Appearance: She is not ill-appearing, toxic-appearing or diaphoretic. HENT:      Head: Normocephalic and atraumatic. Right Ear: External ear normal.      Left Ear: External ear normal.   Eyes:      General:         Right eye: No discharge. Left eye: No discharge. Conjunctiva/sclera: Conjunctivae normal.      Pupils: Pupils are equal, round, and reactive to light. Cardiovascular:      Rate and Rhythm: Normal rate and regular rhythm. Heart sounds: No murmur heard. No friction rub. No gallop. Pulmonary:      Effort: No respiratory distress. Breath sounds: Normal breath sounds. No wheezing or rales. Musculoskeletal:         General: Normal range of motion. Cervical back: Normal.      Right lower leg: No edema. Left lower leg: No edema. Skin:     General: Skin is warm and dry. Neurological:      Mental Status: She is oriented to person, place, and time. Mental status is at baseline. Coordination: Coordination normal.      Gait: Gait normal.   Psychiatric:         Mood and Affect: Mood normal.         Behavior: Behavior normal.       ASSESSMENT and PLAN    ICD-10-CM NCU-6-HT    1. Diastolic CHF, chronic (HCC)  I50.32 428.32 LIPID PANEL     637.3 METABOLIC PANEL, COMPREHENSIVE   Medically managed clinically stable last echo was in August  raven up-to-date with cardiology from October   HEMOGLOBIN A1C WITH EAG      2.  Type II diabetes mellitus with nephropathy (HCC)  E11.21 250.40 gabapentin (NEURONTIN) 100 mg capsule     583.81 LIPID PANEL      METABOLIC PANEL, COMPREHENSIVE   Adequately controlled for age and being on insulin on current regimen of NPH 70/30 mix takes 14 units twice daily also on metformin twice daily continue no change to dose monitor renal function   HEMOGLOBIN A1C WITH EAG      3. Type 2 diabetes mellitus with nephropathy ([facility])  E11.21 250.40 LIPID PANEL     715.98 METABOLIC PANEL, COMPREHENSIVE   See above   HEMOGLOBIN A1C WITH EAG      4. Recurrent depression ([facility]  F33.9 296.30 LIPID PANEL      METABOLIC PANEL, COMPREHENSIVE   Discussed restarting Lexapro reviewed depression screen she is not interested for now she will even if she changes her mind   HEMOGLOBIN A1C WITH EAG      5. Hypothyroidism, unspecified type  E03.9 244.9 LIPID PANEL      METABOLIC PANEL, COMPREHENSIVE   Controlled on Synthroid takes 100 mcg daily   HEMOGLOBIN A1C WITH EAG      6. Coronary artery disease involving native coronary artery of native heart without angina pectoris  I25.10 414.01 LIPID PANEL      METABOLIC PANEL, COMPREHENSIVE   Medically managed no active signs or symptoms of CAD   HEMOGLOBIN A1C WITH EAG      7. Mixed hyperlipidemia  E78.2 272.2 LIPID PANEL      METABOLIC PANEL, COMPREHENSIVE   Controlled on Lipitor and Zetia check lipids prior to follow-up   HEMOGLOBIN A1C WITH EAG      8. Essential hypertension, benign  I10 401.1 LIPID PANEL      METABOLIC PANEL, COMPREHENSIVE   Controlled on metoprolol and Norvasc continue no change to dose monitor metabolic panel   HEMOGLOBIN A1C WITH EAG      9. Chronic bronchitis, unspecified chronic bronchitis type ([facility]  J42 491.9 LIPID PANEL      METABOLIC PANEL, COMPREHENSIVE   Follows with pulmonary had PFTs done this past fall inhalers helped but ultimately she did not like taking it daily she is currently using albuterol as needed   HEMOGLOBIN A1C WITH EAG      10. PVD (peripheral vascular disease) ([facility])  I73.9 443.9 LIPID PANEL      METABOLIC PANEL, COMPREHENSIVE   Up-to-date follows with vascular annually   HEMOGLOBIN A1C WITH EAG      11.  Type 2 diabetes mellitus with diabetic neuropathy, with long-term current use of insulin (HCC)  E11.40 250.60 LIPID PANEL    W86.3 299.1 METABOLIC PANEL, COMPREHENSIVE   See above improved with gabapentin  V58.67 HEMOGLOBIN A1C WITH EAG      12. Age-related osteoporosis without current pathological fracture  M81.0 733.01 LIPID PANEL      METABOLIC PANEL, COMPREHENSIVE   Vitamin D to for treatment improved osteopenia on last labs   HEMOGLOBIN A1C WITH EAG      13. Other iron deficiency anemia  D50.8 280.8    CBC normal improved on last labs still taking iron supplement does have some mildly low iron has had GI bleeding in the past has severe diverticulosis   14. Loose stools  R19.5 787.7 REFERRAL TO GASTROENTEROLOGY   Colonoscopy up-to-date she is having loose stools on and off discussed probiotic and fiber supplement she would like to follow-up with GI as well placed referral   Scribed by Kori Alexander of [address] [place] [address] [address], as dictated by Dr. Lori Avila. Current diagnosis and concerns discussed with pt at length. Pt understands risks and benefits or current treatment plan and medications, and accepts the treatment and medication with any possible risks. Pt asks appropriate questions, which were answered. Pt was instructed to call with any concerns or problems. I have reviewed the note documented by the scribe. The services provided are my own. The documentation is accurate.

## 2022-12-07 ENCOUNTER — OFFICE VISIT (OUTPATIENT)
Dept: INTERNAL MEDICINE CLINIC | Age: 82
End: 2022-12-07
Payer: MEDICARE

## 2022-12-07 VITALS
BODY MASS INDEX: 32.02 KG/M2 | TEMPERATURE: 97 F | SYSTOLIC BLOOD PRESSURE: 136 MMHG | DIASTOLIC BLOOD PRESSURE: 79 MMHG | WEIGHT: 174 LBS | HEART RATE: 74 BPM | OXYGEN SATURATION: 99 % | RESPIRATION RATE: 16 BRPM | HEIGHT: 62 IN

## 2022-12-07 DIAGNOSIS — D50.8 OTHER IRON DEFICIENCY ANEMIA: ICD-10-CM

## 2022-12-07 DIAGNOSIS — E11.40 TYPE 2 DIABETES MELLITUS WITH DIABETIC NEUROPATHY, WITH LONG-TERM CURRENT USE OF INSULIN (HCC): ICD-10-CM

## 2022-12-07 DIAGNOSIS — E11.21 TYPE II DIABETES MELLITUS WITH NEPHROPATHY (HCC): ICD-10-CM

## 2022-12-07 DIAGNOSIS — R19.5 LOOSE STOOLS: ICD-10-CM

## 2022-12-07 DIAGNOSIS — E78.2 MIXED HYPERLIPIDEMIA: ICD-10-CM

## 2022-12-07 DIAGNOSIS — E03.9 HYPOTHYROIDISM, UNSPECIFIED TYPE: ICD-10-CM

## 2022-12-07 DIAGNOSIS — M81.0 AGE-RELATED OSTEOPOROSIS WITHOUT CURRENT PATHOLOGICAL FRACTURE: ICD-10-CM

## 2022-12-07 DIAGNOSIS — I10 ESSENTIAL HYPERTENSION, BENIGN: ICD-10-CM

## 2022-12-07 DIAGNOSIS — E11.21 TYPE 2 DIABETES MELLITUS WITH NEPHROPATHY (HCC): ICD-10-CM

## 2022-12-07 DIAGNOSIS — I25.10 CORONARY ARTERY DISEASE INVOLVING NATIVE CORONARY ARTERY OF NATIVE HEART WITHOUT ANGINA PECTORIS: ICD-10-CM

## 2022-12-07 DIAGNOSIS — J42 CHRONIC BRONCHITIS, UNSPECIFIED CHRONIC BRONCHITIS TYPE (HCC): ICD-10-CM

## 2022-12-07 DIAGNOSIS — Z79.4 TYPE 2 DIABETES MELLITUS WITH DIABETIC NEUROPATHY, WITH LONG-TERM CURRENT USE OF INSULIN (HCC): ICD-10-CM

## 2022-12-07 DIAGNOSIS — I73.9 PVD (PERIPHERAL VASCULAR DISEASE) (HCC): ICD-10-CM

## 2022-12-07 DIAGNOSIS — I50.32 DIASTOLIC CHF, CHRONIC (HCC): Primary | ICD-10-CM

## 2022-12-07 DIAGNOSIS — F33.9 RECURRENT DEPRESSION (HCC): ICD-10-CM

## 2022-12-07 RX ORDER — GABAPENTIN 100 MG/1
CAPSULE ORAL
Qty: 120 CAPSULE | Refills: 2 | Status: SHIPPED | OUTPATIENT
Start: 2022-12-07

## 2022-12-07 RX ORDER — ALBUTEROL SULFATE 90 UG/1
1 AEROSOL, METERED RESPIRATORY (INHALATION)
Qty: 1 EACH | Refills: 0 | Status: SHIPPED | OUTPATIENT
Start: 2022-12-07

## 2022-12-07 NOTE — LETTER
12/8/2022 1:52 PM    Ms. Mary Saenz [address] [facility] [address] [address]    Dear Care Provider    Please fax us the most recent notes so that we may update the patient's records for continuity of care. Our fax number: [phone].     Patient:   Harmony Borges  1940            Sincerely,      Julio Smith MD

## 2022-12-07 NOTE — PROGRESS NOTES
1. \"Have you been to the ER, urgent care clinic since your last visit? Hospitalized since your last visit? \" No    2. \"Have you seen or consulted any other health care providers outside of the [address] [place] [address] since your last visit? \" No     3. For patients aged 39-70: Has the patient had a colonoscopy / FIT/ Cologuard? Yes - Care Gap present. Most recent result on file      If the patient is female:    4. For patients aged 41-77: Has the patient had a mammogram within the past 2 years? Yes - Care Gap present. Most recent result on file      5. For patients aged 21-65: Has the patient had a pap smear?  NA - based on age or sex

## 2022-12-08 DIAGNOSIS — E03.9 ACQUIRED HYPOTHYROIDISM: ICD-10-CM

## 2022-12-08 RX ORDER — LEVOTHYROXINE SODIUM 100 UG/1
TABLET ORAL
Qty: 90 TABLET | Refills: 0 | Status: SHIPPED | OUTPATIENT
Start: 2022-12-08

## 2023-01-07 DIAGNOSIS — I10 ESSENTIAL HYPERTENSION, BENIGN: ICD-10-CM

## 2023-01-07 RX ORDER — METOPROLOL TARTRATE 50 MG/1
TABLET ORAL
Qty: 180 TABLET | Refills: 0 | Status: SHIPPED | OUTPATIENT
Start: 2023-01-07

## 2023-02-04 DIAGNOSIS — E11.21 TYPE II DIABETES MELLITUS WITH NEPHROPATHY (HCC): ICD-10-CM

## 2023-02-04 DIAGNOSIS — I10 ESSENTIAL HYPERTENSION, BENIGN: ICD-10-CM

## 2023-02-04 DIAGNOSIS — E78.2 MIXED HYPERLIPIDEMIA: ICD-10-CM

## 2023-02-04 RX ORDER — METFORMIN HYDROCHLORIDE 1000 MG/1
TABLET ORAL
Qty: 180 TABLET | Refills: 0 | Status: SHIPPED | OUTPATIENT
Start: 2023-02-04

## 2023-02-04 RX ORDER — EZETIMIBE 10 MG/1
TABLET ORAL
Qty: 90 TABLET | Refills: 2 | Status: SHIPPED | OUTPATIENT
Start: 2023-02-04

## 2023-02-04 RX ORDER — ATORVASTATIN CALCIUM 80 MG/1
TABLET, FILM COATED ORAL
Qty: 90 TABLET | Refills: 2 | Status: SHIPPED | OUTPATIENT
Start: 2023-02-04

## 2023-02-04 RX ORDER — AMLODIPINE BESYLATE 5 MG/1
TABLET ORAL
Qty: 90 TABLET | Refills: 0 | Status: SHIPPED | OUTPATIENT
Start: 2023-02-04

## 2023-02-16 DIAGNOSIS — E11.21 TYPE II DIABETES MELLITUS WITH NEPHROPATHY (HCC): ICD-10-CM

## 2023-02-18 RX ORDER — GABAPENTIN 100 MG/1
CAPSULE ORAL
Qty: 120 CAPSULE | Refills: 0 | Status: SHIPPED | OUTPATIENT
Start: 2023-02-18

## 2023-02-28 ENCOUNTER — APPOINTMENT (OUTPATIENT)
Dept: INTERNAL MEDICINE CLINIC | Age: 83
End: 2023-02-28

## 2023-02-28 DIAGNOSIS — E78.2 MIXED HYPERLIPIDEMIA: ICD-10-CM

## 2023-02-28 DIAGNOSIS — E11.21 TYPE 2 DIABETES MELLITUS WITH NEPHROPATHY (HCC): ICD-10-CM

## 2023-02-28 DIAGNOSIS — I50.32 DIASTOLIC CHF, CHRONIC (HCC): ICD-10-CM

## 2023-02-28 DIAGNOSIS — I73.9 PVD (PERIPHERAL VASCULAR DISEASE) (HCC): ICD-10-CM

## 2023-02-28 DIAGNOSIS — I10 ESSENTIAL HYPERTENSION, BENIGN: ICD-10-CM

## 2023-02-28 DIAGNOSIS — J42 CHRONIC BRONCHITIS, UNSPECIFIED CHRONIC BRONCHITIS TYPE (HCC): ICD-10-CM

## 2023-02-28 DIAGNOSIS — I25.10 CORONARY ARTERY DISEASE INVOLVING NATIVE CORONARY ARTERY OF NATIVE HEART WITHOUT ANGINA PECTORIS: ICD-10-CM

## 2023-02-28 DIAGNOSIS — E11.21 TYPE II DIABETES MELLITUS WITH NEPHROPATHY (HCC): ICD-10-CM

## 2023-02-28 DIAGNOSIS — M81.0 AGE-RELATED OSTEOPOROSIS WITHOUT CURRENT PATHOLOGICAL FRACTURE: ICD-10-CM

## 2023-02-28 DIAGNOSIS — F33.9 RECURRENT DEPRESSION (HCC): ICD-10-CM

## 2023-02-28 DIAGNOSIS — Z79.4 TYPE 2 DIABETES MELLITUS WITH DIABETIC NEUROPATHY, WITH LONG-TERM CURRENT USE OF INSULIN (HCC): ICD-10-CM

## 2023-02-28 DIAGNOSIS — E11.40 TYPE 2 DIABETES MELLITUS WITH DIABETIC NEUROPATHY, WITH LONG-TERM CURRENT USE OF INSULIN (HCC): ICD-10-CM

## 2023-02-28 DIAGNOSIS — E03.9 HYPOTHYROIDISM, UNSPECIFIED TYPE: ICD-10-CM

## 2023-02-28 LAB
ALBUMIN SERPL-MCNC: 3.6 G/DL (ref 3.5–5)
ALBUMIN/GLOB SERPL: 0.9 (ref 1.1–2.2)
ALP SERPL-CCNC: 94 U/L (ref 45–117)
ALT SERPL-CCNC: 23 U/L (ref 12–78)
ANION GAP SERPL CALC-SCNC: 4 MMOL/L (ref 5–15)
AST SERPL-CCNC: 16 U/L (ref 15–37)
BILIRUB SERPL-MCNC: 0.7 MG/DL (ref 0.2–1)
BUN SERPL-MCNC: 21 MG/DL (ref 6–20)
BUN/CREAT SERPL: 17 (ref 12–20)
CALCIUM SERPL-MCNC: 10 MG/DL (ref 8.5–10.1)
CHLORIDE SERPL-SCNC: 109 MMOL/L (ref 97–108)
CHOLEST SERPL-MCNC: 172 MG/DL
CO2 SERPL-SCNC: 29 MMOL/L (ref 21–32)
CREAT SERPL-MCNC: 1.22 MG/DL (ref 0.55–1.02)
EST. AVERAGE GLUCOSE BLD GHB EST-MCNC: 192 MG/DL
GLOBULIN SER CALC-MCNC: 4.1 G/DL (ref 2–4)
GLUCOSE SERPL-MCNC: 152 MG/DL (ref 65–100)
HBA1C MFR BLD: 8.3 % (ref 4–5.6)
HDLC SERPL-MCNC: 62 MG/DL
HDLC SERPL: 2.8 (ref 0–5)
LDLC SERPL CALC-MCNC: 79.6 MG/DL (ref 0–100)
POTASSIUM SERPL-SCNC: 4.1 MMOL/L (ref 3.5–5.1)
PROT SERPL-MCNC: 7.7 G/DL (ref 6.4–8.2)
SODIUM SERPL-SCNC: 142 MMOL/L (ref 136–145)
TRIGL SERPL-MCNC: 152 MG/DL (ref ?–150)
VLDLC SERPL CALC-MCNC: 30.4 MG/DL

## 2023-03-07 PROBLEM — N18.30 CHRONIC RENAL DISEASE, STAGE III (HCC): Status: ACTIVE | Noted: 2023-03-07

## 2023-03-07 NOTE — PROGRESS NOTES
HISTORY OF PRESENT ILLNESS  Victoriano Nye is a 81 y.o. female. HPI  Last here 12/7/22. Pt is here for routine care. Pt notes she is still having pain down her L leg. She cannot pinpoint a specific area. Notes she saw Dr. Helen Patterson for this, but vascular work-up was unremarkable (see below)   Recall last XR spine was in 2020, showed OA   Discussed ortho would be the next step. Ordered XR L knee. Gave information for Dr. Maame Ferris (ortho).       Has a history of hypertension  BP today is 168/83,  Will repeat at goal 129/73  BP at home: 120/68 128/75 123/77 110/68  Continues on metoprolol 50mg BID and norvasc 5mg, she took these today  Recall had angioedema with lisinopril and benicar-HCT in the past   She took BP meds this morning     She is diabetic   BS  122 147 148 126 140 151 142 135 123 137 124 135 128 103 146 144   Before dinner: 172 174 169 149 151 121 111 114 161 120 170 154 191 189 93   BS on last labs 152  Continues on metformin 1000mg BID   On NPH 70/30 mix now 14U BID   Will increase to 15U BID   She also takes ASA 81mg daily   Recall issues with hypoglycemia in the past, a1c under 7.5 at goal for her   Uses freestyle yenifer    Patient with mild CKD with creatinine 1.2 we will monitor      Wt today is 175 lbs, stable since lov  Discussed diet and weight loss     Reviewed labs   Ordered pre-labs    Lov pt c/o looser, more frequent BM's   I recommended fiber supplement, discussed GI if sx's do not resolve   No complaints today      Previously she c/o feeling tired in general, has some sob when walking over the last 6 months or so this is not new denies any chest pain or chest pressure  She has since seen cardiology and pulmonary     She saw Dr. Trini Aguilar (pulm) for shortness of breath 7/20/22   Reviewed note 7/20/22: had PFT's stay off anoro, use advair PRN  Taking advair PRN  Has not been back, as she felt like last visit was not helpful       Pt follows with Dr. Lavelle Grey (cardio) for cad  Reviewed note 9/8/22: /70, completed stress test, no med changes  She will f/U q 6 months   Advised her to call to check on f/U      Pt follows annually with Dr. Alcon Amor ([facility]) for her PAD  No claudication stable    Last visit was 12/22 for leg pains, ran testing but ultimately was fine   Completed ultrasound aneurysm screen February 2022 which is negative  Notes she is still having pain down her L leg. She cannot pinpoint a specific area. Discussed ortho would be the next step. Gave information for Dr. Lisbet Blount (ortho). Pt had sleep eval with Dr Alexandra Flores (sleep) for sleep apnea  She had sleep titration 3/15/21  She is compliant with bipap 3/23  Last here 7/5/22        Pt saw Dr. Clarke Gambao (ortho) for carpal tunnel   Had surgery for this 8/1/19   Last visit was 8/19/19      She has been seeing Dr. Genevieve Leach (ortho) for R shoulder pain   Last there 12/11/20      She was in the hospital from 12/04/21-12/06/21 for GI bleed  Pt is taking prilosec 20mg every other day working well     Takes OTC vit D daily      Continues synthroid 100mcg daily    She takes this med separately from all meds  Most recent TSH was 2.4       Continues on lipitor 80mg max dose for cholesterol   Pt is taking zetia 10mg once daily as well   Recall welchol was too expensive     Reviewed + depression (2). However, she is not interested in medication at this time. Gave information for [facility]. Discussed medication could be something to think about if this continues to be a persistent problem. No longer on lexapro  She also has ativan to use prn     Pt was on gabapentin 100 mg for pain in hand--takes this twice daily     Now taking Fe supplement, 65 mg once daily      Former smoker, quit smoking in 2010    Pt lives alone  No safety equipment     Pt is functionally independent   Does own laundry  Does own driving  Does own bills and meds    No memory concerns   Knows the month, date, year, location   Can recall 3/3 objects       ACP on file.  SDMs is her daughter Jose Martin Lal. PREVENTIVE:    AAA: 2/22 negative  Colonoscopy: Dr. Champion Po, 12/6/21, severe diverticulosis   EGD: Dr. Champion Po 12/6/21   Pap: 12/14, declines further   Mammogram: 6/22/22 neg  DEXA: 6/22/22 ostepenia  Tdap: 1/04/2016  Pneumovax: 01/29/20   [id number]: 9/17  Zostavax: 10/10/2016  Shingrix: completed both  Flu shot: 9/21/22  Foot exam: 06/21/22  Microalbumin: 6/22  A1c: 3/22 7.9 6/22 7.6 9/22 7.7 12/22 7.1 2/23 8.3  Eye exam: Dr. Kathy Dsouza at [facility], 7/22  Lipids: 2/23 LDL 79  Covid: 4 doses (Zion Benjamin), including bivalent    Patient Active Problem List    Diagnosis Date Noted    Chronic obstructive pulmonary disease, unspecified 06/21/2022    Type 2 diabetes mellitus with diabetic neuropathy ([facility] [address] 01/22/2019    Type 2 diabetes mellitus with nephropathy ([facility] [address] 01/02/2018    Recurrent depression ([facility] [address] 01/02/2018    Diverticulosis of intestine with bleeding 10/11/2015    Anemia 68/86/7480    Diastolic CHF, chronic (HCC) 10/11/2015    Osteoporosis 09/10/2013    Essential hypertension, benign 05/17/2012    Mixed hyperlipidemia 05/17/2012    S/P CABG x 3 11/11/2011    CAD (coronary artery disease) 11/18/2009    Hypothyroidism 11/18/2009    PVD (peripheral vascular disease) ([facility] [address] 11/18/2009     Current Outpatient Medications   Medication Sig Dispense Refill    gabapentin (NEURONTIN) 100 mg capsule TAKE 2 CAPSULES TWICE DAILY 120 Capsule 0    atorvastatin (LIPITOR) 80 mg tablet TAKE 1 TABLET EVERY NIGHT 90 Tablet 2    metFORMIN (GLUCOPHAGE) 1,000 mg tablet TAKE 1 TABLET TWICE DAILY WITH MEALS 180 Tablet 0    amLODIPine (NORVASC) 5 mg tablet TAKE 1 TABLET EVERY DAY 90 Tablet 0    ezetimibe (ZETIA) 10 mg tablet TAKE 1 TABLET EVERY DAY 90 Tablet 2    metoprolol tartrate (LOPRESSOR) 50 mg tablet TAKE 1 TABLET TWICE DAILY 180 Tablet 0    levothyroxine (SYNTHROID) 100 mcg tablet TAKE 1 TABLET DAILY (BEFORE BREAKFAST).  90 Tablet 0    albuterol (PROVENTIL HFA, VENTOLIN HFA, PROAIR HFA) 90 mcg/actuation inhaler Take 1 Puff by inhalation every six (6) hours as needed for Wheezing. 1 Each 0    umeclidinium-vilanteroL (Anoro Ellipta) 62.5-25 mcg/actuation inhaler Take 1 Puff by inhalation daily. 1 Each 0    Blood-Glucose Meter (True Metrix Glucose Meter) misc Use to check blood sugars daily DX E11.21 1 Each 0    omeprazole (PRILOSEC) 20 mg capsule Take 1 Capsule by mouth daily. 90 Capsule 1    glucose blood VI test strips (True Metrix Glucose Test Strip) strip tid 300 Strip 0    Blood Glucose Control, Low (True Metrix Level 1) soln Daily 100 Each 2    lancets (TRUEplus Lancets) 33 gauge misc tid 100 Each 5    alcohol swabs (BD Single Use Swabs Regular) padm dadily 100 Pad 5    flash glucose sensor (FreeStyle Ruben 2 Sensor) kit USE TO CHECK BLOOD SUGARS 4X DAILY. 3 Kit 0    insulin NPH/insulin regular (NovoLIN 70/30 U-100 Insulin) 100 unit/mL (70-30) injection INJECT 14 UNITS SUBCUTANEOUSLY TWICE DAILY 10 mL 0    flash glucose sensor (FreeStyle Ruben 14 Day Sensor) kit daily 300 Kit 0    cholecalciferol (VITAMIN D3) 25 mcg (1,000 unit) cap Take 1,000 Units by mouth daily. aspirin 81 mg chewable tablet Take 1 Tab by mouth daily. 90 Tab 3    cyanocobalamin 1,000 mcg tablet Take 1,000 mcg by mouth daily.        Past Surgical History:   Procedure Laterality Date    COLONOSCOPY N/A 10/31/2018    COLONOSCOPY performed by Kalen Lamb MD at [facility] ENDOSCOPY    COLONOSCOPY N/A 12/6/2021    COLONOSCOPY performed by Kalen Lamb MD at [facility] ENDOSCOPY    HX CORONARY ARTERY BYPASS GRAFT  11/11/11    3 vessel    HX GYN      hysterectomy    HX HEART CATHETERIZATION      HX HYSTERECTOMY      HX UROLOGICAL  1970s    kidney stone    VT CARDIAC SURG PROCEDURE UNLIST      cabg x 3    VASCULAR SURGERY PROCEDURE UNLIST      Stents put in right leg      Lab Results   Component Value Date/Time    WBC 8.8 12/02/2022 09:53 AM    HGB 12.5 12/02/2022 09:53 AM    Hemoglobin (POC) 12.9 03/02/2011 12:21 PM    HCT 43.0 12/02/2022 09:53 AM    PLATELET 806 60/84/7976 09:53 AM    MCV 87.2 12/02/2022 09:53 AM     Lab Results   Component Value Date/Time    Cholesterol, total 172 02/28/2023 09:38 AM    HDL Cholesterol 62 02/28/2023 09:38 AM    LDL, calculated 79.6 02/28/2023 09:38 AM    Triglyceride 152 (H) 02/28/2023 09:38 AM    CHOL/HDL Ratio 2.8 02/28/2023 09:38 AM     Lab Results   Component Value Date/Time    GFR est non-AA 45 (L) 09/14/2022 09:55 AM    GFR est AA 55 (L) 09/14/2022 09:55 AM    Creatinine 1.22 (H) 02/28/2023 09:38 AM    BUN 21 (H) 02/28/2023 09:38 AM    Sodium 142 02/28/2023 09:38 AM    Potassium 4.1 02/28/2023 09:38 AM    Chloride 109 (H) 02/28/2023 09:38 AM    CO2 29 02/28/2023 09:38 AM    Magnesium 1.7 10/16/2015 04:34 AM        Review of Systems   Respiratory:  Negative for shortness of breath. Cardiovascular:  Negative for chest pain. Physical Exam  Constitutional:       General: She is not in acute distress. Appearance: She is not ill-appearing, toxic-appearing or diaphoretic. HENT:      Head: Normocephalic and atraumatic. Right Ear: External ear normal.      Left Ear: External ear normal.   Eyes:      General:         Right eye: No discharge. Left eye: No discharge. Conjunctiva/sclera: Conjunctivae normal.      Pupils: Pupils are equal, round, and reactive to light. Cardiovascular:      Rate and Rhythm: Normal rate and regular rhythm. Heart sounds: Murmur (slight) heard. No friction rub. No gallop. Pulmonary:      Effort: No respiratory distress. Breath sounds: Normal breath sounds. No wheezing or rales. Chest:      Chest wall: No tenderness. Musculoskeletal:         General: Normal range of motion. Cervical back: Normal.   Skin:     General: Skin is warm and dry. Neurological:      Mental Status: She is oriented to person, place, and time. Mental status is at baseline.       Coordination: Coordination normal.      Gait: Gait normal.   Psychiatric: Mood and Affect: Mood normal.         Behavior: Behavior normal.       ASSESSMENT and PLAN    ICD-10-CM ICD-9-CM    1. Essential hypertension, benign  I10 401.1 MICROALBUMIN, UR, RAND W/ MICROALB/CREAT RATIO      METABOLIC PANEL, COMPREHENSIVE      HEMOGLOBIN A1C WITH EAG      TSH 3RD GENERATION   Initially elevated repeat improved continue Norvasc and metoprolol no change to dose   PROTEIN ELECTROPHORESIS      URINALYSIS W/ RFLX MICROSCOPIC      2. Medicare annual wellness visit, subsequent  Z00.00 V70.0 MICROALBUMIN, UR, RAND W/ MICROALB/CREAT RATIO      METABOLIC PANEL, COMPREHENSIVE      HEMOGLOBIN A1C WITH EAG      TSH 3RD GENERATION      PROTEIN ELECTROPHORESIS      URINALYSIS W/ RFLX MICROSCOPIC      3. Stage 3a chronic kidney disease (HCC)  N18.31 585.3 MICROALBUMIN, UR, RAND W/ MICROALB/CREAT RATIO      METABOLIC PANEL, COMPREHENSIVE      HEMOGLOBIN A1C WITH EAG   Mild CKD with creatinine 1.2 we will check protein electrophoresis with next labs this is felt to be related to diabetes and hypertension cardiovascular disease over time has been stable   TSH 3RD GENERATION      PROTEIN ELECTROPHORESIS      URINALYSIS W/ RFLX MICROSCOPIC      4. Diastolic CHF, chronic (HCC)  I50.32 428.32 MICROALBUMIN, UR, RAND W/ MICROALB/CREAT RATIO     892.3 METABOLIC PANEL, COMPREHENSIVE      HEMOGLOBIN A1C WITH EAG   Medically managed stable on current medications up-to-date with cardiology   TSH 3RD GENERATION      PROTEIN ELECTROPHORESIS      URINALYSIS W/ RFLX MICROSCOPIC      5.  Recurrent depression (HCC)  F33.9 296.30 MICROALBUMIN, UR, RAND W/ MICROALB/CREAT RATIO      METABOLIC PANEL, COMPREHENSIVE      HEMOGLOBIN A1C WITH EAG      TSH 3RD GENERATION   Patient continues to report feelings of sadness sporadically she had been on Lexapro which I discussed with her should probably start again    However she is not interested in medication at this time I did discuss if she continues to feel down and she has most every visit we really should readdress this and consider resuming the medication    Alternatively discussed meeting with our therapist in the clinic she will consider this   PROTEIN ELECTROPHORESIS      URINALYSIS W/ RFLX MICROSCOPIC      6. Type II diabetes mellitus with nephropathy ([facility])  E11.21 250.40 MICROALBUMIN, UR, RAND W/ MICROALB/CREAT RATIO     362.02 METABOLIC PANEL, COMPREHENSIVE      HEMOGLOBIN A1C WITH EAG   A1c climbed on recent labs we will increase her 70/30 insulin mix to 15 units twice daily currently taking 14 units twice daily    Discussed dropping back down to 14 if hypoglycemia ensues repeat A1c prior to follow-up   TSH 3RD GENERATION      PROTEIN ELECTROPHORESIS      URINALYSIS W/ RFLX MICROSCOPIC      7. PVD (peripheral vascular disease) ([facility])  I73.9 443.9 MICROALBUMIN, UR, RAND W/ MICROALB/CREAT RATIO      METABOLIC PANEL, COMPREHENSIVE      HEMOGLOBIN A1C WITH EAG      TSH 3RD GENERATION      PROTEIN ELECTROPHORESIS   Follows with vascular surgeon routinely was there recently in December we will get his notes for review has had claudication in the past not currently       URINALYSIS W/ RFLX MICROSCOPIC      8. Type 2 diabetes mellitus with diabetic neuropathy, with long-term current use of insulin ([facility])  E11.40 250.60 MICROALBUMIN, UR, RAND W/ MICROALB/CREAT RATIO    O58.8 044.0 METABOLIC PANEL, COMPREHENSIVE   See above currently taking gabapentin  V58.67 HEMOGLOBIN A1C WITH EAG      TSH 3RD GENERATION      PROTEIN ELECTROPHORESIS      URINALYSIS W/ RFLX MICROSCOPIC      9. Type 2 diabetes mellitus with nephropathy ([facility])  E11.21 250.40 MICROALBUMIN, UR, RAND W/ MICROALB/CREAT RATIO     855.29 METABOLIC PANEL, COMPREHENSIVE      HEMOGLOBIN A1C WITH EAG   See above monitor kidney function   TSH 3RD GENERATION      PROTEIN ELECTROPHORESIS      URINALYSIS W/ RFLX MICROSCOPIC      10.  Coronary artery disease involving native coronary artery of native heart without angina pectoris  I25.10 414.01 MICROALBUMIN, UR, RAND W/ MICROALB/CREAT RATIO      METABOLIC PANEL, COMPREHENSIVE      HEMOGLOBIN A1C WITH EAG   Medically managed no active signs or symptoms of CAD did have stress test recently which she reports was normal   TSH 3RD GENERATION      PROTEIN ELECTROPHORESIS      URINALYSIS W/ RFLX MICROSCOPIC      11. Chronic bronchitis, unspecified chronic bronchitis type (HCC)  J42 491.9 MICROALBUMIN, UR, RAND W/ MICROALB/CREAT RATIO      METABOLIC PANEL, COMPREHENSIVE      HEMOGLOBIN A1C WITH EAG   Previously saw pulmonary had tried an Anoro inhaler but this did not improve her symptoms completed PFTs in the past uses albuterol as needed   TSH 3RD GENERATION      PROTEIN ELECTROPHORESIS      URINALYSIS W/ RFLX MICROSCOPIC      12. Hypothyroidism, unspecified type  E03.9 244.9 MICROALBUMIN, UR, RAND W/ MICROALB/CREAT RATIO      METABOLIC PANEL, COMPREHENSIVE      HEMOGLOBIN A1C WITH EAG   Stable on Synthroid 100 mcg daily   TSH 3RD GENERATION      PROTEIN ELECTROPHORESIS      URINALYSIS W/ RFLX MICROSCOPIC      13. Mixed hyperlipidemia  E78.2 272.2 MICROALBUMIN, UR, RAND W/ MICROALB/CREAT RATIO      METABOLIC PANEL, COMPREHENSIVE      HEMOGLOBIN A1C WITH EAG      TSH 3RD GENERATION      PROTEIN ELECTROPHORESIS   Controlled on Lipitor   URINALYSIS W/ RFLX MICROSCOPIC      14. Age-related osteoporosis without current pathological fracture  M81.0 733.01 MICROALBUMIN, UR, RAND W/ MICROALB/CREAT RATIO      METABOLIC PANEL, COMPREHENSIVE      HEMOGLOBIN A1C WITH EAG      TSH 3RD GENERATION   Improved osteopenia on recent labs continue vitamin D   PROTEIN ELECTROPHORESIS      URINALYSIS W/ RFLX MICROSCOPIC      15.  Left leg pain  M79.605 729.5 REFERRAL TO ORTHOPEDICS      XR SPINE LUMB 2 OR 3 V      XR KNEE LT MAX 2 VWS   Unclear if related to her spine it sounds a bit sciatic    However she states everything hurts so not a great historian may be related to her left knee we will check some plain films refer to orthopedics for further evaluation she already saw her vascular surgeon ruled out vascular etiology     Scribed by Jaswinder Oscar of [address] [place] [address] [address], as dictated by Dr. Ute Cifuentes. Current diagnosis and concerns discussed with pt at length. Pt understands risks and benefits or current treatment plan and medications, and accepts the treatment and medication with any possible risks. Pt asks appropriate questions, which were answered. Pt was instructed to call with any concerns or problems. I have reviewed the note documented by the scribe. The services provided are my own. The documentation is accurate.

## 2023-03-07 NOTE — PROGRESS NOTES
This is the Subsequent Medicare Annual Wellness Exam, performed 12 months or more after the Initial AWV or the last Subsequent AWV    I have reviewed the patient's medical history in detail and updated the computerized patient record. Assessment/Plan   Education and counseling provided:  Are appropriate based on today's review and evaluation    1. Medicare annual wellness visit, subsequent     Depression Risk Factor Screening     3 most recent PHQ Screens 3/8/2023   Little interest or pleasure in doing things Several days   Feeling down, depressed, irritable, or hopeless Several days   Total Score PHQ 2 2   Discussed --declines meds, discussed therapy will consider    Alcohol & Drug Abuse Risk Screen    Do you average more than 1 drink per night or more than 7 drinks a week:  No    On any one occasion in the past three months have you have had more than 3 drinks containing alcohol:  No          Functional Ability and Level of Safety    Hearing: Hearing is good. Activities of Daily Living: The home contains: handrails and grab bars  Patient does total self care      Ambulation: with difficulty, uses a cane     Fall Risk:  Fall Risk Assessment, last 12 mths 3/8/2023   Able to walk? Yes   Fall in past 12 months? 0   Do you feel unsteady? 0   Are you worried about falling -   Number of falls in past 12 months -   Fall with injury?  -      Abuse Screen:  Patient is not abused   Lives alone    Cognitive Screening    Has your family/caregiver stated any concerns about your memory: no     Cognitive Screening: Normal - Mini Cog Test      No memory concerns   Pt knows the month, day and year   Can recall 3/3 objects      Health Maintenance Due     Health Maintenance Due   Topic Date Due    Medicare Yearly Exam  03/16/2023       Patient Care Team   Patient Care Team:  Annie Rain MD as PCP - General (Internal Medicine Physician)  Annie Rain MD as PCP - [facility] Denny Ybarra Provider  Jameel Rowland O.D. (Optometry)  Selena Cruz MD as Physician (Cardiovascular Disease Physician)  Frank Bales MD (Gastroenterology)  Toñito Olea MD (Obstetrics & Gynecology)  Fransisca Tinajero MD (Vascular Surgery)  Nikolai Wesley MD (Hand Surgery Physician)  Daniella Weeks DO (Pulmonary Disease)    History     Patient Active Problem List   Diagnosis Code    CAD (coronary artery disease) I25.10    Hypothyroidism E03.9    PVD (peripheral vascular disease) ([address] I73.9    S/P CABG x 3 Z95.1    Essential hypertension, benign I10    Mixed hyperlipidemia E78.2    Osteoporosis M81.0    Diverticulosis of intestine with bleeding K57.91    Anemia E30.2    Diastolic CHF, chronic (HCC) I50.32    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    Recurrent depression ([address] F33.9    Type 2 diabetes mellitus with diabetic neuropathy ([address] E11.40    Chronic obstructive pulmonary disease, unspecified J44.9     Past Medical History:   Diagnosis Date    Anxiety     CAD (coronary artery disease)     cabg     Chronic obstructive pulmonary disease, unspecified 6/21/2022    Congestive heart failure (HCC)     Diabetes ([address]     Diverticulosis     GERD (gastroesophageal reflux disease)     Heart disease     Heart failure ([address] 10/18/2011    High cholesterol     Hypertension     Hypothyroidism     Kidney stones     Menopause     Myocardial infarction [facility])     Postsurgical percutaneous transluminal coronary angioplasty status 5/17/2012    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    [facility]    Sleeping difficulty     Teeth decayed     Weakness       Past Surgical History:   Procedure Laterality Date    COLONOSCOPY N/A 10/31/2018    COLONOSCOPY performed by Frank Bales MD at [facility] ENDOSCOPY    COLONOSCOPY N/A 12/6/2021    COLONOSCOPY performed by Frank Bales MD at [facility] ENDOSCOPY    HX CORONARY ARTERY BYPASS GRAFT  11/11/11    3 vessel    HX GYN      hysterectomy    HX HEART CATHETERIZATION      HX HYSTERECTOMY      HX UROLOGICAL  1970s    kidney stone    [place] CARDIAC SUR[facility] PROCEDURE UNLIST      cabg x 3    VASCULAR SURGERY PROCEDURE UNLIST      Stents put in right leg     Current Outpatient Medications   Medication Sig Dispense Refill    gabapentin (NEURONTIN) 100 mg capsule TAKE 2 CAPSULES TWICE DAILY 120 Capsule 0    atorvastatin (LIPITOR) 80 mg tablet TAKE 1 TABLET EVERY NIGHT 90 Tablet 2    metFORMIN (GLUCOPHAGE) 1,000 mg tablet TAKE 1 TABLET TWICE DAILY WITH MEALS 180 Tablet 0    amLODIPine (NORVASC) 5 mg tablet TAKE 1 TABLET EVERY DAY 90 Tablet 0    ezetimibe (ZETIA) 10 mg tablet TAKE 1 TABLET EVERY DAY 90 Tablet 2    metoprolol tartrate (LOPRESSOR) 50 mg tablet TAKE 1 TABLET TWICE DAILY 180 Tablet 0    levothyroxine (SYNTHROID) 100 mcg tablet TAKE 1 TABLET DAILY (BEFORE BREAKFAST). 90 Tablet 0    albuterol (PROVENTIL HFA, VENTOLIN HFA, PROAIR HFA) 90 mcg/actuation inhaler Take 1 Puff by inhalation every six (6) hours as needed for Wheezing. 1 Each 0    umeclidinium-vilanteroL (Anoro Ellipta) 62.5-25 mcg/actuation inhaler Take 1 Puff by inhalation daily. 1 Each 0    Blood-Glucose Meter (True Metrix Glucose Meter) misc Use to check blood sugars daily DX E11.21 1 Each 0    omeprazole (PRILOSEC) 20 mg capsule Take 1 Capsule by mouth daily. 90 Capsule 1    glucose blood VI test strips (True Metrix Glucose Test Strip) strip tid 300 Strip 0    Blood Glucose Control, Low (True Metrix Level 1) soln Daily 100 Each 2    lancets (TRUEplus Lancets) 33 gauge misc tid 100 Each 5    alcohol swabs (BD Single Use Swabs Regular) padm dadily 100 Pad 5    flash glucose sensor (FreeStyle Ruben 2 Sensor) kit USE TO CHECK BLOOD SUGARS 4X DAILY.  3 Kit 0    insulin NPH/insulin regular (NovoLIN 70/30 U-100 Insulin) 100 unit/mL (70-30) injection INJECT 14 UNITS SUBCUTANEOUSLY TWICE DAILY 10 mL 0    flash glucose sensor (FreeStyle Ruben 14 Day Sensor) kit daily 300 Kit 0    cholecalciferol (VITAMIN D3) 25 mcg (1,000 unit) cap Take 1,000 Units by mouth daily.      aspirin 81 mg chewable tablet Take 1 Tab by mouth daily. 90 Tab 3    cyanocobalamin 1,000 mcg tablet Take 1,000 mcg by mouth daily. Allergies   Allergen Reactions    Ace Inhibitors Swelling    Arb-Angiotensin Receptor Antagonist Swelling    Lisinopril Swelling    Plavix [Clopidogrel] Other (comments)     Excessive bleeding    Potassium Nausea and Vomiting     Potassium containing oral products are not well tolerated by patient       Family History   Problem Relation Age of Onset    Diabetes Mother     Heart Disease Mother     Diabetes Brother     Heart Disease Brother     Mental Retardation Brother     Hypertension Brother     Other Father     Cancer Sister     Diabetes Brother     Heart Disease Brother     Diabetes Brother     Breast Cancer Daughter         52's     Social History     Tobacco Use    Smoking status: Former     Packs/day: 0.50     Years: 40.00     Pack years: 20.00     Types: Cigarettes     Quit date: 2010     Years since quittin.4    Smokeless tobacco: Never   Substance Use Topics    Alcohol use: No     ACP on file. SDMs is daughter Catherine Delgadillo or Phyllistine Peralta. AAA:  negative  Colonoscopy: Dr. Meredith Martins, 22, severe diverticulosis   EGD: Dr. Meredith Martins 22   Pap: , declines further   Mammogram: 23 neg annual   DEXA: 22 ostepenia q2 yr    Tdap: 2016  Pneumovax: 20   [id number]:   Zostavax: 10/10/2016  Shingrix: completed both  Flu shot: 22    A1c:   8.3 q3 mo    Eye exam: Dr. Jeremi Jean-Baptiste at [facility],  annual     Lipids:  LDL 79 annual     Covid: 4 doses (Pfizer)    Medication reconciliation completed by MA and reviewed by me. Medical/surgical/social/family history reviewed and updated by me. Patient provided AVS and preventative screening table. Patient verbalized understanding of all information discussed.      Leigha Batista MD

## 2023-03-07 NOTE — PATIENT INSTRUCTIONS
Medicare Wellness Visit, Female     The best way to live healthy is to have a lifestyle where you eat a well-balanced diet, exercise regularly, limit alcohol use, and quit all forms of tobacco/nicotine, if applicable. Regular preventive services are another way to keep healthy. Preventive services (vaccines, screening tests, monitoring & exams) can help personalize your care plan, which helps you manage your own care. Screening tests can find health problems at the earliest stages, when they are easiest to treat. [facility] follows the current, evidence-based guidelines published by the [facility] Cortes ([facility]) when recommending preventive services for our patients. Because we follow these guidelines, sometimes recommendations change over time as research supports it. (For example, mammograms used to be recommended annually. Even though Medicare will still pay for an annual mammogram, the newer guidelines recommend a mammogram every two years for women of average risk). Of course, you and your doctor may decide to screen more often for some diseases, based on your risk and your co-morbidities (chronic disease you are already diagnosed with). Preventive services for you include:  - Medicare offers their members a free annual wellness visit, which is time for you and your primary care provider to discuss and plan for your preventive service needs.  Take advantage of this benefit every year!    -Over the age of 72 should receive the recommended pneumonia vaccines.    -All adults should have a flu vaccine yearly.  -All adults should have a tetanus vaccine every 10 years.   -Over the age 48 should receive the shingles vaccines.        -All adults should be screened once for Hepatitis C.  -All adults age 38-68 who are overweight should have a diabetes screening test once every three years.   -Other screening tests and preventive services for persons with diabetes include: an eye exam to screen for diabetic retinopathy, a kidney function test, a foot exam, and stricter control over your cholesterol.   -Cardiovascular screening for adults with routine risk involves an electrocardiogram (ECG) at intervals determined by your doctor.     -Colorectal cancer screenings should be done for adults age 39-70 with no increased risk factors for colorectal cancer. There are a number of acceptable methods of screening for this type of cancer. Each test has its own benefits and drawbacks. Discuss with your doctor what is most appropriate for you during your annual wellness visit. The different tests include: colonoscopy (considered the best screening method), a fecal occult blood test, a fecal DNA test, and sigmoidoscopy.    -Lung cancer screening is recommended annually with a low dose CT scan for adults between age 54 and 68, who have smoked at least 30 pack years (equivalent of 1 pack per day for 30 days), and who is a current smoker or quit less than 15 years ago.    -A bone mass density test is recommended when a woman turns 65 to screen for osteoporosis. This test is only recommended one time, as a screening. Some providers will use this same test as a disease monitoring tool if you already have osteoporosis. -Breast cancer screenings are recommended every other year for women of normal risk, age 54-69.    -Cervical cancer screenings for women over age 72 are only recommended with certain risk factors.      Here is a list of your current Health Maintenance items (your personalized list of preventive services) with a due date:  Health Maintenance Due   Topic Date Due    Annual Well Visit  03/16/2023         INCREASE INSULIN TO 15 UNITS 2 TIMES PER DAY       LABS PRIOR TO FOLLOW UP

## 2023-03-08 ENCOUNTER — HOSPITAL ENCOUNTER (OUTPATIENT)
Dept: GENERAL RADIOLOGY | Age: 83
Discharge: HOME | End: 2023-03-08
Payer: MEDICARE

## 2023-03-08 ENCOUNTER — APPOINTMENT (OUTPATIENT)
Dept: GENERAL RADIOLOGY | Age: 83
End: 2023-03-08
Payer: MEDICARE

## 2023-03-08 ENCOUNTER — OFFICE VISIT (OUTPATIENT)
Dept: INTERNAL MEDICINE CLINIC | Age: 83
End: 2023-03-08
Payer: MEDICARE

## 2023-03-08 VITALS
HEIGHT: 62 IN | RESPIRATION RATE: 16 BRPM | SYSTOLIC BLOOD PRESSURE: 129 MMHG | TEMPERATURE: 97.8 F | DIASTOLIC BLOOD PRESSURE: 73 MMHG | BODY MASS INDEX: 32.2 KG/M2 | HEART RATE: 65 BPM | WEIGHT: 175 LBS | OXYGEN SATURATION: 97 %

## 2023-03-08 DIAGNOSIS — E03.9 HYPOTHYROIDISM, UNSPECIFIED TYPE: ICD-10-CM

## 2023-03-08 DIAGNOSIS — I50.32 DIASTOLIC CHF, CHRONIC (HCC): ICD-10-CM

## 2023-03-08 DIAGNOSIS — I10 ESSENTIAL HYPERTENSION, BENIGN: Primary | ICD-10-CM

## 2023-03-08 DIAGNOSIS — M79.605 LEFT LEG PAIN: ICD-10-CM

## 2023-03-08 DIAGNOSIS — E11.40 TYPE 2 DIABETES MELLITUS WITH DIABETIC NEUROPATHY, WITH LONG-TERM CURRENT USE OF INSULIN (HCC): ICD-10-CM

## 2023-03-08 DIAGNOSIS — M81.0 AGE-RELATED OSTEOPOROSIS WITHOUT CURRENT PATHOLOGICAL FRACTURE: ICD-10-CM

## 2023-03-08 DIAGNOSIS — F33.9 RECURRENT DEPRESSION (HCC): ICD-10-CM

## 2023-03-08 DIAGNOSIS — E78.2 MIXED HYPERLIPIDEMIA: ICD-10-CM

## 2023-03-08 DIAGNOSIS — I25.10 CORONARY ARTERY DISEASE INVOLVING NATIVE CORONARY ARTERY OF NATIVE HEART WITHOUT ANGINA PECTORIS: ICD-10-CM

## 2023-03-08 DIAGNOSIS — N18.31 STAGE 3A CHRONIC KIDNEY DISEASE (HCC): ICD-10-CM

## 2023-03-08 DIAGNOSIS — I73.9 PVD (PERIPHERAL VASCULAR DISEASE) (HCC): ICD-10-CM

## 2023-03-08 DIAGNOSIS — E11.21 TYPE II DIABETES MELLITUS WITH NEPHROPATHY (HCC): ICD-10-CM

## 2023-03-08 DIAGNOSIS — Z00.00 MEDICARE ANNUAL WELLNESS VISIT, SUBSEQUENT: ICD-10-CM

## 2023-03-08 DIAGNOSIS — Z79.4 TYPE 2 DIABETES MELLITUS WITH DIABETIC NEUROPATHY, WITH LONG-TERM CURRENT USE OF INSULIN (HCC): ICD-10-CM

## 2023-03-08 DIAGNOSIS — E11.21 TYPE 2 DIABETES MELLITUS WITH NEPHROPATHY (HCC): ICD-10-CM

## 2023-03-08 DIAGNOSIS — J42 CHRONIC BRONCHITIS, UNSPECIFIED CHRONIC BRONCHITIS TYPE (HCC): ICD-10-CM

## 2023-03-08 PROCEDURE — 73562 X-RAY EXAM OF KNEE 3: CPT

## 2023-03-08 PROCEDURE — 72100 X-RAY EXAM L-S SPINE 2/3 VWS: CPT

## 2023-03-08 NOTE — LETTER
3/9/2023 9:34 AM    Ms. Mary Saenz [address] [facility] [address] [address]    Dear Care Provider    Please fax us the most recent notes so that we may update the patient's records for continuity of care. Our fax number: [phone].     Patient:   Gabriela Thomson  1940            Sincerely,      Tobin Pope MD

## 2023-03-08 NOTE — PROGRESS NOTES
1. \"Have you been to the ER, urgent care clinic since your last visit? Hospitalized since your last visit? \" No    2. \"Have you seen or consulted any other health care providers outside of the [address] [place] [address] since your last visit? \" No     3. For patients aged 39-70: Has the patient had a colonoscopy / FIT/ Cologuard? Yes - Care Gap present. Most recent result on file      If the patient is female:    4. For patients aged 41-77: Has the patient had a mammogram within the past 2 years? NA - based on age or sex      11. For patients aged 21-65: Has the patient had a pap smear?  NA - based on age or sex

## 2023-03-15 ENCOUNTER — TRANSCRIBE ORDER (OUTPATIENT)
Dept: SCHEDULING | Age: 83
End: 2023-03-15

## 2023-03-15 DIAGNOSIS — M51.36 DDD (DEGENERATIVE DISC DISEASE), LUMBAR: ICD-10-CM

## 2023-03-15 DIAGNOSIS — M47.816 LUMBAR SPONDYLOSIS: ICD-10-CM

## 2023-03-15 DIAGNOSIS — M54.32 LEFT SIDED SCIATICA: ICD-10-CM

## 2023-03-15 DIAGNOSIS — M51.36 DISCOGENIC LOW BACK PAIN: Primary | ICD-10-CM

## 2023-03-16 NOTE — TELEPHONE ENCOUNTER
Pt states she does not need this tonight, but would need for the morning on 3-17-23. Is this possible being the type of medication that it is?

## 2023-03-21 ENCOUNTER — TELEPHONE (OUTPATIENT)
Dept: INTERNAL MEDICINE CLINIC | Age: 83
End: 2023-03-21

## 2023-03-21 NOTE — TELEPHONE ENCOUNTER
See 3/16 refill encounter for inhaler  Discuss referral issue for mri      Pt requests callback from clinical team to discuss

## 2023-03-22 DIAGNOSIS — K21.9 GASTROESOPHAGEAL REFLUX DISEASE WITHOUT ESOPHAGITIS: ICD-10-CM

## 2023-03-22 RX ORDER — ALBUTEROL SULFATE 90 UG/1
1 AEROSOL, METERED RESPIRATORY (INHALATION)
Qty: 1 EACH | Refills: 1 | Status: SHIPPED | OUTPATIENT
Start: 2023-03-22

## 2023-03-22 NOTE — TELEPHONE ENCOUNTER
Called, spoke to pt  Received two pt identifiers   Pt asks about MRI ordered by Dr. Vladislav Garcia and where she can get that done to get it cheaper  Pt states she called her insurance and they told her [facility] is cheaper than [facility] pt to call central scheduling and they will help schedule it for there. Pt states needs inhaler for dentist appt per dentist request.  Tiffanie Moura pt sent refill from 03/16 encounter to Dr. Roshan Fowler understanding of the instructions and has no further questions at this time.

## 2023-03-22 NOTE — TELEPHONE ENCOUNTER
Future Appointments:  Future Appointments   Date Time Provider Gen [place]   6/7/2023  1:45 PM May Heaton MD [facility] BS AMB   7/11/2023  1:50 PM Sonia Lindsey NP [facility] BS AMB        Last Appointment With Me:  3/8/2023     Requested Prescriptions     Pending Prescriptions Disp Refills    albuterol (PROVENTIL HFA, VENTOLIN HFA, PROAIR HFA) 90 mcg/actuation inhaler 1 Each 0     Sig: Take 1 Puff by inhalation every six (6) hours as needed for Wheezing.

## 2023-03-23 RX ORDER — OMEPRAZOLE 20 MG/1
CAPSULE, DELAYED RELEASE ORAL
Qty: 90 CAPSULE | Refills: 1 | Status: SHIPPED | OUTPATIENT
Start: 2023-03-23

## 2023-03-29 ENCOUNTER — HOSPITAL ENCOUNTER (OUTPATIENT)
Dept: MRI IMAGING | Age: 83
Discharge: HOME | End: 2023-03-29
Attending: FAMILY MEDICINE
Payer: MEDICARE

## 2023-03-29 DIAGNOSIS — M51.36 DISCOGENIC LOW BACK PAIN: ICD-10-CM

## 2023-03-29 DIAGNOSIS — M54.32 LEFT SIDED SCIATICA: ICD-10-CM

## 2023-03-29 DIAGNOSIS — M51.36 DDD (DEGENERATIVE DISC DISEASE), LUMBAR: ICD-10-CM

## 2023-03-29 DIAGNOSIS — M47.816 LUMBAR SPONDYLOSIS: ICD-10-CM

## 2023-03-29 PROCEDURE — 72148 MRI LUMBAR SPINE W/O DYE: CPT

## 2023-06-01 ENCOUNTER — NURSE ONLY (OUTPATIENT)
Age: 83
End: 2023-06-01

## 2023-06-01 DIAGNOSIS — E03.9 HYPOTHYROIDISM, UNSPECIFIED TYPE: ICD-10-CM

## 2023-06-01 DIAGNOSIS — Z00.00 ENCOUNTER FOR GENERAL ADULT MEDICAL EXAMINATION WITHOUT ABNORMAL FINDINGS: ICD-10-CM

## 2023-06-01 DIAGNOSIS — E11.21 TYPE 2 DIABETES MELLITUS WITH NEPHROPATHY (HCC): ICD-10-CM

## 2023-06-01 DIAGNOSIS — I10 ESSENTIAL HYPERTENSION, BENIGN: ICD-10-CM

## 2023-06-01 DIAGNOSIS — I10 ESSENTIAL HYPERTENSION, BENIGN: Primary | ICD-10-CM

## 2023-06-01 NOTE — PROGRESS NOTES
HISTORY OF PRESENT ILLNESS  Darnell Brooks is a 69 y.o. female. HPI  Last here 4/13/18. Pt is here for routine care.      BP is 153/73   Continues on metoprolol 50mg BID and norvasc 2.5mg daily  Occasionally checks, reports \"good\" but cant remember specific readings. Recall had angioedema with lisinopril and benicar-HCT in the past      BS at home running around  the last one week in the AM fasting , running 140-150s the weeks prior  Pt denies having BS <70  Before bed sugars are 105-140 last 2 weeks  But higher 120-190 before  Continues on metformin 1000mg BID  She also takes ASA 81mg daily  Lov, increased NPH regular mix to 17U, and 17U BID she is currently doing this  Last 2 weeks notes better diet, was eating a lot of sweets before. If fasting sugars over 130, will increase insulin to 19U    Recall issues with hypoglycemia in the past, a1c under 7.5 at goal for her   Edmundo Roth is stable since 4/18   Discussed diet and weight loss   She has been working on decreasing sweets recently   Discussed [employer]    Reports general fatigu last few weeks  More stress which has been contributring to her sx and not sleeping well d/t stress.       Reviewed last labs 7/18     Lov, advised her to restart her vit D     Pt c/o rash on back x 3 weeks - worsening   Pt has been itching this area with a back scratch   Discussed there being no rash, but dry itchy skin--this resolved since lov           Pt follows with Dr. Ed Cobb (cardio)  Last visit was 11/21/17   Saw salina  6/18--reviewed notes: Patient presents doing well and stable from cardiac standpoint.  Continue current care and follow up in 6 months          Pt follows annually with Dr. Goldy Dorantes ([facility]) for her PAD  Reviewed ABIs 3/14/18:showed mild decrease in pressure  Reviewed note 3/18: /72, no additional intervention needed, f/u 1 year  Per pt, PAD in LLE worsened      Continues on prilosec 40mg daily for reflux, which works well, no breakthrough  Advised her to take this med every other day   Advised her to take 20mg daily once her current rx is finished       Continues synthroid 100mcg daily    She takes this med separately from all meds      Continues on lipitor 80mg max dose and welchol daily for cholesterol   Recall zetia is too expensive      Continues on  lexapro 10mg qhs, which works well not sad, has recent stressors but overall happy with dose  She also has ativan to use prn (not daily) for anxiety and sleep--she has not used recently --lost it, would like more to help sleep with recent stressors. She has not used this med recently      Pt went to a derm who did not accept her insurance   Pt will try to go to another derm b/c rash resolved.     Both thumbs hurt her at times and her r shoulder  Has been going on for the last month  No injury, just decreased rom of her r shoulder , hard to lift shoulder up   Has taken some tylneol helped a little        ACP on file, SDMs are her grandson (Danna Officer), daughter (Anshu Mckeon) and brother Sravani Mendez) .      PREVENTIVE:    Colonoscopy: Dr. Da Bates, 10/13/15, unsure of repeat, she will call GI  Pap: 12/14, declines further   Mammogram: 3/18, negative   DEXA: 3/18, osteopenic, stopped fosamax   Tdap: 1/04/2016  Pneumovax: 10/10/2010  Cgfzagn16: 9/17  Zostavax: 10/10/2016  Shhingrix: consider  Flu shot: 9/17  Foot exam: 7/18  Microalbumin: 9/17--today 7/18  A1c: , 12/15 7.2, 3/16 9.1, 8/16 7.6, 12/16 7.2, 3/17 7.3, 6/17 7.3, 9/17 6.9, 12/17 8.5, 4/18 8.4, 7/18 8.0  Eye exam: Dr. Catarina Munroe at [facility], 9/17, received notes  Lipids: 9/17 LDL 78       Patient Active Problem List    Diagnosis Date Noted    Type 2 diabetes mellitus with nephropathy ([facility] 01/02/2018    Recurrent depression ([facility] 01/02/2018    Encounter for long-term (current) use of insulin ([facility] 04/04/2016    Type II diabetes mellitus with nephropathy ([facility] 01/04/2016    Lower GI bleeding 10/12/2015    Diverticulosis of intestine with bleeding 10/11/2015    Anemia 48/23/1282    Diastolic CHF, chronic ([facility] 10/11/2015    GIB (gastrointestinal bleeding) 10/08/2015    ACP (advance care planning) 09/08/2015    Osteoporosis 09/10/2013    Bunion 10/15/2012    Essential hypertension, benign 05/17/2012    Mixed hyperlipidemia 05/17/2012    Postsurgical percutaneous transluminal coronary angioplasty status 05/17/2012    Old myocardial infarction 05/17/2012    Postsurgical aortocoronary bypass status 05/17/2012    S/P CABG x 3 11/11/2011    ASHD (arteriosclerotic heart disease) 11/10/2011    [facility] 10/18/2011    Hyperlipidemia 10/18/2011    Influenza 01/19/2011    CAD (coronary artery disease) 11/18/2009    Hypothyroidism 11/18/2009    PVD (peripheral vascular disease) ([facility] 11/18/2009     Current Outpatient Prescriptions   Medication Sig Dispense Refill    insulin NPH/insulin regular (HUMULIN 70/30 U-100 INSULIN) 100 unit/mL (70-30) injection 17 Units by SubCUTAneous route two (2) times a day. 10 mL 1    amLODIPine (NORVASC) 2.5 mg tablet Take 1 Tab by mouth daily. 90 Tab 1    levothyroxine (SYNTHROID) 100 mcg tablet Take 1 Tab by mouth Daily (before breakfast). 90 Tab 1    cholecalciferol (VITAMIN D3) 1,000 unit cap Take  by mouth daily.  atorvastatin (LIPITOR) 80 mg tablet TAKE 1 TABLET NIGHTLY 90 Tab 3    Omeprazole delayed release (PRILOSEC D/R) 20 mg tablet Take 1 Tab by mouth daily. 90 Tab 1    metFORMIN (GLUCOPHAGE) 1,000 mg tablet TAKE 1 TABLET TWICE A  Tab 1    escitalopram oxalate (LEXAPRO) 10 mg tablet Take 1 Tab by mouth daily. 90 Tab 1    metoprolol tartrate (LOPRESSOR) 50 mg tablet Take 1 Tab by mouth two (2) times a day. 180 Tab 2    colesevelam (WELCHOL) 625 mg tablet Take 1,875 mg by mouth two (2) times daily (with meals).  EPIPEN 2-GEE 0.3 mg/0.3 mL (1:1,000) injection   0    aspirin 81 mg chewable tablet Take 1 Tab by mouth daily.  90 Tab 3    polyethylene glycol (MIRALAX) 17 gram packet Take 1 Packet by mouth daily. 30 Each 1    cyanocobalamin (VITAMIN B-12) 1,000 mcg tablet Take 1,000 mcg by mouth daily.  triamcinolone acetonide (KENALOG) 0.1 % topical cream Apply  to affected area two (2) times a day. use thin layer Monday to Friday only 30 g 0    insulin NPH/insulin regular (NOVOLIN 70/30, HUMULIN 70/30) 100 unit/mL (70-30) injection Start 10 units sq bid, can increase up to 13 units bid for goal 10 mL 1    LORazepam (ATIVAN) 0.5 mg tablet Take 1 Tab by mouth nightly as needed. Max Daily Amount: 0.5 mg. 30 Tab 0     Past Surgical History:   Procedure Laterality Date    CARDIAC SURG PROCEDURE UNLIST      cabg x 3    HX CORONARY ARTERY BYPASS GRAFT  11/11/11    3 vessel    HX GYN      hysterectomy    HX UROLOGICAL      kidney stone    VASCULAR SURGERY PROCEDURE UNLIST      Stents put in right leg      Lab Results  Component Value Date/Time   WBC 7.2 09/22/2017 09:20 AM   HGB 12.3 09/22/2017 09:20 AM   Hemoglobin (POC) 12.9 03/02/2011 12:21 PM   HCT 38.9 09/22/2017 09:20 AM   PLATELET 462 33/30/7542 09:20 AM   MCV 85 09/22/2017 09:20 AM     Lab Results  Component Value Date/Time   Cholesterol, total 157 09/22/2017 09:20 AM   HDL Cholesterol 56 09/22/2017 09:20 AM   LDL, calculated 78 09/22/2017 09:20 AM   Triglyceride 116 09/22/2017 09:20 AM   CHOL/HDL Ratio 3.2 09/14/2010 08:42 AM     Lab Results  Component Value Date/Time   GFR est non-AA 53 (L) 07/20/2018 09:25 AM   GFR est AA 61 07/20/2018 09:25 AM   Creatinine 1.03 (H) 07/20/2018 09:25 AM   BUN 13 07/20/2018 09:25 AM   Sodium 143 07/20/2018 09:25 AM   Potassium 3.2 (L) 07/20/2018 09:25 AM   Chloride 106 07/20/2018 09:25 AM   CO2 22 07/20/2018 09:25 AM   Magnesium 1.7 10/16/2015 04:34 AM        Review of Systems   Respiratory: Negative for shortness of breath. Cardiovascular: Negative for chest pain. Physical Exam   Constitutional: She is oriented to person, place, and time.  She appears well-developed and well-nourished. No distress. HENT:   Head: Normocephalic and atraumatic. Mouth/Throat: Oropharynx is clear and moist. No oropharyngeal exudate. Eyes: Conjunctivae and EOM are normal. Right eye exhibits no discharge. Left eye exhibits no discharge. Neck: Normal range of motion. Neck supple. Cardiovascular: Normal rate, regular rhythm and intact distal pulses. Exam reveals no gallop and no friction rub. Murmur (2/6) heard. Pulmonary/Chest: Effort normal and breath sounds normal. No respiratory distress. She has no wheezes. She has no rales. She exhibits no tenderness. Musculoskeletal: Normal range of motion. She exhibits no edema, tenderness or deformity. 5/5 strength BLUE  Full ROM BLUE with pain on abduction R arm   Lymphadenopathy:     She has no cervical adenopathy. Neurological: She is alert and oriented to person, place, and time. Coordination normal.   Monofilament nl bilaterally    Skin: Skin is warm and dry. No rash noted. She is not diaphoretic. No erythema. No pallor. Psychiatric: She has a normal mood and affect. Her behavior is normal.       ASSESSMENT and PLAN    ICD-10-CM ICD-9-CM    1. Type 2 diabetes mellitus with nephropathy (HCC)    a1c improved but not at goal, her most recent BS readings actually quite good for last 2 weeks, given these readings will not further increase her insulin, will continue 70/30 mix at 17U BID, metformin BID as well,       discussed if she starts eating a lot of sweets as she did previously which caused elevated glucose in 140-150 range would have her increase insulin to 19U BID   E11.21 250.40 LIPID PANEL     621.55 METABOLIC PANEL, COMPREHENSIVE      HEMOGLOBIN A1C WITH EAG      TSH 3RD GENERATION      MICROALBUMIN, UR, RAND W/ MICROALB/CREAT RATIO       DIABETES FOOT EXAM   2.  Recurrent depression ([address]    Well controlled on lexapro   F33.9 296.30 LIPID PANEL      METABOLIC PANEL, COMPREHENSIVE      HEMOGLOBIN A1C WITH EAG      TSH 3RD GENERATION      MICROALBUMIN, UR, RAND W/ MICROALB/CREAT RATIO   3. Diastolic CHF, chronic (HCC)    Stable, just saw Dr Dipak Medina last month   I50.32 428.32 LIPID PANEL     149.3 METABOLIC PANEL, COMPREHENSIVE      HEMOGLOBIN A1C WITH EAG      TSH 3RD GENERATION      MICROALBUMIN, UR, RAND W/ MICROALB/CREAT RATIO   4. PVD (peripheral vascular disease) ([facility]    UTD with Dr Donnie Cunningham, stable, no intervention needed   I73.9 443.9 LIPID PANEL      METABOLIC PANEL, COMPREHENSIVE      HEMOGLOBIN A1C WITH EAG      TSH 3RD GENERATION      MICROALBUMIN, UR, RAND W/ MICROALB/CREAT RATIO   5. Other iron deficiency anemia    Check CBC, improved last check   D50.8 280.8 LIPID PANEL      METABOLIC PANEL, COMPREHENSIVE      HEMOGLOBIN A1C WITH EAG      TSH 3RD GENERATION      MICROALBUMIN, UR, RAND W/ MICROALB/CREAT RATIO   6. Essential hypertension, benign    Controlled on metoprolol and norvasc, continue   I10 401.1 LIPID PANEL      METABOLIC PANEL, COMPREHENSIVE      HEMOGLOBIN A1C WITH EAG      TSH 3RD GENERATION      MICROALBUMIN, UR, RAND W/ MICROALB/CREAT RATIO   7. Mixed hyperlipidemia    Controlled on lipitor   E78.2 272.2 LIPID PANEL      METABOLIC PANEL, COMPREHENSIVE      HEMOGLOBIN A1C WITH EAG      TSH 3RD GENERATION      MICROALBUMIN, UR, RAND W/ MICROALB/CREAT RATIO   8. Coronary artery disease involving native coronary artery of native heart without angina pectoris    Stable, no active sx   I25.10 414.01 LIPID PANEL      METABOLIC PANEL, COMPREHENSIVE      HEMOGLOBIN A1C WITH EAG      TSH 3RD GENERATION      MICROALBUMIN, UR, RAND W/ MICROALB/CREAT RATIO   9. Hypothyroidism, unspecified type    At goal on synthroid 100mcgs daily   E03.9 244.9 LIPID PANEL      METABOLIC PANEL, COMPREHENSIVE      HEMOGLOBIN A1C WITH EAG      TSH 3RD GENERATION      MICROALBUMIN, UR, RAND W/ MICROALB/CREAT RATIO   10.  Primary insomnia    Refill ativan, she does not take this every day, uses prn at night, has had more social stressors recently and this will help   F51.01 307.42    11. Anxiety    Ativan prn   F41.9 300.00 LORazepam (ATIVAN) 0.5 mg tablet   12. Chronic right shoulder pain    Likely rotator cuff injury, will have her see PT, check plain film   M25.511 719.41 XR SHOULDER RT AP/LAT MIN 2 V    G89.29 338.29 REFERRAL TO PHYSICAL THERAPY        Scribed by Ervin Arias of [address] [place] [address] [address], as dictated by Dr. Amadou Cardona. Current diagnosis and concerns discussed with pt at length. Pt understands risks and benefits or current treatment plan and medications, and accepts the treatment and medication with any possible risks. Pt asks appropriate questions, which were answered. Pt was instructed to call with any concerns or problems. This note will not be viewable in [address] Statement Selected

## 2023-06-02 LAB
ALBUMIN SERPL-MCNC: 3.8 G/DL (ref 3.5–5)
ALBUMIN/GLOB SERPL: 0.9 (ref 1.1–2.2)
ALP SERPL-CCNC: 82 U/L (ref 45–117)
ALT SERPL-CCNC: 26 U/L (ref 12–78)
ANION GAP SERPL CALC-SCNC: 6 MMOL/L (ref 5–15)
APPEARANCE UR: CLEAR
AST SERPL-CCNC: 17 U/L (ref 15–37)
BILIRUB SERPL-MCNC: 0.7 MG/DL (ref 0.2–1)
BILIRUB UR QL: NEGATIVE
BUN SERPL-MCNC: 17 MG/DL (ref 6–20)
BUN/CREAT SERPL: 14 (ref 12–20)
CALCIUM SERPL-MCNC: 10.3 MG/DL (ref 8.5–10.1)
CHLORIDE SERPL-SCNC: 108 MMOL/L (ref 97–108)
CO2 SERPL-SCNC: 28 MMOL/L (ref 21–32)
COLOR UR: ABNORMAL
CREAT SERPL-MCNC: 1.23 MG/DL (ref 0.55–1.02)
CREAT UR-MCNC: 110 MG/DL
EST. AVERAGE GLUCOSE BLD GHB EST-MCNC: 192 MG/DL
GLOBULIN SER CALC-MCNC: 4.1 G/DL (ref 2–4)
GLUCOSE SERPL-MCNC: 126 MG/DL (ref 65–100)
GLUCOSE UR STRIP.AUTO-MCNC: NEGATIVE MG/DL
HBA1C MFR BLD: 8.3 % (ref 4–5.6)
HGB UR QL STRIP: NEGATIVE
KETONES UR QL STRIP.AUTO: NEGATIVE MG/DL
LEUKOCYTE ESTERASE UR QL STRIP.AUTO: NEGATIVE
MICROALBUMIN UR-MCNC: 336 MG/DL
MICROALBUMIN/CREAT UR-RTO: 3055 MG/G (ref 0–30)
NITRITE UR QL STRIP.AUTO: NEGATIVE
PH UR STRIP: 5.5 (ref 5–8)
POTASSIUM SERPL-SCNC: 4.2 MMOL/L (ref 3.5–5.1)
PROT SERPL-MCNC: 7.9 G/DL (ref 6.4–8.2)
PROT UR STRIP-MCNC: 300 MG/DL
SODIUM SERPL-SCNC: 142 MMOL/L (ref 136–145)
SP GR UR REFRACTOMETRY: 1.02 (ref 1–1.03)
TSH SERPL DL<=0.05 MIU/L-ACNC: 1.92 UIU/ML (ref 0.36–3.74)
UROBILINOGEN UR QL STRIP.AUTO: 0.2 EU/DL (ref 0.2–1)

## 2023-06-05 LAB
ALBUMIN SERPL ELPH-MCNC: 3.6 G/DL (ref 2.9–4.4)
ALBUMIN/GLOB SERPL: 1.1 (ref 0.7–1.7)
ALPHA1 GLOB SERPL ELPH-MCNC: 0.2 G/DL (ref 0–0.4)
ALPHA2 GLOB SERPL ELPH-MCNC: 1.1 G/DL (ref 0.4–1)
B-GLOBULIN SERPL ELPH-MCNC: 1 G/DL (ref 0.7–1.3)
GAMMA GLOB SERPL ELPH-MCNC: 1.1 G/DL (ref 0.4–1.8)
GLOBULIN SER CALC-MCNC: 3.4 G/DL (ref 2.2–3.9)
M PROTEIN SERPL ELPH-MCNC: ABNORMAL G/DL
PROT SERPL-MCNC: 7 G/DL (ref 6–8.5)

## 2023-06-05 ASSESSMENT — ENCOUNTER SYMPTOMS: SHORTNESS OF BREATH: 0

## 2023-06-07 ENCOUNTER — OFFICE VISIT (OUTPATIENT)
Age: 83
End: 2023-06-07
Payer: MEDICARE

## 2023-06-07 VITALS
SYSTOLIC BLOOD PRESSURE: 162 MMHG | TEMPERATURE: 98.6 F | DIASTOLIC BLOOD PRESSURE: 75 MMHG | BODY MASS INDEX: 32.39 KG/M2 | WEIGHT: 176 LBS | HEART RATE: 54 BPM | OXYGEN SATURATION: 97 % | RESPIRATION RATE: 18 BRPM | HEIGHT: 62 IN

## 2023-06-07 DIAGNOSIS — Z79.4 TYPE 2 DIABETES MELLITUS WITH DIABETIC NEUROPATHY, WITH LONG-TERM CURRENT USE OF INSULIN (HCC): ICD-10-CM

## 2023-06-07 DIAGNOSIS — F33.9 RECURRENT DEPRESSION (HCC): ICD-10-CM

## 2023-06-07 DIAGNOSIS — E03.9 ACQUIRED HYPOTHYROIDISM: ICD-10-CM

## 2023-06-07 DIAGNOSIS — I25.10 CORONARY ARTERY DISEASE INVOLVING NATIVE CORONARY ARTERY OF NATIVE HEART WITHOUT ANGINA PECTORIS: ICD-10-CM

## 2023-06-07 DIAGNOSIS — Z12.39 BREAST SCREENING: ICD-10-CM

## 2023-06-07 DIAGNOSIS — I73.9 PVD (PERIPHERAL VASCULAR DISEASE) (HCC): ICD-10-CM

## 2023-06-07 DIAGNOSIS — I50.32 DIASTOLIC CHF, CHRONIC (HCC): ICD-10-CM

## 2023-06-07 DIAGNOSIS — M81.0 AGE-RELATED OSTEOPOROSIS WITHOUT CURRENT PATHOLOGICAL FRACTURE: ICD-10-CM

## 2023-06-07 DIAGNOSIS — I10 ESSENTIAL HYPERTENSION, BENIGN: ICD-10-CM

## 2023-06-07 DIAGNOSIS — J44.9 CHRONIC OBSTRUCTIVE PULMONARY DISEASE, UNSPECIFIED COPD TYPE (HCC): ICD-10-CM

## 2023-06-07 DIAGNOSIS — N18.32 STAGE 3B CHRONIC KIDNEY DISEASE (HCC): ICD-10-CM

## 2023-06-07 DIAGNOSIS — E78.2 MIXED HYPERLIPIDEMIA: ICD-10-CM

## 2023-06-07 DIAGNOSIS — E66.9 OBESITY (BMI 30.0-34.9): ICD-10-CM

## 2023-06-07 DIAGNOSIS — E11.21 TYPE 2 DIABETES MELLITUS WITH NEPHROPATHY (HCC): Primary | ICD-10-CM

## 2023-06-07 DIAGNOSIS — E11.40 TYPE 2 DIABETES MELLITUS WITH DIABETIC NEUROPATHY, WITH LONG-TERM CURRENT USE OF INSULIN (HCC): ICD-10-CM

## 2023-06-07 DIAGNOSIS — E53.8 B12 DEFICIENCY: ICD-10-CM

## 2023-06-07 PROCEDURE — 99214 OFFICE O/P EST MOD 30 MIN: CPT | Performed by: INTERNAL MEDICINE

## 2023-06-07 PROCEDURE — G8427 DOCREV CUR MEDS BY ELIG CLIN: HCPCS | Performed by: INTERNAL MEDICINE

## 2023-06-07 PROCEDURE — G8417 CALC BMI ABV UP PARAM F/U: HCPCS | Performed by: INTERNAL MEDICINE

## 2023-06-07 PROCEDURE — 1123F ACP DISCUSS/DSCN MKR DOCD: CPT | Performed by: INTERNAL MEDICINE

## 2023-06-07 PROCEDURE — 3023F SPIROM DOC REV: CPT | Performed by: INTERNAL MEDICINE

## 2023-06-07 PROCEDURE — 3078F DIAST BP <80 MM HG: CPT | Performed by: INTERNAL MEDICINE

## 2023-06-07 PROCEDURE — 1090F PRES/ABSN URINE INCON ASSESS: CPT | Performed by: INTERNAL MEDICINE

## 2023-06-07 PROCEDURE — 3077F SYST BP >= 140 MM HG: CPT | Performed by: INTERNAL MEDICINE

## 2023-06-07 PROCEDURE — 4004F PT TOBACCO SCREEN RCVD TLK: CPT | Performed by: INTERNAL MEDICINE

## 2023-06-07 PROCEDURE — 3052F HG A1C>EQUAL 8.0%<EQUAL 9.0%: CPT | Performed by: INTERNAL MEDICINE

## 2023-06-07 PROCEDURE — G8399 PT W/DXA RESULTS DOCUMENT: HCPCS | Performed by: INTERNAL MEDICINE

## 2023-06-07 SDOH — ECONOMIC STABILITY: INCOME INSECURITY: HOW HARD IS IT FOR YOU TO PAY FOR THE VERY BASICS LIKE FOOD, HOUSING, MEDICAL CARE, AND HEATING?: NOT HARD AT ALL

## 2023-06-07 SDOH — ECONOMIC STABILITY: FOOD INSECURITY: WITHIN THE PAST 12 MONTHS, YOU WORRIED THAT YOUR FOOD WOULD RUN OUT BEFORE YOU GOT MONEY TO BUY MORE.: NEVER TRUE

## 2023-06-07 SDOH — ECONOMIC STABILITY: FOOD INSECURITY: WITHIN THE PAST 12 MONTHS, THE FOOD YOU BOUGHT JUST DIDN'T LAST AND YOU DIDN'T HAVE MONEY TO GET MORE.: NEVER TRUE

## 2023-06-07 SDOH — ECONOMIC STABILITY: HOUSING INSECURITY
IN THE LAST 12 MONTHS, WAS THERE A TIME WHEN YOU DID NOT HAVE A STEADY PLACE TO SLEEP OR SLEPT IN A SHELTER (INCLUDING NOW)?: NO

## 2023-06-07 ASSESSMENT — PATIENT HEALTH QUESTIONNAIRE - PHQ9
7. TROUBLE CONCENTRATING ON THINGS, SUCH AS READING THE NEWSPAPER OR WATCHING TELEVISION: 0
9. THOUGHTS THAT YOU WOULD BE BETTER OFF DEAD, OR OF HURTING YOURSELF: 0
SUM OF ALL RESPONSES TO PHQ QUESTIONS 1-9: 0
3. TROUBLE FALLING OR STAYING ASLEEP: 0
2. FEELING DOWN, DEPRESSED OR HOPELESS: 0
4. FEELING TIRED OR HAVING LITTLE ENERGY: 0
5. POOR APPETITE OR OVEREATING: 0
SUM OF ALL RESPONSES TO PHQ QUESTIONS 1-9: 0
1. LITTLE INTEREST OR PLEASURE IN DOING THINGS: 0
SUM OF ALL RESPONSES TO PHQ QUESTIONS 1-9: 0
SUM OF ALL RESPONSES TO PHQ QUESTIONS 1-9: 0
10. IF YOU CHECKED OFF ANY PROBLEMS, HOW DIFFICULT HAVE THESE PROBLEMS MADE IT FOR YOU TO DO YOUR WORK, TAKE CARE OF THINGS AT HOME, OR GET ALONG WITH OTHER PEOPLE: 0
SUM OF ALL RESPONSES TO PHQ9 QUESTIONS 1 & 2: 0
6. FEELING BAD ABOUT YOURSELF - OR THAT YOU ARE A FAILURE OR HAVE LET YOURSELF OR YOUR FAMILY DOWN: 0
8. MOVING OR SPEAKING SO SLOWLY THAT OTHER PEOPLE COULD HAVE NOTICED. OR THE OPPOSITE, BEING SO FIGETY OR RESTLESS THAT YOU HAVE BEEN MOVING AROUND A LOT MORE THAN USUAL: 0

## 2023-06-07 NOTE — PROGRESS NOTES
1. \"Have you been to the ER, urgent care clinic since your last visit? Hospitalized since your last visit? \" no    2. \"Have you seen or consulted any other health care providers outside of the [address] [place] [address] since your last visit? \" no     3. For patients aged 39-70: Has the patient had a colonoscopy / FIT/ Cologuard? Yes      If the patient is female:    4. For patients aged 41-77: Has the patient had a mammogram within the past 2 years? N/A      5. For patients aged 21-65: Has the patient had a pap smear?  N/A
general, has some sob when walking over the last 6 months or so this is not new denies any chest pain or chest pressure  She has since seen cardiology and pulmonary no current symptoms yet    Lov pt c/o pain down her L leg  Reviewed XR L knee 3/8/23: IMPRESSION:  No acute abnormality. Reviewed XR lumbar spine 3/8/23: IMPRESSION:  No acute process. Multilevel degenerative disc disease in the mid and lower  lumbar spine    She saw CHARLIE Dawson (ortho) for R hip pain 5/5/23  Reviewed note:  Plan:   Radiographs are negative for acute osseous abnormalities. She is mild degenerative duration of her left hip. I discussed with the patient her pain today is likely coming from her back. Patient will continue treatment for her lumbar DDD. Patient will follow-up with Dr. Joleen Nolen. Patient expresses understanding and is in agreement with the plan. If she has any problems at all in the meantime, if symptoms worsen or new symptoms develop, she should let us know. She is seeing Dr. Joleen Nolen (ortho) for her back  Lov 4/5/23   Reviewed note:  PLAN  Treatment Plan:     I discussed with Magali Hardy in the office today that her symptoms are consistent with discogenic low back pain and left-sided sciatica. MRI of her lumbar spine is notable for moderate spinal stenosis at L2-3, L3-4, moderate to severe at L4-5. She does have moderate multilevel lumbosacral degenerative changes and spondylosis redemonstrated. She seems to be happy with medication management. After discussion of management options, will proceed with continued conservative management and rehabilitation. Left-sided sciatica, spinal stenosis, seems to be happy with medication management, has noted interval relief with increasing gabapentin. Would like to hold off on injections or anything surgical. Will have her follow up as needed. Medicine:  Will continue gabapentin 200 mg 3 times daily  -Medication side effects, directions, and interactions discussed, what to avoid use

## 2023-06-19 ENCOUNTER — TELEPHONE (OUTPATIENT)
Age: 83
End: 2023-06-19

## 2023-06-19 NOTE — TELEPHONE ENCOUNTER
----- Message from Araceli Cody sent at 6/19/2023 11:13 AM EDT -----  Subject: Message to Provider    QUESTIONS  Information for Provider? Mari from [facility] equipment called following   up on physician order for glucose monitor and supplies; was trying to   verify that fax was received - was faxed again on 06/15 - and find out the   turnaround time for that order; Mari can be reached at number provided   below  ---------------------------------------------------------------------------  --------------  [address] [employer]  [phone]; OK to leave message on voicemail  ---------------------------------------------------------------------------  --------------  SCRIPT ANSWERS  Relationship to Patient? Covered Entity  Covered Entity Type? Durable Medical Equipment?   Representative Name? mari

## 2023-06-19 NOTE — TELEPHONE ENCOUNTER
Called, spoke to [employer] equipment)  Two pt identifiers confirmed. Reina informed  we have not received any medical supply orders for glucose monitor or supplies. Jeremias Israel given fax number [phone] to re fax supply orders. Jeremias Israel verbalized understanding of information discussed w/ no further questions at this time.      Shaina Salamanca LPN

## 2023-06-23 ENCOUNTER — TELEPHONE (OUTPATIENT)
Age: 83
End: 2023-06-23

## 2023-06-29 ENCOUNTER — HOSPITAL ENCOUNTER (OUTPATIENT)
Facility: HOSPITAL | Age: 83
Discharge: HOME | End: 2023-06-29
Attending: INTERNAL MEDICINE
Payer: MEDICARE

## 2023-06-29 ENCOUNTER — HOSPITAL ENCOUNTER (OUTPATIENT)
Facility: HOSPITAL | Age: 83
End: 2023-06-29
Attending: INTERNAL MEDICINE
Payer: MEDICARE

## 2023-06-29 DIAGNOSIS — N18.32 STAGE 3B CHRONIC KIDNEY DISEASE (HCC): ICD-10-CM

## 2023-06-29 DIAGNOSIS — Z12.31 SCREENING MAMMOGRAM FOR HIGH-RISK PATIENT: ICD-10-CM

## 2023-06-29 PROCEDURE — 77067 SCR MAMMO BI INCL CAD: CPT

## 2023-06-29 PROCEDURE — 76770 US EXAM ABDO BACK WALL COMP: CPT

## 2023-08-15 NOTE — PROGRESS NOTES
Updated US AAA Elidel Counseling: Patient may experience a mild burning sensation during topical application. Elidel is not approved in children less than 2 years of age. There have been case reports of hematologic and skin malignancies in patients using topical calcineurin inhibitors although causality is questionable.

## 2023-08-23 RX ORDER — METOPROLOL TARTRATE 50 MG/1
TABLET, FILM COATED ORAL
Qty: 180 TABLET | Refills: 0 | Status: SHIPPED | OUTPATIENT
Start: 2023-08-23

## 2023-09-06 ASSESSMENT — ENCOUNTER SYMPTOMS: SHORTNESS OF BREATH: 0

## 2023-09-06 NOTE — PROGRESS NOTES
9/23  Last here 7/5/22, she will reschedule f/u       Pt saw Dr. Erven Eisenmenger (ortho) for carpal tunnel   Had surgery for this 8/1/19   Last visit was 8/19/19      She has been seeing Dr. Arlene Monteiro (ortho) for R shoulder pain   Last there 12/11/20      She was in the hospital from 12/04/21-12/06/21 for GI bleed  Pt is taking prilosec 20mg every other day working well 9/23     Takes OTC vit D daily      Continues synthroid 100mcg daily    She takes this med separately from all meds  Most recent TSH was 2.4       Continues on lipitor 80mg max dose for cholesterol   Pt is taking zetia 10mg once daily as well   Recall welchol was too expensive     Has a hx of depression  Previously gave information for allyve. Discussed medication could be something to think about if this continues to be a persistent problem. No longer on lexapro  She also has ativan to use prn     Pt is taking gabapentin 100 mg TID for pain in hand      Now taking Fe supplement, 65 mg once daily      Former smoker, quit smoking in 2010     Pt lives alone      ACP on file. SDMs is her daughter Aubree Harding.       Reviewed Mammogram 6/23 neg    PREVENTIVE:    AAA: 2/22 negative  Colonoscopy: Dr. Henry Fajardo, 12/6/21, severe diverticulosis   EGD: Dr. Henry Fajardo 12/6/21   Pap: 12/14, declines further   Mammogram: 6/23 neg  DEXA: 6/22/22 ostepenia  Tdap: 1/04/2016  Pneumovax: 01/29/20   Svlmsiq77: 9/17  Zostavax: 10/10/2016  Shingrix: completed both  Flu shot: 09/13/23   Foot exam: 6/7/23  Microalbumin: 6/23   A1c:  12/22 7.1 2/23 8.3 6/23 8.3 9/23 8.0  [facility] exam: [facility] Racquel [facility], 8/23, she will see specialist   Lipids: 2/23 LDL 79  Covid: 4 doses ([place]), including bivalent    Patient Active Problem List   Diagnosis    Hypothyroidism    Essential hypertension, benign    Type 2 diabetes mellitus with nephropathy ([address])    Recurrent depression ([address])    PVD (peripheral vascular disease) ([address])    Diverticulosis of intestine with bleeding    CAD

## 2023-09-07 ENCOUNTER — NURSE ONLY (OUTPATIENT)
Age: 83
End: 2023-09-07

## 2023-09-07 DIAGNOSIS — E11.21 TYPE 2 DIABETES MELLITUS WITH NEPHROPATHY (HCC): ICD-10-CM

## 2023-09-07 DIAGNOSIS — Z79.4 TYPE 2 DIABETES MELLITUS WITH DIABETIC NEUROPATHY, WITH LONG-TERM CURRENT USE OF INSULIN (HCC): ICD-10-CM

## 2023-09-07 DIAGNOSIS — E11.40 TYPE 2 DIABETES MELLITUS WITH DIABETIC NEUROPATHY, WITH LONG-TERM CURRENT USE OF INSULIN (HCC): ICD-10-CM

## 2023-09-07 LAB
ALBUMIN SERPL-MCNC: 3.2 G/DL (ref 3.5–5)
ALBUMIN/GLOB SERPL: 0.8 (ref 1.1–2.2)
ALP SERPL-CCNC: 103 U/L (ref 45–117)
ALT SERPL-CCNC: 19 U/L (ref 12–78)
ANION GAP SERPL CALC-SCNC: 4 MMOL/L (ref 5–15)
AST SERPL-CCNC: 16 U/L (ref 15–37)
BILIRUB SERPL-MCNC: 0.6 MG/DL (ref 0.2–1)
BUN SERPL-MCNC: 17 MG/DL (ref 6–20)
BUN/CREAT SERPL: 13 (ref 12–20)
CALCIUM SERPL-MCNC: 10.5 MG/DL (ref 8.5–10.1)
CHLORIDE SERPL-SCNC: 106 MMOL/L (ref 97–108)
CO2 SERPL-SCNC: 29 MMOL/L (ref 21–32)
CREAT SERPL-MCNC: 1.27 MG/DL (ref 0.55–1.02)
EST. AVERAGE GLUCOSE BLD GHB EST-MCNC: 183 MG/DL
GLOBULIN SER CALC-MCNC: 4.2 G/DL (ref 2–4)
GLUCOSE SERPL-MCNC: 168 MG/DL (ref 65–100)
HBA1C MFR BLD: 8 % (ref 4–5.6)
POTASSIUM SERPL-SCNC: 4.2 MMOL/L (ref 3.5–5.1)
PROT SERPL-MCNC: 7.4 G/DL (ref 6.4–8.2)
SODIUM SERPL-SCNC: 139 MMOL/L (ref 136–145)

## 2023-09-08 LAB — VIT B12 SERPL-MCNC: 1161 PG/ML (ref 193–986)

## 2023-09-13 ENCOUNTER — OFFICE VISIT (OUTPATIENT)
Age: 83
End: 2023-09-13
Payer: MEDICARE

## 2023-09-13 VITALS
TEMPERATURE: 97 F | HEART RATE: 81 BPM | HEIGHT: 62 IN | WEIGHT: 169.2 LBS | SYSTOLIC BLOOD PRESSURE: 131 MMHG | OXYGEN SATURATION: 96 % | DIASTOLIC BLOOD PRESSURE: 75 MMHG | BODY MASS INDEX: 31.14 KG/M2 | RESPIRATION RATE: 16 BRPM

## 2023-09-13 DIAGNOSIS — E78.2 MIXED HYPERLIPIDEMIA: ICD-10-CM

## 2023-09-13 DIAGNOSIS — E11.21 TYPE 2 DIABETES MELLITUS WITH NEPHROPATHY (HCC): Primary | ICD-10-CM

## 2023-09-13 DIAGNOSIS — Z23 NEEDS FLU SHOT: ICD-10-CM

## 2023-09-13 DIAGNOSIS — I10 ESSENTIAL HYPERTENSION, BENIGN: ICD-10-CM

## 2023-09-13 DIAGNOSIS — J44.9 CHRONIC OBSTRUCTIVE PULMONARY DISEASE, UNSPECIFIED COPD TYPE (HCC): ICD-10-CM

## 2023-09-13 DIAGNOSIS — Z79.4 TYPE 2 DIABETES MELLITUS WITH DIABETIC NEUROPATHY, WITH LONG-TERM CURRENT USE OF INSULIN (HCC): ICD-10-CM

## 2023-09-13 DIAGNOSIS — D64.9 ANEMIA, UNSPECIFIED TYPE: ICD-10-CM

## 2023-09-13 DIAGNOSIS — E11.40 TYPE 2 DIABETES MELLITUS WITH DIABETIC NEUROPATHY, WITH LONG-TERM CURRENT USE OF INSULIN (HCC): ICD-10-CM

## 2023-09-13 DIAGNOSIS — E03.9 ACQUIRED HYPOTHYROIDISM: ICD-10-CM

## 2023-09-13 DIAGNOSIS — N18.32 STAGE 3B CHRONIC KIDNEY DISEASE (HCC): ICD-10-CM

## 2023-09-13 DIAGNOSIS — I25.10 CORONARY ARTERY DISEASE INVOLVING NATIVE CORONARY ARTERY OF NATIVE HEART WITHOUT ANGINA PECTORIS: ICD-10-CM

## 2023-09-13 DIAGNOSIS — M81.0 AGE-RELATED OSTEOPOROSIS WITHOUT CURRENT PATHOLOGICAL FRACTURE: ICD-10-CM

## 2023-09-13 DIAGNOSIS — I73.9 PVD (PERIPHERAL VASCULAR DISEASE) (HCC): ICD-10-CM

## 2023-09-13 DIAGNOSIS — I50.32 DIASTOLIC CHF, CHRONIC (HCC): ICD-10-CM

## 2023-09-13 DIAGNOSIS — F33.9 RECURRENT DEPRESSION (HCC): ICD-10-CM

## 2023-09-13 PROCEDURE — 3023F SPIROM DOC REV: CPT | Performed by: INTERNAL MEDICINE

## 2023-09-13 PROCEDURE — G8417 CALC BMI ABV UP PARAM F/U: HCPCS | Performed by: INTERNAL MEDICINE

## 2023-09-13 PROCEDURE — G0008 ADMIN INFLUENZA VIRUS VAC: HCPCS | Performed by: INTERNAL MEDICINE

## 2023-09-13 PROCEDURE — 99214 OFFICE O/P EST MOD 30 MIN: CPT | Performed by: INTERNAL MEDICINE

## 2023-09-13 PROCEDURE — 1123F ACP DISCUSS/DSCN MKR DOCD: CPT | Performed by: INTERNAL MEDICINE

## 2023-09-13 PROCEDURE — G8427 DOCREV CUR MEDS BY ELIG CLIN: HCPCS | Performed by: INTERNAL MEDICINE

## 2023-09-13 PROCEDURE — G8399 PT W/DXA RESULTS DOCUMENT: HCPCS | Performed by: INTERNAL MEDICINE

## 2023-09-13 PROCEDURE — 3052F HG A1C>EQUAL 8.0%<EQUAL 9.0%: CPT | Performed by: INTERNAL MEDICINE

## 2023-09-13 PROCEDURE — 1036F TOBACCO NON-USER: CPT | Performed by: INTERNAL MEDICINE

## 2023-09-13 PROCEDURE — 1090F PRES/ABSN URINE INCON ASSESS: CPT | Performed by: INTERNAL MEDICINE

## 2023-09-13 PROCEDURE — 3075F SYST BP GE 130 - 139MM HG: CPT | Performed by: INTERNAL MEDICINE

## 2023-09-13 PROCEDURE — 90694 VACC AIIV4 NO PRSRV 0.5ML IM: CPT | Performed by: INTERNAL MEDICINE

## 2023-09-13 PROCEDURE — 3078F DIAST BP <80 MM HG: CPT | Performed by: INTERNAL MEDICINE

## 2023-09-13 RX ORDER — AMLODIPINE BESYLATE 5 MG/1
TABLET ORAL
Qty: 90 TABLET | Refills: 1 | Status: SHIPPED | OUTPATIENT
Start: 2023-09-13

## 2023-09-13 RX ORDER — AMLODIPINE BESYLATE 5 MG/1
5 TABLET ORAL DAILY
Qty: 90 TABLET | Refills: 1 | Status: SHIPPED | OUTPATIENT
Start: 2023-09-13

## 2023-09-13 SDOH — ECONOMIC STABILITY: FOOD INSECURITY: WITHIN THE PAST 12 MONTHS, YOU WORRIED THAT YOUR FOOD WOULD RUN OUT BEFORE YOU GOT MONEY TO BUY MORE.: NEVER TRUE

## 2023-09-13 SDOH — ECONOMIC STABILITY: FOOD INSECURITY: WITHIN THE PAST 12 MONTHS, THE FOOD YOU BOUGHT JUST DIDN'T LAST AND YOU DIDN'T HAVE MONEY TO GET MORE.: NEVER TRUE

## 2023-09-13 SDOH — ECONOMIC STABILITY: INCOME INSECURITY: HOW HARD IS IT FOR YOU TO PAY FOR THE VERY BASICS LIKE FOOD, HOUSING, MEDICAL CARE, AND HEATING?: NOT HARD AT ALL

## 2023-09-13 ASSESSMENT — PATIENT HEALTH QUESTIONNAIRE - PHQ9
SUM OF ALL RESPONSES TO PHQ QUESTIONS 1-9: 0
1. LITTLE INTEREST OR PLEASURE IN DOING THINGS: 0
SUM OF ALL RESPONSES TO PHQ QUESTIONS 1-9: 0
SUM OF ALL RESPONSES TO PHQ QUESTIONS 1-9: 0
SUM OF ALL RESPONSES TO PHQ9 QUESTIONS 1 & 2: 0
SUM OF ALL RESPONSES TO PHQ QUESTIONS 1-9: 0
2. FEELING DOWN, DEPRESSED OR HOPELESS: 0

## 2023-09-15 ENCOUNTER — TELEPHONE (OUTPATIENT)
Age: 83
End: 2023-09-15

## 2023-09-15 NOTE — TELEPHONE ENCOUNTER
F/U Regarding visit on:   9/13/2023       Caller states:  Concern:   States read she is \"anaemic\" but nothing mentioned to her, asks what she should be doing for this/weakness    Request:   Call to advise

## 2023-09-15 NOTE — TELEPHONE ENCOUNTER
Called patient, verified two patient identifiers. Informed pt dx of anemia is from a previous Gi bleed  Informed Dr. Chris Montano IS aware of this and tracks CBC lab to monitor this issue. Pt states that she still feels very sluggish, weak and tired. Pt states she sleeps enough, drinks plenty of water and takes her b12 compliantly. Pt states she would like to know if theres anything else she can do for this issue.

## 2023-09-19 NOTE — TELEPHONE ENCOUNTER
Informed pt per Dr. Vero Mcmanus \"Patient is NOT currently anemic, she WAS anemic previously from GI bleed in the past,     Last cbc was normal     A repeat level was ordered with her next set of labs \"    Patient states she would like to know what to do about feeling fatigued/ drained all the time.  If theres any other meds to take, please review and advise

## 2023-09-21 NOTE — TELEPHONE ENCOUNTER
Called patient, verified two patient identifiers. Informed pt per Dr. Robert Sifuentes \" She is a scheduled follow-up with her sleep specialist to make sure her CPAP is correctly titrated     She will be seeing her cardiologist next month to ensure he does not want do any additional cardiac testing     She had just had some cold symptoms when I saw her so we are getting a bit a little bit more time     There is not a medication that I have that specifically brings up energy     If there are any new specific symptoms or any worsening upper respiratory symptoms let us know\"    Patient states no acute respiratory symptoms currently but will let us know if they begin, states will discuss DrSammi Follow ups with specialists next month. Pt states no further questions at this time.

## 2023-09-21 NOTE — TELEPHONE ENCOUNTER
Caller is returning a call from clinical team regarding Advice about weakness    Clinical team not available to take the call    Please call pt back.

## 2023-10-01 RX ORDER — ALBUTEROL SULFATE 90 UG/1
AEROSOL, METERED RESPIRATORY (INHALATION)
Qty: 1 EACH | Refills: 0 | Status: SHIPPED | OUTPATIENT
Start: 2023-10-01

## 2023-10-27 DIAGNOSIS — G62.9 NEUROPATHY: Primary | ICD-10-CM

## 2023-10-27 NOTE — TELEPHONE ENCOUNTER
gabapentin (NEURONTIN) 100 MG capsule    [facility]     Pt states pharm has tried to get for days and pt is out of medication.

## 2023-10-27 NOTE — TELEPHONE ENCOUNTER
Future Appointments:  Future Appointments   Date Time Provider [address]   12/5/2023 11:30 AM Yolanda Moncada MD [facility] BS AMB   2/2/2024 11:40 AM Lissett Patino, OSMAN - NP [facility] BS AMB        Last Appointment With Me:  9/13/2023     Requested Prescriptions     Pending Prescriptions Disp Refills    gabapentin (NEURONTIN) 100 MG capsule 90 capsule 1     Sig: TAKE 2 CAPSULES TWICE DAILY

## 2023-10-29 RX ORDER — GABAPENTIN 100 MG/1
CAPSULE ORAL
Qty: 180 CAPSULE | Refills: 1 | Status: SHIPPED | OUTPATIENT
Start: 2023-10-29 | End: 2023-12-29

## 2023-12-26 RX ORDER — ATORVASTATIN CALCIUM 80 MG/1
TABLET, FILM COATED ORAL
Qty: 90 TABLET | Refills: 3 | Status: SHIPPED | OUTPATIENT
Start: 2023-12-26

## 2023-12-26 RX ORDER — ALBUTEROL SULFATE 90 UG/1
AEROSOL, METERED RESPIRATORY (INHALATION)
Qty: 1 EACH | Refills: 3 | Status: SHIPPED | OUTPATIENT
Start: 2023-12-26

## 2023-12-29 RX ORDER — OMEPRAZOLE 20 MG/1
20 CAPSULE, DELAYED RELEASE ORAL DAILY
Qty: 90 CAPSULE | Refills: 3 | Status: SHIPPED | OUTPATIENT
Start: 2023-12-29

## 2024-01-04 ENCOUNTER — NURSE ONLY (OUTPATIENT)
Age: 84
End: 2024-01-04

## 2024-01-04 DIAGNOSIS — E78.2 MIXED HYPERLIPIDEMIA: ICD-10-CM

## 2024-01-04 DIAGNOSIS — E03.9 ACQUIRED HYPOTHYROIDISM: ICD-10-CM

## 2024-01-04 DIAGNOSIS — Z79.4 TYPE 2 DIABETES MELLITUS WITH DIABETIC NEUROPATHY, WITH LONG-TERM CURRENT USE OF INSULIN (HCC): ICD-10-CM

## 2024-01-04 DIAGNOSIS — E11.40 TYPE 2 DIABETES MELLITUS WITH DIABETIC NEUROPATHY, WITH LONG-TERM CURRENT USE OF INSULIN (HCC): ICD-10-CM

## 2024-01-04 DIAGNOSIS — I10 ESSENTIAL HYPERTENSION, BENIGN: ICD-10-CM

## 2024-01-04 DIAGNOSIS — E11.21 TYPE 2 DIABETES MELLITUS WITH NEPHROPATHY (HCC): ICD-10-CM

## 2024-01-05 LAB
ANION GAP SERPL CALC-SCNC: 6 MMOL/L (ref 5–15)
BUN SERPL-MCNC: 17 MG/DL (ref 6–20)
BUN/CREAT SERPL: 14 (ref 12–20)
CALCIUM SERPL-MCNC: 10.3 MG/DL (ref 8.5–10.1)
CHLORIDE SERPL-SCNC: 111 MMOL/L (ref 97–108)
CHOLEST SERPL-MCNC: 165 MG/DL
CO2 SERPL-SCNC: 27 MMOL/L (ref 21–32)
CREAT SERPL-MCNC: 1.25 MG/DL (ref 0.55–1.02)
ERYTHROCYTE [DISTWIDTH] IN BLOOD BY AUTOMATED COUNT: 15.1 % (ref 11.5–14.5)
EST. AVERAGE GLUCOSE BLD GHB EST-MCNC: 166 MG/DL
GLUCOSE SERPL-MCNC: 102 MG/DL (ref 65–100)
HBA1C MFR BLD: 7.4 % (ref 4–5.6)
HCT VFR BLD AUTO: 43.5 % (ref 35–47)
HDLC SERPL-MCNC: 68 MG/DL
HDLC SERPL: 2.4 (ref 0–5)
HGB BLD-MCNC: 13.9 G/DL (ref 11.5–16)
LDLC SERPL CALC-MCNC: 76.8 MG/DL (ref 0–100)
MCH RBC QN AUTO: 29 PG (ref 26–34)
MCHC RBC AUTO-ENTMCNC: 32 G/DL (ref 30–36.5)
MCV RBC AUTO: 90.8 FL (ref 80–99)
NRBC # BLD: 0 K/UL (ref 0–0.01)
NRBC BLD-RTO: 0 PER 100 WBC
PLATELET # BLD AUTO: 164 K/UL (ref 150–400)
POTASSIUM SERPL-SCNC: 4.8 MMOL/L (ref 3.5–5.1)
RBC # BLD AUTO: 4.79 M/UL (ref 3.8–5.2)
SODIUM SERPL-SCNC: 144 MMOL/L (ref 136–145)
TRIGL SERPL-MCNC: 101 MG/DL
TSH SERPL DL<=0.05 MIU/L-ACNC: 0.44 UIU/ML (ref 0.36–3.74)
VLDLC SERPL CALC-MCNC: 20.2 MG/DL
WBC # BLD AUTO: 7.6 K/UL (ref 3.6–11)

## 2024-01-05 ASSESSMENT — ENCOUNTER SYMPTOMS: SHORTNESS OF BREATH: 0

## 2024-01-07 NOTE — PATIENT INSTRUCTIONS
health problems such as diabetes, high blood pressure, and high cholesterol. If you think you may have a problem with alcohol or drug use, talk to your doctor.   Medicines    Take your medicines exactly as prescribed. Call your doctor if you think you are having a problem with your medicine.     If your doctor recommends aspirin, take the amount directed each day. Make sure you take aspirin and not another kind of pain reliever, such as acetaminophen (Tylenol).   When should you call for help?   Call 911 if you have symptoms of a heart attack. These may include:    Chest pain or pressure, or a strange feeling in the chest.     Sweating.     Shortness of breath.     Pain, pressure, or a strange feeling in the back, neck, jaw, or upper belly or in one or both shoulders or arms.     Lightheadedness or sudden weakness.     A fast or irregular heartbeat.   After you call 911, the  may tell you to chew 1 adult-strength or 2 to 4 low-dose aspirin. Wait for an ambulance. Do not try to drive yourself.  Watch closely for changes in your health, and be sure to contact your doctor if you have any problems.  Where can you learn more?  Go to https://www.[email or web address].net/patientEd and enter F075 to learn more about \"A Healthy Heart: Care Instructions.\"  Current as of: June 25, 2023               Content Version: 13.9  © 0084-7667 [employer].   Care instructions adapted under license by [employer]. If you have questions about a medical condition or this instruction, always ask your healthcare professional. [employer] disclaims any warranty or liability for your use of this information.      Personalized Preventive Plan for Ketan Pritchett - 1/10/2024  Medicare offers a range of preventive health benefits. Some of the tests and screenings are paid in full while other may be subject to a deductible, co-insurance, and/or copay.    Some of these benefits include a comprehensive review of your medical

## 2024-01-07 NOTE — PROGRESS NOTES
Medicare Annual Wellness Visit    Ketan Pritchett is here for Medicare AWV    Assessment & Plan   Type 2 diabetes mellitus with diabetic neuropathy, with long-term current use of insulin ([facility])  -     Comprehensive Metabolic Panel; Future  -     Hemoglobin A1C; Future  -     Microalbumin / Creatinine Urine Ratio; Future  -     HM DIABETES FOOT EXAM  Medicare annual wellness visit, subsequent  Mixed hyperlipidemia  -     Comprehensive Metabolic Panel; Future  -     Hemoglobin A1C; Future  -     Microalbumin / Creatinine Urine Ratio; Future  Chronic obstructive pulmonary disease, unspecified COPD type ([facility])  -     Comprehensive Metabolic Panel; Future  -     Hemoglobin A1C; Future  -     Microalbumin / Creatinine Urine Ratio; Future  Diastolic CHF, chronic ([facility])  Recurrent depression ([facility])  PVD (peripheral vascular disease) ([facility])  Stage 3b chronic kidney disease ([facility])  -     Comprehensive Metabolic Panel; Future  -     Hemoglobin A1C; Future  -     Microalbumin / Creatinine Urine Ratio; Future  Essential hypertension, benign  Type 2 diabetes mellitus with nephropathy ([facility])  Acquired hypothyroidism  Age-related osteoporosis without current pathological fracture  VANESSA (obstructive sleep apnea)  Gastroesophageal reflux disease without esophagitis  Neuropathy     Recommendations for Preventive Services Due: see orders and patient instructions/AVS.  Recommended screening schedule for the next 5-10 years is provided to the patient in written form: see Patient Instructions/AVS.     Return in 3 months (on 4/10/2024).     Subjective       Patient's complete Health Risk Assessment and screening values have been reviewed and are found in Flowsheets. The following problems were reviewed today and where indicated follow up appointments were made and/or referrals ordered.    Positive Risk Factor Screenings with Interventions:    Fall Risk:  Do you feel unsteady or are you worried about falling? : (!) yes  2 or more falls in past 
nephropathy (HCC)    Recurrent depression (HCC)    PVD (peripheral vascular disease) (HCC)    Diverticulosis of intestine with bleeding    CAD (coronary artery disease)    Osteoporosis    Anemia    Mixed hyperlipidemia    Diastolic CHF, chronic (HCC)    S/P CABG x 3    Type 2 diabetes mellitus with diabetic neuropathy (HCC)    Chronic obstructive pulmonary disease, unspecified (HCC)    Chronic renal disease, stage III ([facility])     Current Outpatient Medications   Medication Sig Dispense Refill    omeprazole (PRILOSEC) 20 MG delayed release capsule TAKE 1 CAPSULE EVERY DAY 90 capsule 3    albuterol sulfate HFA (PROVENTIL;VENTOLIN;PROAIR) 108 (90 Base) MCG/ACT inhaler INHALE 1 PUFF EVERY 6 HOURS AS NEEDED FOR WHEEZING 1 each 3    atorvastatin (LIPITOR) 80 MG tablet TAKE 1 TABLET EVERY NIGHT 90 tablet 3    gabapentin (NEURONTIN) 100 MG capsule TAKE 2 CAPSULES TWICE DAILY 180 capsule 1    metFORMIN (GLUCOPHAGE) 1000 MG tablet TAKE 1 TABLET TWICE DAILY WITH MEALS 180 tablet 0    amLODIPine (NORVASC) 5 MG tablet TAKE 1 TABLET EVERY DAY 90 tablet 1    amLODIPine (NORVASC) 5 MG tablet Take 1 tablet by mouth daily 90 tablet 1    metFORMIN (GLUCOPHAGE) 1000 MG tablet Take 1 tablet by mouth with breakfast and with evening meal 180 tablet 0    metoprolol tartrate (LOPRESSOR) 50 MG tablet TAKE 1 TABLET TWICE DAILY 180 tablet 0    levothyroxine (SYNTHROID) 100 MCG tablet TAKE 1 TABLET DAILY (BEFORE BREAKFAST). 90 tablet 0    aspirin 81 MG chewable tablet Take by mouth daily      vitamin D 25 MCG (1000 UT) CAPS Take by mouth daily      cyanocobalamin 1000 MCG tablet Take by mouth daily      ezetimibe (ZETIA) 10 MG tablet TAKE 1 TABLET EVERY DAY      insulin 70-30 (HUMULIN;NOVOLIN) (70-30) 100 UNIT per ML injection vial States takes 15 units BID.      umeclidinium-vilanterol (ANORO ELLIPTA) 62.5-25 MCG/ACT inhaler Inhale 1 puff into the lungs daily       No current facility-administered medications for this visit.     Past Surgical

## 2024-01-10 ENCOUNTER — OFFICE VISIT (OUTPATIENT)
Age: 84
End: 2024-01-10
Payer: MEDICARE

## 2024-01-10 VITALS
DIASTOLIC BLOOD PRESSURE: 81 MMHG | RESPIRATION RATE: 18 BRPM | TEMPERATURE: 98 F | HEIGHT: 62 IN | SYSTOLIC BLOOD PRESSURE: 129 MMHG | OXYGEN SATURATION: 97 % | HEART RATE: 64 BPM | BODY MASS INDEX: 30.95 KG/M2

## 2024-01-10 DIAGNOSIS — G62.9 NEUROPATHY: ICD-10-CM

## 2024-01-10 DIAGNOSIS — I73.9 PVD (PERIPHERAL VASCULAR DISEASE) (HCC): ICD-10-CM

## 2024-01-10 DIAGNOSIS — F33.9 RECURRENT DEPRESSION (HCC): ICD-10-CM

## 2024-01-10 DIAGNOSIS — Z79.4 TYPE 2 DIABETES MELLITUS WITH DIABETIC NEUROPATHY, WITH LONG-TERM CURRENT USE OF INSULIN (HCC): Primary | ICD-10-CM

## 2024-01-10 DIAGNOSIS — E78.2 MIXED HYPERLIPIDEMIA: ICD-10-CM

## 2024-01-10 DIAGNOSIS — E11.40 TYPE 2 DIABETES MELLITUS WITH DIABETIC NEUROPATHY, WITH LONG-TERM CURRENT USE OF INSULIN (HCC): Primary | ICD-10-CM

## 2024-01-10 DIAGNOSIS — E11.21 TYPE 2 DIABETES MELLITUS WITH NEPHROPATHY (HCC): ICD-10-CM

## 2024-01-10 DIAGNOSIS — M81.0 AGE-RELATED OSTEOPOROSIS WITHOUT CURRENT PATHOLOGICAL FRACTURE: ICD-10-CM

## 2024-01-10 DIAGNOSIS — Z00.00 MEDICARE ANNUAL WELLNESS VISIT, SUBSEQUENT: ICD-10-CM

## 2024-01-10 DIAGNOSIS — K21.9 GASTROESOPHAGEAL REFLUX DISEASE WITHOUT ESOPHAGITIS: ICD-10-CM

## 2024-01-10 DIAGNOSIS — I50.32 DIASTOLIC CHF, CHRONIC (HCC): ICD-10-CM

## 2024-01-10 DIAGNOSIS — I10 ESSENTIAL HYPERTENSION, BENIGN: ICD-10-CM

## 2024-01-10 DIAGNOSIS — N18.32 STAGE 3B CHRONIC KIDNEY DISEASE (HCC): ICD-10-CM

## 2024-01-10 DIAGNOSIS — E03.9 ACQUIRED HYPOTHYROIDISM: ICD-10-CM

## 2024-01-10 DIAGNOSIS — J44.9 CHRONIC OBSTRUCTIVE PULMONARY DISEASE, UNSPECIFIED COPD TYPE (HCC): ICD-10-CM

## 2024-01-10 DIAGNOSIS — G47.33 OSA (OBSTRUCTIVE SLEEP APNEA): ICD-10-CM

## 2024-01-10 PROCEDURE — 99214 OFFICE O/P EST MOD 30 MIN: CPT | Performed by: INTERNAL MEDICINE

## 2024-01-10 RX ORDER — METOPROLOL SUCCINATE 25 MG/1
25 TABLET, EXTENDED RELEASE ORAL NIGHTLY
COMMUNITY
Start: 2023-10-20

## 2024-01-10 ASSESSMENT — PATIENT HEALTH QUESTIONNAIRE - PHQ9
10. IF YOU CHECKED OFF ANY PROBLEMS, HOW DIFFICULT HAVE THESE PROBLEMS MADE IT FOR YOU TO DO YOUR WORK, TAKE CARE OF THINGS AT HOME, OR GET ALONG WITH OTHER PEOPLE: 0
SUM OF ALL RESPONSES TO PHQ QUESTIONS 1-9: 0
1. LITTLE INTEREST OR PLEASURE IN DOING THINGS: 0
SUM OF ALL RESPONSES TO PHQ QUESTIONS 1-9: 0

## 2024-01-10 ASSESSMENT — LIFESTYLE VARIABLES
HOW MANY STANDARD DRINKS CONTAINING ALCOHOL DO YOU HAVE ON A TYPICAL DAY: PATIENT DOES NOT DRINK
HOW OFTEN DO YOU HAVE A DRINK CONTAINING ALCOHOL: NEVER

## 2024-01-20 DIAGNOSIS — G62.9 NEUROPATHY: ICD-10-CM

## 2024-01-23 RX ORDER — GABAPENTIN 100 MG/1
CAPSULE ORAL
Qty: 180 CAPSULE | Refills: 0 | Status: SHIPPED | OUTPATIENT
Start: 2024-01-23 | End: 2024-03-21 | Stop reason: SDUPTHER

## 2024-01-23 RX ORDER — LEVOTHYROXINE SODIUM 0.1 MG/1
TABLET ORAL
Qty: 90 TABLET | Refills: 3 | Status: SHIPPED | OUTPATIENT
Start: 2024-01-23

## 2024-02-02 ENCOUNTER — CLINICAL DOCUMENTATION (OUTPATIENT)
Age: 84
End: 2024-02-02

## 2024-02-02 ENCOUNTER — OFFICE VISIT (OUTPATIENT)
Age: 84
End: 2024-02-02
Payer: MEDICARE

## 2024-02-02 VITALS
HEART RATE: 82 BPM | SYSTOLIC BLOOD PRESSURE: 164 MMHG | TEMPERATURE: 97.6 F | OXYGEN SATURATION: 94 % | DIASTOLIC BLOOD PRESSURE: 77 MMHG | WEIGHT: 173.2 LBS | BODY MASS INDEX: 31.68 KG/M2

## 2024-02-02 DIAGNOSIS — I10 ESSENTIAL (PRIMARY) HYPERTENSION: ICD-10-CM

## 2024-02-02 DIAGNOSIS — G47.33 OBSTRUCTIVE SLEEP APNEA (ADULT) (PEDIATRIC): Primary | ICD-10-CM

## 2024-02-02 PROCEDURE — 3077F SYST BP >= 140 MM HG: CPT | Performed by: NURSE PRACTITIONER

## 2024-02-02 PROCEDURE — 1123F ACP DISCUSS/DSCN MKR DOCD: CPT | Performed by: NURSE PRACTITIONER

## 2024-02-02 PROCEDURE — G8399 PT W/DXA RESULTS DOCUMENT: HCPCS | Performed by: NURSE PRACTITIONER

## 2024-02-02 PROCEDURE — 3078F DIAST BP <80 MM HG: CPT | Performed by: NURSE PRACTITIONER

## 2024-02-02 PROCEDURE — 1090F PRES/ABSN URINE INCON ASSESS: CPT | Performed by: NURSE PRACTITIONER

## 2024-02-02 PROCEDURE — 1036F TOBACCO NON-USER: CPT | Performed by: NURSE PRACTITIONER

## 2024-02-02 PROCEDURE — G8427 DOCREV CUR MEDS BY ELIG CLIN: HCPCS | Performed by: NURSE PRACTITIONER

## 2024-02-02 PROCEDURE — G8417 CALC BMI ABV UP PARAM F/U: HCPCS | Performed by: NURSE PRACTITIONER

## 2024-02-02 PROCEDURE — 99214 OFFICE O/P EST MOD 30 MIN: CPT | Performed by: NURSE PRACTITIONER

## 2024-02-02 PROCEDURE — G8484 FLU IMMUNIZE NO ADMIN: HCPCS | Performed by: NURSE PRACTITIONER

## 2024-02-02 ASSESSMENT — SLEEP AND FATIGUE QUESTIONNAIRES
ESS TOTAL SCORE: 6
HOW LIKELY ARE YOU TO NOD OFF OR FALL ASLEEP WHEN YOU ARE A PASSENGER IN A CAR FOR AN HOUR WITHOUT A BREAK: 0
HOW LIKELY ARE YOU TO NOD OFF OR FALL ASLEEP WHILE SITTING AND TALKING TO SOMEONE: 0
HOW LIKELY ARE YOU TO NOD OFF OR FALL ASLEEP WHILE SITTING QUIETLY AFTER LUNCH WITHOUT ALCOHOL: 1
HOW LIKELY ARE YOU TO NOD OFF OR FALL ASLEEP IN A CAR, WHILE STOPPED FOR A FEW MINUTES IN TRAFFIC: 0
HOW LIKELY ARE YOU TO NOD OFF OR FALL ASLEEP WHILE SITTING AND READING: 1
HOW LIKELY ARE YOU TO NOD OFF OR FALL ASLEEP WHILE WATCHING TV: 1
HOW LIKELY ARE YOU TO NOD OFF OR FALL ASLEEP WHILE LYING DOWN TO REST IN THE AFTERNOON WHEN CIRCUMSTANCES PERMIT: 2
HOW LIKELY ARE YOU TO NOD OFF OR FALL ASLEEP WHILE SITTING INACTIVE IN A PUBLIC PLACE: 1

## 2024-02-02 NOTE — PROGRESS NOTES
[address]   [place] [address]   Tel.  [phone]   Fax. [phone]  [address]   [place] [address]   Tel.  [phone]   Fax. [phone] [address] [place] [address]   Tel.  [phone]   Fax. [phone]     Ketan Pritchett (: 1940) is a 83 y.o. female, established patient, seen for positive airway pressure follow-up and evaluation.  She was last seen by Dr. Owens on 2022, prior notes and sleep testing reviewed in detail.  Home sleep test 2021 showed AHI of 6.4/hr with a lowest SpO2 of 74%, duration of SpO2 < 88% 28.1 min.  Weight at time of sleep testing 186 pounds.  BiPAP titration 3/2021 showed CPAP failure, BiPAP therapy increased to 12/7 cm H2O.  Weight at time of sleep testing 187 pounds.      ASSESSMENT/PLAN:   Diagnosis Orders   1. Obstructive sleep apnea (adult) (pediatric)  DME Order for (Specify) as OP      2. Essential (primary) hypertension        3. Adult BMI 31.0-31.9 kg/sq m            AHI = 6.4(2021).  On Resmed BiPAP :  Max IPAP 13, Min EPAP 6, PS 5 cmH2O. Set up 2021.    She is adherent with PAP therapy and PAP continues to benefit patient and remains necessary for control of her sleep apnea.     Follow-up and Dispositions    Return in about 1 year (around 2025) for Annual follow up.         Sleep Apnea well controlled, continue with current pressures at BiPAP  Max IPAP 13, Min EPAP 6, PS 5  cmH2O,  Sleep apnea treatment is causing negative side effects.  Change in treatment plan is needed.  Change mask style.    * tech to check mask fit     * Supplies - Nasal mask and heated tubing    Orders Placed This Encounter   Procedures    DME Order for (Specify) as OP     Diagnosis: (G47.33) VANESSA (obstructive sleep apnea)  (primary encounter diagnosis)     Replacement Supplies for Positive Airway Pressure Therapy Device:   Duration of need: 99 months.        Nasal Cushion (Replace) 2 per month.

## 2024-02-08 ENCOUNTER — CLINICAL DOCUMENTATION (OUTPATIENT)
Age: 84
End: 2024-02-08

## 2024-02-28 ENCOUNTER — TELEPHONE (OUTPATIENT)
Age: 84
End: 2024-02-28

## 2024-02-28 NOTE — TELEPHONE ENCOUNTER
Pooja Rashid RN case manager states that pt's b/p was 176/88 on 7-27-24.    Today while on phone 123/108 right 165/105 left     Please call pt to advise what she needs to do.  Thanks.

## 2024-02-28 NOTE — TELEPHONE ENCOUNTER
Called, Spoke with Pt  Received two pt identifiers  Pt informed per Dr. Farnsworth come in for a NV BP check  Pt verbalizes understanding of the instructions and has no further questions at this time.     PAST SURGICAL HISTORY:  No significant past surgical history

## 2024-02-29 ENCOUNTER — NURSE ONLY (OUTPATIENT)
Age: 84
End: 2024-02-29

## 2024-02-29 VITALS
DIASTOLIC BLOOD PRESSURE: 96 MMHG | SYSTOLIC BLOOD PRESSURE: 178 MMHG | HEART RATE: 90 BPM | OXYGEN SATURATION: 97 % | TEMPERATURE: 97.3 F

## 2024-02-29 DIAGNOSIS — Z01.30 BLOOD PRESSURE CHECK: Primary | ICD-10-CM

## 2024-02-29 NOTE — TELEPHONE ENCOUNTER
Called, Spoke with Pt  Received two pt identifiers  Pt informed per Dr. Farnsworth due to HTN at NV, increase norvasc to 10mg  Pt verbalizes understanding of the instructions and has no further questions at this time.

## 2024-02-29 NOTE — PROGRESS NOTES
Pt presents in clinic for BP check per Dr. Farnsworth.  BP taken--see VS.  BP (!) 178/96   Pulse 90   Temp 97.3 °F (36.3 °C) (Temporal)   SpO2 97%     Pt informed Dr. Farnsworth will be notified.    Pt informed if Dr. Farnsworth makes any adjustments, pt will be contacted.   Pt verbalized understanding of information discussed w/ no further questions at this time.

## 2024-03-01 RX ORDER — AMLODIPINE BESYLATE 10 MG/1
10 TABLET ORAL DAILY
Qty: 90 TABLET | Refills: 0 | Status: SHIPPED | OUTPATIENT
Start: 2024-03-01

## 2024-03-06 ENCOUNTER — TELEPHONE (OUTPATIENT)
Age: 84
End: 2024-03-06

## 2024-03-06 NOTE — TELEPHONE ENCOUNTER
Patient states she needs a call back to discuss Elevated Blood Pressure yesterday/Last night of 186/97 & this Morning of 187/90. Please call to discuss & advise Plan of Care. Thank you

## 2024-03-06 NOTE — TELEPHONE ENCOUNTER
Called, Spoke with Pt  Received two pt identifiers  BP is still elevated while take the 10mg Amlodipine  Last night at 9pm before going to sleep was 186/97  This morning at 9:30 was 187/90  Pt is also having slight headaches which she usually never gets  Advised pt will send message to Dr. Farnsworth to see about next steps  Pt states if needs appt cannot come today but can do an enhanced visit on Friday  Advised pt will callback later  Pt verbalizes understanding of the instructions and has no further questions at this time.

## 2024-03-07 NOTE — TELEPHONE ENCOUNTER
Called, Spoke with Pt  Received two pt identifiers  Pt offered and accepted virtual appt for 03/08/24 @ 1215  Visit will be an ENHANCED VV and will be here by 1130  Pt verbalizes understanding of the instructions and has no further questions at this time.

## 2024-03-08 ENCOUNTER — TELEMEDICINE (OUTPATIENT)
Age: 84
End: 2024-03-08
Payer: MEDICARE

## 2024-03-08 VITALS
SYSTOLIC BLOOD PRESSURE: 120 MMHG | OXYGEN SATURATION: 97 % | TEMPERATURE: 97.8 F | WEIGHT: 170 LBS | HEART RATE: 70 BPM | HEIGHT: 62 IN | BODY MASS INDEX: 31.28 KG/M2 | DIASTOLIC BLOOD PRESSURE: 75 MMHG | RESPIRATION RATE: 15 BRPM

## 2024-03-08 DIAGNOSIS — F41.9 ANXIETY: ICD-10-CM

## 2024-03-08 DIAGNOSIS — I10 ESSENTIAL HYPERTENSION, BENIGN: Primary | ICD-10-CM

## 2024-03-08 DIAGNOSIS — F33.9 RECURRENT DEPRESSION (HCC): ICD-10-CM

## 2024-03-08 DIAGNOSIS — E11.21 TYPE 2 DIABETES MELLITUS WITH NEPHROPATHY (HCC): ICD-10-CM

## 2024-03-08 PROCEDURE — G8399 PT W/DXA RESULTS DOCUMENT: HCPCS | Performed by: INTERNAL MEDICINE

## 2024-03-08 PROCEDURE — 1090F PRES/ABSN URINE INCON ASSESS: CPT | Performed by: INTERNAL MEDICINE

## 2024-03-08 PROCEDURE — 3074F SYST BP LT 130 MM HG: CPT | Performed by: INTERNAL MEDICINE

## 2024-03-08 PROCEDURE — 3051F HG A1C>EQUAL 7.0%<8.0%: CPT | Performed by: INTERNAL MEDICINE

## 2024-03-08 PROCEDURE — 3078F DIAST BP <80 MM HG: CPT | Performed by: INTERNAL MEDICINE

## 2024-03-08 PROCEDURE — 1123F ACP DISCUSS/DSCN MKR DOCD: CPT | Performed by: INTERNAL MEDICINE

## 2024-03-08 PROCEDURE — G8427 DOCREV CUR MEDS BY ELIG CLIN: HCPCS | Performed by: INTERNAL MEDICINE

## 2024-03-08 PROCEDURE — 99214 OFFICE O/P EST MOD 30 MIN: CPT | Performed by: INTERNAL MEDICINE

## 2024-03-08 RX ORDER — LORAZEPAM 0.5 MG/1
0.5 TABLET ORAL DAILY PRN
Qty: 20 TABLET | Refills: 0 | Status: SHIPPED | OUTPATIENT
Start: 2024-03-08 | End: 2024-04-07

## 2024-03-08 RX ORDER — SERTRALINE HYDROCHLORIDE 25 MG/1
25 TABLET, FILM COATED ORAL DAILY
Qty: 30 TABLET | Refills: 3 | Status: SHIPPED | OUTPATIENT
Start: 2024-03-08

## 2024-03-08 ASSESSMENT — PATIENT HEALTH QUESTIONNAIRE - PHQ9
SUM OF ALL RESPONSES TO PHQ QUESTIONS 1-9: 2
2. FEELING DOWN, DEPRESSED OR HOPELESS: 1
SUM OF ALL RESPONSES TO PHQ QUESTIONS 1-9: 2
1. LITTLE INTEREST OR PLEASURE IN DOING THINGS: 1
SUM OF ALL RESPONSES TO PHQ9 QUESTIONS 1 & 2: 2
SUM OF ALL RESPONSES TO PHQ QUESTIONS 1-9: 2
SUM OF ALL RESPONSES TO PHQ QUESTIONS 1-9: 2

## 2024-03-08 ASSESSMENT — ENCOUNTER SYMPTOMS: SHORTNESS OF BREATH: 0

## 2024-03-08 NOTE — PROGRESS NOTES
\"Have you been to the ER, urgent care clinic since your last visit?  Hospitalized since your last visit?\"    NO    “Have you seen or consulted any other health care providers outside of [facility] since your last visit?”    NO        
readings good will repeat A1c prior to follow-up she has labs   3. Recurrent depression (HCC)     Start Zoloft 25 mg daily adjust further follow-up   4. Anxiety  LORazepam (ATIVAN) 0.5 MG tablet   Start Zoloft 25 mg daily will give Ativan for as needed use for stress up to once daily   I have reviewed the note documented by the scribe.  The services provided are my own.  The documentation is accurate     Depression screen reviewed and positive.  Starting Zoloft  Scribed by Lea Mitchell of [facility], as dictated by Dr. Kristel Farnsworth.    Current diagnosis and concerns discussed with pt at length. Pt understands risks and benefits or current treatment plan and medications, and accepts the treatment and medication with any possible risks. Pt asks appropriate questions, which were answered. Pt was instructed to call with any concerns or problems.    I have reviewed the note documented by the scribe. The services provided are my own. The documentation is accurate.  Ketan GERALD Pritchett, was evaluated through a synchronous (real-time) audio-video encounter. The patient (or guardian if applicable) is aware that this is a billable service, which includes applicable co-pays. This Virtual Visit was conducted with patient's (and/or legal guardian's) consent. Patient identification was verified, and a caregiver was present when appropriate.   The patient [facility] located at Facility [facility] [address] [place] [address]  [address]  [place] [address]  Provider was located at Home (Appt Dept State): [place]       --Lea Mitchell on 3/8/2024 at 9:55 AM  An electronic signature was used to authenticate this note.

## 2024-03-11 RX ORDER — SERTRALINE HYDROCHLORIDE 25 MG/1
25 TABLET, FILM COATED ORAL DAILY
Qty: 90 TABLET | Refills: 1 | Status: SHIPPED | OUTPATIENT
Start: 2024-03-11

## 2024-03-11 NOTE — TELEPHONE ENCOUNTER
Future Appointments:  Future Appointments   Date Time Provider Department Center   4/17/2024  1:45 PM Kristel Farnsworth MD [facility]   2/7/2025 11:40 AM Kristel Patino, APRN - NP [facility]        Last Appointment With Me:  3/8/2024     Requested Prescriptions     Pending Prescriptions Disp Refills    sertraline (ZOLOFT) 25 MG tablet 90 tablet 1     Sig: Take 1 tablet by mouth daily

## 2024-03-11 NOTE — TELEPHONE ENCOUNTER
Pt states medication from Friday, 3-8-24 should have gone to [facility] #[phone]      Pt states script was sent to incorrect pharm and needs to be called into [facility] right away.

## 2024-03-21 DIAGNOSIS — G62.9 NEUROPATHY: ICD-10-CM

## 2024-03-21 NOTE — TELEPHONE ENCOUNTER
Patient states she needs refill done for 90 day supplies w/refills thru [facility] for her Gabapentin (NEURONTIN) 100 MG capsule. Please call if any questions.Thank you      Patient reminded of [address] around on refills

## 2024-03-21 NOTE — TELEPHONE ENCOUNTER
Future Appointments:  Future Appointments   Date Time Provider Department Center   4/17/2024  1:45 PM Kristel Farnsworth MD [facility]   2/7/2025 11:40 AM Kristel Patino, APRN - NP [facility]        Last Appointment With Me:  3/8/2024     Requested Prescriptions     Pending Prescriptions Disp Refills    gabapentin (NEURONTIN) 100 MG capsule 180 capsule 0     Sig: TAKE 2 CAPSULES TWICE DAILY

## 2024-03-22 RX ORDER — EZETIMIBE 10 MG/1
TABLET ORAL
Qty: 90 TABLET | Refills: 3 | Status: SHIPPED | OUTPATIENT
Start: 2024-03-22

## 2024-03-22 RX ORDER — GABAPENTIN 100 MG/1
CAPSULE ORAL
Qty: 180 CAPSULE | Refills: 0 | Status: SHIPPED | OUTPATIENT
Start: 2024-03-22 | End: 2024-06-20

## 2024-03-24 RX ORDER — GABAPENTIN 100 MG/1
CAPSULE ORAL
Qty: 180 CAPSULE | Refills: 0 | Status: SHIPPED | OUTPATIENT
Start: 2024-03-24 | End: 2024-04-25

## 2024-04-12 DIAGNOSIS — E78.2 MIXED HYPERLIPIDEMIA: ICD-10-CM

## 2024-04-12 DIAGNOSIS — Z79.4 TYPE 2 DIABETES MELLITUS WITH DIABETIC NEUROPATHY, WITH LONG-TERM CURRENT USE OF INSULIN (HCC): ICD-10-CM

## 2024-04-12 DIAGNOSIS — N18.32 STAGE 3B CHRONIC KIDNEY DISEASE (HCC): ICD-10-CM

## 2024-04-12 DIAGNOSIS — E11.40 TYPE 2 DIABETES MELLITUS WITH DIABETIC NEUROPATHY, WITH LONG-TERM CURRENT USE OF INSULIN (HCC): ICD-10-CM

## 2024-04-12 DIAGNOSIS — J44.9 CHRONIC OBSTRUCTIVE PULMONARY DISEASE, UNSPECIFIED COPD TYPE (HCC): ICD-10-CM

## 2024-04-12 NOTE — PROGRESS NOTES
Dr Owens (sleep) for sleep apnea  She is compliant with bipap 4/24       Pt saw Dr. Olivo (ortho) for carpal tunnel   Had surgery for this 8/1/19   Last visit was 8/19/19           She was in the hospital from 12/04/21-12/06/21 for GI bleed  Pt is taking prilosec 20mg every other day working well      Takes OTC vit D daily      Continues synthroid 100mcg daily    She takes this med separately from all meds  Most recent TSH was 2.4       Continues on lipitor 80mg max dose for cholesterol   Pt is taking zetia 10mg once daily as well   Recall welchol was too expensive     Has a hx of depression and anxiety started Zoloft last office visit  She is on zoloft 25 mg, works well 4/24   Previously gave information for Chelsie Mendiola. Discussed medication could be something to think about if this continues to be a persistent problem.  No longer on lexapro  She also previously had ativan to use prn no longer has it at home we will go ahead and refill this     Pt is taking gabapentin 100 mg TID for pain in hand also helps with her foot pain     Now taking Fe supplement, 65 mg once daily      Former smoker, quit smoking in 2010     Pt lives alone      ACP on file. SDMs is her daughter Shelbie Pritchett.     PREVENTIVE:    AAA: 2/22 negative  Colonoscopy: Dr. Du, 12/6/21, severe diverticulosis   EGD: Dr. Du 12/6/21   Pap: 12/14, declines further   Mammogram: 6/23 neg, due 6/24, ordered  DEXA: 6/22/22 ostepenia, due 6/24, ordered  Tdap: 1/04/2016  Pneumovax: 01/29/20   Zesbyme81: 9/17  Zostavax: 10/10/2016  Shingrix: completed both  Flu shot: 09/13/23   Foot exam: 01/10/24   Microalbumin: 6/23   A1c:  12/22 7.1 2/23 8.3 6/23 8.3 9/23 8.0 1/24 7.4  Eye exam: Dr. Fran Tolentino 7/23, Dr Hendrix 3/24, will see him  5/9/24  Lipids: 1/24 LDL 76  Covid: 5 doses (Pfizer), most recent 10/23          Patient Active Problem List   Diagnosis    Hypothyroidism    Essential hypertension, benign    Type 2 diabetes mellitus with

## 2024-04-17 ENCOUNTER — OFFICE VISIT (OUTPATIENT)
Age: 84
End: 2024-04-17
Payer: MEDICARE

## 2024-04-17 VITALS
HEIGHT: 62 IN | BODY MASS INDEX: 30.73 KG/M2 | OXYGEN SATURATION: 97 % | TEMPERATURE: 98.4 F | SYSTOLIC BLOOD PRESSURE: 116 MMHG | HEART RATE: 70 BPM | WEIGHT: 167 LBS | DIASTOLIC BLOOD PRESSURE: 70 MMHG | RESPIRATION RATE: 20 BRPM

## 2024-04-17 DIAGNOSIS — Z12.39 BREAST SCREENING: ICD-10-CM

## 2024-04-17 DIAGNOSIS — N18.32 STAGE 3B CHRONIC KIDNEY DISEASE (HCC): ICD-10-CM

## 2024-04-17 DIAGNOSIS — G47.33 OSA (OBSTRUCTIVE SLEEP APNEA): ICD-10-CM

## 2024-04-17 DIAGNOSIS — M81.0 AGE-RELATED OSTEOPOROSIS WITHOUT CURRENT PATHOLOGICAL FRACTURE: ICD-10-CM

## 2024-04-17 DIAGNOSIS — J44.9 CHRONIC OBSTRUCTIVE PULMONARY DISEASE, UNSPECIFIED COPD TYPE (HCC): ICD-10-CM

## 2024-04-17 DIAGNOSIS — E78.2 MIXED HYPERLIPIDEMIA: ICD-10-CM

## 2024-04-17 DIAGNOSIS — I50.32 DIASTOLIC CHF, CHRONIC (HCC): ICD-10-CM

## 2024-04-17 DIAGNOSIS — E03.9 ACQUIRED HYPOTHYROIDISM: ICD-10-CM

## 2024-04-17 DIAGNOSIS — E11.21 TYPE 2 DIABETES MELLITUS WITH NEPHROPATHY (HCC): ICD-10-CM

## 2024-04-17 DIAGNOSIS — G62.9 NEUROPATHY: ICD-10-CM

## 2024-04-17 DIAGNOSIS — Z79.4 TYPE 2 DIABETES MELLITUS WITH DIABETIC NEUROPATHY, WITH LONG-TERM CURRENT USE OF INSULIN (HCC): ICD-10-CM

## 2024-04-17 DIAGNOSIS — I73.9 PVD (PERIPHERAL VASCULAR DISEASE) (HCC): ICD-10-CM

## 2024-04-17 DIAGNOSIS — I25.10 CORONARY ARTERY DISEASE INVOLVING NATIVE CORONARY ARTERY OF NATIVE HEART WITHOUT ANGINA PECTORIS: ICD-10-CM

## 2024-04-17 DIAGNOSIS — E11.40 TYPE 2 DIABETES MELLITUS WITH DIABETIC NEUROPATHY, WITH LONG-TERM CURRENT USE OF INSULIN (HCC): ICD-10-CM

## 2024-04-17 DIAGNOSIS — F33.9 RECURRENT DEPRESSION (HCC): ICD-10-CM

## 2024-04-17 DIAGNOSIS — I10 ESSENTIAL HYPERTENSION, BENIGN: Primary | ICD-10-CM

## 2024-04-17 LAB
ALBUMIN SERPL-MCNC: 3.6 G/DL (ref 3.5–5)
ALBUMIN/GLOB SERPL: 0.9 (ref 1.1–2.2)
ALP SERPL-CCNC: 81 U/L (ref 45–117)
ALT SERPL-CCNC: 21 U/L (ref 12–78)
ANION GAP SERPL CALC-SCNC: 6 MMOL/L (ref 5–15)
AST SERPL-CCNC: 18 U/L (ref 15–37)
BILIRUB SERPL-MCNC: 0.8 MG/DL (ref 0.2–1)
BUN SERPL-MCNC: 28 MG/DL (ref 6–20)
BUN/CREAT SERPL: 23 (ref 12–20)
CALCIUM SERPL-MCNC: 10.6 MG/DL (ref 8.5–10.1)
CHLORIDE SERPL-SCNC: 110 MMOL/L (ref 97–108)
CO2 SERPL-SCNC: 27 MMOL/L (ref 21–32)
CREAT SERPL-MCNC: 1.2 MG/DL (ref 0.55–1.02)
EST. AVERAGE GLUCOSE BLD GHB EST-MCNC: 163 MG/DL
GLOBULIN SER CALC-MCNC: 3.9 G/DL (ref 2–4)
GLUCOSE SERPL-MCNC: 108 MG/DL (ref 65–100)
HBA1C MFR BLD: 7.3 % (ref 4–5.6)
POTASSIUM SERPL-SCNC: 4 MMOL/L (ref 3.5–5.1)
PROT SERPL-MCNC: 7.5 G/DL (ref 6.4–8.2)
SODIUM SERPL-SCNC: 143 MMOL/L (ref 136–145)

## 2024-04-17 PROCEDURE — 1090F PRES/ABSN URINE INCON ASSESS: CPT | Performed by: INTERNAL MEDICINE

## 2024-04-17 PROCEDURE — G8399 PT W/DXA RESULTS DOCUMENT: HCPCS | Performed by: INTERNAL MEDICINE

## 2024-04-17 PROCEDURE — 1036F TOBACCO NON-USER: CPT | Performed by: INTERNAL MEDICINE

## 2024-04-17 PROCEDURE — 3078F DIAST BP <80 MM HG: CPT | Performed by: INTERNAL MEDICINE

## 2024-04-17 PROCEDURE — 3023F SPIROM DOC REV: CPT | Performed by: INTERNAL MEDICINE

## 2024-04-17 PROCEDURE — G8427 DOCREV CUR MEDS BY ELIG CLIN: HCPCS | Performed by: INTERNAL MEDICINE

## 2024-04-17 PROCEDURE — 99214 OFFICE O/P EST MOD 30 MIN: CPT | Performed by: INTERNAL MEDICINE

## 2024-04-17 PROCEDURE — 1123F ACP DISCUSS/DSCN MKR DOCD: CPT | Performed by: INTERNAL MEDICINE

## 2024-04-17 PROCEDURE — 3074F SYST BP LT 130 MM HG: CPT | Performed by: INTERNAL MEDICINE

## 2024-04-17 PROCEDURE — 3051F HG A1C>EQUAL 7.0%<8.0%: CPT | Performed by: INTERNAL MEDICINE

## 2024-04-17 PROCEDURE — G8417 CALC BMI ABV UP PARAM F/U: HCPCS | Performed by: INTERNAL MEDICINE

## 2024-04-17 ASSESSMENT — PATIENT HEALTH QUESTIONNAIRE - PHQ9
SUM OF ALL RESPONSES TO PHQ QUESTIONS 1-9: 0
10. IF YOU CHECKED OFF ANY PROBLEMS, HOW DIFFICULT HAVE THESE PROBLEMS MADE IT FOR YOU TO DO YOUR WORK, TAKE CARE OF THINGS AT HOME, OR GET ALONG WITH OTHER PEOPLE: NOT DIFFICULT AT ALL
9. THOUGHTS THAT YOU WOULD BE BETTER OFF DEAD, OR OF HURTING YOURSELF: NOT AT ALL
7. TROUBLE CONCENTRATING ON THINGS, SUCH AS READING THE NEWSPAPER OR WATCHING TELEVISION: NOT AT ALL
SUM OF ALL RESPONSES TO PHQ QUESTIONS 1-9: 0
4. FEELING TIRED OR HAVING LITTLE ENERGY: NOT AT ALL
3. TROUBLE FALLING OR STAYING ASLEEP: NOT AT ALL
2. FEELING DOWN, DEPRESSED OR HOPELESS: NOT AT ALL
8. MOVING OR SPEAKING SO SLOWLY THAT OTHER PEOPLE COULD HAVE NOTICED. OR THE OPPOSITE, BEING SO FIGETY OR RESTLESS THAT YOU HAVE BEEN MOVING AROUND A LOT MORE THAN USUAL: NOT AT ALL
6. FEELING BAD ABOUT YOURSELF - OR THAT YOU ARE A FAILURE OR HAVE LET YOURSELF OR YOUR FAMILY DOWN: NOT AT ALL
1. LITTLE INTEREST OR PLEASURE IN DOING THINGS: NOT AT ALL
5. POOR APPETITE OR OVEREATING: NOT AT ALL
SUM OF ALL RESPONSES TO PHQ9 QUESTIONS 1 & 2: 0

## 2024-04-19 ENCOUNTER — TRANSCRIBE ORDERS (OUTPATIENT)
Facility: HOSPITAL | Age: 84
End: 2024-04-19

## 2024-04-19 DIAGNOSIS — Z12.31 VISIT FOR SCREENING MAMMOGRAM: Primary | ICD-10-CM

## 2024-04-26 ENCOUNTER — TELEPHONE (OUTPATIENT)
Age: 84
End: 2024-04-26

## 2024-04-26 NOTE — TELEPHONE ENCOUNTER
I advised the patient per , \"We actually did not increase her insulin at her last visit she was already on 18 units twice per day she has been on this for the last 3 months    If she is having recurrent low readings we can go ahead and have her reduce her insulin to 15 units twice per day    For low sugar of 61 can have some juice or high carbohydrate snack to bring up the reading\"    Patient verbalized understanding of information discussed w/ no further questions at this time.

## 2024-04-26 NOTE — TELEPHONE ENCOUNTER
Calling to report low blood glucose levels, prior to breakfast she  her BG, it reported Low and did not give a #.  She ate breakfast, rechecked BG-88 after breakfast    The patient reported, she was instructed to increase her insulin from 15 to 18 units BID at her last appt.     How long has this been going on? The patient reported she does notice a trend this is happening on most Fridays     Always at the same time of day?yes    Current BG 61    Advised the patient to:  consistent meals/snack time, take medications as instructed, and monitor blood sugar before exercise    I advised her to eat some protein example: egg, cheese, meat etc. Eating toast, apples with sugar and a cantaloupe will have her BG go up and then will go low. Eating protein will keep BG stable.     She is to hold insulin (afternoon dose) right now until I talk to . the patient is stable at this time and BG is going up.    Patient verbalized understanding of information discussed w/ no further questions at this time.

## 2024-04-26 NOTE — TELEPHONE ENCOUNTER
Glucose this morning: needle said \"low\" and did not display a number.    Pt took insulin and ate.    Then BS reading went to 88    Then ate cantaloupe around 11 am    Pt took it just now at 12:19 pm:  reading is 57    Pt states no symptoms noticed right now    (States always happens on Friday)      Transferred to [place] on clinical team

## 2024-05-16 RX ORDER — AMLODIPINE BESYLATE 10 MG/1
10 TABLET ORAL DAILY
Qty: 90 TABLET | Refills: 0 | Status: SHIPPED | OUTPATIENT
Start: 2024-05-16

## 2024-05-24 ENCOUNTER — CLINICAL DOCUMENTATION (OUTPATIENT)
Age: 84
End: 2024-05-24

## 2024-06-04 ENCOUNTER — CLINICAL DOCUMENTATION (OUTPATIENT)
Age: 84
End: 2024-06-04

## 2024-06-04 NOTE — PROGRESS NOTES
6/4 Spoke with Ms. Pritchett concerning her cpap order. I reached out to [employer] (Joe Rangel) and he is now working on getting Ms. Pritchett's machine. Order was sent 5/24/24.

## 2024-06-11 DIAGNOSIS — G62.9 NEUROPATHY: ICD-10-CM

## 2024-06-11 RX ORDER — GABAPENTIN 100 MG/1
CAPSULE ORAL
Qty: 180 CAPSULE | Refills: 0 | Status: SHIPPED | OUTPATIENT
Start: 2024-06-11 | End: 2024-09-09

## 2024-06-11 NOTE — TELEPHONE ENCOUNTER
Caller requests Refill of:  gabapentin (NEURONTIN) 100 MG capsule       Please send to:    [facility] - [place], [place] - [address] - P [phone] - F [phone]  [address]  [facility] [address]  Phone: [phone] Fax: [phone]         Visit / Appointment History:  Future Appointment at [facility]:  7/24/2024   Last Appointment With PCP:  4/17/2024       Caller confirmed instructions and dosages as correct.    Caller was advised that Meds will be refilled as soon as possible, however there can be a 48-72 business hour turn around on refill requests.

## 2024-06-17 RX ORDER — INSULIN ASPART 100 [IU]/ML
18 INJECTION, SUSPENSION SUBCUTANEOUS 2 TIMES DAILY WITH MEALS
Qty: 30 ML | Refills: 0 | Status: CANCELLED | OUTPATIENT
Start: 2024-06-17

## 2024-06-17 NOTE — TELEPHONE ENCOUNTER
Confirmed with PharmD Patrica Pt could go to Novolog 70/30 and stay at 18U BID  Confirmed with [facility] pharmacy they have ten boxes of it

## 2024-06-17 NOTE — TELEPHONE ENCOUNTER
Called, Spoke with Pt  Received two pt identifiers  Advised pt of instructions from Sydni Burciaga  Pt states was told by Pharmacy she could do the novolog 70/30 and would be able to stay at the 18U BID  Advised pt will talk with Sydni Burciaga and callback  Pt verbalizes understanding of the instructions and has no further questions at this time.

## 2024-06-17 NOTE — TELEPHONE ENCOUNTER
Called, Spoke with Pt  Received two pt identifiers  Advised pt per PharmDIMAS Burciaga she can do the novolog 70/30 at 18U BID  Pt states will try to get OTC and if unable then will call to get script  Nothing further

## 2024-06-17 NOTE — TELEPHONE ENCOUNTER
Called pt, left vm to return call to office.    Spoke with Patrica,  Switch to humalog 75/25 due to backorder  Per Patrica, dosage would need to be 16U BID

## 2024-06-17 NOTE — TELEPHONE ENCOUNTER
Patient came into the office asking what she should do about her insulin.  She has been trying to get it refilled and it has been out of stock at all the Health system pharmacies for a whole week.     Patient states has enough for a couple more days.    Please call patient at [phone] and advise what she should do.  Patient states you can leave a detailed message on voicemail.

## 2024-06-19 RX ORDER — INSULIN ASPART 100 [IU]/ML
18 INJECTION, SUSPENSION SUBCUTANEOUS 2 TIMES DAILY WITH MEALS
Qty: 15 ML | Refills: 0 | Status: SHIPPED | OUTPATIENT
Start: 2024-06-19 | End: 2024-06-21 | Stop reason: SDUPTHER

## 2024-06-19 NOTE — TELEPHONE ENCOUNTER
Patient states she needs to get Prescription to be sent to [facility] on file for her Novolog 70/30 at 18U BID. Please call if any questions or to advise when this has been done. Patient states a detailed message can be left if no answer. Thank you

## 2024-06-19 NOTE — TELEPHONE ENCOUNTER
Future Appointments:  Future Appointments   Date Time Provider Department Center   7/2/2024  1:30 PM [facility] KIERA 1 [facility]   7/2/2024  2:00 PM [facility] DEXA 1 [facility]   7/24/2024  1:45 PM Kristel Farnsworth MD [facility]   2/7/2025 11:40 AM Kristel Patino, APRN - NP [facility]        Last Appointment With Me:  4/17/2024     Requested Prescriptions     Pending Prescriptions Disp Refills    insulin aspart protamine-insulin aspart (NOVOLOG MIX 70/30) (70-30) 100 UNIT/ML injection  3     Sig: Inject 18 Units into the skin 2 times daily (with meals)

## 2024-06-21 ENCOUNTER — TELEPHONE (OUTPATIENT)
Age: 84
End: 2024-06-21

## 2024-06-21 RX ORDER — INSULIN ASPART 100 [IU]/ML
18 INJECTION, SUSPENSION SUBCUTANEOUS 2 TIMES DAILY WITH MEALS
Qty: 15 ML | Refills: 0 | Status: SHIPPED | OUTPATIENT
Start: 2024-06-21

## 2024-06-21 NOTE — TELEPHONE ENCOUNTER
Pt called the office stating the medication the novolog mix 70/30. Pt stated that with ins the medication cost $70. Pt stated if it the medication can be sent to [facility] on [address] (ph # is [phone]). Pt was told they have the medication and can not tell pt the price until a prescription has been sent in. Please advise pt and discuss.

## 2024-06-21 NOTE — TELEPHONE ENCOUNTER
Future Appointments:  Future Appointments   Date Time Provider Department Center   7/2/2024  1:30 PM [facility] KIERA 1 [facility]   7/2/2024  2:00 PM [facility] DEXA 1 [facility]   7/24/2024  1:45 PM Kristel Farnsworth MD [facility] AMB   2/7/2025 11:40 AM Kristel Patino, APRN - NP [facility] AMB        Last Appointment With Me:  4/17/2024     Requested Prescriptions     Pending Prescriptions Disp Refills    insulin aspart protamine-insulin aspart (NOVOLOG MIX 70/30) (70-30) 100 UNIT/ML injection 15 mL 0     Sig: Inject 18 Units into the skin 2 times daily (with meals)

## 2024-07-02 ENCOUNTER — HOSPITAL ENCOUNTER (OUTPATIENT)
Facility: HOSPITAL | Age: 84
Discharge: HOME | End: 2024-07-05
Attending: INTERNAL MEDICINE
Payer: MEDICARE

## 2024-07-02 VITALS — BODY MASS INDEX: 31.83 KG/M2 | WEIGHT: 173 LBS | HEIGHT: 62 IN

## 2024-07-02 DIAGNOSIS — Z12.31 VISIT FOR SCREENING MAMMOGRAM: ICD-10-CM

## 2024-07-02 DIAGNOSIS — M81.0 AGE-RELATED OSTEOPOROSIS WITHOUT CURRENT PATHOLOGICAL FRACTURE: ICD-10-CM

## 2024-07-02 PROCEDURE — 77080 DXA BONE DENSITY AXIAL: CPT

## 2024-07-02 PROCEDURE — 77067 SCR MAMMO BI INCL CAD: CPT

## 2024-07-03 NOTE — RESULT ENCOUNTER NOTE
Called, Spoke with Pt  Received two pt identifiers  Pt informed per Dr. Farnsworth needs repeat imaging of left breast  Pt verbalizes understanding of the instructions and has no further questions at this time.

## 2024-07-15 ENCOUNTER — LAB (OUTPATIENT)
Age: 84
End: 2024-07-15

## 2024-07-15 DIAGNOSIS — E78.2 MIXED HYPERLIPIDEMIA: ICD-10-CM

## 2024-07-15 DIAGNOSIS — E03.9 ACQUIRED HYPOTHYROIDISM: ICD-10-CM

## 2024-07-15 DIAGNOSIS — M81.0 AGE-RELATED OSTEOPOROSIS WITHOUT CURRENT PATHOLOGICAL FRACTURE: ICD-10-CM

## 2024-07-15 DIAGNOSIS — Z79.4 TYPE 2 DIABETES MELLITUS WITH DIABETIC NEUROPATHY, WITH LONG-TERM CURRENT USE OF INSULIN (HCC): ICD-10-CM

## 2024-07-15 DIAGNOSIS — E11.40 TYPE 2 DIABETES MELLITUS WITH DIABETIC NEUROPATHY, WITH LONG-TERM CURRENT USE OF INSULIN (HCC): ICD-10-CM

## 2024-07-15 DIAGNOSIS — I10 ESSENTIAL HYPERTENSION, BENIGN: ICD-10-CM

## 2024-07-15 DIAGNOSIS — N18.32 STAGE 3B CHRONIC KIDNEY DISEASE (HCC): ICD-10-CM

## 2024-07-16 LAB
ANION GAP SERPL CALC-SCNC: 7 MMOL/L (ref 5–15)
BUN SERPL-MCNC: 24 MG/DL (ref 6–20)
BUN/CREAT SERPL: 19 (ref 12–20)
CALCIUM SERPL-MCNC: 10.2 MG/DL (ref 8.5–10.1)
CHLORIDE SERPL-SCNC: 110 MMOL/L (ref 97–108)
CO2 SERPL-SCNC: 25 MMOL/L (ref 21–32)
CREAT SERPL-MCNC: 1.29 MG/DL (ref 0.55–1.02)
EST. AVERAGE GLUCOSE BLD GHB EST-MCNC: 151 MG/DL
GLUCOSE SERPL-MCNC: 82 MG/DL (ref 65–100)
HBA1C MFR BLD: 6.9 % (ref 4–5.6)
POTASSIUM SERPL-SCNC: 4.2 MMOL/L (ref 3.5–5.1)
SODIUM SERPL-SCNC: 142 MMOL/L (ref 136–145)
TSH SERPL DL<=0.05 MIU/L-ACNC: 0.28 UIU/ML (ref 0.36–3.74)

## 2024-07-24 ENCOUNTER — OFFICE VISIT (OUTPATIENT)
Age: 84
End: 2024-07-24
Payer: MEDICARE

## 2024-07-24 VITALS
BODY MASS INDEX: 31.28 KG/M2 | WEIGHT: 170 LBS | TEMPERATURE: 98 F | OXYGEN SATURATION: 97 % | DIASTOLIC BLOOD PRESSURE: 73 MMHG | RESPIRATION RATE: 18 BRPM | HEART RATE: 71 BPM | SYSTOLIC BLOOD PRESSURE: 133 MMHG | HEIGHT: 62 IN

## 2024-07-24 DIAGNOSIS — J44.9 CHRONIC OBSTRUCTIVE PULMONARY DISEASE, UNSPECIFIED COPD TYPE (HCC): ICD-10-CM

## 2024-07-24 DIAGNOSIS — M25.512 CHRONIC PAIN OF BOTH SHOULDERS: ICD-10-CM

## 2024-07-24 DIAGNOSIS — E78.2 MIXED HYPERLIPIDEMIA: ICD-10-CM

## 2024-07-24 DIAGNOSIS — I73.9 PVD (PERIPHERAL VASCULAR DISEASE) (HCC): ICD-10-CM

## 2024-07-24 DIAGNOSIS — I25.10 CORONARY ARTERY DISEASE INVOLVING NATIVE CORONARY ARTERY OF NATIVE HEART WITHOUT ANGINA PECTORIS: ICD-10-CM

## 2024-07-24 DIAGNOSIS — E11.40 TYPE 2 DIABETES MELLITUS WITH DIABETIC NEUROPATHY, WITH LONG-TERM CURRENT USE OF INSULIN (HCC): Primary | ICD-10-CM

## 2024-07-24 DIAGNOSIS — G47.33 OSA (OBSTRUCTIVE SLEEP APNEA): ICD-10-CM

## 2024-07-24 DIAGNOSIS — I10 ESSENTIAL HYPERTENSION, BENIGN: ICD-10-CM

## 2024-07-24 DIAGNOSIS — E11.21 TYPE 2 DIABETES MELLITUS WITH NEPHROPATHY (HCC): ICD-10-CM

## 2024-07-24 DIAGNOSIS — F33.9 RECURRENT DEPRESSION (HCC): ICD-10-CM

## 2024-07-24 DIAGNOSIS — I50.32 DIASTOLIC CHF, CHRONIC (HCC): ICD-10-CM

## 2024-07-24 DIAGNOSIS — G89.29 CHRONIC PAIN OF BOTH SHOULDERS: ICD-10-CM

## 2024-07-24 DIAGNOSIS — M25.511 CHRONIC PAIN OF BOTH SHOULDERS: ICD-10-CM

## 2024-07-24 DIAGNOSIS — K21.9 GASTROESOPHAGEAL REFLUX DISEASE WITHOUT ESOPHAGITIS: ICD-10-CM

## 2024-07-24 DIAGNOSIS — E03.9 ACQUIRED HYPOTHYROIDISM: ICD-10-CM

## 2024-07-24 DIAGNOSIS — G62.9 NEUROPATHY: ICD-10-CM

## 2024-07-24 DIAGNOSIS — Z79.4 TYPE 2 DIABETES MELLITUS WITH DIABETIC NEUROPATHY, WITH LONG-TERM CURRENT USE OF INSULIN (HCC): Primary | ICD-10-CM

## 2024-07-24 DIAGNOSIS — N18.32 STAGE 3B CHRONIC KIDNEY DISEASE (HCC): ICD-10-CM

## 2024-07-24 DIAGNOSIS — M81.0 AGE-RELATED OSTEOPOROSIS WITHOUT CURRENT PATHOLOGICAL FRACTURE: ICD-10-CM

## 2024-07-24 PROCEDURE — 3023F SPIROM DOC REV: CPT | Performed by: INTERNAL MEDICINE

## 2024-07-24 PROCEDURE — 1090F PRES/ABSN URINE INCON ASSESS: CPT | Performed by: INTERNAL MEDICINE

## 2024-07-24 PROCEDURE — 99214 OFFICE O/P EST MOD 30 MIN: CPT | Performed by: INTERNAL MEDICINE

## 2024-07-24 PROCEDURE — 3075F SYST BP GE 130 - 139MM HG: CPT | Performed by: INTERNAL MEDICINE

## 2024-07-24 PROCEDURE — 1036F TOBACCO NON-USER: CPT | Performed by: INTERNAL MEDICINE

## 2024-07-24 PROCEDURE — 3078F DIAST BP <80 MM HG: CPT | Performed by: INTERNAL MEDICINE

## 2024-07-24 PROCEDURE — G8399 PT W/DXA RESULTS DOCUMENT: HCPCS | Performed by: INTERNAL MEDICINE

## 2024-07-24 PROCEDURE — G8417 CALC BMI ABV UP PARAM F/U: HCPCS | Performed by: INTERNAL MEDICINE

## 2024-07-24 PROCEDURE — 1123F ACP DISCUSS/DSCN MKR DOCD: CPT | Performed by: INTERNAL MEDICINE

## 2024-07-24 PROCEDURE — 3044F HG A1C LEVEL LT 7.0%: CPT | Performed by: INTERNAL MEDICINE

## 2024-07-24 PROCEDURE — G8427 DOCREV CUR MEDS BY ELIG CLIN: HCPCS | Performed by: INTERNAL MEDICINE

## 2024-07-24 RX ORDER — GABAPENTIN 100 MG/1
CAPSULE ORAL
Qty: 360 CAPSULE | Refills: 1 | Status: SHIPPED | OUTPATIENT
Start: 2024-07-24 | End: 2024-08-25

## 2024-07-24 RX ORDER — GABAPENTIN 100 MG/1
CAPSULE ORAL
Qty: 180 CAPSULE | Refills: 0 | Status: SHIPPED | OUTPATIENT
Start: 2024-07-24 | End: 2024-10-22

## 2024-07-24 NOTE — PROGRESS NOTES
\"Have you been to the ER, urgent care clinic since your last visit?  Hospitalized since your last visit?\"    NO    “Have you seen or consulted any other health care providers outside of [facility] since your last visit?”    NO            Click Here for Release of Records Request    
  PREVENTIVE:    AAA: 2/22 negative  Colonoscopy: Dr. Du, 12/6/21, severe diverticulosis   EGD: Dr. Du 12/6/21   Pap: 12/14, declines further   Mammogram: 7/24 needs additional imaging scheduled for later this month  DEXA: 6/22/24 - mild osteopenia   Tdap: 1/04/2016  Pneumovax: 01/29/20   Jwfdliy00: 9/17  Zostavax: 10/10/2016  Shingrix: completed both  Flu shot: 09/13/23   Foot exam: 01/10/24   Microalbumin: 6/23   A1c: 9/23 8.0 1/24 7.4, 4/24 7.3 7/24 6.9  Eye exam: Dr. Fran Tolentino 7/23, Dr Hendrix 5/9/24  Lipids: 1/24 LDL 76  Covid: 5 doses (Pfizer), most recent 10/23     Patient Active Problem List   Diagnosis    Hypothyroidism    Essential hypertension, benign    Type 2 diabetes mellitus with nephropathy (HCC)    Recurrent depression (HCC)    PVD (peripheral vascular disease) (HCC)    Diverticulosis of intestine with bleeding    CAD (coronary artery disease)    Osteoporosis    Anemia    Mixed hyperlipidemia    Diastolic CHF, chronic (HCC)    S/P CABG x 3    Type 2 diabetes mellitus with diabetic neuropathy (HCC)    Chronic obstructive pulmonary disease, unspecified (HCC)    Chronic renal disease, stage III (HCC)    VANESSA (obstructive sleep apnea)    Gastroesophageal reflux disease without esophagitis    Neuropathy     Current Outpatient Medications   Medication Sig Dispense Refill    metFORMIN (GLUCOPHAGE) 1000 MG tablet TAKE 1 TABLET TWICE DAILY WITH MEALS 180 tablet 0    insulin aspart protamine-insulin aspart (NOVOLOG MIX 70/30) (70-30) 100 UNIT/ML injection Inject 18 Units into the skin 2 times daily (with meals) 15 mL 0    gabapentin (NEURONTIN) 100 MG capsule TAKE 2 CAPSULES TWICE DAILY 180 capsule 0    amLODIPine (NORVASC) 10 MG tablet TAKE 1 TABLET EVERY DAY 90 tablet 0    gabapentin (NEURONTIN) 100 MG capsule TAKE 2 CAPSULES TWICE DAILY (Patient not taking: Reported on 4/17/2024) 180 capsule 0    ezetimibe (ZETIA) 10 MG tablet TAKE 1 TABLET EVERY DAY 90 tablet 3    sertraline (ZOLOFT) 25 MG

## 2024-07-30 ENCOUNTER — HOSPITAL ENCOUNTER (OUTPATIENT)
Facility: HOSPITAL | Age: 84
Discharge: HOME | End: 2024-08-02
Attending: INTERNAL MEDICINE
Payer: MEDICARE

## 2024-07-30 DIAGNOSIS — R92.8 ABNORMAL MAMMOGRAM: ICD-10-CM

## 2024-07-30 PROCEDURE — G0279 TOMOSYNTHESIS, MAMMO: HCPCS

## 2024-08-05 ENCOUNTER — HOSPITAL ENCOUNTER (OUTPATIENT)
Facility: HOSPITAL | Age: 84
Setting detail: RECURRING SERIES
Discharge: HOME | End: 2024-08-08
Attending: INTERNAL MEDICINE
Payer: MEDICARE

## 2024-08-05 DIAGNOSIS — G62.9 NEUROPATHY: ICD-10-CM

## 2024-08-05 PROCEDURE — 97162 PT EVAL MOD COMPLEX 30 MIN: CPT | Performed by: PHYSICAL THERAPIST

## 2024-08-05 PROCEDURE — 97110 THERAPEUTIC EXERCISES: CPT | Performed by: PHYSICAL THERAPIST

## 2024-08-05 NOTE — THERAPY EVALUATION
[facility]  [address]  [place] [address]  Phone: [phone]   Fax: [phone]          PHYSICAL THERAPY - MEDICARE EVALUATION/PLAN OF CARE NOTE (updated 3/23)      Date: 2024          Patient Name:  Ketan Pritchett :  1940   Medical   Diagnosis:  Chronic pain of both shoulders [M25.511, G89.29, M25.512] Treatment Diagnosis:  M25.511  RIGHT SHOULDER PAIN and M25.512  LEFT SHOULDER PAIN    Referral Source:  Kristel Farnsworth MD Provider #:  [id number]                Insurance: Payor: HU[employer]NA MEDICARE / Plan: HU[employer] GOLD PLUS HMO / Product Type: *No Product type* /      Patient  verified yes     Visit #   Current  / Total 1 24   Time   In / Out 10:05am 11:00am   Total Treatment Time 55   Total Timed Codes 25   1:1 Treatment Time 25      [facility] Totals Reminder:  bill using total billable   min of TIMED therapeutic procedures and modalities.   8-22 min = 1 unit; 23-37 min = 2 units; 38-52 min = 3 units;  53-67 min = 4 units; 68-82 min = 5 units           SUBJECTIVE  Pain Level (0-10 scale): Shoulders: R= 0 (0-8/10): L= 0 (0-8/10)  []constant []intermittent []improving []worsening []no change since onset    Any medication changes, allergies to medications, adverse drug reactions, diagnosis change, or new procedure performed?: [x] No    [] Yes (see summary sheet for update)  Medications: Verified on Patient Summary List    Subjective functional status/changes:     The patient reports that she's had shoulder issues for awhile but worse about two months ago. It will hurt from the shoulder down to mid upper arm. Sleeping is worse (starts sleeping on the right side, but will flip onto the left side> has cpap machine), and will wake her up at night frequently. It's difficult to reach overhead. Picking things up will irritate (bag of groceries). She has 3 steps to get in the house, bilateral independent handrails,

## 2024-08-06 RX ORDER — GABAPENTIN 100 MG/1
CAPSULE ORAL
Qty: 120 CAPSULE | Refills: 0 | Status: SHIPPED | OUTPATIENT
Start: 2024-08-06 | End: 2024-09-06

## 2024-08-06 RX ORDER — AMLODIPINE BESYLATE 10 MG/1
10 TABLET ORAL DAILY
Qty: 90 TABLET | Refills: 3 | Status: SHIPPED | OUTPATIENT
Start: 2024-08-06

## 2024-08-08 ENCOUNTER — HOSPITAL ENCOUNTER (OUTPATIENT)
Facility: HOSPITAL | Age: 84
Setting detail: RECURRING SERIES
Discharge: HOME | End: 2024-08-11
Attending: INTERNAL MEDICINE
Payer: MEDICARE

## 2024-08-08 PROCEDURE — 97110 THERAPEUTIC EXERCISES: CPT | Performed by: PHYSICAL THERAPIST

## 2024-08-08 NOTE — PROGRESS NOTES
PHYSICAL THERAPY - MEDICARE DAILY TREATMENT NOTE (updated 3/23)      Date: 2024          Patient Name:  Ketan Pritchett :  1940   Medical   Diagnosis:  Chronic pain of both shoulders [M25.511, G89.29, M25.512] Treatment Diagnosis:  M25.511  RIGHT SHOULDER PAIN and M25.512  LEFT SHOULDER PAIN    Referral Source:  Kristel Farnsworth MD Insurance:   Payor: HUMANA MEDICARE / Plan: HU[employer]NA GOLD PLUS HMO / Product Type: *No Product type* /                     Patient  verified yes     Visit #   Current  / Total 2 24   Time   In / Out 12:27pm 1:10pm   Total Treatment Time 43   Total Timed Codes 43   1:1 Treatment Time 43      [facility] Totals Reminder:  bill using total billable   min of TIMED therapeutic procedures and modalities.   8-22 min = 1 unit; 23-37 min = 2 units; 38-52 min = 3 units; 53-67 min = 4 units; 68-82 min = 5 units            SUBJECTIVE    Pain Level (0-10 scale): Shoulders: R/L: 0/0    Any medication changes, allergies to medications, adverse drug reactions, diagnosis change, or new procedure performed?: [x] No    [] Yes (see summary sheet for update)  Medications: Verified on Patient Summary List    Subjective functional status/changes:     The patient reports that she doing the homework.    OBJECTIVE      Therapeutic Procedures:  Tx Min Billable or 1:1 Min (if diff from Tx Min) Procedure, Rationale, Specifics   43 43 [id number] Therapeutic Exercise (timed):  increase ROM, strength, coordination, balance, and proprioception to improve patient's ability to progress to PLOF and address remaining functional goals. (see flow sheet as applicable)     Details if applicable:            Details if applicable:           Details if applicable:           Details if applicable:            Details if applicable:     43 43    Total Total         [x]  Patient Education billed concurrently with other procedures   [x] Review HEP    [] Progressed/Changed HEP, detail:    [] Other detail:         Other

## 2024-08-11 RX ORDER — ALBUTEROL SULFATE 90 UG/1
AEROSOL, METERED RESPIRATORY (INHALATION)
Qty: 1 EACH | Refills: 3 | Status: SHIPPED | OUTPATIENT
Start: 2024-08-11

## 2024-08-14 ENCOUNTER — HOSPITAL ENCOUNTER (OUTPATIENT)
Facility: HOSPITAL | Age: 84
Setting detail: RECURRING SERIES
Discharge: HOME | End: 2024-08-17
Attending: INTERNAL MEDICINE
Payer: MEDICARE

## 2024-08-14 PROCEDURE — 97110 THERAPEUTIC EXERCISES: CPT | Performed by: PHYSICAL THERAPIST

## 2024-08-14 NOTE — PROGRESS NOTES
PHYSICAL THERAPY - MEDICARE DAILY TREATMENT NOTE (updated 3/23)      Date: 2024          Patient Name:  Ketan Pritchett :  1940   Medical   Diagnosis:  Chronic pain of both shoulders [M25.511, G89.29, M25.512] Treatment Diagnosis:  M25.511  RIGHT SHOULDER PAIN and M25.512  LEFT SHOULDER PAIN    Referral Source:  Kristel Farnsowrth MD Insurance:   Payor: HUMANA MEDICARE / Plan: HU[employer]NA GOLD PLUS HMO / Product Type: *No Product type* /                     Patient  verified yes     Visit #   Current  / Total 3 24   Time   In / Out 11:01am 12:05pm   Total Treatment Time 64   Total Timed Codes 64   1:1 Treatment Time 55      [facility] Totals Reminder:  bill using total billable   min of TIMED therapeutic procedures and modalities.   8-22 min = 1 unit; 23-37 min = 2 units; 38-52 min = 3 units; 53-67 min = 4 units; 68-82 min = 5 units            SUBJECTIVE    Pain Level (0-10 scale): Shoulders: R/L: 0/0    Any medication changes, allergies to medications, adverse drug reactions, diagnosis change, or new procedure performed?: [x] No    [] Yes (see summary sheet for update)  Medications: Verified on Patient Summary List    Subjective functional status/changes:     The patient reports that she felt fine after last time.    OBJECTIVE      Therapeutic Procedures:  Tx Min Billable or 1:1 Min (if diff from Tx Min) Procedure, Rationale, Specifics   64 55 10000 Therapeutic Exercise (timed):  increase ROM, strength, coordination, balance, and proprioception to improve patient's ability to progress to PLOF and address remaining functional goals. (see flow sheet as applicable)     Details if applicable:            Details if applicable:           Details if applicable:           Details if applicable:            Details if applicable:     64 55    Total Total         [x]  Patient Education billed concurrently with other procedures   [x] Review HEP    [] Progressed/Changed HEP, detail:    [] Other detail:         Other

## 2024-08-16 ENCOUNTER — APPOINTMENT (OUTPATIENT)
Facility: HOSPITAL | Age: 84
End: 2024-08-16
Attending: INTERNAL MEDICINE
Payer: MEDICARE

## 2024-08-20 RX ORDER — INSULIN ASPART 100 [IU]/ML
INJECTION, SUSPENSION SUBCUTANEOUS
Qty: 10 ML | Refills: 1 | Status: SHIPPED | OUTPATIENT
Start: 2024-08-20

## 2024-08-26 ENCOUNTER — HOSPITAL ENCOUNTER (OUTPATIENT)
Facility: HOSPITAL | Age: 84
Setting detail: RECURRING SERIES
Discharge: HOME | End: 2024-08-29
Attending: INTERNAL MEDICINE
Payer: MEDICARE

## 2024-08-26 PROCEDURE — 97110 THERAPEUTIC EXERCISES: CPT

## 2024-08-26 NOTE — PROGRESS NOTES
PHYSICAL THERAPY - MEDICARE DAILY TREATMENT NOTE (updated 3/23)      Date: 2024          Patient Name:  Ketan Pritchett :  1940   Medical   Diagnosis:  Chronic pain of both shoulders [M25.511, G89.29, M25.512] Treatment Diagnosis:  M25.511  RIGHT SHOULDER PAIN and M25.512  LEFT SHOULDER PAIN    Referral Source:  Kristel Farnsworth MD Insurance:   Payor: HUMANA MEDICARE / Plan: HU[employer]NA GOLD PLUS HMO / Product Type: *No Product type* /                     Patient  verified yes     Visit #   Current  / Total 4 24   Time   In / Out 1000 am 1057 am   Total Treatment Time 57   Total Timed Codes 57   1:1 Treatment Time 43      [facility] Totals Reminder:  bill using total billable   min of TIMED therapeutic procedures and modalities.   8-22 min = 1 unit; 23-37 min = 2 units; 38-52 min = 3 units; 53-67 min = 4 units; 68-82 min = 5 units            SUBJECTIVE    Pain Level (0-10 scale): Shoulders: R/L: 0/0    Any medication changes, allergies to medications, adverse drug reactions, diagnosis change, or new procedure performed?: [x] No    [] Yes (see summary sheet for update)  Medications: Verified on Patient Summary List    Subjective functional status/changes:     Patient noted their arms are still sore from their exercises, noting they have continued to work on them at home.     OBJECTIVE      Therapeutic Procedures:  Tx Min Billable or 1:1 Min (if diff from Tx Min) Procedure, Rationale, Specifics   57 43 21626 Therapeutic Exercise (timed):  increase ROM, strength, coordination, balance, and proprioception to improve patient's ability to progress to PLOF and address remaining functional goals. (see flow sheet as applicable)     Details if applicable:            Details if applicable:           Details if applicable:           Details if applicable:            Details if applicable:     57 43    Total Total         [x]  Patient Education billed concurrently with other procedures   [x] Review HEP    []

## 2024-08-29 ENCOUNTER — HOSPITAL ENCOUNTER (OUTPATIENT)
Facility: HOSPITAL | Age: 84
Setting detail: RECURRING SERIES
End: 2024-08-29
Attending: INTERNAL MEDICINE
Payer: MEDICARE

## 2024-08-29 PROCEDURE — 97110 THERAPEUTIC EXERCISES: CPT | Performed by: PHYSICAL THERAPIST

## 2024-08-29 NOTE — PROGRESS NOTES
PHYSICAL THERAPY - MEDICARE DAILY TREATMENT NOTE (updated 3/23)      Date: 2024          Patient Name:  Ketan Pritchett :  1940   Medical   Diagnosis:  Chronic pain of both shoulders [M25.511, G89.29, M25.512] Treatment Diagnosis:  M25.511  RIGHT SHOULDER PAIN and M25.512  LEFT SHOULDER PAIN    Referral Source:  Kristel Farnsworth MD Insurance:   Payor: HUMANA MEDICARE / Plan: HUMANA GOLD PLUS HMO / Product Type: *No Product type* /                     Patient  verified yes     Visit #   Current  / Total 5 24   Time   In / Out 12:01pm 12:57pm   Total Treatment Time 56   Total Timed Codes 56   1:1 Treatment Time 56       [facility] Totals Reminder:  bill using total billable   min of TIMED therapeutic procedures and modalities.   8-22 min = 1 unit; 23-37 min = 2 units; 38-52 min = 3 units; 53-67 min = 4 units; 68-82 min = 5 units            SUBJECTIVE    Pain Level (0-10 scale): Shoulders: R/L: 0/0    Any medication changes, allergies to medications, adverse drug reactions, diagnosis change, or new procedure performed?: [x] No    [] Yes (see summary sheet for update)  Medications: Verified on Patient Summary List    Subjective functional status/changes:     The patient reports that she's doing okay and is usually just a little sore after doing the exercises but not too bad.    OBJECTIVE      Therapeutic Procedures:  Tx Min Billable or 1:1 Min (if diff from Tx Min) Procedure, Rationale, Specifics   56 56 65536 Therapeutic Exercise (timed):  increase ROM, strength, coordination, balance, and proprioception to improve patient's ability to progress to PLOF and address remaining functional goals. (see flow sheet as applicable)     Details if applicable:            Details if applicable:           Details if applicable:           Details if applicable:            Details if applicable:     56 56    Total Total         [x]  Patient Education billed concurrently with other procedures   [x] Review HEP    []

## 2024-09-03 ENCOUNTER — HOSPITAL ENCOUNTER (OUTPATIENT)
Facility: HOSPITAL | Age: 84
Setting detail: RECURRING SERIES
Discharge: HOME | End: 2024-09-06
Attending: INTERNAL MEDICINE
Payer: MEDICARE

## 2024-09-03 PROCEDURE — 97110 THERAPEUTIC EXERCISES: CPT | Performed by: PHYSICAL THERAPIST

## 2024-09-03 NOTE — PROGRESS NOTES
PHYSICAL THERAPY - MEDICARE DAILY TREATMENT NOTE (updated 3/23)      Date: 9/3/2024          Patient Name:  Ketan Pritchett :  1940   Medical   Diagnosis:  Chronic pain of both shoulders [M25.511, G89.29, M25.512] Treatment Diagnosis:  M25.511  RIGHT SHOULDER PAIN and M25.512  LEFT SHOULDER PAIN    Referral Source:  Kristel Farnsworth MD Insurance:   Payor: HUMANA MEDICARE / Plan: HUMANA GOLD PLUS HMO / Product Type: *No Product type* /                     Patient  verified yes     Visit #   Current  / Total 6 24   Time   In / Out 10:04am 11:02am   Total Treatment Time 58   Total Timed Codes 58   1:1 Treatment Time 42      [facility] Totals Reminder:  bill using total billable   min of TIMED therapeutic procedures and modalities.   8-22 min = 1 unit; 23-37 min = 2 units; 38-52 min = 3 units; 53-67 min = 4 units; 68-82 min = 5 units            SUBJECTIVE    Pain Level (0-10 scale): Shoulders: R/L: 0/0    Any medication changes, allergies to medications, adverse drug reactions, diagnosis change, or new procedure performed?: [x] No    [] Yes (see summary sheet for update)  Medications: Verified on Patient Summary List    Subjective functional status/changes:     The patient reports that her shoulder only bothered her once over the weekend that she remembers, but it was at night.     OBJECTIVE      Therapeutic Procedures:  Tx Min Billable or 1:1 Min (if diff from Tx Min) Procedure, Rationale, Specifics   58 42 12967 Therapeutic Exercise (timed):  increase ROM, strength, coordination, balance, and proprioception to improve patient's ability to progress to PLOF and address remaining functional goals. (see flow sheet as applicable)     Details if applicable:            Details if applicable:           Details if applicable:           Details if applicable:            Details if applicable:     58 42    Total Total         [x]  Patient Education billed concurrently with other procedures   [x] Review HEP    []

## 2024-09-04 DIAGNOSIS — G62.9 NEUROPATHY: ICD-10-CM

## 2024-09-04 NOTE — TELEPHONE ENCOUNTER
gabapentin (NEURONTIN) 100 MG capsule    90 day supply to [facility]     Pt states that she is taking 4 pills per day.  Please be sure a script that reflects this goes to mail order.

## 2024-09-04 NOTE — TELEPHONE ENCOUNTER
Future Appointments:  Future Appointments   Date Time Provider Department Center   9/5/2024 10:00 AM Alexei Rivera, PT [facility]   10/30/2024 12:00 PM Kristel Farnsworth MD [facility]   2/7/2025 11:40 AM Kristel Patino, APRN - NP [facility]        Last Appointment With Me:  7/24/2024     Requested Prescriptions     Pending Prescriptions Disp Refills    gabapentin (NEURONTIN) 100 MG capsule 360 capsule 1     Sig: TAKE 2 CAPSULES TWICE DAILY

## 2024-09-05 ENCOUNTER — HOSPITAL ENCOUNTER (OUTPATIENT)
Facility: HOSPITAL | Age: 84
Setting detail: RECURRING SERIES
Discharge: HOME | End: 2024-09-08
Attending: INTERNAL MEDICINE
Payer: MEDICARE

## 2024-09-05 PROCEDURE — 97110 THERAPEUTIC EXERCISES: CPT | Performed by: PHYSICAL THERAPIST

## 2024-09-06 RX ORDER — GABAPENTIN 100 MG/1
CAPSULE ORAL
Qty: 360 CAPSULE | Refills: 1 | Status: SHIPPED | OUTPATIENT
Start: 2024-09-06 | End: 2024-12-03

## 2024-09-20 ENCOUNTER — TELEPHONE (OUTPATIENT)
Age: 84
End: 2024-09-20

## 2024-09-20 DIAGNOSIS — G47.33 OBSTRUCTIVE SLEEP APNEA (ADULT) (PEDIATRIC): Primary | ICD-10-CM

## 2024-10-03 ENCOUNTER — TELEPHONE (OUTPATIENT)
Age: 84
End: 2024-10-03

## 2024-10-03 NOTE — TELEPHONE ENCOUNTER
Called patient left a voice message to inform and sent a letter.     I am writing you today as I was unable to reach you via phone regarding your upcoming appointment on 2/4/2025 at 2:00 p.m. We have just been notified that Dr. Owens will be out of the office this day, and as such this affects your upcoming appointment with us. We have cancelled your appointment and scheduled you for 2/11/2025 at 2:20p.m. We deeply apologize for any inconvenience that this may cause. If you could please return our call at [phone], we are happy to assist in rescheduling your appointment for the next soonest available appointment.     Thank you for choosing [facility].

## 2024-10-09 ENCOUNTER — CLINICAL DOCUMENTATION (OUTPATIENT)
Age: 84
End: 2024-10-09

## 2024-10-17 RX ORDER — INSULIN ASPART 100 [IU]/ML
INJECTION, SUSPENSION SUBCUTANEOUS
Qty: 10 ML | Refills: 0 | Status: SHIPPED | OUTPATIENT
Start: 2024-10-17

## 2024-10-17 RX ORDER — SERTRALINE HYDROCHLORIDE 25 MG/1
25 TABLET, FILM COATED ORAL DAILY
Qty: 90 TABLET | Refills: 0 | Status: SHIPPED | OUTPATIENT
Start: 2024-10-17

## 2024-10-23 ENCOUNTER — LAB (OUTPATIENT)
Age: 84
End: 2024-10-23

## 2024-10-23 DIAGNOSIS — M25.512 CHRONIC PAIN OF BOTH SHOULDERS: ICD-10-CM

## 2024-10-23 DIAGNOSIS — G89.29 CHRONIC PAIN OF BOTH SHOULDERS: ICD-10-CM

## 2024-10-23 DIAGNOSIS — E11.40 TYPE 2 DIABETES MELLITUS WITH DIABETIC NEUROPATHY, WITH LONG-TERM CURRENT USE OF INSULIN (HCC): ICD-10-CM

## 2024-10-23 DIAGNOSIS — I10 ESSENTIAL HYPERTENSION, BENIGN: ICD-10-CM

## 2024-10-23 DIAGNOSIS — M25.511 CHRONIC PAIN OF BOTH SHOULDERS: ICD-10-CM

## 2024-10-23 DIAGNOSIS — Z79.4 TYPE 2 DIABETES MELLITUS WITH DIABETIC NEUROPATHY, WITH LONG-TERM CURRENT USE OF INSULIN (HCC): ICD-10-CM

## 2024-10-23 DIAGNOSIS — E03.9 ACQUIRED HYPOTHYROIDISM: ICD-10-CM

## 2024-10-23 NOTE — PROGRESS NOTES
HISTORY OF PRESENT ILLNESS  Ketan Pritchett is a 84 y.o. female.  HPI  Last here 7/24/24. Pt is here for routine care.        Has a history of hypertension   BP today is 136/63  BP at home: 122/73 147/78 130/78   Continues on norvasc 10 mg and toprol XL 25mg daily   Recall had angioedema with lisinopril and benicar-HCT in the past      She is diabetic   BS:  157 114 130 141 137 133 139 124 123 127 88 95    104 157 110 119 169 158 129 148   Discussed increasing insulin to 20U if she is on steroids in the future, has not needed to do this  Continues on metformin 1000mg BID  On NPH 70/30 mix 18U BID   She also takes ASA 81mg daily   Recall issues with hypoglycemia in the past, a1c under 7.5 at goal for her   Uses freestyle maricarmen     Patient with mild CKD with creatinine 1.2 we will monitor,   spep and upep completed 6/23   kidney done 6/2023  Instructed to Avoid NSAIDS      Wt today is 173 lbs, stable since lov   Discussed diet and weight loss     Reviewed labs   Ordered labs        She saw Dr. Powers (pul) for shortness of breath 7/20/22, f/u prn  Reviewed note 7/20/22: had PFT's stay off anoro, use advair PRN  Taking advair PRN  Has not been back, as she felt like last visit was not helpful         Pt follows with Dr. Marx (cardio) for cad 5/23/24, f/u 6 months 11/24        Pt follows annually with Dr. Piedra ([facility]) for her PAD  No claudication stable    Last visit was 10/24 with RICHARD Meeks went to see them acutely because of some pain in her legs- had ABIs, no change, f/u 6/25   Completed ultrasound aneurysm screen February 2022 which is negative     Pt follows with Dr Owens (sleep) for sleep apnea  Lov 2/24, f/u 2/25   She is compliant with bipap 10/24        Pt saw Dr. Olivo (ortho) for carpal tunnel   Had surgery for this 8/1/19   Last visit was 8/19/19      She saw Dr. Bailon for spine injection (ortho) on 5/24/24       She saw Dr. Hernandez (ortho) 6/17/24       She was in the hospital from

## 2024-10-24 ENCOUNTER — TELEPHONE (OUTPATIENT)
Age: 84
End: 2024-10-24

## 2024-10-24 DIAGNOSIS — E11.21 TYPE 2 DIABETES MELLITUS WITH NEPHROPATHY (HCC): Primary | ICD-10-CM

## 2024-10-24 LAB
ALBUMIN SERPL-MCNC: 4 G/DL (ref 3.5–5)
ALBUMIN/GLOB SERPL: 1.1 (ref 1.1–2.2)
ALP SERPL-CCNC: 78 U/L (ref 45–117)
ALT SERPL-CCNC: 26 U/L (ref 12–78)
ANION GAP SERPL CALC-SCNC: 5 MMOL/L (ref 2–12)
AST SERPL-CCNC: 16 U/L (ref 15–37)
BILIRUB SERPL-MCNC: 0.7 MG/DL (ref 0.2–1)
BUN SERPL-MCNC: 26 MG/DL (ref 6–20)
BUN/CREAT SERPL: 21 (ref 12–20)
CALCIUM SERPL-MCNC: 10.2 MG/DL (ref 8.5–10.1)
CHLORIDE SERPL-SCNC: 109 MMOL/L (ref 97–108)
CO2 SERPL-SCNC: 29 MMOL/L (ref 21–32)
CREAT SERPL-MCNC: 1.26 MG/DL (ref 0.55–1.02)
ERYTHROCYTE [SEDIMENTATION RATE] IN BLOOD: 5 MM/HR (ref 0–30)
EST. AVERAGE GLUCOSE BLD GHB EST-MCNC: 154 MG/DL
GLOBULIN SER CALC-MCNC: 3.5 G/DL (ref 2–4)
GLUCOSE SERPL-MCNC: 83 MG/DL (ref 65–100)
HBA1C MFR BLD: 7 % (ref 4–5.6)
POTASSIUM SERPL-SCNC: 4 MMOL/L (ref 3.5–5.1)
PROT SERPL-MCNC: 7.5 G/DL (ref 6.4–8.2)
SODIUM SERPL-SCNC: 143 MMOL/L (ref 136–145)
TSH SERPL DL<=0.05 MIU/L-ACNC: 1.82 UIU/ML (ref 0.36–3.74)

## 2024-10-24 NOTE — TELEPHONE ENCOUNTER
----- Message from MEGAN HALL MA sent at 10/24/2024 12:55 PM EDT -----  Regarding: FW: Lab Notification: Samples unable to be obtained    ----- Message -----  From: Alejandra Rascon  Sent: 10/24/2024  12:48 PM EDT  To: [facility] Client Services Staff Pool; #  Subject: Lab Notification: Samples unable to be obtai#    We have been notified that samples could not be obtained for the patient below's most recent lab work. Please place new orders prior to the patient's return.    If additional assistance is required, please contact Client Services at [phone].    Patient: Ketan Pritchett  : 1940  Ordering Provider: MARIYA JEAN-BAPTISTE MD    #Urine#    Thank you,    [facility]

## 2024-10-30 ENCOUNTER — OFFICE VISIT (OUTPATIENT)
Age: 84
End: 2024-10-30
Payer: MEDICARE

## 2024-10-30 VITALS
WEIGHT: 173 LBS | HEIGHT: 62 IN | OXYGEN SATURATION: 98 % | TEMPERATURE: 97.8 F | DIASTOLIC BLOOD PRESSURE: 63 MMHG | HEART RATE: 67 BPM | RESPIRATION RATE: 18 BRPM | BODY MASS INDEX: 31.83 KG/M2 | SYSTOLIC BLOOD PRESSURE: 136 MMHG

## 2024-10-30 DIAGNOSIS — E11.40 TYPE 2 DIABETES MELLITUS WITH DIABETIC NEUROPATHY, WITHOUT LONG-TERM CURRENT USE OF INSULIN (HCC): ICD-10-CM

## 2024-10-30 DIAGNOSIS — G47.33 OSA (OBSTRUCTIVE SLEEP APNEA): ICD-10-CM

## 2024-10-30 DIAGNOSIS — E03.1 CONGENITAL HYPOTHYROIDISM WITHOUT GOITER: ICD-10-CM

## 2024-10-30 DIAGNOSIS — K21.9 GASTROESOPHAGEAL REFLUX DISEASE WITHOUT ESOPHAGITIS: ICD-10-CM

## 2024-10-30 DIAGNOSIS — E11.21 TYPE 2 DIABETES MELLITUS WITH NEPHROPATHY (HCC): ICD-10-CM

## 2024-10-30 DIAGNOSIS — D50.0 IRON DEFICIENCY ANEMIA DUE TO CHRONIC BLOOD LOSS: ICD-10-CM

## 2024-10-30 DIAGNOSIS — F33.9 RECURRENT DEPRESSION (HCC): ICD-10-CM

## 2024-10-30 DIAGNOSIS — I25.10 CORONARY ARTERY DISEASE INVOLVING NATIVE CORONARY ARTERY OF NATIVE HEART WITHOUT ANGINA PECTORIS: ICD-10-CM

## 2024-10-30 DIAGNOSIS — J42 CHRONIC BRONCHITIS, UNSPECIFIED CHRONIC BRONCHITIS TYPE (HCC): ICD-10-CM

## 2024-10-30 DIAGNOSIS — I10 ESSENTIAL HYPERTENSION, BENIGN: Primary | ICD-10-CM

## 2024-10-30 DIAGNOSIS — I50.32 DIASTOLIC CHF, CHRONIC (HCC): ICD-10-CM

## 2024-10-30 DIAGNOSIS — G62.9 NEUROPATHY: ICD-10-CM

## 2024-10-30 DIAGNOSIS — N18.30 STAGE 3 CHRONIC KIDNEY DISEASE, UNSPECIFIED WHETHER STAGE 3A OR 3B CKD (HCC): ICD-10-CM

## 2024-10-30 DIAGNOSIS — M80.00XA AGE-RELATED OSTEOPOROSIS WITH CURRENT PATHOLOGICAL FRACTURE, INITIAL ENCOUNTER: ICD-10-CM

## 2024-10-30 DIAGNOSIS — I73.9 PVD (PERIPHERAL VASCULAR DISEASE) (HCC): ICD-10-CM

## 2024-10-30 DIAGNOSIS — E78.2 MIXED HYPERLIPIDEMIA: ICD-10-CM

## 2024-10-30 DIAGNOSIS — Z23 NEEDS FLU SHOT: ICD-10-CM

## 2024-10-30 PROCEDURE — 99214 OFFICE O/P EST MOD 30 MIN: CPT | Performed by: INTERNAL MEDICINE

## 2024-10-30 PROCEDURE — 90653 IIV ADJUVANT VACCINE IM: CPT | Performed by: INTERNAL MEDICINE

## 2024-10-30 SDOH — ECONOMIC STABILITY: FOOD INSECURITY: WITHIN THE PAST 12 MONTHS, YOU WORRIED THAT YOUR FOOD WOULD RUN OUT BEFORE YOU GOT MONEY TO BUY MORE.: NEVER TRUE

## 2024-10-30 SDOH — ECONOMIC STABILITY: FOOD INSECURITY: WITHIN THE PAST 12 MONTHS, THE FOOD YOU BOUGHT JUST DIDN'T LAST AND YOU DIDN'T HAVE MONEY TO GET MORE.: NEVER TRUE

## 2024-10-30 SDOH — ECONOMIC STABILITY: INCOME INSECURITY: HOW HARD IS IT FOR YOU TO PAY FOR THE VERY BASICS LIKE FOOD, HOUSING, MEDICAL CARE, AND HEATING?: NOT HARD AT ALL

## 2024-10-30 ASSESSMENT — PATIENT HEALTH QUESTIONNAIRE - PHQ9
SUM OF ALL RESPONSES TO PHQ QUESTIONS 1-9: 0
SUM OF ALL RESPONSES TO PHQ9 QUESTIONS 1 & 2: 0
2. FEELING DOWN, DEPRESSED OR HOPELESS: NOT AT ALL
SUM OF ALL RESPONSES TO PHQ QUESTIONS 1-9: 0
1. LITTLE INTEREST OR PLEASURE IN DOING THINGS: NOT AT ALL

## 2024-10-30 NOTE — PROGRESS NOTES
\"Have you been to the ER, urgent care clinic since your last visit?  Hospitalized since your last visit?\"    NO    “Have you seen or consulted any other health care providers outside our system since your last visit?”    NO      “Have you had a diabetic eye exam?”    NO     Date of last diabetic eye exam: 6/6/2022

## 2024-10-31 LAB
COMMENT:: NORMAL
CREAT UR-MCNC: 57.5 MG/DL
MICROALBUMIN UR-MCNC: 60.6 MG/DL
MICROALBUMIN/CREAT UR-RTO: 1054 MG/G (ref 0–30)
SPECIMEN HOLD: NORMAL

## 2024-10-31 RX ORDER — ATORVASTATIN CALCIUM 80 MG/1
TABLET, FILM COATED ORAL
Qty: 90 TABLET | Refills: 3 | Status: SHIPPED | OUTPATIENT
Start: 2024-10-31

## 2024-11-04 ENCOUNTER — TELEPHONE (OUTPATIENT)
Age: 84
End: 2024-11-04

## 2024-11-04 NOTE — TELEPHONE ENCOUNTER
Patient states she received order for Urinalysis to be done & she was here on 10/30/24 & did Urinalysis that day.   Please call to advise if the letter/order was sent prior to her coming in. Thank you

## 2024-11-04 NOTE — TELEPHONE ENCOUNTER
Called, Spoke with Pt  Received two pt identifiers  Advised pt lab order was sent prior to urine received  Advised duplicate order  Pt verbalizes understanding of the instructions and has no further questions at this time.

## 2024-11-07 RX ORDER — LEVOTHYROXINE SODIUM 100 UG/1
TABLET ORAL
Qty: 90 TABLET | Refills: 3 | Status: SHIPPED | OUTPATIENT
Start: 2024-11-07

## 2024-11-12 RX ORDER — INSULIN ASPART 100 [IU]/ML
INJECTION, SUSPENSION SUBCUTANEOUS
Qty: 10 ML | Refills: 0 | Status: SHIPPED | OUTPATIENT
Start: 2024-11-12

## 2024-11-12 NOTE — TELEPHONE ENCOUNTER
Pt is calling as well requesting insulin.      insulin aspart protamine-insulin aspart (NOVOLOG MIX 70/30) (70-30) 100 UNIT/ML injection

## 2024-12-18 RX ORDER — INSULIN ASPART 100 [IU]/ML
INJECTION, SUSPENSION SUBCUTANEOUS
Qty: 10 ML | Refills: 0 | Status: SHIPPED | OUTPATIENT
Start: 2024-12-18

## 2024-12-18 NOTE — TELEPHONE ENCOUNTER
Patient calling to request refill for NOVOLOG MIX 70/30 (70-30) 100 UNIT/ML injection [[id number]]     Patient request the medication gets refilled as soon as possible.     Patient confirmed pharmacy.

## 2025-01-13 RX ORDER — EZETIMIBE 10 MG/1
TABLET ORAL
Qty: 90 TABLET | Refills: 3 | Status: SHIPPED | OUTPATIENT
Start: 2025-01-13

## 2025-01-15 RX ORDER — INSULIN ASPART 100 [IU]/ML
INJECTION, SUSPENSION SUBCUTANEOUS
Qty: 10 ML | Refills: 0 | Status: SHIPPED | OUTPATIENT
Start: 2025-01-15

## 2025-01-28 DIAGNOSIS — E11.40 TYPE 2 DIABETES MELLITUS WITH DIABETIC NEUROPATHY, WITHOUT LONG-TERM CURRENT USE OF INSULIN (HCC): ICD-10-CM

## 2025-01-28 DIAGNOSIS — E78.2 MIXED HYPERLIPIDEMIA: ICD-10-CM

## 2025-01-28 DIAGNOSIS — I10 ESSENTIAL HYPERTENSION, BENIGN: ICD-10-CM

## 2025-01-28 DIAGNOSIS — E03.1 CONGENITAL HYPOTHYROIDISM WITHOUT GOITER: ICD-10-CM

## 2025-01-29 LAB
ANION GAP SERPL CALC-SCNC: 7 MMOL/L (ref 2–12)
BUN SERPL-MCNC: 22 MG/DL (ref 6–20)
BUN/CREAT SERPL: 17 (ref 12–20)
CALCIUM SERPL-MCNC: 10.2 MG/DL (ref 8.5–10.1)
CHLORIDE SERPL-SCNC: 109 MMOL/L (ref 97–108)
CHOLEST SERPL-MCNC: 150 MG/DL
CO2 SERPL-SCNC: 26 MMOL/L (ref 21–32)
CREAT SERPL-MCNC: 1.26 MG/DL (ref 0.55–1.02)
ERYTHROCYTE [DISTWIDTH] IN BLOOD BY AUTOMATED COUNT: 14.6 % (ref 11.5–14.5)
EST. AVERAGE GLUCOSE BLD GHB EST-MCNC: 171 MG/DL
GLUCOSE SERPL-MCNC: 92 MG/DL (ref 65–100)
HBA1C MFR BLD: 7.6 % (ref 4–5.6)
HCT VFR BLD AUTO: 43.2 % (ref 35–47)
HDLC SERPL-MCNC: 60 MG/DL
HDLC SERPL: 2.5 (ref 0–5)
HGB BLD-MCNC: 13.3 G/DL (ref 11.5–16)
LDLC SERPL CALC-MCNC: 65.4 MG/DL (ref 0–100)
MCH RBC QN AUTO: 28.4 PG (ref 26–34)
MCHC RBC AUTO-ENTMCNC: 30.8 G/DL (ref 30–36.5)
MCV RBC AUTO: 92.1 FL (ref 80–99)
NRBC # BLD: 0 K/UL (ref 0–0.01)
NRBC BLD-RTO: 0 PER 100 WBC
PLATELET # BLD AUTO: 199 K/UL (ref 150–400)
POTASSIUM SERPL-SCNC: 4.3 MMOL/L (ref 3.5–5.1)
RBC # BLD AUTO: 4.69 M/UL (ref 3.8–5.2)
SODIUM SERPL-SCNC: 142 MMOL/L (ref 136–145)
TRIGL SERPL-MCNC: 123 MG/DL
VLDLC SERPL CALC-MCNC: 24.6 MG/DL
WBC # BLD AUTO: 12.3 K/UL (ref 3.6–11)

## 2025-02-02 PROBLEM — N18.32 STAGE 3B CHRONIC KIDNEY DISEASE (HCC): Status: ACTIVE | Noted: 2023-03-07

## 2025-02-02 NOTE — PATIENT INSTRUCTIONS
can help you develop a safe and effective exercise program.  A counselor or psychiatrist can help you cope with issues such as depression, anxiety, or family problems that can make it hard to focus on weight loss.  Consider joining a support group for people who are trying to lose weight. Your doctor can suggest groups in your area.  Where can you learn more?  Go to https://www.Zmqnw.com.cn.net/patientEd and enter U357 to learn more about \"Starting a Weight-Loss Plan: Care Instructions.\"  Current as of: April 30, 2024  Content Version: 14.3  © 2024 [employer].   Care instructions adapted under license by [employer]. If you have questions about a medical condition or this instruction, always ask your healthcare professional. [employer], disclaims any warranty or liability for your use of this information.         A Healthy Heart: Care Instructions  Overview     Coronary artery disease, also called heart disease, occurs when a substance called plaque builds up in the vessels that supply oxygen-rich blood to your heart muscle. This can narrow the blood vessels and reduce blood flow. A heart attack happens when blood flow is completely blocked. A high-fat diet, smoking, and other factors increase the risk of heart disease.  Your doctor has found that you have a chance of having heart disease. A heart-healthy lifestyle can help keep your heart healthy and prevent heart disease. This lifestyle includes eating healthy, being active, staying at a weight that's healthy for you, and not smoking or using tobacco. It also includes taking medicines as directed, managing other health conditions, and trying to get a healthy amount of sleep.  Follow-up care is a key part of your treatment and safety. Be sure to make and go to all appointments, and call your doctor if you are having problems. It's also a good idea to know your test results and keep a list of the medicines you take.  How can

## 2025-02-04 ENCOUNTER — OFFICE VISIT (OUTPATIENT)
Age: 85
End: 2025-02-04

## 2025-02-04 VITALS
WEIGHT: 172.38 LBS | OXYGEN SATURATION: 97 % | HEIGHT: 62 IN | HEART RATE: 69 BPM | DIASTOLIC BLOOD PRESSURE: 78 MMHG | BODY MASS INDEX: 31.72 KG/M2 | TEMPERATURE: 97.2 F | SYSTOLIC BLOOD PRESSURE: 135 MMHG

## 2025-02-04 DIAGNOSIS — G62.9 NEUROPATHY: ICD-10-CM

## 2025-02-04 DIAGNOSIS — M81.0 AGE-RELATED OSTEOPOROSIS WITHOUT CURRENT PATHOLOGICAL FRACTURE: ICD-10-CM

## 2025-02-04 DIAGNOSIS — F51.01 PRIMARY INSOMNIA: ICD-10-CM

## 2025-02-04 DIAGNOSIS — I73.9 PVD (PERIPHERAL VASCULAR DISEASE) (HCC): ICD-10-CM

## 2025-02-04 DIAGNOSIS — E11.21 TYPE 2 DIABETES MELLITUS WITH NEPHROPATHY (HCC): ICD-10-CM

## 2025-02-04 DIAGNOSIS — F33.41 RECURRENT MAJOR DEPRESSIVE DISORDER, IN PARTIAL REMISSION (HCC): ICD-10-CM

## 2025-02-04 DIAGNOSIS — E03.9 ACQUIRED HYPOTHYROIDISM: ICD-10-CM

## 2025-02-04 DIAGNOSIS — F33.9 RECURRENT DEPRESSION (HCC): ICD-10-CM

## 2025-02-04 DIAGNOSIS — N18.32 STAGE 3B CHRONIC KIDNEY DISEASE (HCC): ICD-10-CM

## 2025-02-04 DIAGNOSIS — J42 CHRONIC BRONCHITIS, UNSPECIFIED CHRONIC BRONCHITIS TYPE (HCC): ICD-10-CM

## 2025-02-04 DIAGNOSIS — E11.40 TYPE 2 DIABETES MELLITUS WITH DIABETIC NEUROPATHY, WITHOUT LONG-TERM CURRENT USE OF INSULIN (HCC): ICD-10-CM

## 2025-02-04 DIAGNOSIS — I25.10 CORONARY ARTERY DISEASE INVOLVING NATIVE CORONARY ARTERY OF NATIVE HEART WITHOUT ANGINA PECTORIS: ICD-10-CM

## 2025-02-04 DIAGNOSIS — K21.9 GASTROESOPHAGEAL REFLUX DISEASE WITHOUT ESOPHAGITIS: ICD-10-CM

## 2025-02-04 DIAGNOSIS — G47.33 OSA (OBSTRUCTIVE SLEEP APNEA): ICD-10-CM

## 2025-02-04 DIAGNOSIS — I10 ESSENTIAL HYPERTENSION, BENIGN: Primary | ICD-10-CM

## 2025-02-04 DIAGNOSIS — D64.9 ANEMIA, UNSPECIFIED TYPE: ICD-10-CM

## 2025-02-04 DIAGNOSIS — I50.32 DIASTOLIC CHF, CHRONIC (HCC): ICD-10-CM

## 2025-02-04 DIAGNOSIS — R23.8 PAPULE: ICD-10-CM

## 2025-02-04 DIAGNOSIS — Z00.00 MEDICARE ANNUAL WELLNESS VISIT, SUBSEQUENT: ICD-10-CM

## 2025-02-04 DIAGNOSIS — E78.2 MIXED HYPERLIPIDEMIA: ICD-10-CM

## 2025-02-04 PROCEDURE — 99214 OFFICE O/P EST MOD 30 MIN: CPT | Performed by: INTERNAL MEDICINE

## 2025-02-04 RX ORDER — METOPROLOL SUCCINATE 25 MG/1
25 TABLET, EXTENDED RELEASE ORAL NIGHTLY
Qty: 90 TABLET | Refills: 1 | Status: SHIPPED | OUTPATIENT
Start: 2025-02-04

## 2025-02-04 SDOH — ECONOMIC STABILITY: FOOD INSECURITY: WITHIN THE PAST 12 MONTHS, THE FOOD YOU BOUGHT JUST DIDN'T LAST AND YOU DIDN'T HAVE MONEY TO GET MORE.: NEVER TRUE

## 2025-02-04 SDOH — ECONOMIC STABILITY: FOOD INSECURITY: WITHIN THE PAST 12 MONTHS, YOU WORRIED THAT YOUR FOOD WOULD RUN OUT BEFORE YOU GOT MONEY TO BUY MORE.: NEVER TRUE

## 2025-02-04 ASSESSMENT — PATIENT HEALTH QUESTIONNAIRE - PHQ9
SUM OF ALL RESPONSES TO PHQ9 QUESTIONS 1 & 2: 0
SUM OF ALL RESPONSES TO PHQ QUESTIONS 1-9: 0
2. FEELING DOWN, DEPRESSED OR HOPELESS: NOT AT ALL
SUM OF ALL RESPONSES TO PHQ QUESTIONS 1-9: 0
1. LITTLE INTEREST OR PLEASURE IN DOING THINGS: NOT AT ALL
SUM OF ALL RESPONSES TO PHQ QUESTIONS 1-9: 0
SUM OF ALL RESPONSES TO PHQ QUESTIONS 1-9: 0

## 2025-02-04 ASSESSMENT — LIFESTYLE VARIABLES
HOW OFTEN DO YOU HAVE A DRINK CONTAINING ALCOHOL: NEVER
HOW MANY STANDARD DRINKS CONTAINING ALCOHOL DO YOU HAVE ON A TYPICAL DAY: PATIENT DOES NOT DRINK

## 2025-02-04 NOTE — PROGRESS NOTES
Medicare Annual Wellness Visit    Ketan Pritchett is here for Medicare AWV    Assessment & Plan   Essential hypertension, benign  -      DIABETES FOOT EXAM  -     Hemoglobin A1C; Future  -     Comprehensive Metabolic Panel; Future  -     CBC; Future  -     TSH; Future  Diastolic CHF, chronic ([facility])  Chronic bronchitis, unspecified chronic bronchitis type ([facility])  Type 2 diabetes mellitus with diabetic neuropathy, without long-term current use of insulin ([facility])  Stage 3b chronic kidney disease ([facility])  Coronary artery disease involving native coronary artery of native heart without angina pectoris  Recurrent depression ([facility])  Neuropathy  Acquired hypothyroidism  -      DIABETES FOOT EXAM  -     Hemoglobin A1C; Future  -     Comprehensive Metabolic Panel; Future  -     CBC; Future  -     TSH; Future  VANESSA (obstructive sleep apnea)  Type 2 diabetes mellitus with nephropathy ([facility])  -      DIABETES FOOT EXAM  -     Hemoglobin A1C; Future  -     Comprehensive Metabolic Panel; Future  -     CBC; Future  -     TSH; Future  Age-related osteoporosis without current pathological fracture  Mixed hyperlipidemia  PVD (peripheral vascular disease) ([facility])  Gastroesophageal reflux disease without esophagitis  Anemia, unspecified type  -      DIABETES FOOT EXAM  -     Hemoglobin A1C; Future  -     Comprehensive Metabolic Panel; Future  -     CBC; Future  -     TSH; Future  Medicare annual wellness visit, subsequent  Recurrent major depressive disorder, in partial remission ([facility])  Primary insomnia  Papule        Return in about 3 months (around 5/4/2025).     Subjective       Patient's complete Health Risk Assessment and screening values have been reviewed and are found in Flowsheets. The following problems were reviewed today and where indicated follow up appointments were made and/or referrals ordered.    Positive Risk Factor Screenings with Interventions:    Fall Risk:  Do you feel unsteady or are you worried about falling? : (!) 
\"Have you been to the ER, urgent care clinic since your last visit?  Hospitalized since your last visit?\"    NO    “Have you seen or consulted any other health care providers outside our system since your last visit?”    Cardiology 10/2024, Vascular sx, Ortho, eye specialist      “Have you had a diabetic eye exam?”    NO     Date of last diabetic eye exam: 6/6/2022          
Breath sounds: Normal breath sounds. No wheezing.   Musculoskeletal:         General: No swelling, tenderness or deformity.      Cervical back: Normal range of motion and neck supple. No tenderness.      Right lower leg: No edema.      Left lower leg: No edema.   Feet:      Comments: Sensory exam of the foot is normal, tested with the monofilament. Good pulses, no lesions or ulcers, good peripheral pulses.    Lymphadenopathy:      Cervical: No cervical adenopathy.   Skin:     General: Skin is warm and dry.      Findings: No erythema.   Neurological:      General: No focal deficit present.      Mental Status: She is oriented to person, place, and time. Mental status is at baseline.      Gait: Gait abnormal (ambulates with cane).   Psychiatric:         Mood and Affect: Mood normal.           ASSESSMENT and PLAN   Diagnosis Orders   1. Essential hypertension, benign  HM DIABETES FOOT EXAM    Hemoglobin A1C    Comprehensive Metabolic Panel     Continues on norvasc 10 mg and toprol XL 25mg daily     Controlled on current regimen no change CBC    TSH      2. Diastolic CHF, chronic ([facility])       Up-to-date with cardiology medically managed no active signs or symptoms of CHF     3. Chronic bronchitis, unspecified chronic bronchitis type ([facility])         Previously follows with pulmonary using Advair as needed stable       4. Type 2 diabetes mellitus with diabetic neuropathy, without long-term current use of insulin ([facility])         On insulin mix 7030    Takes 18 units twice daily    Metformin thousand twice daily    No readings overall good no hypoglycemia    Given advanced age and risk for hypoglycemia goal A1c under 8 closer to 7.5 better    She is around her goal no additional changes today there was a slight worsening over the holidays she plans to getting back on track with diet     5. Stage 3b chronic kidney disease ([facility])         Stable kidney function on labs creatinine runs around 1.2-1.3 baseline continue to monitor

## 2025-02-05 RX ORDER — SERTRALINE HYDROCHLORIDE 25 MG/1
25 TABLET, FILM COATED ORAL DAILY
Qty: 90 TABLET | Refills: 0 | Status: SHIPPED | OUTPATIENT
Start: 2025-02-05

## 2025-02-05 NOTE — TELEPHONE ENCOUNTER
Pt will not get the medication in time at mail order       Also needed is a small script to go to local pharm.    Send to [facility] [employer] [facility] #[phone]    BE SURE TO SEND TO BOTH PHARMS.

## 2025-02-06 RX ORDER — SERTRALINE HYDROCHLORIDE 25 MG/1
25 TABLET, FILM COATED ORAL DAILY
Qty: 30 TABLET | Refills: 0 | Status: SHIPPED | OUTPATIENT
Start: 2025-02-06

## 2025-02-06 NOTE — TELEPHONE ENCOUNTER
Future Appointments:  Future Appointments   Date Time Provider Department Center   2/11/2025  2:20 PM Marina Owens MD [facility]   5/6/2025 12:00 PM Kristel Farnsworth MD [facility]        Last Appointment With Me:  2/4/2025     Requested Prescriptions     Pending Prescriptions Disp Refills    sertraline (ZOLOFT) 25 MG tablet 30 tablet 0     Sig: Take 1 tablet by mouth daily

## 2025-03-27 ENCOUNTER — TELEPHONE (OUTPATIENT)
Age: 85
End: 2025-03-27

## 2025-03-27 NOTE — TELEPHONE ENCOUNTER
Caller:   Adeline with [facility]  Phone [phone]  Fax [phone]    Subject:   diabetic supplies     States:   Pt due for renewal  Faxing renewal order to [facility], provided with main fax number.      Follow up:  Complete and return.

## 2025-04-02 ENCOUNTER — TELEPHONE (OUTPATIENT)
Age: 85
End: 2025-04-02

## 2025-04-02 DIAGNOSIS — G62.9 NEUROPATHY: Primary | ICD-10-CM

## 2025-04-02 NOTE — TELEPHONE ENCOUNTER
gabapentin (NEURONTIN) 100 MG capsule ()    Refill LOCAL PHARM.    Send to [facility] #[phone]    This is a 14 day supply due to mail order

## 2025-04-03 RX ORDER — GABAPENTIN 100 MG/1
100 CAPSULE ORAL NIGHTLY PRN
Qty: 90 CAPSULE | Refills: 0 | Status: SHIPPED | OUTPATIENT
Start: 2025-04-03 | End: 2025-07-02

## 2025-04-03 RX ORDER — EZETIMIBE 10 MG/1
10 TABLET ORAL DAILY
Qty: 90 TABLET | Refills: 0 | Status: SHIPPED | OUTPATIENT
Start: 2025-04-03

## 2025-04-07 DIAGNOSIS — G62.9 NEUROPATHY: ICD-10-CM

## 2025-04-07 RX ORDER — GABAPENTIN 100 MG/1
CAPSULE ORAL
Qty: 360 CAPSULE | Refills: 1 | Status: SHIPPED | OUTPATIENT
Start: 2025-04-07 | End: 2025-07-04

## 2025-04-07 RX ORDER — GABAPENTIN 100 MG/1
CAPSULE ORAL
Qty: 28 CAPSULE | Refills: 0 | Status: SHIPPED | OUTPATIENT
Start: 2025-04-07 | End: 2025-05-09

## 2025-04-07 NOTE — TELEPHONE ENCOUNTER
Pt was to have gotten a 14 day supply of the gabapentin to be sent to [facility] in which she uses.     This went to mail order.  She is waiting on mail order.  Pt is asking why this keeps happening?    Pt needs this to go to [facility] yet today please.  #[phone]    Please call pt so she knows when to .    Detail Level: Detailed General Sunscreen Counseling: I recommended a broad spectrum sunscreen with a SPF of 30 or higher.  I explained that SPF 30 sunscreens block approximately 97 percent of the sun's harmful rays.  Sunscreens should be applied at least 15 minutes prior to expected sun exposure and then every 2 hours after that as long as sun exposure continues. If swimming or exercising sunscreen should be reapplied every 45 minutes to an hour after getting wet or sweating.  One ounce, or the equivalent of a shot glass full of sunscreen, is adequate to protect the skin not covered by a bathing suit. I also recommended a lip balm with a sunscreen as well. Sun protective clothing can be used in lieu of sunscreen but must be worn the entire time you are exposed to the sun's rays.

## 2025-04-07 NOTE — TELEPHONE ENCOUNTER
Called, Spoke with Pt  Received two pt identifiers  Confirmed with pt she is taking noa 100mg capsule 2 caps BID   Advised pt each script recently was not the correct one  Pt states would like to get these corrected  Advised pt corrected [facility] day [facility] and [facility] day supply [facility]  Pt verbalizes understanding of the instructions and has no further questions at this time.

## 2025-04-22 RX ORDER — SERTRALINE HYDROCHLORIDE 25 MG/1
25 TABLET, FILM COATED ORAL DAILY
Qty: 90 TABLET | Refills: 0 | Status: SHIPPED | OUTPATIENT
Start: 2025-04-22

## 2025-04-29 ENCOUNTER — LAB (OUTPATIENT)
Age: 85
End: 2025-04-29

## 2025-04-29 DIAGNOSIS — E11.21 TYPE 2 DIABETES MELLITUS WITH NEPHROPATHY (HCC): ICD-10-CM

## 2025-04-29 DIAGNOSIS — E03.9 ACQUIRED HYPOTHYROIDISM: ICD-10-CM

## 2025-04-29 DIAGNOSIS — D64.9 ANEMIA, UNSPECIFIED TYPE: ICD-10-CM

## 2025-04-29 DIAGNOSIS — I10 ESSENTIAL HYPERTENSION, BENIGN: ICD-10-CM

## 2025-04-29 LAB
ALBUMIN SERPL-MCNC: 3.7 G/DL (ref 3.5–5)
ALBUMIN/GLOB SERPL: 1.1 (ref 1.1–2.2)
ALP SERPL-CCNC: 74 U/L (ref 45–117)
ALT SERPL-CCNC: 21 U/L (ref 12–78)
ANION GAP SERPL CALC-SCNC: 5 MMOL/L (ref 2–12)
AST SERPL-CCNC: 18 U/L (ref 15–37)
BILIRUB SERPL-MCNC: 0.8 MG/DL (ref 0.2–1)
BUN SERPL-MCNC: 22 MG/DL (ref 6–20)
BUN/CREAT SERPL: 17 (ref 12–20)
CALCIUM SERPL-MCNC: 10.1 MG/DL (ref 8.5–10.1)
CHLORIDE SERPL-SCNC: 109 MMOL/L (ref 97–108)
CO2 SERPL-SCNC: 27 MMOL/L (ref 21–32)
CREAT SERPL-MCNC: 1.27 MG/DL (ref 0.55–1.02)
ERYTHROCYTE [DISTWIDTH] IN BLOOD BY AUTOMATED COUNT: 14.7 % (ref 11.5–14.5)
EST. AVERAGE GLUCOSE BLD GHB EST-MCNC: 157 MG/DL
GLOBULIN SER CALC-MCNC: 3.4 G/DL (ref 2–4)
GLUCOSE SERPL-MCNC: 87 MG/DL (ref 65–100)
HBA1C MFR BLD: 7.1 % (ref 4–5.6)
HCT VFR BLD AUTO: 39.8 % (ref 35–47)
HGB BLD-MCNC: 12.6 G/DL (ref 11.5–16)
MCH RBC QN AUTO: 27.8 PG (ref 26–34)
MCHC RBC AUTO-ENTMCNC: 31.7 G/DL (ref 30–36.5)
MCV RBC AUTO: 87.7 FL (ref 80–99)
NRBC # BLD: 0 K/UL (ref 0–0.01)
NRBC BLD-RTO: 0 PER 100 WBC
PLATELET # BLD AUTO: 172 K/UL (ref 150–400)
POTASSIUM SERPL-SCNC: 4.4 MMOL/L (ref 3.5–5.1)
PROT SERPL-MCNC: 7.1 G/DL (ref 6.4–8.2)
RBC # BLD AUTO: 4.54 M/UL (ref 3.8–5.2)
SODIUM SERPL-SCNC: 141 MMOL/L (ref 136–145)
TSH SERPL DL<=0.05 MIU/L-ACNC: 3.68 UIU/ML (ref 0.36–3.74)
WBC # BLD AUTO: 9 K/UL (ref 3.6–11)

## 2025-04-30 ENCOUNTER — RESULTS FOLLOW-UP (OUTPATIENT)
Age: 85
End: 2025-04-30

## 2025-05-06 ENCOUNTER — OFFICE VISIT (OUTPATIENT)
Age: 85
End: 2025-05-06
Payer: MEDICARE

## 2025-05-06 VITALS
HEIGHT: 62 IN | BODY MASS INDEX: 31.35 KG/M2 | TEMPERATURE: 97.7 F | WEIGHT: 170.38 LBS | DIASTOLIC BLOOD PRESSURE: 66 MMHG | OXYGEN SATURATION: 96 % | HEART RATE: 73 BPM | SYSTOLIC BLOOD PRESSURE: 131 MMHG

## 2025-05-06 DIAGNOSIS — I50.32 DIASTOLIC CHF, CHRONIC (HCC): ICD-10-CM

## 2025-05-06 DIAGNOSIS — F33.9 RECURRENT DEPRESSION: ICD-10-CM

## 2025-05-06 DIAGNOSIS — E11.21 TYPE 2 DIABETES MELLITUS WITH NEPHROPATHY (HCC): ICD-10-CM

## 2025-05-06 DIAGNOSIS — R19.7 DIARRHEA, UNSPECIFIED TYPE: ICD-10-CM

## 2025-05-06 DIAGNOSIS — K21.9 GASTROESOPHAGEAL REFLUX DISEASE WITHOUT ESOPHAGITIS: ICD-10-CM

## 2025-05-06 DIAGNOSIS — I73.9 PVD (PERIPHERAL VASCULAR DISEASE): ICD-10-CM

## 2025-05-06 DIAGNOSIS — M81.0 AGE-RELATED OSTEOPOROSIS WITHOUT CURRENT PATHOLOGICAL FRACTURE: ICD-10-CM

## 2025-05-06 DIAGNOSIS — D64.9 ANEMIA, UNSPECIFIED TYPE: ICD-10-CM

## 2025-05-06 DIAGNOSIS — G47.33 OSA (OBSTRUCTIVE SLEEP APNEA): ICD-10-CM

## 2025-05-06 DIAGNOSIS — G62.9 NEUROPATHY: ICD-10-CM

## 2025-05-06 DIAGNOSIS — N18.32 STAGE 3B CHRONIC KIDNEY DISEASE (HCC): ICD-10-CM

## 2025-05-06 DIAGNOSIS — E03.9 ACQUIRED HYPOTHYROIDISM: ICD-10-CM

## 2025-05-06 DIAGNOSIS — I25.10 CORONARY ARTERY DISEASE INVOLVING NATIVE CORONARY ARTERY OF NATIVE HEART WITHOUT ANGINA PECTORIS: ICD-10-CM

## 2025-05-06 DIAGNOSIS — E11.40 TYPE 2 DIABETES MELLITUS WITH DIABETIC NEUROPATHY, WITH LONG-TERM CURRENT USE OF INSULIN (HCC): ICD-10-CM

## 2025-05-06 DIAGNOSIS — J44.9 CHRONIC OBSTRUCTIVE PULMONARY DISEASE, UNSPECIFIED COPD TYPE (HCC): ICD-10-CM

## 2025-05-06 DIAGNOSIS — I10 ESSENTIAL HYPERTENSION, BENIGN: Primary | ICD-10-CM

## 2025-05-06 DIAGNOSIS — E78.2 MIXED HYPERLIPIDEMIA: ICD-10-CM

## 2025-05-06 DIAGNOSIS — Z79.4 TYPE 2 DIABETES MELLITUS WITH DIABETIC NEUROPATHY, WITH LONG-TERM CURRENT USE OF INSULIN (HCC): ICD-10-CM

## 2025-05-06 PROCEDURE — 1123F ACP DISCUSS/DSCN MKR DOCD: CPT | Performed by: INTERNAL MEDICINE

## 2025-05-06 PROCEDURE — 3078F DIAST BP <80 MM HG: CPT | Performed by: INTERNAL MEDICINE

## 2025-05-06 PROCEDURE — 99214 OFFICE O/P EST MOD 30 MIN: CPT | Performed by: INTERNAL MEDICINE

## 2025-05-06 PROCEDURE — 1159F MED LIST DOCD IN RCRD: CPT | Performed by: INTERNAL MEDICINE

## 2025-05-06 PROCEDURE — 3051F HG A1C>EQUAL 7.0%<8.0%: CPT | Performed by: INTERNAL MEDICINE

## 2025-05-06 PROCEDURE — 3075F SYST BP GE 130 - 139MM HG: CPT | Performed by: INTERNAL MEDICINE

## 2025-05-06 PROCEDURE — 1160F RVW MEDS BY RX/DR IN RCRD: CPT | Performed by: INTERNAL MEDICINE

## 2025-05-06 ASSESSMENT — PATIENT HEALTH QUESTIONNAIRE - PHQ9
SUM OF ALL RESPONSES TO PHQ QUESTIONS 1-9: 0
2. FEELING DOWN, DEPRESSED OR HOPELESS: NOT AT ALL
SUM OF ALL RESPONSES TO PHQ QUESTIONS 1-9: 0
SUM OF ALL RESPONSES TO PHQ QUESTIONS 1-9: 0
1. LITTLE INTEREST OR PLEASURE IN DOING THINGS: NOT AT ALL
SUM OF ALL RESPONSES TO PHQ QUESTIONS 1-9: 0

## 2025-05-08 NOTE — PROGRESS NOTES
Have you been to the ER, urgent care clinic since your last visit?  Hospitalized since your last visit?   ER    Have you seen or consulted any other health care providers outside our system since your last visit?   NO      “Have you had a diabetic eye exam?”    NO     Date of last diabetic eye exam: 6/6/2022          
Medical records requested.  
2 diabetes mellitus with nephropathy (HCC)    Recurrent depression    PVD (peripheral vascular disease)    Diverticulosis of intestine with bleeding    CAD (coronary artery disease)    Osteoporosis    Anemia    Mixed hyperlipidemia    Diastolic CHF, chronic ([facility])    S/P CABG x 3    Type 2 diabetes mellitus with diabetic neuropathy ([facility])    Chronic obstructive pulmonary disease, unspecified ([facility])    Stage 3b chronic kidney disease ([facility])    VANESSA (obstructive sleep apnea)    Gastroesophageal reflux disease without esophagitis    Neuropathy     Current Outpatient Medications   Medication Sig Dispense Refill    sertraline (ZOLOFT) 25 MG tablet TAKE 1 TABLET DAILY 90 tablet 0    gabapentin (NEURONTIN) 100 MG capsule TAKE 2 CAPSULES TWICE DAILY 360 capsule 1    gabapentin (NEURONTIN) 100 MG capsule TAKE 2 CAPSULES TWICE DAILY 28 capsule 0    ezetimibe (ZETIA) 10 MG tablet Take 1 tablet by mouth daily 90 tablet 0    sertraline (ZOLOFT) 25 MG tablet Take 1 tablet by mouth daily 30 tablet 0    metoprolol succinate (TOPROL XL) 25 MG extended release tablet Take 1 tablet by mouth nightly 90 tablet 1    NOVOLOG MIX 70/30 (70-30) 100 UNIT/ML injection INJECT 18 UNITS INTO THE SKIN 2 TIMES DAILY (WITH MEALS) 10 mL 0    levothyroxine (SYNTHROID) 100 MCG tablet TAKE 1 TABLET EVERY DAY BEFORE BREAKFAST 90 tablet 3    omeprazole (PRILOSEC) 20 MG delayed release capsule TAKE 1 CAPSULE EVERY DAY 90 capsule 3    atorvastatin (LIPITOR) 80 MG tablet TAKE 1 TABLET EVERY NIGHT 90 tablet 3    metFORMIN (GLUCOPHAGE) 1000 MG tablet TAKE 1 TABLET TWICE DAILY WITH MEALS 180 tablet 1    albuterol sulfate HFA (PROVENTIL;VENTOLIN;PROAIR) 108 (90 Base) MCG/ACT inhaler INHALE 1 PUFF EVERY 6 HOURS AS NEEDED FOR WHEEZING 1 each 3    amLODIPine (NORVASC) 10 MG tablet TAKE 1 TABLET EVERY DAY 90 tablet 3    aspirin 81 MG chewable tablet Take by mouth daily      vitamin D 25 MCG (1000 UT) CAPS Take by mouth daily      cyanocobalamin 1000 MCG tablet Take by

## 2025-06-17 RX ORDER — EZETIMIBE 10 MG/1
10 TABLET ORAL DAILY
Qty: 90 TABLET | Refills: 3 | Status: SHIPPED | OUTPATIENT
Start: 2025-06-17

## 2025-06-18 NOTE — TELEPHONE ENCOUNTER
Future Appointments:  Future Appointments   Date Time Provider Department Center   7/8/2025 11:40 AM Marina Owens MD [facility]   8/19/2025 12:00 PM [facility]        Last Appointment With Me:  5/6/2025     Requested Prescriptions     Pending Prescriptions Disp Refills    metFORMIN (GLUCOPHAGE) 1000 MG tablet 180 tablet 1     Sig: Take 1 tablet by mouth 2 times daily (with meals)

## 2025-06-25 ENCOUNTER — TRANSCRIBE ORDERS (OUTPATIENT)
Facility: HOSPITAL | Age: 85
End: 2025-06-25

## 2025-06-25 DIAGNOSIS — Z12.31 ENCOUNTER FOR SCREENING MAMMOGRAM FOR HIGH-RISK PATIENT: Primary | ICD-10-CM

## 2025-07-08 ENCOUNTER — CLINICAL DOCUMENTATION (OUTPATIENT)
Age: 85
End: 2025-07-08

## 2025-07-08 ENCOUNTER — OFFICE VISIT (OUTPATIENT)
Age: 85
End: 2025-07-08
Payer: MEDICARE

## 2025-07-08 VITALS
OXYGEN SATURATION: 93 % | HEART RATE: 60 BPM | DIASTOLIC BLOOD PRESSURE: 68 MMHG | BODY MASS INDEX: 31.14 KG/M2 | SYSTOLIC BLOOD PRESSURE: 118 MMHG | HEIGHT: 62 IN | TEMPERATURE: 98 F | WEIGHT: 169.2 LBS

## 2025-07-08 DIAGNOSIS — I10 ESSENTIAL (PRIMARY) HYPERTENSION: ICD-10-CM

## 2025-07-08 DIAGNOSIS — G47.33 OBSTRUCTIVE SLEEP APNEA (ADULT) (PEDIATRIC): Primary | ICD-10-CM

## 2025-07-08 PROCEDURE — 1160F RVW MEDS BY RX/DR IN RCRD: CPT | Performed by: INTERNAL MEDICINE

## 2025-07-08 PROCEDURE — 3074F SYST BP LT 130 MM HG: CPT | Performed by: INTERNAL MEDICINE

## 2025-07-08 PROCEDURE — 99214 OFFICE O/P EST MOD 30 MIN: CPT | Performed by: INTERNAL MEDICINE

## 2025-07-08 PROCEDURE — 1159F MED LIST DOCD IN RCRD: CPT | Performed by: INTERNAL MEDICINE

## 2025-07-08 PROCEDURE — 1123F ACP DISCUSS/DSCN MKR DOCD: CPT | Performed by: INTERNAL MEDICINE

## 2025-07-08 PROCEDURE — 3078F DIAST BP <80 MM HG: CPT | Performed by: INTERNAL MEDICINE

## 2025-07-08 ASSESSMENT — SLEEP AND FATIGUE QUESTIONNAIRES
HOW LIKELY ARE YOU TO NOD OFF OR FALL ASLEEP WHILE SITTING INACTIVE IN A PUBLIC PLACE: WOULD NEVER DOZE
HOW LIKELY ARE YOU TO NOD OFF OR FALL ASLEEP WHILE SITTING AND TALKING TO SOMEONE: WOULD NEVER DOZE
HOW LIKELY ARE YOU TO NOD OFF OR FALL ASLEEP WHILE SITTING QUIETLY AFTER LUNCH WITHOUT ALCOHOL: WOULD NEVER DOZE
HOW LIKELY ARE YOU TO NOD OFF OR FALL ASLEEP WHEN YOU ARE A PASSENGER IN A CAR FOR AN HOUR WITHOUT A BREAK: WOULD NEVER DOZE
HOW LIKELY ARE YOU TO NOD OFF OR FALL ASLEEP WHILE LYING DOWN TO REST IN THE AFTERNOON WHEN CIRCUMSTANCES PERMIT: WOULD NEVER DOZE
ESS TOTAL SCORE: 2
HOW LIKELY ARE YOU TO NOD OFF OR FALL ASLEEP IN A CAR, WHILE STOPPED FOR A FEW MINUTES IN TRAFFIC: WOULD NEVER DOZE
HOW LIKELY ARE YOU TO NOD OFF OR FALL ASLEEP WHILE WATCHING TV: MODERATE CHANCE OF DOZING
HOW LIKELY ARE YOU TO NOD OFF OR FALL ASLEEP WHILE SITTING AND READING: WOULD NEVER DOZE

## 2025-07-08 NOTE — PROGRESS NOTES
unspecified (HCC), Chronic renal disease, stage III (HCC), Congestive heart failure (HCC), Diabetes (HCC), Diverticulosis, GERD (gastroesophageal reflux disease), Heart disease, Heart failure (HCC), High cholesterol, Hypertension, Hypothyroidism, Kidney stones, Menopause, Myocardial infarction (HCC), Postsurgical percutaneous transluminal coronary angioplasty status, S/P CABG x 3, Sleeping difficulty, Teeth decayed, and Weakness.        7/8/2025    11:40 AM 2/2/2024    11:45 AM 7/5/2022     2:00 PM 7/1/2021     1:00 PM   Sleep Medicine   Sitting and reading 0 1 2 0   Watching TV 2 1 3 2   Sitting, inactive in a public place (e.g. a theatre or a meeting) 0 1 2 0   As a passenger in a car for an hour without a break 0 0 2 0   Lying down to rest in the afternoon when circumstances permit 0 2 1 2   Sitting and talking to someone 0 0 1 0   Sitting quietly after a lunch without alcohol 0 1 0 0   In a car, while stopped for a few minutes in traffic 0 0 0 0   [place] Sleepiness Score 2 6 11 4    which reflect improved sleep quality over therapy time.    O>    /68 (BP Site: Left Upper Arm, Patient Position: Sitting, BP Cuff Size: Medium Adult)   Pulse 60   Temp 98 °F (36.7 °C) (Temporal)   Ht 1.575 m (5' 2\")   Wt 76.7 kg (169 lb 3.2 oz)   SpO2 93%   BMI 30.95 kg/m²         General:   Alert, oriented, not in distress   Neck:   No JVD    Chest/Lungs:  symetrical lung expansion , no accessory muscle use    Extremities:  no obvious rashes , negative edema    Neuro:  No focal deficits ; No obvious tremor    Psych:  Normal affect ,  Normal countenance ;         A>   Diagnosis Orders   1. Obstructive sleep apnea (adult) (pediatric)  DME Order for (Specify) as OP      2. Essential (primary) hypertension          AHI = 6.4(1/2021).  On Resmed BiPAP :  Max IPAP 13, Min EPAP 6, PS 5 cmH2O. Set up 4/29/2021.     Compliant:      yes    Therapeutic Response:  Positive    P>     Sleep apnea appears well controlled. Patient

## 2025-07-08 NOTE — PATIENT INSTRUCTIONS
causing factor in more than 100,000 police reported crashes annually, resulting in 76,000 injuries and 1,500 deaths. Other surveys suggest 55% of people polled have driven while drowsy in the past year, 23% had fallen asleep but not crashed, 3% crashed, and 2% had and accident due to drowsy driving.  Who is at risk?   Young Drivers: One study of drowsy driving accidents states that 55% of the drivers were under 25 years. Of those, 75% were male.   Shift Workers and Travelers: People who work overnight or travel across time zones frequently are at higher risk of experiencing Circadian Rhythm Disorders. They are trying to work and function when their body is programed to sleep.   Sleep Deprived: Lack of sleep has a serious impact on your ability to pay attention or focus on a task. Consistently getting less than the average of 8 hours your body needs creates partial or cumulative sleep deprivation.   Untreated Sleep Disorders: Sleep Apnea, Narcolepsy, R.L.S., and other sleep disorders (untreated) prevent a person from getting enough restful sleep. This leads to excessive daytime sleepiness and increases the risk for drowsy driving accidents by up to 7 times.  Medications / Alcohol: Even over the counter medications can cause drowsiness. Medications that impair a drivers attention should have a warning label. Alcohol naturally makes you sleepy and on its own can cause accidents. Combined with excessive drowsiness its effects are amplified.   Signs of Drowsy Driving:   * You don't remember driving the last few miles   * You may drift out of your lucio   * You are unable to focus and your thoughts wander   * You may yawn more often than normal   * You have difficulty keeping your eyes open / nodding off   * Missing traffic signs, speeding, or tailgating  Prevention-   Good sleep hygiene, lifestyle and behavioral choices have the most impact on drowsy driving. There is no substitute for sleep and the average person

## 2025-08-08 ENCOUNTER — TELEPHONE (OUTPATIENT)
Age: 85
End: 2025-08-08

## 2025-08-26 RX ORDER — LEVOTHYROXINE SODIUM 100 UG/1
100 TABLET ORAL DAILY
Qty: 90 TABLET | Refills: 0 | Status: SHIPPED | OUTPATIENT
Start: 2025-08-26

## 2025-08-26 RX ORDER — AMLODIPINE BESYLATE 10 MG/1
10 TABLET ORAL DAILY
Qty: 90 TABLET | Refills: 0 | Status: SHIPPED | OUTPATIENT
Start: 2025-08-26

## 2025-08-29 ENCOUNTER — TELEPHONE (OUTPATIENT)
Age: 85
End: 2025-08-29

## 2025-09-01 RX ORDER — SERTRALINE HYDROCHLORIDE 25 MG/1
25 TABLET, FILM COATED ORAL DAILY
Qty: 90 TABLET | Refills: 3 | Status: SHIPPED | OUTPATIENT
Start: 2025-09-01

## 2025-09-01 RX ORDER — METOPROLOL SUCCINATE 25 MG/1
25 TABLET, EXTENDED RELEASE ORAL NIGHTLY
Qty: 90 TABLET | Refills: 0 | Status: SHIPPED | OUTPATIENT
Start: 2025-09-01

## (undated) DEVICE — NEONATAL-ADULT SPO2 SENSOR: Brand: NELLCOR

## (undated) DEVICE — ZIMMER® STERILE DISPOSABLE TOURNIQUET CUFF WITH PLC, DUAL PORT, SINGLE BLADDER, 18 IN. (46 CM)

## (undated) DEVICE — SYR 3ML LL TIP 1/10ML GRAD --

## (undated) DEVICE — SOLUTION LACTATED RINGERS INJECTION USP

## (undated) DEVICE — TOWEL 4 PLY TISS 19X30 SUE WHT

## (undated) DEVICE — 1200 GUARD II KIT W/5MM TUBE W/O VAC TUBE: Brand: GUARDIAN

## (undated) DEVICE — INFECTION CONTROL KIT SYS

## (undated) DEVICE — BIPOLAR FORCEPS CORD: Brand: VALLEYLAB

## (undated) DEVICE — SET ADMIN 16ML TBNG L100IN 2 Y INJ SITE IV PIGGY BK DISP

## (undated) DEVICE — Device

## (undated) DEVICE — YANKAUER,TAPERED BULBOUS TIP,W/O VENT: Brand: MEDLINE

## (undated) DEVICE — Z DISCONTINUED PER MEDLINE LINE GAS SAMPLING O2/CO2 LNG AD 13 FT NSL W/ TBNG FILTERLINE

## (undated) DEVICE — SOLUTION IV 1000ML 0.9% SOD CHL

## (undated) DEVICE — 3M™ TEGADERM™ TRANSPARENT FILM DRESSING FRAME STYLE, 1624W, 2-3/8 IN X 2-3/4 IN (6 CM X 7 CM), 100/CT 4CT/CASE: Brand: 3M™ TEGADERM™

## (undated) DEVICE — STERILE POLYISOPRENE POWDER-FREE SURGICAL GLOVES: Brand: PROTEXIS

## (undated) DEVICE — BLOCK BITE ENDOSCP AD 21 MM W/ DIL BLU LF DISP

## (undated) DEVICE — KENDALL DL ECG CABLE AND LEAD WIRE SYSTEM, 3-LEAD, SINGLE PATIENT USE: Brand: KENDALL

## (undated) DEVICE — STERILE POLYISOPRENE POWDER-FREE SURGICAL GLOVES WITH EMOLLIENT COATING: Brand: PROTEXIS

## (undated) DEVICE — CONTINU-FLO SOLUTION SET, 2 INJECTION SITES, MALE LUER LOCK ADAPTER WITH RETRACTABLE COLLAR, LARGE BORE STOPCOCK WITH ROTATING MALE LUER LOCK EXTENSION SET, 2 INJECTION SITES, MALE LUER LOCK ADAPTER WITH RETRACTABLE COLLAR: Brand: INTERLINK/CONTINU-FLO

## (undated) DEVICE — SOLIDIFIER MEDC 1200ML -- CONVERT TO 356117

## (undated) DEVICE — SUT ETHLN 3-0 18IN PS2 BLK --

## (undated) DEVICE — NEEDLE HYPO 18GA L1.5IN PNK S STL HUB POLYPR SHLD REG BVL

## (undated) DEVICE — BASIN EMSIS 16OZ GRAPHITE PLAS KID SHP MOLD GRAD FOR ORAL

## (undated) DEVICE — COVER LT HNDL PLAS RIG 1 PER PK

## (undated) DEVICE — (D)PREP SKN CHLRAPRP APPL 26ML -- CONVERT TO ITEM 371833

## (undated) DEVICE — CATH IV AUTOGRD BC PNK 20GA 25 -- INSYTE

## (undated) DEVICE — SYR 10ML LUER LOK 1/5ML GRAD --

## (undated) DEVICE — HAND I-LF: Brand: MEDLINE INDUSTRIES, INC.

## (undated) DEVICE — ELECTRODE,RADIOTRANSLUCENT,FOAM,5PK: Brand: MEDLINE

## (undated) DEVICE — KENDALL RADIOLUCENT FOAM MONITORING ELECTRODE RECTANGULAR SHAPE: Brand: KENDALL

## (undated) DEVICE — DRESSING,GAUZE,XEROFORM,CURAD,1"X8",ST: Brand: CURAD

## (undated) DEVICE — SOLIDIFIER FLD 2OZ 1500CC N DISINF IN BTL DISP SAFESORB

## (undated) DEVICE — BNDG ELAS HK LOOP 2X5YD NS -- MATRIX

## (undated) DEVICE — SET SATELLIT SUPERNO2VA AD MED --